# Patient Record
Sex: MALE | Race: WHITE | Employment: OTHER | URBAN - METROPOLITAN AREA
[De-identification: names, ages, dates, MRNs, and addresses within clinical notes are randomized per-mention and may not be internally consistent; named-entity substitution may affect disease eponyms.]

---

## 2022-04-13 ENCOUNTER — HOSPITAL ENCOUNTER (OUTPATIENT)
Dept: GENERAL RADIOLOGY | Age: 77
Discharge: HOME | End: 2022-04-13
Attending: NURSE PRACTITIONER
Payer: MEDICARE

## 2022-04-13 DIAGNOSIS — M54.16 LUMBAR RADICULOPATHY: ICD-10-CM

## 2022-04-13 PROCEDURE — 72100 X-RAY EXAM L-S SPINE 2/3 VWS: CPT

## 2022-04-13 PROCEDURE — 73502 X-RAY EXAM HIP UNI 2-3 VIEWS: CPT

## 2022-04-13 NOTE — PROGRESS NOTES
Please let patient know that his xray showed multilevel lumbar spondylosis, arthritis of the spine and I would like for him to follow with orthopedic for further evaluation if he is in agreement to this.

## 2022-04-14 NOTE — PROGRESS NOTES
Pt was notified on 04/14/2022 @ 11:08 about his xray. He would like a referral for orthopedic.     Thank you    Bradley Hernandez

## 2022-05-23 ENCOUNTER — OFFICE VISIT (OUTPATIENT)
Dept: ORTHOPEDIC SURGERY | Age: 77
End: 2022-05-23
Payer: MEDICARE

## 2022-05-23 DIAGNOSIS — M48.062 SPINAL STENOSIS OF LUMBAR REGION WITH NEUROGENIC CLAUDICATION: Primary | ICD-10-CM

## 2022-05-23 DIAGNOSIS — M51.36 DDD (DEGENERATIVE DISC DISEASE), LUMBAR: ICD-10-CM

## 2022-05-23 PROCEDURE — 64483 NJX AA&/STRD TFRM EPI L/S 1: CPT | Performed by: PHYSICAL MEDICINE & REHABILITATION

## 2022-05-23 RX ORDER — TRIAMCINOLONE ACETONIDE 40 MG/ML
80 INJECTION, SUSPENSION INTRA-ARTICULAR; INTRAMUSCULAR ONCE
Status: COMPLETED | OUTPATIENT
Start: 2022-05-23 | End: 2022-05-23

## 2022-05-23 RX ADMIN — TRIAMCINOLONE ACETONIDE 80 MG: 40 INJECTION, SUSPENSION INTRA-ARTICULAR; INTRAMUSCULAR at 14:39

## 2022-05-23 NOTE — PROGRESS NOTES
Name: Rena Bronson  YOB: 1945  Gender: male  MRN: [id number]        Transforaminal WARNER Procedure Note    Procedure: Right  L5-S1 transforaminal epidural steroid injections     Precautions: Rena Bronson denies prior sensitivity to steroid, local anesthetic, iodine, or shellfish. Consent:  Consent was obtained prior to the procedure. The procedure was discussed at length with Rena Bronson. He was given the opportunity to ask questions regarding the procedure and its associated risks. In addition to the potential risks associated with the procedure itself, the patient was informed both verbally and in writing of potential side effects of the use glucocorticoids. The patient appeared to comprehend the informed consent and desired to have the procedure performed, and informed consent was signed. He was placed in a prone position on the fluoroscopy table and the skin was prepped and draped in a routine sterile fashion. The areas to be injected were each anesthetized with 1 ml of 1% Lidocaine. A 22 gauge 3.5 inch spinal needle was carefully advanced under fluoroscopic guidance to the right L5-S1 transforaminal space  0.5 ml of 70% of Omnipaque was injected to confirm proper needle placement and absence of subdural or vascular flow Once proper placement was confirmed, 0.5ml of 0.25 marcaine and 80 mg of kenalog were injected through the spinal needle. Fluoroscopic guidance was used intermittently over a 10-minute period to insure proper needle placement and his safety. A hard copy of the fluoroscopic image has been placed in his chart and is saved on the C-arm hard drive. He was monitored for 30 minutes after the procedure and discharged home in a stable fashion with a routine follow up. Procedural Diagnosis:     ICD-10-CM    1.  Spinal stenosis of lumbar region with neurogenic claudication  M48.062 IR GUIDED INJ LUMB/SAC TRANSFORAMINAL EPI SINGLE LEVEL triamcinolone acetonide (KENALOG-40) injection 80 mg   2.  DDD (degenerative disc disease), lumbar  M51.36 IR GUIDED INJ LUMB/SAC TRANSFORAMINAL EPI SINGLE LEVEL     triamcinolone acetonide (KENALOG-40) injection 80 mg      Agapito Catalan MD  05/25/22

## 2022-06-09 ENCOUNTER — OFFICE VISIT (OUTPATIENT)
Dept: ORTHOPEDIC SURGERY | Age: 77
End: 2022-06-09
Payer: MEDICARE

## 2022-06-09 DIAGNOSIS — M43.16 SPONDYLOLISTHESIS OF LUMBAR REGION: ICD-10-CM

## 2022-06-09 DIAGNOSIS — M48.062 SPINAL STENOSIS OF LUMBAR REGION WITH NEUROGENIC CLAUDICATION: Primary | ICD-10-CM

## 2022-06-09 PROCEDURE — 99213 OFFICE O/P EST LOW 20 MIN: CPT | Performed by: NURSE PRACTITIONER

## 2022-06-09 PROCEDURE — G8428 CUR MEDS NOT DOCUMENT: HCPCS | Performed by: NURSE PRACTITIONER

## 2022-06-09 PROCEDURE — 4004F PT TOBACCO SCREEN RCVD TLK: CPT | Performed by: NURSE PRACTITIONER

## 2022-06-09 PROCEDURE — 1123F ACP DISCUSS/DSCN MKR DOCD: CPT | Performed by: NURSE PRACTITIONER

## 2022-06-09 PROCEDURE — G8421 BMI NOT CALCULATED: HCPCS | Performed by: NURSE PRACTITIONER

## 2022-06-09 NOTE — PROGRESS NOTES
Name: Farida Blanca  YOB: 1945  Gender: male  MRN: [id number]    CC: Follow-up low back and right leg pain    HPI: This is a 68y.o. year old male who returns today after lumbar injections. Patient has complaints of right L5 radiculopathy ongoing intermittently for 5 years. He has a known spondylolisthesis of L4 and L5 with severe spinal stenosis at L4-5 centrally and moderate to severe foraminal stenosis on the right at L4 and L5. Patient underwent a right L5 selective nerve root block. Conservative treatment had included physical therapy, chiropractic care, diclofenac. Percentage of pain relief: 95% relief    Activity limitation or improvement: he is able to return back to baseline activities.     Current pain level: 0       AMB PAIN ASSESSMENT 6/9/2022   Severity of Pain 0           Not on File    Current Outpatient Medications:     albuterol sulfate  (90 Base) MCG/ACT inhaler, 2 puffs as needed, Disp: , Rfl:     chlorhexidine (PERIDEX) 0.12 % solution, PLACE 15 ML IN THE MOUTH SWISH IN MOUTH FOR 30 SECONDS THEN SPIT OUT, Disp: , Rfl:     vitamin D (CHOLECALCIFEROL) 25 MCG (1000 UT) TABS tablet, Take 1,000 Units by mouth daily, Disp: , Rfl:     diclofenac (VOLTAREN) 75 MG EC tablet, Take 75 mg by mouth 2 times daily, Disp: , Rfl:     fluticasone (FLONASE) 50 MCG/ACT nasal spray, 1 spray in each nostril, Disp: , Rfl:     fluticasone-salmeterol (ADVAIR) 250-50 MCG/ACT AEPB diskus inhaler, Inhale 1 puff into the lungs, Disp: , Rfl:     hydroCHLOROthiazide (HYDRODIURIL) 12.5 MG tablet, Take 12.5 mg by mouth every other day, Disp: , Rfl:     lisinopril (PRINIVIL;ZESTRIL) 20 MG tablet, Take 20 mg by mouth daily, Disp: , Rfl:     methylPREDNISolone (MEDROL DOSEPACK) 4 MG tablet, FOLLOW PACKAGE INSTRUCTIONS, Disp: , Rfl:     metroNIDAZOLE (METROGEL) 1 % gel, Apply 1 Tube topically daily, Disp: , Rfl:     pramipexole (MIRAPEX) 0.5 MG tablet, Take 0.5 mg by mouth nightly, Disp: , Rfl:     pravastatin (PRAVACHOL) 10 MG tablet, Take 10 mg by mouth daily, Disp: , Rfl:   Past Medical History:   Diagnosis Date    Arthritis     Asthma     Hypercholesterolemia     Hypertension      Tobacco:  reports that he has never smoked. He has never used smokeless tobacco.  Alcohol:   Social History     Substance and Sexual Activity   Alcohol Use Never          Radiographic Studies:       Assessment/Plan:        ICD-10-CM    1. Spinal stenosis of lumbar region with neurogenic claudication  M48.062    2. Spondylolisthesis of lumbar region  M43.16         Almost complete resolution of right L5 radiculopathy due to severe spinal stenosis and spondylolisthesis status post selective nerve root block #1. I am very pleased with patient's results from the injection. He is very happy as well. We discussed that he may call back when his symptoms return for reinjection.    -The patient will be treated observantly with self directed symptomatic care. Instructions were given to follow up if there is any neurologic or functional decline. No orders of the defined types were placed in this encounter. No orders of the defined types were placed in this encounter. 3 This is stable chronic illness/condition      Return if symptoms worsen or fail to improve. GOMEZ Hansen - CNP  06/09/22      Elements of this note were created using speech recognition software. As such, errors of speech recognition may be present.

## 2022-06-13 ENCOUNTER — PATIENT MESSAGE (OUTPATIENT)
Dept: INTERNAL MEDICINE CLINIC | Facility: CLINIC | Age: 77
End: 2022-06-13

## 2022-06-13 NOTE — TELEPHONE ENCOUNTER
From: Millicent Birch  To: Dang Whitman  Sent: 2022 10:40 AM EDT  Subject: Pramipexole    I need a new scrip for this med sent to [address]. The current one from Dr Emmett Lombard on [address] has .  Thank you   Millicent Birch 1945

## 2022-06-14 DIAGNOSIS — I10 PRIMARY HYPERTENSION: Primary | ICD-10-CM

## 2022-06-14 DIAGNOSIS — E78.2 MODERATE MIXED HYPERLIPIDEMIA NOT REQUIRING STATIN THERAPY: ICD-10-CM

## 2022-06-15 ENCOUNTER — NURSE ONLY (OUTPATIENT)
Dept: INTERNAL MEDICINE CLINIC | Facility: CLINIC | Age: 77
End: 2022-06-15

## 2022-06-15 DIAGNOSIS — I10 PRIMARY HYPERTENSION: ICD-10-CM

## 2022-06-15 DIAGNOSIS — E78.2 MODERATE MIXED HYPERLIPIDEMIA NOT REQUIRING STATIN THERAPY: ICD-10-CM

## 2022-06-15 RX ORDER — PRAMIPEXOLE DIHYDROCHLORIDE 0.5 MG/1
0.5 TABLET ORAL NIGHTLY
Qty: 90 TABLET | Refills: 1 | Status: SHIPPED | OUTPATIENT
Start: 2022-06-15

## 2022-06-15 NOTE — TELEPHONE ENCOUNTER
Patient is requesting prmipexole refill. Please send to [facility] on [facility]

## 2022-06-15 NOTE — TELEPHONE ENCOUNTER
Pramipexole sent to [facility] by Dulce Soriano on 6/15/22. LVM on 6/15/22 @ 10:22 AM to inform pt.

## 2022-06-16 ENCOUNTER — TELEPHONE (OUTPATIENT)
Dept: INTERNAL MEDICINE CLINIC | Facility: CLINIC | Age: 77
End: 2022-06-16

## 2022-06-16 LAB
ALBUMIN SERPL-MCNC: 3.8 G/DL (ref 3.2–4.6)
ALBUMIN/GLOB SERPL: 1.2 {RATIO} (ref 1.2–3.5)
ALP SERPL-CCNC: 49 U/L (ref 50–136)
ALT SERPL-CCNC: 46 U/L (ref 12–65)
ANION GAP SERPL CALC-SCNC: 6 MMOL/L (ref 7–16)
APPEARANCE UR: CLEAR
AST SERPL-CCNC: 26 U/L (ref 15–37)
BACTERIA URNS QL MICRO: 0 /HPF
BASOPHILS # BLD: 0 K/UL (ref 0–0.2)
BASOPHILS NFR BLD: 0 % (ref 0–2)
BILIRUB SERPL-MCNC: 0.6 MG/DL (ref 0.2–1.1)
BILIRUB UR QL: NEGATIVE
BUN SERPL-MCNC: 17 MG/DL (ref 8–23)
CALCIUM SERPL-MCNC: 8.8 MG/DL (ref 8.3–10.4)
CASTS URNS QL MICRO: 0 /LPF
CHLORIDE SERPL-SCNC: 104 MMOL/L (ref 98–107)
CHOLEST SERPL-MCNC: 161 MG/DL
CO2 SERPL-SCNC: 28 MMOL/L (ref 21–32)
COLOR UR: YELLOW
CREAT SERPL-MCNC: 0.8 MG/DL (ref 0.8–1.5)
CRYSTALS URNS QL MICRO: 0 /LPF
DIFFERENTIAL METHOD BLD: ABNORMAL
EOSINOPHIL # BLD: 0.2 K/UL (ref 0–0.8)
EOSINOPHIL NFR BLD: 2 % (ref 0.5–7.8)
EPI CELLS #/AREA URNS HPF: 0 /HPF
ERYTHROCYTE [DISTWIDTH] IN BLOOD BY AUTOMATED COUNT: 13.8 % (ref 11.9–14.6)
GLOBULIN SER CALC-MCNC: 3.3 G/DL (ref 2.3–3.5)
GLUCOSE SERPL-MCNC: 79 MG/DL (ref 65–100)
GLUCOSE UR STRIP.AUTO-MCNC: NEGATIVE MG/DL
HCT VFR BLD AUTO: 42.1 % (ref 41.1–50.3)
HDLC SERPL-MCNC: 69 MG/DL (ref 40–60)
HDLC SERPL: 2.3 {RATIO}
HGB BLD-MCNC: 13.2 G/DL (ref 13.6–17.2)
HGB UR QL STRIP: NEGATIVE
IMM GRANULOCYTES # BLD AUTO: 0 K/UL (ref 0–0.5)
IMM GRANULOCYTES NFR BLD AUTO: 0 % (ref 0–5)
KETONES UR QL STRIP.AUTO: NEGATIVE MG/DL
LDLC SERPL CALC-MCNC: 72.2 MG/DL
LEUKOCYTE ESTERASE UR QL STRIP.AUTO: NEGATIVE
LYMPHOCYTES # BLD: 1.9 K/UL (ref 0.5–4.6)
LYMPHOCYTES NFR BLD: 27 % (ref 13–44)
MCH RBC QN AUTO: 30.4 PG (ref 26.1–32.9)
MCHC RBC AUTO-ENTMCNC: 31.4 G/DL (ref 31.4–35)
MCV RBC AUTO: 97 FL (ref 79.6–97.8)
MONOCYTES # BLD: 0.5 K/UL (ref 0.1–1.3)
MONOCYTES NFR BLD: 7 % (ref 4–12)
MUCOUS THREADS URNS QL MICRO: 0 /LPF
NEUTS SEG # BLD: 4.6 K/UL (ref 1.7–8.2)
NEUTS SEG NFR BLD: 64 % (ref 43–78)
NITRITE UR QL STRIP.AUTO: NEGATIVE
NRBC # BLD: 0 K/UL (ref 0–0.2)
PH UR STRIP: 7 [PH] (ref 5–9)
PLATELET # BLD AUTO: 247 K/UL (ref 150–450)
PMV BLD AUTO: 9.9 FL (ref 9.4–12.3)
POTASSIUM SERPL-SCNC: 4.3 MMOL/L (ref 3.5–5.1)
PROT SERPL-MCNC: 7.1 G/DL (ref 6.3–8.2)
PROT UR STRIP-MCNC: NEGATIVE MG/DL
RBC # BLD AUTO: 4.34 M/UL (ref 4.23–5.6)
RBC #/AREA URNS HPF: 0 /HPF
SODIUM SERPL-SCNC: 138 MMOL/L (ref 138–145)
SP GR UR REFRACTOMETRY: 1.01 (ref 1–1.02)
TRIGL SERPL-MCNC: 99 MG/DL (ref 35–150)
URINE CULTURE IF INDICATED: NORMAL
UROBILINOGEN UR QL STRIP.AUTO: 0.2 EU/DL (ref 0.2–1)
VLDLC SERPL CALC-MCNC: 19.8 MG/DL (ref 6–23)
WBC # BLD AUTO: 7.2 K/UL (ref 4.3–11.1)
WBC URNS QL MICRO: 0 /HPF

## 2022-06-16 NOTE — TELEPHONE ENCOUNTER
Called back no answer   Patient is requesting refill on advair. [facility] on [facility].

## 2022-06-17 RX ORDER — FLUTICASONE PROPIONATE AND SALMETEROL 250; 50 UG/1; UG/1
1 POWDER RESPIRATORY (INHALATION) 2 TIMES DAILY
Qty: 60 EACH | Refills: 2 | Status: SHIPPED | OUTPATIENT
Start: 2022-06-17 | End: 2022-09-30 | Stop reason: SDUPTHER

## 2022-07-18 ENCOUNTER — TELEMEDICINE (OUTPATIENT)
Dept: INTERNAL MEDICINE CLINIC | Facility: CLINIC | Age: 77
End: 2022-07-18
Payer: MEDICARE

## 2022-07-18 DIAGNOSIS — U07.1 COVID-19: Primary | ICD-10-CM

## 2022-07-18 PROBLEM — E78.5 HYPERLIPIDEMIA: Status: ACTIVE | Noted: 2020-12-10

## 2022-07-18 PROBLEM — I10 HYPERTENSION: Status: ACTIVE | Noted: 2020-12-10

## 2022-07-18 PROBLEM — J45.909 ASTHMA: Status: ACTIVE | Noted: 2020-12-10

## 2022-07-18 PROCEDURE — 1123F ACP DISCUSS/DSCN MKR DOCD: CPT | Performed by: NURSE PRACTITIONER

## 2022-07-18 PROCEDURE — 99213 OFFICE O/P EST LOW 20 MIN: CPT | Performed by: NURSE PRACTITIONER

## 2022-07-18 PROCEDURE — G8427 DOCREV CUR MEDS BY ELIG CLIN: HCPCS | Performed by: NURSE PRACTITIONER

## 2022-07-18 RX ORDER — METHYLPREDNISOLONE 4 MG/1
TABLET ORAL
Qty: 21 TABLET | Refills: 0 | Status: SHIPPED | OUTPATIENT
Start: 2022-07-18 | End: 2022-07-24

## 2022-07-18 ASSESSMENT — ENCOUNTER SYMPTOMS
VOMITING: 0
SHORTNESS OF BREATH: 0
RHINORRHEA: 1
SINUS PAIN: 0
WHEEZING: 1
ABDOMINAL PAIN: 0
SINUS PRESSURE: 0
DIARRHEA: 0
NAUSEA: 0
COUGH: 1
SORE THROAT: 0

## 2022-07-18 NOTE — PROGRESS NOTES
Zena Carranza (:  1945) is a Established patient, here for evaluation of the following:    Assessment & Plan   Below is the assessment and plan developed based on review of pertinent history, physical exam, labs, studies, and medications. 1. COVID-19  -     nirmatrelvir/ritonavir (PAXLOVID) 20 x 150 MG & 10 x 100MG; Take 3 tablets (two 150 mg nirmatrelvir and one 100 mg ritonavir tablets) by mouth every 12 hours for 5 days. , Disp-30 tablet, R-0Covid symptoms started ; positive result . GFR > 60 on 6/15/22. Normal  -     methylPREDNISolone (MEDROL DOSEPACK) 4 MG tablet; Take by mouth., Disp-21 tablet, R-0Normal  Take Paxlovid and Medrol dose pack as prescribed. Continue inhalers as prescribed. Recommended Mucinex twice daily and continued Flonase use. Call if symptoms worsen or fail to improve. Return if symptoms worsen or fail to improve. Subjective   HPI  Patient seen virtually today with complaint of cough and congestion. Symptoms started about 2 days ago. Tested positive on an at-home rapid Covid test yesterday. Associated symptoms include body aches, fatigue, rhinorrhea, headache, chest congestion. He reports underlying asthma and has noticed increased wheezing from baseline since symptoms started. He has been using Advair as prescribed. He says that he has not needed the albuterol. He denies any fever, chills or shortness of breath. Review of Systems   Constitutional:  Positive for fatigue. Negative for chills and fever. HENT:  Positive for congestion, postnasal drip and rhinorrhea. Negative for ear discharge, ear pain, sinus pressure, sinus pain and sore throat. Respiratory:  Positive for cough and wheezing. Negative for shortness of breath. Cardiovascular:  Negative for chest pain. Gastrointestinal:  Negative for abdominal pain, diarrhea, nausea and vomiting. Neurological:  Positive for headaches.         Objective     Physical Exam  Constitutional: General: He is not in acute distress. Appearance: Normal appearance. HENT:      Head: Normocephalic and atraumatic. Pulmonary:      Effort: No respiratory distress. Neurological:      Mental Status: He is alert and oriented to person, place, and time. Psychiatric:         Mood and Affect: Mood normal.            On this date 7/18/2022 I have spent 20 minutes reviewing previous notes, test results and face to face (virtual) with the patient discussing the diagnosis and importance of compliance with the treatment plan as well as documenting on the day of the visit. [employer], was evaluated through a synchronous (real-time) audio-video encounter. The patient (or guardian if applicable) is aware that this is a billable service, which includes applicable co-pays. This Virtual Visit was conducted with patient's (and/or legal guardian's) consent. The visit was conducted pursuant to the emergency declaration under the [address] [place] [address], [address] [place] [address] authority and the [employer] and [employer] General Act. Patient identification was verified, and a caregiver was present when appropriate. The patient was located at Home: [address] [address]. Provider was located at [facility] (Appt Dept): [address] [place] [address] [address] [facility]  [place],  [address] [facility] [place].         --GOMEZ Teague - CNP

## 2022-07-28 RX ORDER — DICLOFENAC SODIUM 75 MG/1
75 TABLET, DELAYED RELEASE ORAL 2 TIMES DAILY
Qty: 60 TABLET | Refills: 5 | Status: SHIPPED | OUTPATIENT
Start: 2022-07-28

## 2022-07-28 NOTE — TELEPHONE ENCOUNTER
Refills sent to [facility] by Uzma Mercado on 7/28/22. LVM on 7/28/22 @ 1:50 PM to inform pt.

## 2022-09-29 ENCOUNTER — TELEPHONE (OUTPATIENT)
Dept: ORTHOPEDIC SURGERY | Age: 77
End: 2022-09-29

## 2022-09-29 RX ORDER — FLUTICASONE PROPIONATE AND SALMETEROL 250; 50 UG/1; UG/1
1 POWDER RESPIRATORY (INHALATION) 2 TIMES DAILY
Qty: 60 EACH | Refills: 2 | Status: CANCELLED | OUTPATIENT
Start: 2022-09-29

## 2022-09-30 DIAGNOSIS — Z09 NEED FOR IMMUNIZATION FOLLOW-UP: Primary | ICD-10-CM

## 2022-09-30 RX ORDER — ZOSTER VACCINE RECOMBINANT, ADJUVANTED 50 MCG/0.5
0.5 KIT INTRAMUSCULAR SEE ADMIN INSTRUCTIONS
Qty: 0.5 ML | Refills: 0 | Status: SHIPPED | OUTPATIENT
Start: 2022-09-30 | End: 2023-03-29

## 2022-09-30 RX ORDER — METRONIDAZOLE 10 MG/G
1 GEL TOPICAL DAILY
Qty: 1 EACH | Refills: 1 | Status: SHIPPED | OUTPATIENT
Start: 2022-09-30

## 2022-09-30 RX ORDER — FLUTICASONE PROPIONATE AND SALMETEROL 250; 50 UG/1; UG/1
1 POWDER RESPIRATORY (INHALATION) 2 TIMES DAILY
Qty: 1 EACH | Refills: 2 | Status: SHIPPED | OUTPATIENT
Start: 2022-09-30

## 2022-09-30 RX ORDER — CHLORHEXIDINE GLUCONATE 0.12 MG/ML
15 RINSE ORAL 2 TIMES DAILY
Qty: 1 EACH | Refills: 0 | Status: SHIPPED | OUTPATIENT
Start: 2022-09-30

## 2022-09-30 NOTE — TELEPHONE ENCOUNTER
Refills sent to [facility] by [address], NP and shingles vaccine RX printed. Pt notified and verbalized understanding. RX will be mailed to pt today.

## 2022-10-11 ENCOUNTER — TELEPHONE (OUTPATIENT)
Dept: ORTHOPEDIC SURGERY | Age: 77
End: 2022-10-11

## 2022-10-11 DIAGNOSIS — M48.062 SPINAL STENOSIS OF LUMBAR REGION WITH NEUROGENIC CLAUDICATION: Primary | ICD-10-CM

## 2022-10-11 DIAGNOSIS — M43.16 SPONDYLOLISTHESIS OF LUMBAR REGION: ICD-10-CM

## 2022-10-11 NOTE — TELEPHONE ENCOUNTER
Patient calls in with increases complaints of lower back and right sided leg pain. Patient expresses no new injuries or falls. Patient is wanting to trial #2 SNRB. We will get him scheduled for a R l5 snrb with GIANNA.             Name: Rossy Hawkins  YOB: 1945  Gender: male  MRN: [id number]        The most recent injection provided 90% pain reduction for at least 4 months. The patient's current pain scale is 8/10. .    As a result of the current recurrence of pain, the patient is having the following difficulties:  Doing his daily acitivies without pain    The patient has severe pain limiting function despite on-going, conservative, physician-guided treatment. The patient is currently doing home exercise program under [facility] with no relief.        Eluterio Halsted, [place]     10/11/2022

## 2022-10-17 ENCOUNTER — OFFICE VISIT (OUTPATIENT)
Dept: ORTHOPEDIC SURGERY | Age: 77
End: 2022-10-17
Payer: MEDICARE

## 2022-10-17 DIAGNOSIS — M51.36 DDD (DEGENERATIVE DISC DISEASE), LUMBAR: ICD-10-CM

## 2022-10-17 DIAGNOSIS — M48.062 SPINAL STENOSIS OF LUMBAR REGION WITH NEUROGENIC CLAUDICATION: Primary | ICD-10-CM

## 2022-10-17 DIAGNOSIS — M43.16 SPONDYLOLISTHESIS OF LUMBAR REGION: ICD-10-CM

## 2022-10-17 PROCEDURE — 64483 NJX AA&/STRD TFRM EPI L/S 1: CPT | Performed by: PHYSICAL MEDICINE & REHABILITATION

## 2022-10-17 RX ORDER — TRIAMCINOLONE ACETONIDE 40 MG/ML
40 INJECTION, SUSPENSION INTRA-ARTICULAR; INTRAMUSCULAR ONCE
Status: COMPLETED | OUTPATIENT
Start: 2022-10-17 | End: 2022-10-17

## 2022-10-17 RX ADMIN — TRIAMCINOLONE ACETONIDE 40 MG: 40 INJECTION, SUSPENSION INTRA-ARTICULAR; INTRAMUSCULAR at 15:55

## 2022-11-07 RX ORDER — PRAMIPEXOLE DIHYDROCHLORIDE 0.5 MG/1
0.5 TABLET ORAL NIGHTLY
Qty: 90 TABLET | Refills: 1 | OUTPATIENT
Start: 2022-11-07

## 2022-11-28 RX ORDER — PRAMIPEXOLE DIHYDROCHLORIDE 0.5 MG/1
0.5 TABLET ORAL NIGHTLY
Qty: 90 TABLET | Refills: 1 | OUTPATIENT
Start: 2022-11-28

## 2022-11-29 RX ORDER — FLUTICASONE PROPIONATE AND SALMETEROL 50; 250 UG/1; UG/1
POWDER RESPIRATORY (INHALATION)
Qty: 60 EACH | Refills: 2 | OUTPATIENT
Start: 2022-11-29

## 2022-12-07 RX ORDER — PRAMIPEXOLE DIHYDROCHLORIDE 0.5 MG/1
0.5 TABLET ORAL NIGHTLY
Qty: 90 TABLET | Refills: 1 | Status: SHIPPED | OUTPATIENT
Start: 2022-12-07

## 2022-12-07 RX ORDER — LISINOPRIL 20 MG/1
20 TABLET ORAL DAILY
Qty: 30 TABLET | Refills: 5 | Status: SHIPPED | OUTPATIENT
Start: 2022-12-07

## 2022-12-07 RX ORDER — PRAVASTATIN SODIUM 10 MG
10 TABLET ORAL DAILY
Qty: 30 TABLET | Refills: 5 | Status: SHIPPED | OUTPATIENT
Start: 2022-12-07

## 2022-12-15 RX ORDER — FLUTICASONE PROPIONATE AND SALMETEROL 50; 250 UG/1; UG/1
POWDER RESPIRATORY (INHALATION)
Qty: 60 EACH | Refills: 2 | OUTPATIENT
Start: 2022-12-15

## 2023-01-09 DIAGNOSIS — J45.909 UNCOMPLICATED ASTHMA, UNSPECIFIED ASTHMA SEVERITY, UNSPECIFIED WHETHER PERSISTENT: Primary | ICD-10-CM

## 2023-01-09 RX ORDER — FLUTICASONE PROPIONATE AND SALMETEROL 250; 50 UG/1; UG/1
1 POWDER RESPIRATORY (INHALATION) 2 TIMES DAILY
Qty: 1 EACH | Refills: 2 | Status: SHIPPED | OUTPATIENT
Start: 2023-01-09

## 2023-01-09 NOTE — TELEPHONE ENCOUNTER
Pt requesting refill of Advair Diskus to [facility] [address], [address] Any issues pt can be reached by [phone]. Please advise.      Thank you    Pati Miller

## 2023-01-23 RX ORDER — DICLOFENAC SODIUM 75 MG/1
75 TABLET, DELAYED RELEASE ORAL 2 TIMES DAILY
Qty: 60 TABLET | Refills: 5 | OUTPATIENT
Start: 2023-01-23

## 2023-02-01 DIAGNOSIS — J45.909 UNCOMPLICATED ASTHMA, UNSPECIFIED ASTHMA SEVERITY, UNSPECIFIED WHETHER PERSISTENT: ICD-10-CM

## 2023-02-01 NOTE — TELEPHONE ENCOUNTER
Pt called, requests refill of diclofenac 75 mg tablet, requests 90 day supply, and Advair 250 discus, requests 90 days, pt states brand name only.     Sent to [facility] on [address]

## 2023-02-03 RX ORDER — FLUTICASONE PROPIONATE AND SALMETEROL 250; 50 UG/1; UG/1
1 POWDER RESPIRATORY (INHALATION) 2 TIMES DAILY
Qty: 1 EACH | Refills: 2 | Status: SHIPPED | OUTPATIENT
Start: 2023-02-03

## 2023-02-03 RX ORDER — DICLOFENAC SODIUM 75 MG/1
75 TABLET, DELAYED RELEASE ORAL 2 TIMES DAILY
Qty: 60 TABLET | Refills: 5 | Status: SHIPPED | OUTPATIENT
Start: 2023-02-03

## 2023-02-03 NOTE — TELEPHONE ENCOUNTER
Called and advised patient diclofenac and [facility]. Patient voiced understanding.

## 2023-02-08 ENCOUNTER — TELEMEDICINE (OUTPATIENT)
Dept: INTERNAL MEDICINE CLINIC | Facility: CLINIC | Age: 78
End: 2023-02-08
Payer: MEDICARE

## 2023-02-08 DIAGNOSIS — U07.1 POSITIVE SELF-ADMINISTERED ANTIGEN TEST FOR COVID-19: Primary | ICD-10-CM

## 2023-02-08 PROCEDURE — G8417 CALC BMI ABV UP PARAM F/U: HCPCS | Performed by: NURSE PRACTITIONER

## 2023-02-08 PROCEDURE — 1123F ACP DISCUSS/DSCN MKR DOCD: CPT | Performed by: NURSE PRACTITIONER

## 2023-02-08 PROCEDURE — 99213 OFFICE O/P EST LOW 20 MIN: CPT | Performed by: NURSE PRACTITIONER

## 2023-02-08 PROCEDURE — 1036F TOBACCO NON-USER: CPT | Performed by: NURSE PRACTITIONER

## 2023-02-08 PROCEDURE — G8428 CUR MEDS NOT DOCUMENT: HCPCS | Performed by: NURSE PRACTITIONER

## 2023-02-08 PROCEDURE — G8484 FLU IMMUNIZE NO ADMIN: HCPCS | Performed by: NURSE PRACTITIONER

## 2023-02-08 ASSESSMENT — ENCOUNTER SYMPTOMS
NAUSEA: 0
SHORTNESS OF BREATH: 0
EYE DISCHARGE: 0
ABDOMINAL DISTENTION: 0
CONSTIPATION: 0
EYE ITCHING: 0
APNEA: 0
EYE REDNESS: 0
SINUS PAIN: 0
BACK PAIN: 0
COUGH: 0
EYE PAIN: 0
DIARRHEA: 0
ABDOMINAL PAIN: 0
COLOR CHANGE: 0

## 2023-02-08 NOTE — PROGRESS NOTES
[unfilled]  [address] [place] [id number]      PROGRESS NOTE    SUBJECTIVE:   Pritesh Orellana is a 78 y.o. male seen for a follow up visit regarding the following chief complaint:     Chief Complaint   Patient presents with    Positive For Covid-19       Positive For Covid-19  Associated symptoms include congestion and fatigue. Pertinent negatives include no abdominal pain, arthralgias, chest pain, chills, coughing, fever, headaches, joint swelling, myalgias, nausea, neck pain, rash or weakness.   Patient presents with concerns of positive covid test today. Symptom onset 3 days ago. Symptoms include fatigue, congestion and sinus drainage. Afebrile. Appetite fair. Denies cough. Hx of COPD.  Current Outpatient Medications   Medication Sig Dispense Refill    molnupiravir 200 MG capsule Take 4 capsules by mouth in the morning and 4 capsules in the evening. Do all this for 5 days. 40 capsule 0    diclofenac (VOLTAREN) 75 MG EC tablet Take 1 tablet by mouth 2 times daily 60 tablet 5    fluticasone-salmeterol (ADVAIR DISKUS) 250-50 MCG/ACT AEPB diskus inhaler Inhale 1 puff into the lungs 2 times daily 1 each 2    pravastatin (PRAVACHOL) 10 MG tablet Take 1 tablet by mouth daily 30 tablet 5    lisinopril (PRINIVIL;ZESTRIL) 20 MG tablet Take 1 tablet by mouth daily 30 tablet 5    pramipexole (MIRAPEX) 0.5 MG tablet Take 1 tablet by mouth nightly 90 tablet 1    chlorhexidine (PERIDEX) 0.12 % solution Take 15 mLs by mouth 2 times daily PLACE 15 ML IN THE MOUTH SWISH IN MOUTH FOR 30 SECONDS THEN SPIT OUT 1 each 0    metroNIDAZOLE (METROGEL) 1 % gel Apply 1 Tube topically daily 1 each 1    zoster recombinant adjuvanted vaccine (SHINGRIX) 50 MCG/0.5ML SUSR injection Inject 0.5 mLs into the muscle See Admin Instructions 1 dose now and repeat in 2-6 months 0.5 mL 0    zoster recombinant adjuvanted vaccine (SHINGRIX) 50 MCG/0.5ML SUSR injection Inject 0.5 mLs into the muscle See Admin Instructions 1 dose  now and repeat in 2-6 months 0.5 mL 0    albuterol sulfate  (90 Base) MCG/ACT inhaler 2 puffs as needed      vitamin D (CHOLECALCIFEROL) 25 MCG (1000 UT) TABS tablet Take 1,000 Units by mouth daily      fluticasone (FLONASE) 50 MCG/ACT nasal spray 1 spray in each nostril      hydroCHLOROthiazide (HYDRODIURIL) 12.5 MG tablet Take 12.5 mg by mouth every other day       No current facility-administered medications for this visit. No Known Allergies    Past Medical History:   Diagnosis Date    Arthritis     Asthma     Hypercholesterolemia     Hypertension      Past Surgical History:   Procedure Laterality Date    CATARACT REMOVAL      Bilateral    CHOLECYSTECTOMY      HX JOINT REPLACEMENT SURGERY Bilateral 2012    [place]    TOTAL KNEE ARTHROPLASTY Bilateral 2012     Family History   Problem Relation Age of Onset    Kidney Disease Father     Pancreatic Cancer Mother     Colon Cancer Sister      Social History     Tobacco Use    Smoking status: Never    Smokeless tobacco: Never   Substance Use Topics    Alcohol use: Never       Review of Systems   Constitutional:  Positive for fatigue. Negative for activity change, appetite change, chills and fever. HENT:  Positive for congestion. Negative for ear pain and sinus pain. Eyes:  Negative for pain, discharge, redness and itching. Respiratory:  Negative for apnea, cough and shortness of breath. Cardiovascular:  Negative for chest pain, palpitations and leg swelling. Gastrointestinal:  Negative for abdominal distention, abdominal pain, constipation, diarrhea and nausea. Endocrine: Negative for cold intolerance and heat intolerance. Genitourinary:  Negative for difficulty urinating, dysuria, enuresis and urgency. Musculoskeletal:  Negative for arthralgias, back pain, joint swelling, myalgias and neck pain. Skin:  Negative for color change and rash. Neurological:  Negative for dizziness, weakness and headaches.    Psychiatric/Behavioral: Negative for behavioral problems and sleep disturbance. The patient is not nervous/anxious. OBJECTIVE:  There were no vitals taken for this visit. Physical Exam  Constitutional:       General: He is not in acute distress. Appearance: Normal appearance. He is not ill-appearing or toxic-appearing. Cardiovascular:      Rate and Rhythm: Normal rate. Pulmonary:      Effort: Pulmonary effort is normal. No respiratory distress. Skin:     General: Skin is warm and dry. Neurological:      Mental Status: He is alert. Mental status is at baseline. Psychiatric:         Mood and Affect: Mood normal.         Behavior: Behavior normal.         Thought Content: Thought content normal.         ASSESSMENT and PLAN  Hair Huff was seen today for positive for covid-19. Diagnoses and all orders for this visit:    Positive self-administered antigen test for COVID-19  -     molnupiravir 200 MG capsule; Take 4 capsules by mouth in the morning and 4 capsules in the evening. Do all this for 5 days. Start on molnupiravir as prescribed. Instructed to return if symptoms worsen such as dehydration, lethargy, chest pain or SOB. Pursuant to the emergency declaration under the [facility], [facility] waiver authority and the [employer] Act, this Virtual Visit was conducted, with patient's consent, to reduce the patient's risk of exposure to COVID-19 and provide continuity of care for an established patient. Services were provided through a video synchronous discussion virtually to substitute for in-person clinic visit. Patient consented to virtual visit. Greater than 50% of this 15 min visit was spent counseling the patient about test results, prognosis, importance of compliance, education about disease process, benefits of medications, instructions for management of acute symptoms, and follow up plans.        Follow-up and Dispositions Return if symptoms worsen or fail to improve.          Nusrat Balderas NP, APRN - CNP

## 2023-02-09 ENCOUNTER — TELEPHONE (OUTPATIENT)
Dept: INTERNAL MEDICINE CLINIC | Facility: CLINIC | Age: 78
End: 2023-02-09

## 2023-02-09 NOTE — TELEPHONE ENCOUNTER
Patient called and lvm stating that his advair was not sent in. Called and advised patient refills were already at the pharmacy. Patient voiced understanding and will call pharmacy.

## 2023-02-24 ENCOUNTER — OFFICE VISIT (OUTPATIENT)
Dept: INTERNAL MEDICINE CLINIC | Facility: CLINIC | Age: 78
End: 2023-02-24
Payer: MEDICARE

## 2023-02-24 VITALS
WEIGHT: 213 LBS | SYSTOLIC BLOOD PRESSURE: 130 MMHG | HEART RATE: 79 BPM | BODY MASS INDEX: 33.43 KG/M2 | OXYGEN SATURATION: 96 % | TEMPERATURE: 97.9 F | DIASTOLIC BLOOD PRESSURE: 76 MMHG | HEIGHT: 67 IN

## 2023-02-24 DIAGNOSIS — I10 PRIMARY HYPERTENSION: Primary | ICD-10-CM

## 2023-02-24 DIAGNOSIS — Z91.89 CARDIOVASCULAR RISK FACTOR: ICD-10-CM

## 2023-02-24 DIAGNOSIS — R07.89 INTERMITTENT LEFT-SIDED CHEST PAIN: ICD-10-CM

## 2023-02-24 DIAGNOSIS — G25.81 RLS (RESTLESS LEGS SYNDROME): ICD-10-CM

## 2023-02-24 DIAGNOSIS — N52.1 ERECTILE DYSFUNCTION DUE TO DISEASES CLASSIFIED ELSEWHERE: ICD-10-CM

## 2023-02-24 DIAGNOSIS — E78.2 MIXED HYPERLIPIDEMIA: ICD-10-CM

## 2023-02-24 PROCEDURE — 1036F TOBACCO NON-USER: CPT | Performed by: NURSE PRACTITIONER

## 2023-02-24 PROCEDURE — G8417 CALC BMI ABV UP PARAM F/U: HCPCS | Performed by: NURSE PRACTITIONER

## 2023-02-24 PROCEDURE — G8484 FLU IMMUNIZE NO ADMIN: HCPCS | Performed by: NURSE PRACTITIONER

## 2023-02-24 PROCEDURE — 99214 OFFICE O/P EST MOD 30 MIN: CPT | Performed by: NURSE PRACTITIONER

## 2023-02-24 PROCEDURE — 3078F DIAST BP <80 MM HG: CPT | Performed by: NURSE PRACTITIONER

## 2023-02-24 PROCEDURE — 3075F SYST BP GE 130 - 139MM HG: CPT | Performed by: NURSE PRACTITIONER

## 2023-02-24 PROCEDURE — G8427 DOCREV CUR MEDS BY ELIG CLIN: HCPCS | Performed by: NURSE PRACTITIONER

## 2023-02-24 PROCEDURE — 1123F ACP DISCUSS/DSCN MKR DOCD: CPT | Performed by: NURSE PRACTITIONER

## 2023-02-24 RX ORDER — PRAMIPEXOLE DIHYDROCHLORIDE 0.5 MG/1
0.5 TABLET ORAL NIGHTLY
Qty: 90 TABLET | Refills: 1 | Status: SHIPPED | OUTPATIENT
Start: 2023-02-24

## 2023-02-24 RX ORDER — SILDENAFIL CITRATE 20 MG/1
20 TABLET ORAL DAILY PRN
Qty: 30 TABLET | Refills: 0 | Status: SHIPPED | OUTPATIENT
Start: 2023-02-24

## 2023-02-24 RX ORDER — HYDROCHLOROTHIAZIDE 12.5 MG/1
12.5 TABLET ORAL EVERY OTHER DAY
Qty: 90 TABLET | Refills: 1 | Status: SHIPPED | OUTPATIENT
Start: 2023-02-24

## 2023-02-24 SDOH — ECONOMIC STABILITY: INCOME INSECURITY: HOW HARD IS IT FOR YOU TO PAY FOR THE VERY BASICS LIKE FOOD, HOUSING, MEDICAL CARE, AND HEATING?: NOT HARD AT ALL

## 2023-02-24 SDOH — ECONOMIC STABILITY: FOOD INSECURITY: WITHIN THE PAST 12 MONTHS, YOU WORRIED THAT YOUR FOOD WOULD RUN OUT BEFORE YOU GOT MONEY TO BUY MORE.: NEVER TRUE

## 2023-02-24 SDOH — ECONOMIC STABILITY: FOOD INSECURITY: WITHIN THE PAST 12 MONTHS, THE FOOD YOU BOUGHT JUST DIDN'T LAST AND YOU DIDN'T HAVE MONEY TO GET MORE.: NEVER TRUE

## 2023-02-24 SDOH — ECONOMIC STABILITY: HOUSING INSECURITY
IN THE LAST 12 MONTHS, WAS THERE A TIME WHEN YOU DID NOT HAVE A STEADY PLACE TO SLEEP OR SLEPT IN A SHELTER (INCLUDING NOW)?: NO

## 2023-02-24 ASSESSMENT — PATIENT HEALTH QUESTIONNAIRE - PHQ9
SUM OF ALL RESPONSES TO PHQ QUESTIONS 1-9: 0
2. FEELING DOWN, DEPRESSED OR HOPELESS: 0
1. LITTLE INTEREST OR PLEASURE IN DOING THINGS: 0
SUM OF ALL RESPONSES TO PHQ9 QUESTIONS 1 & 2: 0

## 2023-02-24 ASSESSMENT — ENCOUNTER SYMPTOMS
EYE DISCHARGE: 0
ABDOMINAL PAIN: 0
SINUS PAIN: 0
EYE REDNESS: 0
DIARRHEA: 0
SHORTNESS OF BREATH: 0
NAUSEA: 0
EYE ITCHING: 0
COUGH: 0
ABDOMINAL DISTENTION: 0
COLOR CHANGE: 0
APNEA: 0
BACK PAIN: 0
EYE PAIN: 0
CONSTIPATION: 0

## 2023-02-24 NOTE — PROGRESS NOTES
[facility]  [address] [place] [id number]      PROGRESS NOTE    SUBJECTIVE:   Shy Lerner is a 66 y.o. male seen for a follow up visit regarding the following chief complaint:     Chief Complaint   Patient presents with    Hypertension     4 month recheck    Cholesterol Problem       Hypertension  Pertinent negatives include no chest pain, headaches, neck pain, palpitations or shortness of breath. Patient presents for follow up chronic disease management. Due for labs. RLS: stable with mirapex; HL: stable on current regimen, denies any intolerance to lipitor and he is compliant with this. HTN: he is compliant with current treatment regimen, denies any intolerance. he denies any headache, dizziness or edema. Ongoing back pain. Following orthopedic Dr. Helen Arenas for spinal injections that seems to be helping temporarily. Last injection 10/22; Additional concerns of ED. He has tried cialis in the past but didn't find this effective. He would like to try alternative. He complains of single episode of sharp chest pain that occurred after dinner upon sitting in recliner. He states he had one sec of \"jolt\" like chest pain that went away and returned 1-2 more times with similar sensation and length. He denies any associated symptoms. He denies any chest pain today, dyspnea or heart palpitations.   Current Outpatient Medications   Medication Sig Dispense Refill    hydroCHLOROthiazide (HYDRODIURIL) 12.5 MG tablet Take 1 tablet by mouth every other day 90 tablet 1    pramipexole (MIRAPEX) 0.5 MG tablet Take 1 tablet by mouth nightly 90 tablet 1    sildenafil (REVATIO) 20 MG tablet Take 1 tablet by mouth daily as needed (ED) 30 tablet 0    diclofenac (VOLTAREN) 75 MG EC tablet Take 1 tablet by mouth 2 times daily 60 tablet 5    fluticasone-salmeterol (ADVAIR DISKUS) 250-50 MCG/ACT AEPB diskus inhaler Inhale 1 puff into the lungs 2 times daily 1 each 2    pravastatin (PRAVACHOL) 10 MG tablet Take 1 tablet by mouth daily 30 tablet 5    lisinopril (PRINIVIL;ZESTRIL) 20 MG tablet Take 1 tablet by mouth daily 30 tablet 5    chlorhexidine (PERIDEX) 0.12 % solution Take 15 mLs by mouth 2 times daily PLACE 15 ML IN THE MOUTH SWISH IN MOUTH FOR 30 SECONDS THEN SPIT OUT 1 each 0    metroNIDAZOLE (METROGEL) 1 % gel Apply 1 Tube topically daily 1 each 1    albuterol sulfate  (90 Base) MCG/ACT inhaler 2 puffs as needed      vitamin D (CHOLECALCIFEROL) 25 MCG (1000 UT) TABS tablet Take 1,000 Units by mouth daily      fluticasone (FLONASE) 50 MCG/ACT nasal spray 1 spray in each nostril      zoster recombinant adjuvanted vaccine [facility]) 50 MCG/0.5ML SUSR injection Inject 0.5 mLs into the muscle See Admin Instructions 1 dose now and repeat in 2-6 months (Patient not taking: Reported on 2/24/2023) 0.5 mL 0     No current facility-administered medications for this visit. No Known Allergies    Past Medical History:   Diagnosis Date    Arthritis     Asthma     Hypercholesterolemia     Hypertension      Past Surgical History:   Procedure Laterality Date    CATARACT REMOVAL      Bilateral    CHOLECYSTECTOMY      HX JOINT REPLACEMENT SURGERY Bilateral 2012    [place]    TOTAL KNEE ARTHROPLASTY Bilateral 2012     Family History   Problem Relation Age of Onset    Kidney Disease Father     Pancreatic Cancer Mother     Colon Cancer Sister      Social History     Tobacco Use    Smoking status: Never    Smokeless tobacco: Never   Substance Use Topics    Alcohol use: Never       Review of Systems   Constitutional:  Negative for activity change, appetite change, chills, fatigue and fever. HENT:  Negative for congestion, ear pain and sinus pain. Eyes:  Negative for pain, discharge, redness and itching. Respiratory:  Negative for apnea, cough and shortness of breath. Cardiovascular:  Negative for chest pain, palpitations and leg swelling.    Gastrointestinal:  Negative for abdominal distention, abdominal pain, constipation, diarrhea and nausea. Endocrine: Negative for cold intolerance and heat intolerance. Genitourinary:  Negative for difficulty urinating, dysuria, enuresis and urgency. Musculoskeletal:  Negative for arthralgias, back pain, joint swelling, myalgias and neck pain. Skin:  Negative for color change and rash. Neurological:  Negative for dizziness, weakness and headaches. Psychiatric/Behavioral:  Negative for behavioral problems and sleep disturbance. The patient is not nervous/anxious. OBJECTIVE:  /76 (Site: Left Upper Arm, Position: Sitting, Cuff Size: Small Adult)   Pulse 79   Temp 97.9 °F (36.6 °C) (Skin)   Ht 5' 6.5\" (1.689 m)   Wt 213 lb (96.6 kg)   SpO2 96%   BMI 33.86 kg/m²      Physical Exam  Constitutional:       General: He is not in acute distress. Appearance: Normal appearance. He is not ill-appearing or toxic-appearing. Cardiovascular:      Rate and Rhythm: Normal rate and regular rhythm. Pulmonary:      Effort: Pulmonary effort is normal. No respiratory distress. Skin:     General: Skin is warm and dry. Neurological:      Mental Status: He is alert. Mental status is at baseline. Psychiatric:         Mood and Affect: Mood normal.         Behavior: Behavior normal.         Thought Content: Thought content normal.         ASSESSMENT and PLAN  Ramin Jacobson was seen today for hypertension and cholesterol problem. Diagnoses and all orders for this visit:    Primary hypertension  -     hydroCHLOROthiazide (HYDRODIURIL) 12.5 MG tablet; Take 1 tablet by mouth every other day    Cardiovascular risk factor  -     Exercise stress test; Future    Intermittent left-sided chest pain  -     Exercise stress test; Future    Mixed hyperlipidemia    Erectile dysfunction due to diseases classified elsewhere  -     sildenafil (REVATIO) 20 MG tablet;  Take 1 tablet by mouth daily as needed (ED)    RLS (restless legs syndrome)  -     pramipexole (MIRAPEX) 0.5 MG tablet; Take 1 tablet by mouth nightly    We discussed obtaining stress test. Start on viagra once cleared from stress test. Continue all medications as prescribed. Greater than 50% of this 35 min visit was spent counseling the patient about test results, prognosis, importance of compliance, education about disease process, benefits of medications, instructions for management of acute symptoms, and follow up plans. Follow-up and Dispositions    Return in about 3 months (around 5/24/2023) for fasting labs/recheck.          Ely Rosario, GOMEZ - CNP

## 2023-02-27 ENCOUNTER — TELEPHONE (OUTPATIENT)
Dept: INTERNAL MEDICINE CLINIC | Facility: CLINIC | Age: 78
End: 2023-02-27

## 2023-02-27 ENCOUNTER — TELEPHONE (OUTPATIENT)
Dept: ORTHOPEDIC SURGERY | Age: 78
End: 2023-02-27

## 2023-02-28 NOTE — TELEPHONE ENCOUNTER
Called and advised patient PA was denied. Advised patient to contact insurance company to see what alternatives they would cover or to call pharmacy and see what the self-pay cost would be for Sildenafil. Patient voiced understanding and states he will call pharmacy.

## 2023-03-03 ENCOUNTER — TELEPHONE (OUTPATIENT)
Dept: ORTHOPEDIC SURGERY | Age: 78
End: 2023-03-03

## 2023-03-03 DIAGNOSIS — M48.061 SPINAL STENOSIS OF LUMBAR REGION, UNSPECIFIED WHETHER NEUROGENIC CLAUDICATION PRESENT: Primary | ICD-10-CM

## 2023-03-03 DIAGNOSIS — M43.16 SPONDYLOLISTHESIS OF LUMBAR REGION: ICD-10-CM

## 2023-03-03 NOTE — TELEPHONE ENCOUNTER
Name: Emily Mai  YOB: 1945  Gender: male  MRN: [id number]     CC: Follow-up low back and right leg pain     HPI: This is a 68y.o. year old male who returns today after lumbar injections. Patient has complaints of right L5 radiculopathy ongoing intermittently for 5 years. He has a known spondylolisthesis of L4 and L5 with severe spinal stenosis at L4-5 centrally and moderate to severe foraminal stenosis on the right at L4 and L5. Patient underwent a right L5 selective nerve root block. Conservative treatment had included physical therapy, chiropractic care, diclofenac. Patient calls in with increased pain of lower back and right leg. Pain level is 6/10 and is limited with daily activies. Unable to walk without having leg pain. He would like to schedule a repeat R l5 snrb. This will be first injection for 2023.

## 2023-03-09 ENCOUNTER — HOSPITAL ENCOUNTER (OUTPATIENT)
Dept: NON INVASIVE DIAGNOSTICS | Age: 78
Discharge: HOME | End: 2023-03-11

## 2023-03-09 DIAGNOSIS — R07.89 INTERMITTENT LEFT-SIDED CHEST PAIN: ICD-10-CM

## 2023-03-09 DIAGNOSIS — Z91.89 CARDIOVASCULAR RISK FACTOR: ICD-10-CM

## 2023-03-10 DIAGNOSIS — R07.89 INTERMITTENT LEFT-SIDED CHEST PAIN: Primary | ICD-10-CM

## 2023-03-10 DIAGNOSIS — I10 PRIMARY HYPERTENSION: ICD-10-CM

## 2023-03-10 DIAGNOSIS — Z91.89 CARDIOVASCULAR RISK FACTOR: ICD-10-CM

## 2023-03-13 ENCOUNTER — OFFICE VISIT (OUTPATIENT)
Dept: ORTHOPEDIC SURGERY | Age: 78
End: 2023-03-13

## 2023-03-13 DIAGNOSIS — M48.061 SPINAL STENOSIS OF LUMBAR REGION, UNSPECIFIED WHETHER NEUROGENIC CLAUDICATION PRESENT: Primary | ICD-10-CM

## 2023-03-13 DIAGNOSIS — M51.36 DDD (DEGENERATIVE DISC DISEASE), LUMBAR: ICD-10-CM

## 2023-03-13 DIAGNOSIS — M43.16 SPONDYLOLISTHESIS OF LUMBAR REGION: ICD-10-CM

## 2023-03-13 DIAGNOSIS — M48.062 SPINAL STENOSIS OF LUMBAR REGION WITH NEUROGENIC CLAUDICATION: ICD-10-CM

## 2023-03-13 RX ORDER — TRIAMCINOLONE ACETONIDE 40 MG/ML
40 INJECTION, SUSPENSION INTRA-ARTICULAR; INTRAMUSCULAR ONCE
Status: COMPLETED | OUTPATIENT
Start: 2023-03-13 | End: 2023-03-13

## 2023-03-13 RX ADMIN — TRIAMCINOLONE ACETONIDE 40 MG: 40 INJECTION, SUSPENSION INTRA-ARTICULAR; INTRAMUSCULAR at 16:37

## 2023-03-13 NOTE — PROGRESS NOTES
Name: Ana Bhagat  YOB: 1945  Gender: male  MRN: [id number]        Transforaminal WARNER Procedure Note    Procedure: Right  L5-S1 transforaminal epidural steroid injections     Precautions: Ana Bhagat denies prior sensitivity to steroid, local anesthetic, iodine, or shellfish. Consent:  Consent was obtained prior to the procedure. The procedure was discussed at length with Ana Bhagat. He was given the opportunity to ask questions regarding the procedure and its associated risks. In addition to the potential risks associated with the procedure itself, the patient was informed both verbally and in writing of potential side effects of the use glucocorticoids. The patient appeared to comprehend the informed consent and desired to have the procedure performed, and informed consent was signed. He was placed in a prone position on the fluoroscopy table and the skin was prepped and draped in a routine sterile fashion. The areas to be injected were each anesthetized with 1 ml of 1% Lidocaine. A 22 gauge 3.5 inch spinal needle was carefully advanced under fluoroscopic guidance to the right L5-S1 transforaminal space  0.5 ml of 70% of Omnipaque was injected to confirm proper needle placement and absence of subdural or vascular flow Once proper placement was confirmed, 0.5ml of 0.25 marcaine and 40 mg kenalog were injected through the spinal needle. Fluoroscopic guidance was used intermittently over a 10-minute period to insure proper needle placement and his safety. A hard copy of the fluoroscopic image has been placed in his chart and is saved on the C-arm hard drive. He was monitored for 30 minutes after the procedure and discharged home in a stable fashion with a routine follow up. Procedural Diagnosis:     ICD-10-CM    1.  Spinal stenosis of lumbar region, unspecified whether neurogenic claudication present  M48.061 FL NERVE BLOCK LUMBOSACRAL 1ST triamcinolone acetonide (KENALOG-40) injection 40 mg      2. Spondylolisthesis of lumbar region  M43.16 FL NERVE BLOCK LUMBOSACRAL 1ST     triamcinolone acetonide (KENALOG-40) injection 40 mg      3. Spinal stenosis of lumbar region with neurogenic claudication  M48.062 FL NERVE BLOCK LUMBOSACRAL 1ST     triamcinolone acetonide (KENALOG-40) injection 40 mg      4.  DDD (degenerative disc disease), lumbar  M51.36 FL NERVE BLOCK LUMBOSACRAL 1ST     triamcinolone acetonide (KENALOG-40) injection 40 mg         Amador Campuzano MD  03/13/23

## 2023-05-18 DIAGNOSIS — Z11.59 NEED FOR HEPATITIS C SCREENING TEST: ICD-10-CM

## 2023-05-18 DIAGNOSIS — E78.2 MIXED HYPERLIPIDEMIA: Primary | ICD-10-CM

## 2023-05-18 DIAGNOSIS — I10 PRIMARY HYPERTENSION: ICD-10-CM

## 2023-05-22 ENCOUNTER — HOSPITAL ENCOUNTER (OUTPATIENT)
Dept: LAB | Age: 78
Discharge: HOME | End: 2023-05-25

## 2023-05-22 DIAGNOSIS — I10 PRIMARY HYPERTENSION: ICD-10-CM

## 2023-05-22 DIAGNOSIS — E78.2 MIXED HYPERLIPIDEMIA: ICD-10-CM

## 2023-05-22 DIAGNOSIS — Z11.59 NEED FOR HEPATITIS C SCREENING TEST: ICD-10-CM

## 2023-05-22 LAB
ALBUMIN SERPL-MCNC: 3.5 G/DL (ref 3.2–4.6)
ALBUMIN/GLOB SERPL: 1.1 (ref 0.4–1.6)
ALP SERPL-CCNC: 53 U/L (ref 50–136)
ALT SERPL-CCNC: 38 U/L (ref 12–65)
ANION GAP SERPL CALC-SCNC: 5 MMOL/L (ref 2–11)
APPEARANCE UR: CLEAR
AST SERPL-CCNC: 24 U/L (ref 15–37)
BACTERIA URNS QL MICRO: NEGATIVE /HPF
BASOPHILS # BLD: 0 K/UL (ref 0–0.2)
BASOPHILS NFR BLD: 0 % (ref 0–2)
BILIRUB SERPL-MCNC: 0.5 MG/DL (ref 0.2–1.1)
BILIRUB UR QL: NEGATIVE
BUN SERPL-MCNC: 22 MG/DL (ref 8–23)
CALCIUM SERPL-MCNC: 8.6 MG/DL (ref 8.3–10.4)
CASTS URNS QL MICRO: NORMAL /LPF (ref 0–2)
CHLORIDE SERPL-SCNC: 106 MMOL/L (ref 101–110)
CHOLEST SERPL-MCNC: 151 MG/DL
CO2 SERPL-SCNC: 29 MMOL/L (ref 21–32)
COLOR UR: NORMAL
CREAT SERPL-MCNC: 0.9 MG/DL (ref 0.8–1.5)
DIFFERENTIAL METHOD BLD: ABNORMAL
EOSINOPHIL # BLD: 0.3 K/UL (ref 0–0.8)
EOSINOPHIL NFR BLD: 4 % (ref 0.5–7.8)
EPI CELLS #/AREA URNS HPF: NORMAL /HPF (ref 0–5)
ERYTHROCYTE [DISTWIDTH] IN BLOOD BY AUTOMATED COUNT: 14.4 % (ref 11.9–14.6)
GLOBULIN SER CALC-MCNC: 3.3 G/DL (ref 2.8–4.5)
GLUCOSE SERPL-MCNC: 103 MG/DL (ref 65–100)
GLUCOSE UR STRIP.AUTO-MCNC: NEGATIVE MG/DL
HCT VFR BLD AUTO: 41.1 % (ref 41.1–50.3)
HCV AB SER QL: NONREACTIVE
HDLC SERPL-MCNC: 63 MG/DL (ref 40–60)
HDLC SERPL: 2.4
HGB BLD-MCNC: 13.1 G/DL (ref 13.6–17.2)
HGB UR QL STRIP: NEGATIVE
IMM GRANULOCYTES # BLD AUTO: 0 K/UL (ref 0–0.5)
IMM GRANULOCYTES NFR BLD AUTO: 0 % (ref 0–5)
KETONES UR QL STRIP.AUTO: NEGATIVE MG/DL
LDLC SERPL CALC-MCNC: 65.6 MG/DL
LEUKOCYTE ESTERASE UR QL STRIP.AUTO: NEGATIVE
LYMPHOCYTES # BLD: 1.9 K/UL (ref 0.5–4.6)
LYMPHOCYTES NFR BLD: 25 % (ref 13–44)
MCH RBC QN AUTO: 30.5 PG (ref 26.1–32.9)
MCHC RBC AUTO-ENTMCNC: 31.9 G/DL (ref 31.4–35)
MCV RBC AUTO: 95.8 FL (ref 82–102)
MONOCYTES # BLD: 0.6 K/UL (ref 0.1–1.3)
MONOCYTES NFR BLD: 8 % (ref 4–12)
MUCOUS THREADS URNS QL MICRO: 0 /LPF
NEUTS SEG # BLD: 4.7 K/UL (ref 1.7–8.2)
NEUTS SEG NFR BLD: 63 % (ref 43–78)
NITRITE UR QL STRIP.AUTO: NEGATIVE
NRBC # BLD: 0 K/UL (ref 0–0.2)
PH UR STRIP: 7 (ref 5–9)
PLATELET # BLD AUTO: 295 K/UL (ref 150–450)
PMV BLD AUTO: 9.8 FL (ref 9.4–12.3)
POTASSIUM SERPL-SCNC: 4.3 MMOL/L (ref 3.5–5.1)
PROT SERPL-MCNC: 6.8 G/DL (ref 6.3–8.2)
PROT UR STRIP-MCNC: NEGATIVE MG/DL
RBC # BLD AUTO: 4.29 M/UL (ref 4.23–5.6)
RBC #/AREA URNS HPF: NORMAL /HPF (ref 0–5)
SODIUM SERPL-SCNC: 140 MMOL/L (ref 133–143)
SP GR UR REFRACTOMETRY: 1.01 (ref 1–1.02)
TRIGL SERPL-MCNC: 112 MG/DL (ref 35–150)
TSH, 3RD GENERATION: 0.43 UIU/ML (ref 0.36–3.74)
URINE CULTURE IF INDICATED: NORMAL
UROBILINOGEN UR QL STRIP.AUTO: 0.2 EU/DL (ref 0.2–1)
VLDLC SERPL CALC-MCNC: 22.4 MG/DL (ref 6–23)
WBC # BLD AUTO: 7.5 K/UL (ref 4.3–11.1)
WBC URNS QL MICRO: NORMAL /HPF (ref 0–4)

## 2023-06-23 ENCOUNTER — TELEPHONE (OUTPATIENT)
Dept: ORTHOPEDIC SURGERY | Age: 78
End: 2023-06-23

## 2023-06-23 DIAGNOSIS — M48.062 SPINAL STENOSIS OF LUMBAR REGION WITH NEUROGENIC CLAUDICATION: Primary | ICD-10-CM

## 2023-06-23 DIAGNOSIS — M43.16 SPONDYLOLISTHESIS OF LUMBAR REGION: ICD-10-CM

## 2023-06-29 ENCOUNTER — OFFICE VISIT (OUTPATIENT)
Dept: ORTHOPEDIC SURGERY | Age: 78
End: 2023-06-29

## 2023-06-29 DIAGNOSIS — M48.062 SPINAL STENOSIS OF LUMBAR REGION WITH NEUROGENIC CLAUDICATION: Primary | ICD-10-CM

## 2023-06-29 DIAGNOSIS — M43.16 SPONDYLOLISTHESIS OF LUMBAR REGION: ICD-10-CM

## 2023-06-29 RX ORDER — TRIAMCINOLONE ACETONIDE 40 MG/ML
40 INJECTION, SUSPENSION INTRA-ARTICULAR; INTRAMUSCULAR ONCE
Status: COMPLETED | OUTPATIENT
Start: 2023-06-29 | End: 2023-06-30

## 2023-06-30 RX ADMIN — TRIAMCINOLONE ACETONIDE 40 MG: 40 INJECTION, SUSPENSION INTRA-ARTICULAR; INTRAMUSCULAR at 13:35

## 2023-07-04 DIAGNOSIS — J45.909 UNCOMPLICATED ASTHMA, UNSPECIFIED ASTHMA SEVERITY, UNSPECIFIED WHETHER PERSISTENT: ICD-10-CM

## 2023-07-05 RX ORDER — FLUTICASONE PROPIONATE AND SALMETEROL 50; 250 UG/1; UG/1
POWDER RESPIRATORY (INHALATION)
Qty: 60 EACH | Refills: 1 | OUTPATIENT
Start: 2023-07-05

## 2023-07-05 RX ORDER — LISINOPRIL 20 MG/1
TABLET ORAL
Qty: 30 TABLET | Refills: 5 | OUTPATIENT
Start: 2023-07-05

## 2023-07-12 SDOH — HEALTH STABILITY: PHYSICAL HEALTH: ON AVERAGE, HOW MANY DAYS PER WEEK DO YOU ENGAGE IN MODERATE TO STRENUOUS EXERCISE (LIKE A BRISK WALK)?: 0 DAYS

## 2023-07-12 ASSESSMENT — LIFESTYLE VARIABLES
HOW OFTEN DO YOU HAVE A DRINK CONTAINING ALCOHOL: NEVER
HOW MANY STANDARD DRINKS CONTAINING ALCOHOL DO YOU HAVE ON A TYPICAL DAY: 0
HOW OFTEN DO YOU HAVE A DRINK CONTAINING ALCOHOL: 1
HOW MANY STANDARD DRINKS CONTAINING ALCOHOL DO YOU HAVE ON A TYPICAL DAY: PATIENT DOES NOT DRINK

## 2023-07-12 ASSESSMENT — PATIENT HEALTH QUESTIONNAIRE - PHQ9
1. LITTLE INTEREST OR PLEASURE IN DOING THINGS: 0
2. FEELING DOWN, DEPRESSED OR HOPELESS: 0
SUM OF ALL RESPONSES TO PHQ QUESTIONS 1-9: 0
SUM OF ALL RESPONSES TO PHQ9 QUESTIONS 1 & 2: 0
SUM OF ALL RESPONSES TO PHQ QUESTIONS 1-9: 0

## 2023-07-14 ENCOUNTER — OFFICE VISIT (OUTPATIENT)
Dept: INTERNAL MEDICINE CLINIC | Facility: CLINIC | Age: 78
End: 2023-07-14
Payer: MEDICARE

## 2023-07-14 ENCOUNTER — OFFICE VISIT (OUTPATIENT)
Dept: ORTHOPEDIC SURGERY | Age: 78
End: 2023-07-14
Payer: MEDICARE

## 2023-07-14 VITALS
BODY MASS INDEX: 33.78 KG/M2 | SYSTOLIC BLOOD PRESSURE: 156 MMHG | OXYGEN SATURATION: 94 % | HEART RATE: 80 BPM | DIASTOLIC BLOOD PRESSURE: 92 MMHG | TEMPERATURE: 98.1 F | WEIGHT: 210.2 LBS | HEIGHT: 66 IN

## 2023-07-14 VITALS — HEIGHT: 66 IN | BODY MASS INDEX: 33.75 KG/M2 | WEIGHT: 210 LBS

## 2023-07-14 DIAGNOSIS — M43.16 SPONDYLOLISTHESIS OF LUMBAR REGION: ICD-10-CM

## 2023-07-14 DIAGNOSIS — Z76.0 MEDICATION REFILL: ICD-10-CM

## 2023-07-14 DIAGNOSIS — G25.81 RLS (RESTLESS LEGS SYNDROME): ICD-10-CM

## 2023-07-14 DIAGNOSIS — Z00.00 ENCOUNTER FOR SUBSEQUENT ANNUAL WELLNESS VISIT (AWV) IN MEDICARE PATIENT: Primary | ICD-10-CM

## 2023-07-14 DIAGNOSIS — Z01.00 ENCOUNTER FOR VISION SCREENING: ICD-10-CM

## 2023-07-14 DIAGNOSIS — I10 PRIMARY HYPERTENSION: ICD-10-CM

## 2023-07-14 DIAGNOSIS — M48.062 SPINAL STENOSIS OF LUMBAR REGION WITH NEUROGENIC CLAUDICATION: Primary | ICD-10-CM

## 2023-07-14 DIAGNOSIS — J45.909 UNCOMPLICATED ASTHMA, UNSPECIFIED ASTHMA SEVERITY, UNSPECIFIED WHETHER PERSISTENT: ICD-10-CM

## 2023-07-14 PROCEDURE — 1123F ACP DISCUSS/DSCN MKR DOCD: CPT | Performed by: NURSE PRACTITIONER

## 2023-07-14 PROCEDURE — 3080F DIAST BP >= 90 MM HG: CPT | Performed by: NURSE PRACTITIONER

## 2023-07-14 PROCEDURE — G8428 CUR MEDS NOT DOCUMENT: HCPCS | Performed by: NURSE PRACTITIONER

## 2023-07-14 PROCEDURE — 1036F TOBACCO NON-USER: CPT | Performed by: NURSE PRACTITIONER

## 2023-07-14 PROCEDURE — 3077F SYST BP >= 140 MM HG: CPT | Performed by: NURSE PRACTITIONER

## 2023-07-14 PROCEDURE — G0439 PPPS, SUBSEQ VISIT: HCPCS | Performed by: NURSE PRACTITIONER

## 2023-07-14 PROCEDURE — G8417 CALC BMI ABV UP PARAM F/U: HCPCS | Performed by: NURSE PRACTITIONER

## 2023-07-14 PROCEDURE — 99214 OFFICE O/P EST MOD 30 MIN: CPT | Performed by: NURSE PRACTITIONER

## 2023-07-14 RX ORDER — PRAMIPEXOLE DIHYDROCHLORIDE 0.5 MG/1
0.5 TABLET ORAL NIGHTLY
Qty: 90 TABLET | Refills: 1 | Status: SHIPPED | OUTPATIENT
Start: 2023-07-14

## 2023-07-14 RX ORDER — PREDNISONE 10 MG/1
10 TABLET ORAL SEE ADMIN INSTRUCTIONS
Qty: 48 EACH | Refills: 0 | Status: SHIPPED | OUTPATIENT
Start: 2023-07-14 | End: 2023-07-26

## 2023-07-14 RX ORDER — LISINOPRIL 20 MG/1
20 TABLET ORAL DAILY
Qty: 30 TABLET | Refills: 5 | Status: SHIPPED | OUTPATIENT
Start: 2023-07-14

## 2023-07-14 RX ORDER — FLUTICASONE PROPIONATE AND SALMETEROL 250; 50 UG/1; UG/1
1 POWDER RESPIRATORY (INHALATION) 2 TIMES DAILY
Qty: 1 EACH | Refills: 2 | Status: SHIPPED | OUTPATIENT
Start: 2023-07-14

## 2023-07-14 RX ORDER — DICLOFENAC SODIUM 75 MG/1
75 TABLET, DELAYED RELEASE ORAL 2 TIMES DAILY
Qty: 60 TABLET | Refills: 5 | Status: SHIPPED | OUTPATIENT
Start: 2023-07-14

## 2023-07-14 ASSESSMENT — ENCOUNTER SYMPTOMS
ABDOMINAL PAIN: 0
EYE ITCHING: 0
APNEA: 0
SINUS PAIN: 0
CONSTIPATION: 0
COLOR CHANGE: 0
EYE REDNESS: 0
COUGH: 0
DIARRHEA: 0
NAUSEA: 0
EYE PAIN: 0
BACK PAIN: 0
SHORTNESS OF BREATH: 0
EYE DISCHARGE: 0
ABDOMINAL DISTENTION: 0

## 2023-07-14 NOTE — PROGRESS NOTES
@[facility]@  [facility] [phone]      PROGRESS NOTE    SUBJECTIVE:   Joretta Kehr is a 66 y.o. male seen for a follow up visit regarding the following chief complaint:     Chief Complaint   Patient presents with    Medicare AWV       HPI    Patient presents for AWV. Due for labs. HTN: he is compliant with current treatment regimen, denies any intolerance. he denies any headache, dizziness or edema. BP at home usually 140-148/82-90; White coat syndrome. HL: stable on current regimen, denies any intolerance to pravastatin and he is compliant with this. Vision screen: 6 months ago optometry; Wears hearing aids. Sleeps about 6-7 hours each night; takes melatonin at times to help. Lives with spouse and independent of daily living activities. He has family that stay in off and on.    Denies any skin concerns-he has followed dermatology in the past.     Current Outpatient Medications   Medication Sig Dispense Refill    lisinopril (PRINIVIL;ZESTRIL) 20 MG tablet Take 1 tablet by mouth daily 30 tablet 5    pramipexole (MIRAPEX) 0.5 MG tablet Take 1 tablet by mouth nightly 90 tablet 1    diclofenac (VOLTAREN) 75 MG EC tablet Take 1 tablet by mouth 2 times daily 60 tablet 5    fluticasone-salmeterol (ADVAIR DISKUS) 250-50 MCG/ACT AEPB diskus inhaler Inhale 1 puff into the lungs 2 times daily 1 each 2    sildenafil (VIAGRA) 50 MG tablet Take 1 tablet by mouth daily as needed for Erectile Dysfunction 10 tablet 2    hydroCHLOROthiazide (HYDRODIURIL) 12.5 MG tablet Take 1 tablet by mouth every other day 90 tablet 1    sildenafil (REVATIO) 20 MG tablet Take 1 tablet by mouth daily as needed (ED) 30 tablet 0    pravastatin (PRAVACHOL) 10 MG tablet Take 1 tablet by mouth daily 30 tablet 5    chlorhexidine (PERIDEX) 0.12 % solution Take 15 mLs by mouth 2 times daily PLACE 15 ML IN THE MOUTH SWISH IN MOUTH FOR 30 SECONDS THEN SPIT OUT 1 each 0    metroNIDAZOLE (METROGEL) 1 % gel Apply 1

## 2023-07-14 NOTE — PROGRESS NOTES
illness/condition with exacerbation and progression      No follow-ups on file. GOMEZ Camara CNP  07/14/23      Elements of this note were created using speech recognition software. As such, errors of speech recognition may be present.

## 2023-08-01 ENCOUNTER — TELEPHONE (OUTPATIENT)
Dept: ORTHOPEDIC SURGERY | Age: 78
End: 2023-08-01

## 2023-08-31 ENCOUNTER — OFFICE VISIT (OUTPATIENT)
Dept: ORTHOPEDIC SURGERY | Age: 78
End: 2023-08-31
Payer: MEDICARE

## 2023-08-31 VITALS — BODY MASS INDEX: 33.43 KG/M2 | HEIGHT: 67 IN | WEIGHT: 213 LBS

## 2023-08-31 DIAGNOSIS — M48.062 SPINAL STENOSIS OF LUMBAR REGION WITH NEUROGENIC CLAUDICATION: Primary | ICD-10-CM

## 2023-08-31 DIAGNOSIS — M47.816 LUMBAR FACET ARTHROPATHY: ICD-10-CM

## 2023-08-31 DIAGNOSIS — M54.16 RADICULOPATHY, LUMBAR REGION: ICD-10-CM

## 2023-08-31 DIAGNOSIS — M43.16 SPONDYLOLISTHESIS OF LUMBAR REGION: ICD-10-CM

## 2023-08-31 PROCEDURE — 1123F ACP DISCUSS/DSCN MKR DOCD: CPT | Performed by: ORTHOPAEDIC SURGERY

## 2023-08-31 PROCEDURE — 99214 OFFICE O/P EST MOD 30 MIN: CPT | Performed by: ORTHOPAEDIC SURGERY

## 2023-08-31 PROCEDURE — G8417 CALC BMI ABV UP PARAM F/U: HCPCS | Performed by: ORTHOPAEDIC SURGERY

## 2023-08-31 PROCEDURE — G8427 DOCREV CUR MEDS BY ELIG CLIN: HCPCS | Performed by: ORTHOPAEDIC SURGERY

## 2023-08-31 PROCEDURE — 1036F TOBACCO NON-USER: CPT | Performed by: ORTHOPAEDIC SURGERY

## 2023-08-31 NOTE — PROGRESS NOTES
failure possibly needing additional surgery;  blood loss needing transfusion; postoperative hematoma; and the risks of anesthesia. The patient voiced an understanding of these issues as outlined. The procedure that may prove to be beneficial here is a L4-L5 laminectomy and fusion with allograft, transforaminal lumbar interbody fusion, and instrumentation, and a right S1 hemilaminectomy.            Electronically Signed By Odilon Hightower MD     8:55 AM

## 2023-09-05 ENCOUNTER — PREP FOR PROCEDURE (OUTPATIENT)
Dept: ORTHOPEDIC SURGERY | Age: 78
End: 2023-09-05

## 2023-09-05 DIAGNOSIS — M48.062 SPINAL STENOSIS OF LUMBAR REGION WITH NEUROGENIC CLAUDICATION: Primary | ICD-10-CM

## 2023-09-05 DIAGNOSIS — M47.816 LUMBAR FACET ARTHROPATHY: ICD-10-CM

## 2023-09-05 DIAGNOSIS — M43.16 SPONDYLOLISTHESIS OF LUMBAR REGION: ICD-10-CM

## 2023-09-05 DIAGNOSIS — M54.16 RADICULOPATHY, LUMBAR REGION: ICD-10-CM

## 2023-09-14 ENCOUNTER — HOSPITAL ENCOUNTER (OUTPATIENT)
Dept: SURGERY | Age: 78
Discharge: HOME | End: 2023-09-14
Payer: MEDICARE

## 2023-09-14 VITALS
WEIGHT: 212.7 LBS | HEIGHT: 68 IN | SYSTOLIC BLOOD PRESSURE: 158 MMHG | DIASTOLIC BLOOD PRESSURE: 86 MMHG | OXYGEN SATURATION: 95 % | HEART RATE: 83 BPM | RESPIRATION RATE: 16 BRPM | TEMPERATURE: 97.5 F | BODY MASS INDEX: 32.24 KG/M2

## 2023-09-14 LAB
ANION GAP SERPL CALC-SCNC: 7 MMOL/L (ref 2–11)
APPEARANCE UR: ABNORMAL
BACTERIA URNS QL MICRO: NEGATIVE /HPF
BASOPHILS # BLD: 0 K/UL (ref 0–0.2)
BASOPHILS NFR BLD: 0 % (ref 0–2)
BILIRUB UR QL: NEGATIVE
BUN SERPL-MCNC: 23 MG/DL (ref 8–23)
CALCIUM SERPL-MCNC: 8.8 MG/DL (ref 8.3–10.4)
CASTS URNS QL MICRO: ABNORMAL /LPF
CHLORIDE SERPL-SCNC: 106 MMOL/L (ref 101–110)
CO2 SERPL-SCNC: 26 MMOL/L (ref 21–32)
COLOR UR: ABNORMAL
CREAT SERPL-MCNC: 0.96 MG/DL (ref 0.8–1.5)
DIFFERENTIAL METHOD BLD: ABNORMAL
EOSINOPHIL # BLD: 0.3 K/UL (ref 0–0.8)
EOSINOPHIL NFR BLD: 4 % (ref 0.5–7.8)
EPI CELLS #/AREA URNS HPF: ABNORMAL /HPF
ERYTHROCYTE [DISTWIDTH] IN BLOOD BY AUTOMATED COUNT: 13.8 % (ref 11.9–14.6)
GLUCOSE SERPL-MCNC: 93 MG/DL (ref 65–100)
GLUCOSE UR STRIP.AUTO-MCNC: NEGATIVE MG/DL
HCT VFR BLD AUTO: 40.4 % (ref 41.1–50.3)
HGB BLD-MCNC: 13.3 G/DL (ref 13.6–17.2)
HGB UR QL STRIP: NEGATIVE
IMM GRANULOCYTES # BLD AUTO: 0 K/UL (ref 0–0.5)
IMM GRANULOCYTES NFR BLD AUTO: 0 % (ref 0–5)
KETONES UR QL STRIP.AUTO: ABNORMAL MG/DL
LEUKOCYTE ESTERASE UR QL STRIP.AUTO: NEGATIVE
LYMPHOCYTES # BLD: 1.8 K/UL (ref 0.5–4.6)
LYMPHOCYTES NFR BLD: 25 % (ref 13–44)
MCH RBC QN AUTO: 30.6 PG (ref 26.1–32.9)
MCHC RBC AUTO-ENTMCNC: 32.9 G/DL (ref 31.4–35)
MCV RBC AUTO: 92.9 FL (ref 82–102)
MONOCYTES # BLD: 0.7 K/UL (ref 0.1–1.3)
MONOCYTES NFR BLD: 9 % (ref 4–12)
NEUTS SEG # BLD: 4.5 K/UL (ref 1.7–8.2)
NEUTS SEG NFR BLD: 62 % (ref 43–78)
NITRITE UR QL STRIP.AUTO: NEGATIVE
NRBC # BLD: 0 K/UL (ref 0–0.2)
PH UR STRIP: 5 (ref 5–9)
PLATELET # BLD AUTO: 298 K/UL (ref 150–450)
PMV BLD AUTO: 9.6 FL (ref 9.4–12.3)
POTASSIUM SERPL-SCNC: 3.9 MMOL/L (ref 3.5–5.1)
PROT UR STRIP-MCNC: ABNORMAL MG/DL
RBC # BLD AUTO: 4.35 M/UL (ref 4.23–5.6)
RBC #/AREA URNS HPF: ABNORMAL /HPF
SODIUM SERPL-SCNC: 139 MMOL/L (ref 133–143)
SP GR UR REFRACTOMETRY: 1.03 (ref 1–1.02)
UROBILINOGEN UR QL STRIP.AUTO: 1 EU/DL (ref 0.2–1)
WBC # BLD AUTO: 7.4 K/UL (ref 4.3–11.1)
WBC URNS QL MICRO: ABNORMAL /HPF

## 2023-09-14 PROCEDURE — 85025 COMPLETE CBC W/AUTO DIFF WBC: CPT

## 2023-09-14 PROCEDURE — 81001 URINALYSIS AUTO W/SCOPE: CPT

## 2023-09-14 PROCEDURE — 87641 MR-STAPH DNA AMP PROBE: CPT

## 2023-09-14 PROCEDURE — 80048 BASIC METABOLIC PNL TOTAL CA: CPT

## 2023-09-14 RX ORDER — ASCORBIC ACID 500 MG
500 TABLET ORAL DAILY
COMMUNITY

## 2023-09-14 RX ORDER — VIT C/ZINC CITRATE/ELDERBERRY 45 MG-50MG
TABLET,CHEWABLE ORAL
COMMUNITY

## 2023-09-14 ASSESSMENT — PAIN DESCRIPTION - LOCATION: LOCATION: BACK

## 2023-09-14 ASSESSMENT — PAIN SCALES - GENERAL: PAINLEVEL_OUTOF10: 3

## 2023-09-14 NOTE — PERIOP NOTE
PLEASE CONTINUE TAKING ALL PRESCRIPTION MEDICATIONS UP TO THE DAY OF SURGERY UNLESS OTHERWISE DIRECTED BELOW. DISCONTINUE all vitamins and supplements 7 days prior to surgery. DISCONTINUE Non-Steriodal Anti-Inflammatory (NSAIDS) such as Advil and Aleve 5 days prior to surgery. Home Medications to take  the day of surgery    Albuterol (Ventolin/ Pro-air) use and bring     Fluticasone propionate (Flonase)     Use fluticasone-salmeterol (ADVAIR) inhaler       Home Medications   to Hold   Diclofenac (Cataflam) hold 5 days     Am of surgery hold  Hydrochlorothiazide (HCTZ)  Lisinopril       Comments      On the day before surgery please take Acetaminophen 1000mg in the morning and then again before bed. You may substitute for Tylenol 650 mg. Please do not bring home medications with you on the day of surgery unless otherwise directed by your nurse. If you are instructed to bring home medications, please give them to your nurse as they will be administered by the nursing staff. If you have any questions, please call [address] [place] [address] [phone]. A copy of this note was provided to the patient for reference.

## 2023-09-14 NOTE — PERIOP NOTE
Patient verified name, , and surgery as listed in [facility]. Patient provided medical/health information and PTA medications to the best of their ability. TYPE  CASE: III  Orders per surgeon: were recieved  Labs per surgeon: CBC, BMP, UA, MRSA. Labs per anesthesia protocol: T&S  EKG:  not required      MRSA/MSSA swab collected; pharmacy to review and dose antibiotic as appropriate. Patient provided with and instructed on education handouts including Guide to Surgery, blood transfusions, pain management, and hand hygiene for the family and community, and [facility] brochure. Road to Recovery Spine surgery patient guide given. Instructed on incentive spirometry with return demonstration. Patient viewed spine prehab video. Hibiclens and instructions given per hospital policy. Original medication prescription bottles not visualized during patient appointment. Patient teach back successful and patient demonstrates knowledge of instruction.

## 2023-09-15 LAB
MRSA DNA SPEC QL NAA+PROBE: NOT DETECTED
S AUREUS CPE NOSE QL NAA+PROBE: DETECTED

## 2023-09-15 NOTE — PROGRESS NOTES
Pre-Assessment Surgery Review      Patient ID:  Karen Castelan  [id number]  89 y.o.  1945  Surgeon: Dr Nathalie Gee  Date of Surgery: 10/2/2023  Procedure:  laminectomy and fusion    Primary Care Physician: GOMEZ Romero [phone]  Specialty Physician(s):      Subjective:   Karen Castelan is a 66 y.o. White (non-) male who presents for preoperative evaluation. This is a preoperative chart review note based on data collected by the nurse at the surgical Pre-Assessment visit. Past Medical History:   Diagnosis Date    Arthritis     Asthma     Hypercholesterolemia     Hypertension       Past Surgical History:   Procedure Laterality Date    CATARACT REMOVAL      Bilateral    CHOLECYSTECTOMY      HX JOINT REPLACEMENT SURGERY Bilateral 2012    [place], [place]    SINUS SURGERY  2001    TOTAL KNEE ARTHROPLASTY Bilateral 2012     Family History   Problem Relation Age of Onset    Kidney Disease Father     Pancreatic Cancer Mother     Colon Cancer Sister       Social History     Tobacco Use    Smoking status: Never    Smokeless tobacco: Never   Substance Use Topics    Alcohol use: Never       Prior to Admission medications    Medication Sig Start Date End Date Taking?  Authorizing Provider   Melatonin-Theanine (SLEEP+CALM) 3-50 MG CHEW Take by mouth nightly   Yes Historical Provider, MD   vitamin C (ASCORBIC ACID) 500 MG tablet Take 1 tablet by mouth daily   Yes Historical Provider, MD   Multiple Vitamins-Minerals (ICAPS AREDS FORMULA PO) Take by mouth in the morning and at bedtime   Yes Historical Provider, MD   lisinopril (PRINIVIL;ZESTRIL) 20 MG tablet Take 1 tablet by mouth daily 7/14/23   GOMEZ Hoyos CNP   pramipexole (MIRAPEX) 0.5 MG tablet Take 1 tablet by mouth nightly 7/14/23   GOMEZ Hoyos CNP   diclofenac (VOLTAREN) 75 MG EC tablet Take 1 tablet by mouth 2 times daily 7/14/23   GOMEZ Hoyos CNP   fluticasone-salmeterol (ADVAIR DISKUS) 250-50 MCG/ACT AEPB

## 2023-09-22 ENCOUNTER — TELEPHONE (OUTPATIENT)
Dept: ORTHOPEDIC SURGERY | Age: 78
End: 2023-09-22

## 2023-09-22 DIAGNOSIS — J45.909 UNCOMPLICATED ASTHMA, UNSPECIFIED ASTHMA SEVERITY, UNSPECIFIED WHETHER PERSISTENT: ICD-10-CM

## 2023-09-22 DIAGNOSIS — M43.16 SPONDYLOLISTHESIS OF LUMBAR REGION: ICD-10-CM

## 2023-09-22 DIAGNOSIS — M54.16 RADICULOPATHY, LUMBAR REGION: Primary | ICD-10-CM

## 2023-09-22 DIAGNOSIS — M48.062 SPINAL STENOSIS OF LUMBAR REGION WITH NEUROGENIC CLAUDICATION: ICD-10-CM

## 2023-09-22 RX ORDER — FLUTICASONE PROPIONATE AND SALMETEROL 50; 250 UG/1; UG/1
POWDER RESPIRATORY (INHALATION)
Qty: 60 EACH | Refills: 2 | OUTPATIENT
Start: 2023-09-22

## 2023-09-22 NOTE — TELEPHONE ENCOUNTER
He is having surgery Oct 2. He is wanting to get his post op meds before his surgery. His surgery is on a Monday so I told him it wouldn't be before the Friday before. Please let him know.

## 2023-09-25 ENCOUNTER — TELEPHONE (OUTPATIENT)
Dept: ORTHOPEDIC SURGERY | Age: 78
End: 2023-09-25

## 2023-09-25 NOTE — TELEPHONE ENCOUNTER
He is having surgery on Oct 2. On 9-14 his labs came back positive for staph. What do you do about this. He did have a pilonidal cyst surgery in 1969 and he forgot to list this on prior surgeries.  Can you let him know about the staph test results

## 2023-09-25 NOTE — TELEPHONE ENCOUNTER
Having surgery on Monday and would like his medications called in prior.  Will send his request for tramadol and flexeril to Dr Ainsley Betts to authorize and send to his pharmacy

## 2023-09-25 NOTE — TELEPHONE ENCOUNTER
Discussed that the SA positive did not have any affect on his surgery. That was his main concern.  I also will send the rx for tramadol and flexeril to Dr Virgilio Guerrero to send prior to surgery

## 2023-09-26 RX ORDER — TRAMADOL HYDROCHLORIDE 50 MG/1
50 TABLET ORAL EVERY 6 HOURS PRN
Qty: 28 TABLET | Refills: 0 | Status: SHIPPED | OUTPATIENT
Start: 2023-09-26 | End: 2023-10-03

## 2023-09-26 RX ORDER — CYCLOBENZAPRINE HCL 10 MG
10 TABLET ORAL 3 TIMES DAILY PRN
Qty: 40 TABLET | Refills: 0 | Status: SHIPPED | OUTPATIENT
Start: 2023-09-26 | End: 2023-10-06

## 2023-10-01 ENCOUNTER — ANESTHESIA EVENT (OUTPATIENT)
Dept: SURGERY | Age: 78
End: 2023-10-01
Payer: MEDICARE

## 2023-10-02 ENCOUNTER — APPOINTMENT (OUTPATIENT)
Dept: GENERAL RADIOLOGY | Age: 78
End: 2023-10-02
Attending: ORTHOPAEDIC SURGERY
Payer: MEDICARE

## 2023-10-02 ENCOUNTER — HOSPITAL ENCOUNTER (OUTPATIENT)
Age: 78
Setting detail: OBSERVATION
Discharge: HOME | End: 2023-10-03
Attending: ORTHOPAEDIC SURGERY | Admitting: ORTHOPAEDIC SURGERY
Payer: MEDICARE

## 2023-10-02 ENCOUNTER — ANESTHESIA (OUTPATIENT)
Dept: SURGERY | Age: 78
End: 2023-10-02
Payer: MEDICARE

## 2023-10-02 DIAGNOSIS — M48.062 SPINAL STENOSIS, LUMBAR REGION, WITH NEUROGENIC CLAUDICATION: ICD-10-CM

## 2023-10-02 DIAGNOSIS — M47.816 LUMBAR FACET ARTHROPATHY: ICD-10-CM

## 2023-10-02 DIAGNOSIS — M54.16 LUMBAR RADICULOPATHY: ICD-10-CM

## 2023-10-02 DIAGNOSIS — M43.16 SPONDYLOLISTHESIS OF LUMBAR REGION: ICD-10-CM

## 2023-10-02 PROBLEM — Z98.1 S/P LUMBAR FUSION: Status: ACTIVE | Noted: 2023-10-02

## 2023-10-02 LAB
ABO + RH BLD: NORMAL
BLOOD GROUP ANTIBODIES SERPL: NORMAL
SPECIMEN EXP DATE BLD: NORMAL

## 2023-10-02 PROCEDURE — 86850 RBC ANTIBODY SCREEN: CPT

## 2023-10-02 PROCEDURE — 6360000002 HC RX W HCPCS: Performed by: ORTHOPAEDIC SURGERY

## 2023-10-02 PROCEDURE — 2580000003 HC RX 258: Performed by: ORTHOPAEDIC SURGERY

## 2023-10-02 PROCEDURE — G0378 HOSPITAL OBSERVATION PER HR: HCPCS

## 2023-10-02 PROCEDURE — 6360000002 HC RX W HCPCS: Performed by: ANESTHESIOLOGY

## 2023-10-02 PROCEDURE — 2580000003 HC RX 258: Performed by: NURSE ANESTHETIST, CERTIFIED REGISTERED

## 2023-10-02 PROCEDURE — 86900 BLOOD TYPING SEROLOGIC ABO: CPT

## 2023-10-02 PROCEDURE — 22633 ARTHRD CMBN 1NTRSPC LUMBAR: CPT | Performed by: ORTHOPAEDIC SURGERY

## 2023-10-02 PROCEDURE — 3600000014 HC SURGERY LEVEL 4 ADDTL 15MIN: Performed by: ORTHOPAEDIC SURGERY

## 2023-10-02 PROCEDURE — C1713 ANCHOR/SCREW BN/BN,TIS/BN: HCPCS | Performed by: ORTHOPAEDIC SURGERY

## 2023-10-02 PROCEDURE — 6370000000 HC RX 637 (ALT 250 FOR IP): Performed by: ORTHOPAEDIC SURGERY

## 2023-10-02 PROCEDURE — 6370000000 HC RX 637 (ALT 250 FOR IP): Performed by: ANESTHESIOLOGY

## 2023-10-02 PROCEDURE — 2709999900 HC NON-CHARGEABLE SUPPLY: Performed by: ORTHOPAEDIC SURGERY

## 2023-10-02 PROCEDURE — C1889 IMPLANT/INSERT DEVICE, NOC: HCPCS | Performed by: ORTHOPAEDIC SURGERY

## 2023-10-02 PROCEDURE — 7100000001 HC PACU RECOVERY - ADDTL 15 MIN: Performed by: ORTHOPAEDIC SURGERY

## 2023-10-02 PROCEDURE — 51798 US URINE CAPACITY MEASURE: CPT

## 2023-10-02 PROCEDURE — 86901 BLOOD TYPING SEROLOGIC RH(D): CPT

## 2023-10-02 PROCEDURE — 6360000002 HC RX W HCPCS: Performed by: NURSE ANESTHETIST, CERTIFIED REGISTERED

## 2023-10-02 PROCEDURE — 72100 X-RAY EXAM L-S SPINE 2/3 VWS: CPT

## 2023-10-02 PROCEDURE — 7100000000 HC PACU RECOVERY - FIRST 15 MIN: Performed by: ORTHOPAEDIC SURGERY

## 2023-10-02 PROCEDURE — 2500000003 HC RX 250 WO HCPCS: Performed by: NURSE ANESTHETIST, CERTIFIED REGISTERED

## 2023-10-02 PROCEDURE — A4216 STERILE WATER/SALINE, 10 ML: HCPCS | Performed by: ANESTHESIOLOGY

## 2023-10-02 PROCEDURE — 3700000000 HC ANESTHESIA ATTENDED CARE: Performed by: ORTHOPAEDIC SURGERY

## 2023-10-02 PROCEDURE — 63047 LAM FACETEC & FORAMOT LUMBAR: CPT | Performed by: ORTHOPAEDIC SURGERY

## 2023-10-02 PROCEDURE — 51701 INSERT BLADDER CATHETER: CPT

## 2023-10-02 PROCEDURE — 2500000003 HC RX 250 WO HCPCS: Performed by: ANESTHESIOLOGY

## 2023-10-02 PROCEDURE — 2580000003 HC RX 258: Performed by: ANESTHESIOLOGY

## 2023-10-02 PROCEDURE — 63052 LAM FACETC/FRMT ARTHRD LUM 1: CPT | Performed by: ORTHOPAEDIC SURGERY

## 2023-10-02 PROCEDURE — 3600000004 HC SURGERY LEVEL 4 BASE: Performed by: ORTHOPAEDIC SURGERY

## 2023-10-02 PROCEDURE — 3700000001 HC ADD 15 MINUTES (ANESTHESIA): Performed by: ORTHOPAEDIC SURGERY

## 2023-10-02 PROCEDURE — 22840 INSERT SPINE FIXATION DEVICE: CPT | Performed by: ORTHOPAEDIC SURGERY

## 2023-10-02 PROCEDURE — 63053 LAM FACTC/FRMT ARTHRD LUM EA: CPT | Performed by: ORTHOPAEDIC SURGERY

## 2023-10-02 PROCEDURE — 2720000010 HC SURG SUPPLY STERILE: Performed by: ORTHOPAEDIC SURGERY

## 2023-10-02 PROCEDURE — 22853 INSJ BIOMECHANICAL DEVICE: CPT | Performed by: ORTHOPAEDIC SURGERY

## 2023-10-02 DEVICE — GRAFT BNE MED: Type: IMPLANTABLE DEVICE | Site: SPINE LUMBAR | Status: FUNCTIONAL

## 2023-10-02 DEVICE — ALLOGRAFT BNE CHIP 1-4 MM 5 CC CRUSH CANC: Type: IMPLANTABLE DEVICE | Site: SPINE LUMBAR | Status: FUNCTIONAL

## 2023-10-02 DEVICE — SPACER SPNL 15 DEG SM 28X10 MM STRL PROLIFT: Type: IMPLANTABLE DEVICE | Site: SPINE LUMBAR | Status: FUNCTIONAL

## 2023-10-02 DEVICE — VITALLIUM PREBENT AND PRECUT ROD WITHOUT HEX
Type: IMPLANTABLE DEVICE | Site: SPINE LUMBAR | Status: FUNCTIONAL
Brand: XIA 4 5

## 2023-10-02 DEVICE — POLYAXIAL CORTICAL SCREW
Type: IMPLANTABLE DEVICE | Site: SPINE LUMBAR | Status: FUNCTIONAL
Brand: XIA 4.5 SYSTEM -  XIA CT

## 2023-10-02 DEVICE — BLOCKER
Type: IMPLANTABLE DEVICE | Site: SPINE LUMBAR | Status: FUNCTIONAL
Brand: XIA 4.5 SYSTEM -  XIA CT

## 2023-10-02 DEVICE — BIO DBM PLUS PUTTY (WITH CANCELLOUS)
Type: IMPLANTABLE DEVICE | Site: SPINE LUMBAR | Status: FUNCTIONAL
Brand: BIO DBM

## 2023-10-02 RX ORDER — PROMETHAZINE HYDROCHLORIDE 25 MG/1
12.5 TABLET ORAL EVERY 6 HOURS PRN
Status: DISCONTINUED | OUTPATIENT
Start: 2023-10-02 | End: 2023-10-03 | Stop reason: HOSPADM

## 2023-10-02 RX ORDER — CYCLOBENZAPRINE HCL 5 MG
10 TABLET ORAL 3 TIMES DAILY PRN
Status: DISCONTINUED | OUTPATIENT
Start: 2023-10-02 | End: 2023-10-03 | Stop reason: HOSPADM

## 2023-10-02 RX ORDER — OXYCODONE HYDROCHLORIDE 5 MG/1
10 TABLET ORAL EVERY 4 HOURS PRN
Status: DISCONTINUED | OUTPATIENT
Start: 2023-10-02 | End: 2023-10-03 | Stop reason: HOSPADM

## 2023-10-02 RX ORDER — SODIUM CHLORIDE 9 MG/ML
INJECTION, SOLUTION INTRAVENOUS CONTINUOUS
Status: ACTIVE | OUTPATIENT
Start: 2023-10-02 | End: 2023-10-03

## 2023-10-02 RX ORDER — SODIUM CHLORIDE, SODIUM LACTATE, POTASSIUM CHLORIDE, CALCIUM CHLORIDE 600; 310; 30; 20 MG/100ML; MG/100ML; MG/100ML; MG/100ML
INJECTION, SOLUTION INTRAVENOUS CONTINUOUS PRN
Status: DISCONTINUED | OUTPATIENT
Start: 2023-10-02 | End: 2023-10-02 | Stop reason: SDUPTHER

## 2023-10-02 RX ORDER — SODIUM CHLORIDE 0.9 % (FLUSH) 0.9 %
5-40 SYRINGE (ML) INJECTION PRN
Status: DISCONTINUED | OUTPATIENT
Start: 2023-10-02 | End: 2023-10-02 | Stop reason: HOSPADM

## 2023-10-02 RX ORDER — MIDAZOLAM HYDROCHLORIDE 1 MG/ML
INJECTION INTRAMUSCULAR; INTRAVENOUS PRN
Status: DISCONTINUED | OUTPATIENT
Start: 2023-10-02 | End: 2023-10-02 | Stop reason: SDUPTHER

## 2023-10-02 RX ORDER — FLUTICASONE PROPIONATE 50 MCG
1 SPRAY, SUSPENSION (ML) NASAL DAILY
Status: DISCONTINUED | OUTPATIENT
Start: 2023-10-03 | End: 2023-10-03 | Stop reason: HOSPADM

## 2023-10-02 RX ORDER — DEXTROSE MONOHYDRATE 100 MG/ML
INJECTION, SOLUTION INTRAVENOUS CONTINUOUS PRN
Status: DISCONTINUED | OUTPATIENT
Start: 2023-10-02 | End: 2023-10-02 | Stop reason: HOSPADM

## 2023-10-02 RX ORDER — DIPHENHYDRAMINE HCL 25 MG
25 CAPSULE ORAL EVERY 6 HOURS PRN
Status: DISCONTINUED | OUTPATIENT
Start: 2023-10-02 | End: 2023-10-03 | Stop reason: HOSPADM

## 2023-10-02 RX ORDER — DIPHENHYDRAMINE HYDROCHLORIDE 50 MG/ML
25 INJECTION INTRAMUSCULAR; INTRAVENOUS EVERY 6 HOURS PRN
Status: DISCONTINUED | OUTPATIENT
Start: 2023-10-02 | End: 2023-10-03 | Stop reason: HOSPADM

## 2023-10-02 RX ORDER — KETAMINE HYDROCHLORIDE 50 MG/ML
INJECTION, SOLUTION, CONCENTRATE INTRAMUSCULAR; INTRAVENOUS PRN
Status: DISCONTINUED | OUTPATIENT
Start: 2023-10-02 | End: 2023-10-02 | Stop reason: SDUPTHER

## 2023-10-02 RX ORDER — TRANEXAMIC ACID 100 MG/ML
INJECTION, SOLUTION INTRAVENOUS PRN
Status: DISCONTINUED | OUTPATIENT
Start: 2023-10-02 | End: 2023-10-02 | Stop reason: SDUPTHER

## 2023-10-02 RX ORDER — OXYCODONE HYDROCHLORIDE 5 MG/1
5 TABLET ORAL EVERY 4 HOURS PRN
Status: DISCONTINUED | OUTPATIENT
Start: 2023-10-02 | End: 2023-10-03 | Stop reason: HOSPADM

## 2023-10-02 RX ORDER — PROCHLORPERAZINE EDISYLATE 5 MG/ML
5 INJECTION INTRAMUSCULAR; INTRAVENOUS
Status: DISCONTINUED | OUTPATIENT
Start: 2023-10-02 | End: 2023-10-02 | Stop reason: HOSPADM

## 2023-10-02 RX ORDER — ONDANSETRON 2 MG/ML
4 INJECTION INTRAMUSCULAR; INTRAVENOUS EVERY 6 HOURS PRN
Status: DISCONTINUED | OUTPATIENT
Start: 2023-10-02 | End: 2023-10-03 | Stop reason: HOSPADM

## 2023-10-02 RX ORDER — HYDROMORPHONE HYDROCHLORIDE 2 MG/ML
0.5 INJECTION, SOLUTION INTRAMUSCULAR; INTRAVENOUS; SUBCUTANEOUS EVERY 5 MIN PRN
Status: DISCONTINUED | OUTPATIENT
Start: 2023-10-02 | End: 2023-10-02 | Stop reason: HOSPADM

## 2023-10-02 RX ORDER — SODIUM CHLORIDE 0.9 % (FLUSH) 0.9 %
5-40 SYRINGE (ML) INJECTION EVERY 12 HOURS SCHEDULED
Status: DISCONTINUED | OUTPATIENT
Start: 2023-10-02 | End: 2023-10-02 | Stop reason: HOSPADM

## 2023-10-02 RX ORDER — DIPHENHYDRAMINE HYDROCHLORIDE 50 MG/ML
12.5 INJECTION INTRAMUSCULAR; INTRAVENOUS
Status: DISCONTINUED | OUTPATIENT
Start: 2023-10-02 | End: 2023-10-02 | Stop reason: HOSPADM

## 2023-10-02 RX ORDER — ONDANSETRON 2 MG/ML
4 INJECTION INTRAMUSCULAR; INTRAVENOUS
Status: COMPLETED | OUTPATIENT
Start: 2023-10-02 | End: 2023-10-02

## 2023-10-02 RX ORDER — IBUPROFEN 600 MG/1
1 TABLET ORAL PRN
Status: DISCONTINUED | OUTPATIENT
Start: 2023-10-02 | End: 2023-10-02 | Stop reason: HOSPADM

## 2023-10-02 RX ORDER — OXYCODONE HYDROCHLORIDE 5 MG/1
5 TABLET ORAL
Status: COMPLETED | OUTPATIENT
Start: 2023-10-02 | End: 2023-10-02

## 2023-10-02 RX ORDER — HYDROMORPHONE HYDROCHLORIDE 2 MG/ML
INJECTION, SOLUTION INTRAMUSCULAR; INTRAVENOUS; SUBCUTANEOUS PRN
Status: DISCONTINUED | OUTPATIENT
Start: 2023-10-02 | End: 2023-10-02 | Stop reason: SDUPTHER

## 2023-10-02 RX ORDER — GLYCOPYRROLATE 0.2 MG/ML
INJECTION INTRAMUSCULAR; INTRAVENOUS PRN
Status: DISCONTINUED | OUTPATIENT
Start: 2023-10-02 | End: 2023-10-02 | Stop reason: SDUPTHER

## 2023-10-02 RX ORDER — SODIUM CHLORIDE 9 MG/ML
INJECTION, SOLUTION INTRAVENOUS PRN
Status: DISCONTINUED | OUTPATIENT
Start: 2023-10-02 | End: 2023-10-03 | Stop reason: HOSPADM

## 2023-10-02 RX ORDER — SODIUM CHLORIDE 0.9 % (FLUSH) 0.9 %
5-40 SYRINGE (ML) INJECTION PRN
Status: DISCONTINUED | OUTPATIENT
Start: 2023-10-02 | End: 2023-10-03 | Stop reason: HOSPADM

## 2023-10-02 RX ORDER — HYDROCHLOROTHIAZIDE 25 MG/1
12.5 TABLET ORAL EVERY OTHER DAY
Status: DISCONTINUED | OUTPATIENT
Start: 2023-10-03 | End: 2023-10-03 | Stop reason: HOSPADM

## 2023-10-02 RX ORDER — LISINOPRIL 20 MG/1
20 TABLET ORAL DAILY
Status: DISCONTINUED | OUTPATIENT
Start: 2023-10-03 | End: 2023-10-03 | Stop reason: HOSPADM

## 2023-10-02 RX ORDER — LIDOCAINE HYDROCHLORIDE 20 MG/ML
INJECTION, SOLUTION EPIDURAL; INFILTRATION; INTRACAUDAL; PERINEURAL PRN
Status: DISCONTINUED | OUTPATIENT
Start: 2023-10-02 | End: 2023-10-02 | Stop reason: SDUPTHER

## 2023-10-02 RX ORDER — ROCURONIUM BROMIDE 10 MG/ML
INJECTION, SOLUTION INTRAVENOUS PRN
Status: DISCONTINUED | OUTPATIENT
Start: 2023-10-02 | End: 2023-10-02 | Stop reason: SDUPTHER

## 2023-10-02 RX ORDER — LIDOCAINE HYDROCHLORIDE 10 MG/ML
1 INJECTION, SOLUTION INFILTRATION; PERINEURAL
Status: DISCONTINUED | OUTPATIENT
Start: 2023-10-02 | End: 2023-10-02 | Stop reason: HOSPADM

## 2023-10-02 RX ORDER — ALBUTEROL SULFATE 90 UG/1
2 AEROSOL, METERED RESPIRATORY (INHALATION) EVERY 6 HOURS PRN
Status: DISCONTINUED | OUTPATIENT
Start: 2023-10-02 | End: 2023-10-03 | Stop reason: HOSPADM

## 2023-10-02 RX ORDER — MIDAZOLAM HYDROCHLORIDE 2 MG/2ML
2 INJECTION, SOLUTION INTRAMUSCULAR; INTRAVENOUS
Status: DISCONTINUED | OUTPATIENT
Start: 2023-10-02 | End: 2023-10-02 | Stop reason: HOSPADM

## 2023-10-02 RX ORDER — SODIUM CHLORIDE 9 MG/ML
INJECTION, SOLUTION INTRAVENOUS PRN
Status: DISCONTINUED | OUTPATIENT
Start: 2023-10-02 | End: 2023-10-02 | Stop reason: HOSPADM

## 2023-10-02 RX ORDER — HYDROMORPHONE HYDROCHLORIDE 1 MG/ML
0.5 INJECTION, SOLUTION INTRAMUSCULAR; INTRAVENOUS; SUBCUTANEOUS
Status: DISCONTINUED | OUTPATIENT
Start: 2023-10-02 | End: 2023-10-03 | Stop reason: HOSPADM

## 2023-10-02 RX ORDER — NEOSTIGMINE METHYLSULFATE 1 MG/ML
INJECTION, SOLUTION INTRAVENOUS PRN
Status: DISCONTINUED | OUTPATIENT
Start: 2023-10-02 | End: 2023-10-02 | Stop reason: SDUPTHER

## 2023-10-02 RX ORDER — SODIUM CHLORIDE 0.9 % (FLUSH) 0.9 %
5-40 SYRINGE (ML) INJECTION EVERY 12 HOURS SCHEDULED
Status: DISCONTINUED | OUTPATIENT
Start: 2023-10-02 | End: 2023-10-03 | Stop reason: HOSPADM

## 2023-10-02 RX ORDER — PRAVASTATIN SODIUM 20 MG
10 TABLET ORAL
Status: DISCONTINUED | OUTPATIENT
Start: 2023-10-03 | End: 2023-10-03 | Stop reason: HOSPADM

## 2023-10-02 RX ORDER — DEXAMETHASONE SODIUM PHOSPHATE 4 MG/ML
INJECTION, SOLUTION INTRA-ARTICULAR; INTRALESIONAL; INTRAMUSCULAR; INTRAVENOUS; SOFT TISSUE PRN
Status: DISCONTINUED | OUTPATIENT
Start: 2023-10-02 | End: 2023-10-02 | Stop reason: SDUPTHER

## 2023-10-02 RX ORDER — PROPOFOL 10 MG/ML
INJECTION, EMULSION INTRAVENOUS PRN
Status: DISCONTINUED | OUTPATIENT
Start: 2023-10-02 | End: 2023-10-02 | Stop reason: SDUPTHER

## 2023-10-02 RX ORDER — SODIUM CHLORIDE, SODIUM LACTATE, POTASSIUM CHLORIDE, CALCIUM CHLORIDE 600; 310; 30; 20 MG/100ML; MG/100ML; MG/100ML; MG/100ML
INJECTION, SOLUTION INTRAVENOUS CONTINUOUS
Status: DISCONTINUED | OUTPATIENT
Start: 2023-10-02 | End: 2023-10-03 | Stop reason: HOSPADM

## 2023-10-02 RX ORDER — TRAMADOL HYDROCHLORIDE 50 MG/1
50 TABLET ORAL EVERY 6 HOURS PRN
Qty: 28 TABLET | Refills: 0 | Status: SHIPPED | OUTPATIENT
Start: 2023-10-02 | End: 2023-10-09

## 2023-10-02 RX ORDER — POLYETHYLENE GLYCOL 3350 17 G/17G
17 POWDER, FOR SOLUTION ORAL DAILY
Status: DISCONTINUED | OUTPATIENT
Start: 2023-10-02 | End: 2023-10-03 | Stop reason: HOSPADM

## 2023-10-02 RX ORDER — ACETAMINOPHEN 500 MG
1000 TABLET ORAL ONCE
Status: COMPLETED | OUTPATIENT
Start: 2023-10-02 | End: 2023-10-02

## 2023-10-02 RX ORDER — PRAMIPEXOLE DIHYDROCHLORIDE 1 MG/1
0.5 TABLET ORAL NIGHTLY
Status: DISCONTINUED | OUTPATIENT
Start: 2023-10-03 | End: 2023-10-03 | Stop reason: HOSPADM

## 2023-10-02 RX ORDER — BISACODYL 5 MG/1
5 TABLET, DELAYED RELEASE ORAL DAILY
Status: DISCONTINUED | OUTPATIENT
Start: 2023-10-02 | End: 2023-10-03 | Stop reason: HOSPADM

## 2023-10-02 RX ORDER — ACETAMINOPHEN 325 MG/1
650 TABLET ORAL EVERY 6 HOURS
Status: DISCONTINUED | OUTPATIENT
Start: 2023-10-02 | End: 2023-10-03 | Stop reason: HOSPADM

## 2023-10-02 RX ORDER — ONDANSETRON 2 MG/ML
INJECTION INTRAMUSCULAR; INTRAVENOUS PRN
Status: DISCONTINUED | OUTPATIENT
Start: 2023-10-02 | End: 2023-10-02 | Stop reason: SDUPTHER

## 2023-10-02 RX ORDER — VANCOMYCIN HYDROCHLORIDE 1 G/20ML
INJECTION, POWDER, LYOPHILIZED, FOR SOLUTION INTRAVENOUS PRN
Status: DISCONTINUED | OUTPATIENT
Start: 2023-10-02 | End: 2023-10-02 | Stop reason: ALTCHOICE

## 2023-10-02 RX ORDER — SODIUM CHLORIDE, SODIUM LACTATE, POTASSIUM CHLORIDE, CALCIUM CHLORIDE 600; 310; 30; 20 MG/100ML; MG/100ML; MG/100ML; MG/100ML
INJECTION, SOLUTION INTRAVENOUS CONTINUOUS
Status: DISCONTINUED | OUTPATIENT
Start: 2023-10-02 | End: 2023-10-02 | Stop reason: HOSPADM

## 2023-10-02 RX ADMIN — BISACODYL 5 MG: 5 TABLET, COATED ORAL at 18:37

## 2023-10-02 RX ADMIN — CEFAZOLIN SODIUM 2000 MG: 100 INJECTION, POWDER, LYOPHILIZED, FOR SOLUTION INTRAVENOUS at 20:38

## 2023-10-02 RX ADMIN — ROCURONIUM BROMIDE 10 MG: 10 INJECTION, SOLUTION INTRAVENOUS at 11:50

## 2023-10-02 RX ADMIN — HYDROMORPHONE HYDROCHLORIDE 0.5 MG: 2 INJECTION, SOLUTION INTRAMUSCULAR; INTRAVENOUS; SUBCUTANEOUS at 13:52

## 2023-10-02 RX ADMIN — SODIUM CHLORIDE, PRESERVATIVE FREE 10 ML: 5 INJECTION INTRAVENOUS at 21:00

## 2023-10-02 RX ADMIN — SODIUM CHLORIDE, POTASSIUM CHLORIDE, SODIUM LACTATE AND CALCIUM CHLORIDE: 600; 310; 30; 20 INJECTION, SOLUTION INTRAVENOUS at 08:54

## 2023-10-02 RX ADMIN — LIDOCAINE HYDROCHLORIDE 100 MG: 20 INJECTION, SOLUTION EPIDURAL; INFILTRATION; INTRACAUDAL; PERINEURAL at 10:40

## 2023-10-02 RX ADMIN — ROCURONIUM BROMIDE 50 MG: 10 INJECTION, SOLUTION INTRAVENOUS at 10:40

## 2023-10-02 RX ADMIN — SODIUM CHLORIDE: 9 INJECTION, SOLUTION INTRAVENOUS at 18:42

## 2023-10-02 RX ADMIN — Medication 2 G: at 11:06

## 2023-10-02 RX ADMIN — TRANEXAMIC ACID 2000 MG: 100 INJECTION, SOLUTION INTRAVENOUS at 11:11

## 2023-10-02 RX ADMIN — PROPOFOL 200 MG: 10 INJECTION, EMULSION INTRAVENOUS at 10:40

## 2023-10-02 RX ADMIN — HYDROMORPHONE HYDROCHLORIDE 0.4 MG: 2 INJECTION INTRAMUSCULAR; INTRAVENOUS; SUBCUTANEOUS at 12:32

## 2023-10-02 RX ADMIN — PHENYLEPHRINE HYDROCHLORIDE 100 MCG: 10 INJECTION INTRAVENOUS at 11:31

## 2023-10-02 RX ADMIN — HYDROMORPHONE HYDROCHLORIDE 0.3 MG: 2 INJECTION INTRAMUSCULAR; INTRAVENOUS; SUBCUTANEOUS at 12:39

## 2023-10-02 RX ADMIN — PHENYLEPHRINE HYDROCHLORIDE 50 MCG: 10 INJECTION INTRAVENOUS at 11:15

## 2023-10-02 RX ADMIN — OXYCODONE HYDROCHLORIDE 5 MG: 5 TABLET ORAL at 20:52

## 2023-10-02 RX ADMIN — ONDANSETRON 4 MG: 2 INJECTION INTRAMUSCULAR; INTRAVENOUS at 14:07

## 2023-10-02 RX ADMIN — ACETAMINOPHEN 650 MG: 325 TABLET ORAL at 20:38

## 2023-10-02 RX ADMIN — PHENYLEPHRINE HYDROCHLORIDE 150 MCG: 10 INJECTION INTRAVENOUS at 11:23

## 2023-10-02 RX ADMIN — FAMOTIDINE 20 MG: 10 INJECTION, SOLUTION INTRAVENOUS at 14:34

## 2023-10-02 RX ADMIN — HYDROMORPHONE HYDROCHLORIDE 0.5 MG: 2 INJECTION, SOLUTION INTRAMUSCULAR; INTRAVENOUS; SUBCUTANEOUS at 13:24

## 2023-10-02 RX ADMIN — Medication 3 AMPULE: at 08:56

## 2023-10-02 RX ADMIN — MIDAZOLAM 1 MG: 1 INJECTION INTRAMUSCULAR; INTRAVENOUS at 10:30

## 2023-10-02 RX ADMIN — DEXAMETHASONE SODIUM PHOSPHATE 4 MG: 4 INJECTION INTRA-ARTICULAR; INTRALESIONAL; INTRAMUSCULAR; INTRAVENOUS; SOFT TISSUE at 11:06

## 2023-10-02 RX ADMIN — PHENYLEPHRINE HYDROCHLORIDE 100 MCG: 10 INJECTION INTRAVENOUS at 11:14

## 2023-10-02 RX ADMIN — HYDROMORPHONE HYDROCHLORIDE 0.2 MG: 2 INJECTION INTRAMUSCULAR; INTRAVENOUS; SUBCUTANEOUS at 12:15

## 2023-10-02 RX ADMIN — ONDANSETRON 4 MG: 2 INJECTION INTRAMUSCULAR; INTRAVENOUS at 11:06

## 2023-10-02 RX ADMIN — GLYCOPYRROLATE 0.8 MG: 0.2 INJECTION INTRAMUSCULAR; INTRAVENOUS at 12:20

## 2023-10-02 RX ADMIN — KETAMINE HYDROCHLORIDE 20 MG: 50 INJECTION, SOLUTION INTRAMUSCULAR; INTRAVENOUS at 10:40

## 2023-10-02 RX ADMIN — OXYCODONE HYDROCHLORIDE 5 MG: 5 TABLET ORAL at 13:25

## 2023-10-02 RX ADMIN — Medication 5 MG: at 12:20

## 2023-10-02 RX ADMIN — GLYCOPYRROLATE 0.2 MG: 0.2 INJECTION INTRAMUSCULAR; INTRAVENOUS at 10:30

## 2023-10-02 RX ADMIN — HYDROMORPHONE HYDROCHLORIDE 0.2 MG: 2 INJECTION INTRAMUSCULAR; INTRAVENOUS; SUBCUTANEOUS at 11:12

## 2023-10-02 RX ADMIN — ACETAMINOPHEN 1000 MG: 500 TABLET, FILM COATED ORAL at 08:56

## 2023-10-02 RX ADMIN — PHENYLEPHRINE HYDROCHLORIDE 100 MCG: 10 INJECTION INTRAVENOUS at 10:57

## 2023-10-02 RX ADMIN — HYDROMORPHONE HYDROCHLORIDE 0.2 MG: 2 INJECTION INTRAMUSCULAR; INTRAVENOUS; SUBCUTANEOUS at 11:50

## 2023-10-02 RX ADMIN — SODIUM CHLORIDE, SODIUM LACTATE, POTASSIUM CHLORIDE, AND CALCIUM CHLORIDE: 600; 310; 30; 20 INJECTION, SOLUTION INTRAVENOUS at 10:30

## 2023-10-02 RX ADMIN — HYDROMORPHONE HYDROCHLORIDE 0.4 MG: 2 INJECTION INTRAMUSCULAR; INTRAVENOUS; SUBCUTANEOUS at 10:30

## 2023-10-02 ASSESSMENT — PAIN SCALES - GENERAL
PAINLEVEL_OUTOF10: 4
PAINLEVEL_OUTOF10: 9
PAINLEVEL_OUTOF10: 7

## 2023-10-02 ASSESSMENT — PAIN DESCRIPTION - LOCATION
LOCATION: BACK;INCISION
LOCATION: BACK

## 2023-10-02 ASSESSMENT — PAIN DESCRIPTION - PAIN TYPE: TYPE: SURGICAL PAIN

## 2023-10-02 ASSESSMENT — PAIN DESCRIPTION - ORIENTATION: ORIENTATION: RIGHT;LEFT;LOWER

## 2023-10-02 ASSESSMENT — PAIN DESCRIPTION - DESCRIPTORS
DESCRIPTORS: ACHING
DESCRIPTORS: ACHING

## 2023-10-02 ASSESSMENT — PAIN - FUNCTIONAL ASSESSMENT: PAIN_FUNCTIONAL_ASSESSMENT: 0-10

## 2023-10-02 ASSESSMENT — PAIN DESCRIPTION - FREQUENCY: FREQUENCY: INTERMITTENT

## 2023-10-02 NOTE — ANESTHESIA PRE PROCEDURE
Department of Anesthesiology  Preprocedure Note       Name:  Lucrecia Garcia   Age:  66 y.o.  :  1945                                          MRN:  [id number]         Date:  10/2/2023      Surgeon: Panda Leach):  Rhianna Seymour MD    Procedure: Procedure(s):  L4-L5 laminectomy and fusion with allograft, transforaminal lumbar interbody fusion, and instrumentation, right S1 hemilaminectomy. Medications prior to admission:   Prior to Admission medications    Medication Sig Start Date End Date Taking? Authorizing Provider   traMADol (ULTRAM) 50 MG tablet Take 1 tablet by mouth every 6 hours as needed for Pain for up to 7 days.  Max Daily Amount: 200 mg 9/26/23 10/3/23  Rhianna Seymour MD   cyclobenzaprine (FLEXERIL) 10 MG tablet Take 1 tablet by mouth 3 times daily as needed for Muscle spasms 9/26/23 10/6/23  Rihanna Seymour MD   Melatonin-Theanine (SLEEP+CALM) 3-50 MG CHEW Take by mouth nightly    Historical Provider, MD   vitamin C (ASCORBIC ACID) 500 MG tablet Take 1 tablet by mouth daily    Historical Provider, MD   Multiple Vitamins-Minerals (ICAPS AREDS FORMULA PO) Take by mouth in the morning and at bedtime    Historical Provider, MD   lisinopril (PRINIVIL;ZESTRIL) 20 MG tablet Take 1 tablet by mouth daily 23   GOMEZ Du CNP   pramipexole (MIRAPEX) 0.5 MG tablet Take 1 tablet by mouth nightly 23   Alexa Zona Canavan, APRN - CNP   diclofenac (VOLTAREN) 75 MG EC tablet Take 1 tablet by mouth 2 times daily 23   Alexa Zona Canavan, APRN - CNP   fluticasone-salmeterol (ADVAIR DISKUS) 250-50 MCG/ACT AEPB diskus inhaler Inhale 1 puff into the lungs 2 times daily 23   Alexa Zona Canavan, APRN - CNP   sildenafil (VIAGRA) 50 MG tablet Take 1 tablet by mouth daily as needed for Erectile Dysfunction 23   Alexa Zona Canavan, APRN - CNP   hydroCHLOROthiazide (HYDRODIURIL) 12.5 MG tablet Take 1 tablet by mouth every other day 23   Tramaine Call, APRN - CNP

## 2023-10-02 NOTE — ANESTHESIA PROCEDURE NOTES
Airway  Date/Time: 10/2/2023 10:43 AM  Urgency: elective    Airway not difficult    General Information and Staff    Patient location during procedure: OR  Resident/CRNA: GOMEZ Coffman CRNA  Performed: resident/CRNA   Performed by: GOMEZ Coffman CRNA  Authorized by: Radha Stephens MD      Indications and Patient Condition  Indications for airway management: anesthesia  Spontaneous Ventilation: absent  Sedation level: deep  Preoxygenated: yes  Patient position: sniffing  MILS not maintained throughout  Mask difficulty assessment: vent by bag mask + OA or adjuvant +/- NMBA    Final Airway Details  Final airway type: endotracheal airway      Successful airway: ETT  Cuffed: yes   Successful intubation technique: direct laryngoscopy  Facilitating devices/methods: intubating stylet  Endotracheal tube insertion site: oral  Blade: Thorne  Blade size: #2  ETT size (mm): 7.5  Cormack-Lehane Classification: grade I - full view of glottis  Placement verified by: chest auscultation and capnometry   Measured from: teeth  ETT to teeth (cm): 24  Number of attempts at approach: 1  Ventilation between attempts: bag mask    Additional Comments  Atraumatic.  Oral structures and dentition unchanged

## 2023-10-02 NOTE — ANESTHESIA POSTPROCEDURE EVALUATION
Department of Anesthesiology  Postprocedure Note    Patient: Urszula Basilio  MRN: [id number]  YOB: 1945  Date of evaluation: 10/2/2023      Procedure Summary     Date: 10/02/23 Room / Location: [facility] MAIN OR  / [facility] MAIN OR    Anesthesia Start: 1030 Anesthesia Stop: 1387    Procedure: L4-L5 laminectomy and fusion with allograft, transforaminal lumbar interbody fusion, and instrumentation, right S1 hemilaminectomy. (Spine Lumbar) Diagnosis:       Spinal stenosis, lumbar region, with neurogenic claudication      Lumbar facet arthropathy      Spondylolisthesis of lumbar region      Lumbar radiculopathy      (Spinal stenosis, lumbar region, with neurogenic claudication [M48.062])      (Lumbar facet arthropathy [M47.816])      (Spondylolisthesis of lumbar region [M43.16])      (Lumbar radiculopathy [M54.16])    Providers: Basil Clemons MD Responsible Provider: Poonam Garza MD    Anesthesia Type: General ASA Status: 2          Anesthesia Type: General    Jocelnie Phase I: Joceline Score: 9    Joceline Phase II:        Anesthesia Post Evaluation    Patient location during evaluation: PACU  Patient participation: complete - patient participated  Level of consciousness: awake and alert  Airway patency: patent  Nausea: well controlled. Complications: no  Cardiovascular status: acceptable.   Respiratory status: acceptable  Hydration status: stable  Pain management: adequate

## 2023-10-02 NOTE — H&P
needing additional surgery;  blood loss needing transfusion; postoperative hematoma; and the risks of anesthesia. The patient voiced an understanding of these issues as outlined. The procedure that may prove to be beneficial here is a L4-L5 laminectomy and fusion with allograft, transforaminal lumbar interbody fusion, and instrumentation, and a right S1 hemilaminectomy.

## 2023-10-02 NOTE — PERIOP NOTE
Hearing aids removed and left with the patient's wife. Patient has a wedding band on his left hand that he says \"won't come off\". Patient has been made aware of the risks of keeping the wedding band on during the procedure and the wedding band is taped over.

## 2023-10-03 VITALS
HEART RATE: 102 BPM | HEIGHT: 68 IN | TEMPERATURE: 99.9 F | OXYGEN SATURATION: 95 % | SYSTOLIC BLOOD PRESSURE: 134 MMHG | WEIGHT: 209.13 LBS | DIASTOLIC BLOOD PRESSURE: 75 MMHG | RESPIRATION RATE: 17 BRPM | BODY MASS INDEX: 31.7 KG/M2

## 2023-10-03 PROCEDURE — 2580000003 HC RX 258: Performed by: ORTHOPAEDIC SURGERY

## 2023-10-03 PROCEDURE — G0378 HOSPITAL OBSERVATION PER HR: HCPCS

## 2023-10-03 PROCEDURE — 6370000000 HC RX 637 (ALT 250 FOR IP): Performed by: ORTHOPAEDIC SURGERY

## 2023-10-03 PROCEDURE — 97165 OT EVAL LOW COMPLEX 30 MIN: CPT

## 2023-10-03 PROCEDURE — 94760 N-INVAS EAR/PLS OXIMETRY 1: CPT

## 2023-10-03 PROCEDURE — 2580000003 HC RX 258: Performed by: ANESTHESIOLOGY

## 2023-10-03 PROCEDURE — 94640 AIRWAY INHALATION TREATMENT: CPT

## 2023-10-03 PROCEDURE — 97535 SELF CARE MNGMENT TRAINING: CPT

## 2023-10-03 PROCEDURE — 97162 PT EVAL MOD COMPLEX 30 MIN: CPT

## 2023-10-03 PROCEDURE — 6360000002 HC RX W HCPCS: Performed by: ORTHOPAEDIC SURGERY

## 2023-10-03 PROCEDURE — 97112 NEUROMUSCULAR REEDUCATION: CPT

## 2023-10-03 PROCEDURE — 97530 THERAPEUTIC ACTIVITIES: CPT

## 2023-10-03 RX ORDER — KETOROLAC TROMETHAMINE 30 MG/ML
30 INJECTION, SOLUTION INTRAMUSCULAR; INTRAVENOUS ONCE
Status: COMPLETED | OUTPATIENT
Start: 2023-10-03 | End: 2023-10-03

## 2023-10-03 RX ADMIN — ALBUTEROL SULFATE 2 PUFF: 90 AEROSOL, METERED RESPIRATORY (INHALATION) at 01:34

## 2023-10-03 RX ADMIN — OXYCODONE HYDROCHLORIDE 5 MG: 5 TABLET ORAL at 01:45

## 2023-10-03 RX ADMIN — FLUTICASONE PROPIONATE 1 SPRAY: 50 SPRAY, METERED NASAL at 08:36

## 2023-10-03 RX ADMIN — HYDROCHLOROTHIAZIDE 12.5 MG: 25 TABLET ORAL at 08:35

## 2023-10-03 RX ADMIN — PRAVASTATIN SODIUM 10 MG: 20 TABLET ORAL at 00:04

## 2023-10-03 RX ADMIN — CEFAZOLIN SODIUM 2000 MG: 100 INJECTION, POWDER, LYOPHILIZED, FOR SOLUTION INTRAVENOUS at 04:34

## 2023-10-03 RX ADMIN — ACETAMINOPHEN 650 MG: 325 TABLET ORAL at 14:15

## 2023-10-03 RX ADMIN — ACETAMINOPHEN 650 MG: 325 TABLET ORAL at 01:45

## 2023-10-03 RX ADMIN — LISINOPRIL 20 MG: 20 TABLET ORAL at 08:35

## 2023-10-03 RX ADMIN — CYCLOBENZAPRINE HYDROCHLORIDE 10 MG: 5 TABLET, FILM COATED ORAL at 12:33

## 2023-10-03 RX ADMIN — SODIUM CHLORIDE, POTASSIUM CHLORIDE, SODIUM LACTATE AND CALCIUM CHLORIDE: 600; 310; 30; 20 INJECTION, SOLUTION INTRAVENOUS at 04:34

## 2023-10-03 RX ADMIN — SODIUM CHLORIDE, PRESERVATIVE FREE 10 ML: 5 INJECTION INTRAVENOUS at 08:44

## 2023-10-03 RX ADMIN — KETOROLAC TROMETHAMINE 30 MG: 30 INJECTION, SOLUTION INTRAMUSCULAR; INTRAVENOUS at 08:35

## 2023-10-03 RX ADMIN — ACETAMINOPHEN 650 MG: 325 TABLET ORAL at 08:35

## 2023-10-03 RX ADMIN — OXYCODONE HYDROCHLORIDE 5 MG: 5 TABLET ORAL at 05:59

## 2023-10-03 RX ADMIN — MOMETASONE FUROATE AND FORMOTEROL FUMARATE DIHYDRATE 2 PUFF: 200; 5 AEROSOL RESPIRATORY (INHALATION) at 09:15

## 2023-10-03 RX ADMIN — BISACODYL 5 MG: 5 TABLET, COATED ORAL at 08:35

## 2023-10-03 RX ADMIN — PRAMIPEXOLE DIHYDROCHLORIDE 0.5 MG: 1 TABLET ORAL at 00:04

## 2023-10-03 ASSESSMENT — PAIN DESCRIPTION - FREQUENCY: FREQUENCY: INTERMITTENT

## 2023-10-03 ASSESSMENT — PAIN DESCRIPTION - DESCRIPTORS
DESCRIPTORS: ACHING
DESCRIPTORS: ACHING

## 2023-10-03 ASSESSMENT — PAIN DESCRIPTION - LOCATION
LOCATION: BACK
LOCATION: BACK

## 2023-10-03 ASSESSMENT — PAIN DESCRIPTION - ORIENTATION
ORIENTATION: RIGHT;LEFT;LOWER
ORIENTATION: RIGHT;LEFT;LOWER

## 2023-10-03 ASSESSMENT — PAIN DESCRIPTION - PAIN TYPE: TYPE: SURGICAL PAIN

## 2023-10-03 ASSESSMENT — PAIN - FUNCTIONAL ASSESSMENT: PAIN_FUNCTIONAL_ASSESSMENT: PREVENTS OR INTERFERES WITH MANY ACTIVE NOT PASSIVE ACTIVITIES

## 2023-10-03 ASSESSMENT — PAIN SCALES - GENERAL: PAINLEVEL_OUTOF10: 4

## 2023-10-03 NOTE — PLAN OF CARE
Problem: Pain  Goal: Verbalizes/displays adequate comfort level or baseline comfort level  10/3/2023 0502 by Marnie Sow RN  Outcome: Progressing  10/2/2023 2342 by Marnie Sow RN  Outcome: Progressing     Problem: Safety - Adult  Goal: Free from fall injury  10/3/2023 0502 by Marnie Sow RN  Outcome: Progressing  10/2/2023 2342 by Marnie Sow RN  Outcome: Progressing     Problem: Discharge Planning  Goal: Discharge to home or other facility with appropriate resources  10/3/2023 0502 by Marnie Sow RN  Outcome: Progressing  10/2/2023 2342 by Marnie Sow RN  Outcome: Progressing     Problem: ABCDS Injury Assessment  Goal: Absence of physical injury  10/3/2023 0502 by Marnie Sow RN  Outcome: Progressing  10/2/2023 2342 by Marnie Sow RN  Outcome: Progressing

## 2023-10-03 NOTE — THERAPY EVALUATION
ACUTE PHYSICAL THERAPY GOALS:   (Developed with and agreed upon by patient and/or caregiver.)  (1.) Leopold Eans  will move from supine to sit and sit to supine , scoot up and down, and roll side to side with MODIFIED INDEPENDENCE within 7 treatment day(s). (2.) Leopold Eans will transfer from bed to chair and chair to bed with MODIFIED INDEPENDENCE using the least restrictive device within 7 treatment day(s). (3.) Leopold Eans will ambulate with MODIFIED INDEPENDENCE for 500+ feet with the least restrictive device within 7 treatment day(s). (4.) Leopold Eans will perform standing static and dynamic balance activities x 25 minutes with MODIFIED INDEPENDENCE to improve safety within 7 treatment day(s). (5.) Leopold Eans will perform therapeutic exercises x 20 min for HEP with INDEPENDENCE to improve strength, endurance, and functional mobility within 7 treatment day(s).      PHYSICAL THERAPY Initial Assessment, Daily Note, and AM  (Link to Caseload Tracking: PT Visit Days : 1  Acknowledge Orders  Time In/Out  PT Charge Capture  Rehab Caseload Tracker    Leopold Eans is a 66 y.o. male   PRIMARY DIAGNOSIS: Spinal stenosis, lumbar region, with neurogenic claudication  Spinal stenosis, lumbar region, with neurogenic claudication [M48.062]  Lumbar facet arthropathy [M47.816]  Spondylolisthesis of lumbar region [M43.16]  Lumbar radiculopathy [M54.16]  S/P lumbar fusion [Z98.1]  Procedure(s) (LRB):  L4-L5 laminectomy and fusion with allograft, transforaminal lumbar interbody fusion, and instrumentation, right S1 hemilaminectomy. (N/A)  1 Day Post-Op  Reason for Referral: Generalized Muscle Weakness (M62.81)  Other lack of cordination (R27.8)  Difficulty in walking, Not elsewhere classified (R26.2)  Other abnormalities of gait and mobility (R26.89)  Observation: Payor: MEDICARE / Plan: MEDICARE PART A AND B / Product Type: *No Product type* /

## 2023-10-03 NOTE — CARE COORDINATION
Active  Yes   Occupation Retired   Discharge Planning   Type of [facility] Prior To Admission None   [address] DME Needed Walker   DME Ordered? Osbaldo Garcia  ([address] from [facility])   Potential Assistance Purchasing Medications No   Type of Home Care Services PT;OT   Patient expects to be discharged to: [facility] Discharge   Transition of Care Consult (CM Consult) Home Health;DME/Supply Assistance   Internal Home Health No   Reason Outside [address] Physician referred to [address] Discharge Home Health;DME;OT;PT   [address] Provided? No   Mode of Transport at Discharge Other (see comment)  (family)   Confirm Follow Up Transport Family   Condition of Participation: Discharge Planning   The Plan for Transition of Care is related to the following treatment goals: Home health therapy services to support pt's care and recovery in the home s/p spine surgery   [place] of Choice list was provided with basic dialogue that supports the patient's individualized plan of care/goals, treatment preferences, and shares the quality data associated with the providers?   No  ([address] [facility] services arranged by surgeon's office prior to admission)

## 2023-10-04 ENCOUNTER — TELEPHONE (OUTPATIENT)
Dept: ORTHOPEDIC SURGERY | Age: 78
End: 2023-10-04

## 2023-10-04 DIAGNOSIS — M54.16 RADICULOPATHY, LUMBAR REGION: Primary | ICD-10-CM

## 2023-10-04 RX ORDER — OXYCODONE HYDROCHLORIDE 5 MG/1
5 TABLET ORAL EVERY 6 HOURS PRN
Qty: 28 TABLET | Refills: 0 | Status: SHIPPED | OUTPATIENT
Start: 2023-10-04 | End: 2023-10-11

## 2023-10-04 NOTE — TELEPHONE ENCOUNTER
He was discharged from the hosp yesterday. CDV was going to send in Oxy. [facility] on [address] has not received anything. Can you let her know the status please.

## 2023-10-11 ENCOUNTER — TELEPHONE (OUTPATIENT)
Dept: ORTHOPEDIC SURGERY | Age: 78
End: 2023-10-11

## 2023-10-11 NOTE — TELEPHONE ENCOUNTER
He just wanted to let [facility] know that he fell Saturday and landed on his left side on carpet. He got his feet tied up with the walker. He seems okay. Home health has been out and they don't think damage was done but suggested he let [facility]  know. He said if he thinks he needs to be seen sooner to let him know.

## 2023-10-12 NOTE — TELEPHONE ENCOUNTER
Return call to patient and informed him if he is not having any issue we will hold off on any appointments at this time. IF pain increases, or is different we will need to bring him in to be seen and possible new xrays.

## 2023-10-26 ENCOUNTER — OFFICE VISIT (OUTPATIENT)
Dept: ORTHOPEDIC SURGERY | Age: 78
End: 2023-10-26

## 2023-10-26 DIAGNOSIS — Z98.1 STATUS POST LUMBAR SPINAL ARTHRODESIS: Primary | ICD-10-CM

## 2023-10-26 RX ORDER — TRAMADOL HYDROCHLORIDE 50 MG/1
50 TABLET ORAL EVERY 6 HOURS PRN
Qty: 28 TABLET | Refills: 0 | Status: SHIPPED | OUTPATIENT
Start: 2023-10-26 | End: 2023-11-02

## 2023-10-26 RX ORDER — CYCLOBENZAPRINE HCL 10 MG
10 TABLET ORAL 3 TIMES DAILY PRN
Qty: 21 TABLET | Refills: 0 | Status: SHIPPED | OUTPATIENT
Start: 2023-10-26 | End: 2023-11-05

## 2023-10-26 RX ORDER — CEPHALEXIN 500 MG/1
500 CAPSULE ORAL 4 TIMES DAILY
Qty: 28 CAPSULE | Refills: 0 | Status: SHIPPED | OUTPATIENT
Start: 2023-10-26

## 2023-10-26 NOTE — PROGRESS NOTES
Name: Best Das  YOB: 1945  Gender: male  MRN: [id number]  Age: 66 y.o. Chief Complaint: Lumbar spine surgery follow up    History of Present Illness:      Best Das  is here for 3 week follow up of his  lumbar TLIF surgery. He reports some relief of preoperative lower extremity pain, weakness and parasthesias. There is the expected residual back stiffness.            Medications:       Current Outpatient Medications:     Melatonin-Theanine (SLEEP+CALM) 3-50 MG CHEW, Take by mouth nightly, Disp: , Rfl:     vitamin C (ASCORBIC ACID) 500 MG tablet, Take 1 tablet by mouth daily, Disp: , Rfl:     Multiple Vitamins-Minerals (ICAPS AREDS FORMULA PO), Take by mouth in the morning and at bedtime, Disp: , Rfl:     lisinopril (PRINIVIL;ZESTRIL) 20 MG tablet, Take 1 tablet by mouth daily, Disp: 30 tablet, Rfl: 5    pramipexole (MIRAPEX) 0.5 MG tablet, Take 1 tablet by mouth nightly, Disp: 90 tablet, Rfl: 1    diclofenac (VOLTAREN) 75 MG EC tablet, Take 1 tablet by mouth 2 times daily, Disp: 60 tablet, Rfl: 5    fluticasone-salmeterol (ADVAIR DISKUS) 250-50 MCG/ACT AEPB diskus inhaler, Inhale 1 puff into the lungs 2 times daily, Disp: 1 each, Rfl: 2    sildenafil (VIAGRA) 50 MG tablet, Take 1 tablet by mouth daily as needed for Erectile Dysfunction, Disp: 10 tablet, Rfl: 2    hydroCHLOROthiazide (HYDRODIURIL) 12.5 MG tablet, Take 1 tablet by mouth every other day, Disp: 90 tablet, Rfl: 1    sildenafil (REVATIO) 20 MG tablet, Take 1 tablet by mouth daily as needed (ED), Disp: 30 tablet, Rfl: 0    pravastatin (PRAVACHOL) 10 MG tablet, Take 1 tablet by mouth daily (Patient taking differently: Take 1 tablet by mouth nightly), Disp: 30 tablet, Rfl: 5    chlorhexidine (PERIDEX) 0.12 % solution, Take 15 mLs by mouth 2 times daily PLACE 15 ML IN THE MOUTH SWISH IN MOUTH FOR 30 SECONDS THEN SPIT OUT, Disp: 1 each, Rfl: 0    metroNIDAZOLE (METROGEL) 1 % gel, Apply 1 Tube topically daily,

## 2023-10-30 ENCOUNTER — TELEPHONE (OUTPATIENT)
Dept: ORTHOPEDIC SURGERY | Age: 78
End: 2023-10-30

## 2023-10-30 DIAGNOSIS — Z98.1 STATUS POST LUMBAR SPINAL ARTHRODESIS: Primary | ICD-10-CM

## 2023-11-01 DIAGNOSIS — J45.909 UNCOMPLICATED ASTHMA, UNSPECIFIED ASTHMA SEVERITY, UNSPECIFIED WHETHER PERSISTENT: ICD-10-CM

## 2023-11-01 DIAGNOSIS — I10 PRIMARY HYPERTENSION: ICD-10-CM

## 2023-11-01 RX ORDER — LISINOPRIL 20 MG/1
20 TABLET ORAL DAILY
Qty: 30 TABLET | Refills: 5 | OUTPATIENT
Start: 2023-11-01

## 2023-11-01 RX ORDER — FLUTICASONE PROPIONATE AND SALMETEROL 250; 50 UG/1; UG/1
1 POWDER RESPIRATORY (INHALATION) 2 TIMES DAILY
Qty: 1 EACH | Refills: 2 | Status: SHIPPED | OUTPATIENT
Start: 2023-11-01

## 2023-11-01 RX ORDER — FLUTICASONE PROPIONATE AND SALMETEROL 50; 250 UG/1; UG/1
POWDER RESPIRATORY (INHALATION)
Qty: 60 EACH | Refills: 2 | OUTPATIENT
Start: 2023-11-01

## 2023-11-01 NOTE — TELEPHONE ENCOUNTER
Pt is requesting a refill of advair disk inhaler. Please send to [facility] on [facility]. joann

## 2023-11-05 DIAGNOSIS — J45.909 UNCOMPLICATED ASTHMA, UNSPECIFIED ASTHMA SEVERITY, UNSPECIFIED WHETHER PERSISTENT: ICD-10-CM

## 2023-11-06 NOTE — TELEPHONE ENCOUNTER
Patient is requesting refill for fluticasone-salmeterol (ADVAIR DISKUS) 250-50 MCG/ACT AEPB diskus inhaler

## 2023-11-07 DIAGNOSIS — J45.909 UNCOMPLICATED ASTHMA, UNSPECIFIED ASTHMA SEVERITY, UNSPECIFIED WHETHER PERSISTENT: ICD-10-CM

## 2023-11-07 NOTE — TELEPHONE ENCOUNTER
Pt is requesting a refill on Advair sent to [facility] on [address] said they do not have the order.     Pt has been without meds for a week

## 2023-11-08 RX ORDER — FLUTICASONE PROPIONATE AND SALMETEROL 250; 50 UG/1; UG/1
1 POWDER RESPIRATORY (INHALATION) 2 TIMES DAILY
Qty: 1 EACH | Refills: 2 | Status: SHIPPED | OUTPATIENT
Start: 2023-11-08

## 2023-11-08 RX ORDER — FLUTICASONE PROPIONATE AND SALMETEROL 250; 50 UG/1; UG/1
1 POWDER RESPIRATORY (INHALATION) 2 TIMES DAILY
Qty: 1 EACH | Refills: 2 | Status: SHIPPED | OUTPATIENT
Start: 2023-11-08 | End: 2023-11-08 | Stop reason: SDUPTHER

## 2023-12-10 DIAGNOSIS — I10 PRIMARY HYPERTENSION: ICD-10-CM

## 2023-12-11 RX ORDER — LISINOPRIL 20 MG/1
20 TABLET ORAL DAILY
Qty: 30 TABLET | Refills: 5 | OUTPATIENT
Start: 2023-12-11

## 2023-12-12 ENCOUNTER — TELEPHONE (OUTPATIENT)
Dept: INTERNAL MEDICINE CLINIC | Facility: CLINIC | Age: 78
End: 2023-12-12

## 2023-12-12 NOTE — TELEPHONE ENCOUNTER
Pt said pharmacy is out of Advair      Wants to know if  can call in a prescription for [address] to the [facility]    He is leaving for [facility] on Saturday and needs this meds ASAP    He will pay for it.

## 2023-12-13 DIAGNOSIS — J45.909 UNCOMPLICATED ASTHMA, UNSPECIFIED ASTHMA SEVERITY, UNSPECIFIED WHETHER PERSISTENT: ICD-10-CM

## 2023-12-13 RX ORDER — FLUTICASONE PROPIONATE AND SALMETEROL 250; 50 UG/1; UG/1
1 POWDER RESPIRATORY (INHALATION) 2 TIMES DAILY
Qty: 1 EACH | Refills: 2 | Status: SHIPPED | OUTPATIENT
Start: 2023-12-13

## 2024-01-05 ENCOUNTER — OFFICE VISIT (OUTPATIENT)
Dept: ORTHOPEDIC SURGERY | Age: 79
End: 2024-01-05
Payer: MEDICARE

## 2024-01-05 DIAGNOSIS — Z98.1 STATUS POST LUMBAR SPINAL ARTHRODESIS: Primary | ICD-10-CM

## 2024-01-05 PROCEDURE — 1036F TOBACCO NON-USER: CPT | Performed by: ORTHOPAEDIC SURGERY

## 2024-01-05 PROCEDURE — G8417 CALC BMI ABV UP PARAM F/U: HCPCS | Performed by: ORTHOPAEDIC SURGERY

## 2024-01-05 PROCEDURE — 99213 OFFICE O/P EST LOW 20 MIN: CPT | Performed by: ORTHOPAEDIC SURGERY

## 2024-01-05 PROCEDURE — G8484 FLU IMMUNIZE NO ADMIN: HCPCS | Performed by: ORTHOPAEDIC SURGERY

## 2024-01-05 PROCEDURE — 1123F ACP DISCUSS/DSCN MKR DOCD: CPT | Performed by: ORTHOPAEDIC SURGERY

## 2024-01-05 PROCEDURE — G8427 DOCREV CUR MEDS BY ELIG CLIN: HCPCS | Performed by: ORTHOPAEDIC SURGERY

## 2024-01-05 NOTE — PROGRESS NOTES
will have him return for follow up in 4 months.         Electronically Signed By Eugene Saldaña MD   01/05/24  12:01 PM

## 2024-01-16 ENCOUNTER — TELEMEDICINE (OUTPATIENT)
Dept: INTERNAL MEDICINE CLINIC | Facility: CLINIC | Age: 79
End: 2024-01-16
Payer: MEDICARE

## 2024-01-16 DIAGNOSIS — Z76.0 MEDICATION REFILL: ICD-10-CM

## 2024-01-16 DIAGNOSIS — U07.1 POSITIVE SELF-ADMINISTERED ANTIGEN TEST FOR COVID-19: Primary | ICD-10-CM

## 2024-01-16 DIAGNOSIS — I10 PRIMARY HYPERTENSION: ICD-10-CM

## 2024-01-16 PROCEDURE — 99213 OFFICE O/P EST LOW 20 MIN: CPT | Performed by: NURSE PRACTITIONER

## 2024-01-16 PROCEDURE — 1123F ACP DISCUSS/DSCN MKR DOCD: CPT | Performed by: NURSE PRACTITIONER

## 2024-01-16 PROCEDURE — G8427 DOCREV CUR MEDS BY ELIG CLIN: HCPCS | Performed by: NURSE PRACTITIONER

## 2024-01-16 RX ORDER — METHYLPREDNISOLONE 4 MG/1
TABLET ORAL
Qty: 21 TABLET | Refills: 0 | Status: SHIPPED | OUTPATIENT
Start: 2024-01-16 | End: 2024-01-22

## 2024-01-16 RX ORDER — ALBUTEROL SULFATE 90 UG/1
2 AEROSOL, METERED RESPIRATORY (INHALATION) EVERY 4 HOURS PRN
Qty: 18 G | Refills: 2 | Status: SHIPPED | OUTPATIENT
Start: 2024-01-16

## 2024-01-16 RX ORDER — BENZONATATE 200 MG/1
200 CAPSULE ORAL 3 TIMES DAILY PRN
Qty: 30 CAPSULE | Refills: 0 | Status: SHIPPED | OUTPATIENT
Start: 2024-01-16 | End: 2024-01-26

## 2024-01-16 RX ORDER — DICLOFENAC SODIUM 75 MG/1
75 TABLET, DELAYED RELEASE ORAL 2 TIMES DAILY
Qty: 180 TABLET | Refills: 0 | Status: SHIPPED | OUTPATIENT
Start: 2024-01-16

## 2024-01-16 RX ORDER — LISINOPRIL 20 MG/1
20 TABLET ORAL DAILY
Qty: 90 TABLET | Refills: 0 | Status: SHIPPED | OUTPATIENT
Start: 2024-01-16

## 2024-01-16 ASSESSMENT — ENCOUNTER SYMPTOMS
SINUS PRESSURE: 1
ABDOMINAL PAIN: 0
SHORTNESS OF BREATH: 0
VOMITING: 0
NAUSEA: 0
DIARRHEA: 0
COUGH: 1
WHEEZING: 1
RHINORRHEA: 1
SORE THROAT: 1

## 2024-01-16 NOTE — PROGRESS NOTES
Pritesh Orellana, was evaluated through a synchronous (real-time) audio-video encounter. The patient (or guardian if applicable) is aware that this is a billable service, which includes applicable co-pays. This Virtual Visit was conducted with patient's (and/or legal guardian's) consent. Patient identification was verified, and a caregiver was present when appropriate.   The patient was located at Home: [address] [place] [id number]  Provider was located at Facility (Appt Dept): [address] [address]      Pritesh Orellana (:  1945) is a Established patient, presenting virtually for evaluation of the following:    Assessment & Plan   Below is the assessment and plan developed based on review of pertinent history, physical exam, labs, studies, and medications.    1. Positive self-administered antigen test for COVID-19  -     albuterol sulfate HFA (PROVENTIL;VENTOLIN;PROAIR) 108 (90 Base) MCG/ACT inhaler; Inhale 2 puffs into the lungs every 4 hours as needed for Wheezing 2 puffs as needed, Disp-18 g, R-2Normal  -     benzonatate (TESSALON) 200 MG capsule; Take 1 capsule by mouth 3 times daily as needed for Cough, Disp-30 capsule, R-0Normal  -     nirmatrelvir/ritonavir 300/100 (PAXLOVID) 20 x 150 MG & 10 x 100MG TBPK; Take 3 tablets (two 150 mg nirmatrelvir and one 100 mg ritonavir tablets) by mouth every 12 hours for 5 days., Disp-30 tablet, R-0Symptoms started 24. GFR on 23 >60.Normal  -     methylPREDNISolone (MEDROL DOSEPACK) 4 MG tablet; Take by mouth., Disp-21 tablet, R-0Normal  Take Paxlovid and Medrol pack as prescribed. Mucinex twice daily. Tessalon and albuterol as needed. Call if symptoms worsen or fail to improve.       Return if symptoms worsen or fail to improve.       Subjective   HPI  Patient seen virtually today for complaint of positive at-home Covid test. Symptoms started 2-3 days ago with subjective fever, body

## 2024-01-26 ENCOUNTER — OFFICE VISIT (OUTPATIENT)
Dept: ORTHOPEDIC SURGERY | Age: 79
End: 2024-01-26

## 2024-01-26 DIAGNOSIS — M20.42 HAMMER TOE OF LEFT FOOT: ICD-10-CM

## 2024-01-26 DIAGNOSIS — M20.12 VALGUS DEFORMITY OF BOTH GREAT TOES: ICD-10-CM

## 2024-01-26 DIAGNOSIS — M79.672 BILATERAL FOOT PAIN: Primary | ICD-10-CM

## 2024-01-26 DIAGNOSIS — M79.671 BILATERAL FOOT PAIN: Primary | ICD-10-CM

## 2024-01-26 DIAGNOSIS — M20.11 VALGUS DEFORMITY OF BOTH GREAT TOES: ICD-10-CM

## 2024-01-26 NOTE — PROGRESS NOTES
Name: Pritesh Orellana  YOB: 1945  Gender: male  MRN: [id number]    Summary:   Bilateral severe hallux valgus and fixed second and third claw toe deformities       CC: New Patient (NP, bilateral bunions, getting xrays today, )       HPI: Pritesh Orellana is a 79 y.o. male who presents with New Patient (NP, bilateral bunions, getting xrays today, )  .  This patient presents the office with longstanding history of worsening forefoot pain and deformity.  He also complains of instability secondary to his deformity in his forefeet.  He is tried pads and spacers as well as wide shoes for years without much benefit.    History was obtained by Patient and his wife    ROS/Meds/PSH/PMH/FH/SH: I personally reviewed the patients standard intake form.  Below are the pertinents    Tobacco:  reports that he has never smoked. He has never used smokeless tobacco.  Diabetes: None      Physical Examination:  Exam of the bilateral feet shows severe hallux valgus deformities worse on the left on the right as well as fixed second and third claw toe deformities with MTP subluxation.  He has strongly palpable pulses and diminished sensation at his baseline for the peripheral neuropathy.      Imaging:   Interpretation of imaging  Bilateral feet XR: AP, Lateral, Oblique views     ICD-10-CM    1. Bilateral foot pain  M79.671 XR Foot Standard Bilateral    M79.672       2. Valgus deformity of both great toes  M20.11     M20.12       3. Hammer toe of left foot  M20.42          Report: AP, lateral, oblique x-ray of the bilateral feet demonstrates hallux valgus with claw toes    Impression: Hallux valgus with claw toes   RACHID MENA III, MD           Assessment:   Bilateral severe hallux valgus and second and third fixed claw toes left greater than right    Treatment Plan:   4 This is a chronic illness/condition with exacerbation and progression  Treatment at this time: Elective major surgery with procedural

## 2024-01-28 DIAGNOSIS — M20.11 VALGUS DEFORMITY OF BOTH GREAT TOES: Primary | ICD-10-CM

## 2024-01-28 DIAGNOSIS — M20.42 HAMMER TOE OF LEFT FOOT: ICD-10-CM

## 2024-01-28 DIAGNOSIS — M20.12 VALGUS DEFORMITY OF BOTH GREAT TOES: Primary | ICD-10-CM

## 2024-01-29 ENCOUNTER — TELEPHONE (OUTPATIENT)
Dept: ORTHOPEDIC SURGERY | Age: 79
End: 2024-01-29

## 2024-01-29 PROBLEM — M20.42 HAMMER TOE OF LEFT FOOT: Status: ACTIVE | Noted: 2024-01-28

## 2024-01-29 PROBLEM — M20.11 VALGUS DEFORMITY OF BOTH GREAT TOES: Status: ACTIVE | Noted: 2024-01-28

## 2024-01-29 PROBLEM — M20.12 VALGUS DEFORMITY OF BOTH GREAT TOES: Status: ACTIVE | Noted: 2024-01-28

## 2024-01-29 NOTE — TELEPHONE ENCOUNTER
Pt wife Radha called wants to know if 2/15 is available for sx its a conflict with schedule please call asap

## 2024-02-07 ASSESSMENT — PATIENT HEALTH QUESTIONNAIRE - PHQ9
1. LITTLE INTEREST OR PLEASURE IN DOING THINGS: 0
SUM OF ALL RESPONSES TO PHQ QUESTIONS 1-9: 0
SUM OF ALL RESPONSES TO PHQ QUESTIONS 1-9: 0
2. FEELING DOWN, DEPRESSED OR HOPELESS: NOT AT ALL
SUM OF ALL RESPONSES TO PHQ9 QUESTIONS 1 & 2: 0
SUM OF ALL RESPONSES TO PHQ QUESTIONS 1-9: 0
2. FEELING DOWN, DEPRESSED OR HOPELESS: 0
SUM OF ALL RESPONSES TO PHQ QUESTIONS 1-9: 0
1. LITTLE INTEREST OR PLEASURE IN DOING THINGS: NOT AT ALL
SUM OF ALL RESPONSES TO PHQ9 QUESTIONS 1 & 2: 0

## 2024-02-09 ENCOUNTER — HOSPITAL ENCOUNTER (OUTPATIENT)
Dept: GENERAL RADIOLOGY | Age: 79
End: 2024-02-09
Payer: MEDICARE

## 2024-02-09 ENCOUNTER — OFFICE VISIT (OUTPATIENT)
Dept: INTERNAL MEDICINE CLINIC | Facility: CLINIC | Age: 79
End: 2024-02-09
Payer: MEDICARE

## 2024-02-09 VITALS
OXYGEN SATURATION: 98 % | BODY MASS INDEX: 31.37 KG/M2 | HEIGHT: 68 IN | WEIGHT: 207 LBS | HEART RATE: 81 BPM | TEMPERATURE: 97.3 F | SYSTOLIC BLOOD PRESSURE: 150 MMHG | DIASTOLIC BLOOD PRESSURE: 86 MMHG

## 2024-02-09 DIAGNOSIS — J45.41 MODERATE PERSISTENT ASTHMA WITH EXACERBATION: ICD-10-CM

## 2024-02-09 DIAGNOSIS — J45.41 MODERATE PERSISTENT ASTHMA WITH EXACERBATION: Primary | ICD-10-CM

## 2024-02-09 DIAGNOSIS — R07.89 CHEST TIGHTNESS: ICD-10-CM

## 2024-02-09 DIAGNOSIS — R06.02 SOB (SHORTNESS OF BREATH): ICD-10-CM

## 2024-02-09 PROCEDURE — G8484 FLU IMMUNIZE NO ADMIN: HCPCS | Performed by: NURSE PRACTITIONER

## 2024-02-09 PROCEDURE — 99215 OFFICE O/P EST HI 40 MIN: CPT | Performed by: NURSE PRACTITIONER

## 2024-02-09 PROCEDURE — 3079F DIAST BP 80-89 MM HG: CPT | Performed by: NURSE PRACTITIONER

## 2024-02-09 PROCEDURE — 1123F ACP DISCUSS/DSCN MKR DOCD: CPT | Performed by: NURSE PRACTITIONER

## 2024-02-09 PROCEDURE — G8427 DOCREV CUR MEDS BY ELIG CLIN: HCPCS | Performed by: NURSE PRACTITIONER

## 2024-02-09 PROCEDURE — 71046 X-RAY EXAM CHEST 2 VIEWS: CPT

## 2024-02-09 PROCEDURE — G8417 CALC BMI ABV UP PARAM F/U: HCPCS | Performed by: NURSE PRACTITIONER

## 2024-02-09 PROCEDURE — 93000 ELECTROCARDIOGRAM COMPLETE: CPT | Performed by: NURSE PRACTITIONER

## 2024-02-09 PROCEDURE — 3077F SYST BP >= 140 MM HG: CPT | Performed by: NURSE PRACTITIONER

## 2024-02-09 PROCEDURE — 1036F TOBACCO NON-USER: CPT | Performed by: NURSE PRACTITIONER

## 2024-02-09 RX ORDER — ALBUTEROL SULFATE 90 UG/1
2 AEROSOL, METERED RESPIRATORY (INHALATION) EVERY 4 HOURS PRN
Qty: 18 G | Refills: 2 | Status: SHIPPED | OUTPATIENT
Start: 2024-02-09

## 2024-02-09 RX ORDER — PREDNISONE 20 MG/1
40 TABLET ORAL DAILY
Qty: 10 TABLET | Refills: 0 | Status: SHIPPED | OUTPATIENT
Start: 2024-02-09 | End: 2024-02-14

## 2024-02-09 RX ORDER — FLUTICASONE PROPIONATE AND SALMETEROL 500; 50 UG/1; UG/1
1 POWDER RESPIRATORY (INHALATION) EVERY 12 HOURS
Qty: 60 EACH | Refills: 0 | Status: SHIPPED | OUTPATIENT
Start: 2024-02-09

## 2024-02-09 ASSESSMENT — ENCOUNTER SYMPTOMS
SHORTNESS OF BREATH: 1
VOMITING: 0
COUGH: 1
NAUSEA: 0
CONSTIPATION: 0
DIARRHEA: 0
CHEST TIGHTNESS: 1
WHEEZING: 0
ABDOMINAL PAIN: 0

## 2024-02-09 NOTE — PROGRESS NOTES
[facility]      2024    Patient Name: Pritesh Orellana  :  1945      Chief Complaint:  Chief Complaint   Patient presents with    Asthma     Patient c/o worsening asthma for the last couple of weeks. States he experiences SOB and chest tightening. States he is having to use 6 puffs on his albuterol inhaler to alleviate the symptoms.          HPI  Patient presents today with complaint of what he believes to be worsening asthma symptoms. Has been using Advair as prescribed for many years. Was rarely using albuterol (he estimates about once a year) until about 2 weeks ago. He was seen virtually and treated for Covid on 24. He was prescribed Paxlovid, Medrol dose pack which he completed. Symptoms resolved until about two weeks ago when he began having chest tightness and SOB several times daily. He has continued compliance with Advair as prescribed. Over the past two weeks, he reports having to use his albuterol inhaler as many as 6 times daily to relieve his symptoms. He denies any associated dizziness, lightheadedness. He reports mild cough which he attributes to his worsening asthma symptoms. Denies any fever, chills, congestion or wheezing.     Patient seen in 2023 for complaint of CP. Nuclear stress test ordered and completed 2023. Results below. \"Probably normal scan\".     He reports that he had the same symptoms several years ago while still living in [place]. His Advair dose was increased to 500-50 \"for a couple weeks\", and symptoms resolved. He is requesting an increased dose today.         Past Medical History:   Diagnosis Date    Arthritis     Asthma     Hypercholesterolemia     Hypertension        Past Surgical History:   Procedure Laterality Date    CATARACT REMOVAL      Bilateral    CHOLECYSTECTOMY      HX JOINT REPLACEMENT SURGERY Bilateral     LORIN Turner    LUMBAR FUSION N/A 10/2/2023    L4-L5 laminectomy and fusion with allograft,

## 2024-02-14 NOTE — PROGRESS NOTES
Patient verified name and     Order for consent not found in EHR and matches case posting; patient verified.     Type 1B surgery, phone assessment complete.    Labs per surgeon: none;   Labs per anesthesia protocol: K+; results pending  EKG: not needed at time of PAT  Pt tested + for Covid 24 with cough. Notified Dr Egan , no orders received.     .     Hospital approved surgical skin cleanser and instructions given per hospital policy.    Patient provided with and instructed on educational handouts including Guide to Surgery, Pain Management, Hand Hygiene, Blood Transfusion Education, and [facility] Brochure.    Patient answered medical/surgical history questions at their best of ability. All prior to admission medications documented in [facility]. Original medication prescription bottle not  visualized during patient appointment.     Patient instructed to hold all vitamins 7 days prior to surgery and NSAIDS 5 days prior to surgery, patient verbalized understanding.     Patient teach back successful and patient demonstrates knowledge of instructions.

## 2024-02-14 NOTE — PERIOP NOTE
Preop department called to notify patient of arrival time for scheduled procedure. Instructions given to   - Arrive at OPC Entrance [address].  - Remain NPO after midnight, unless otherwise indicated, including gum, mints, and ice chips.   - Have a responsible adult to drive patient to the hospital, stay during surgery, and patient will need supervision 24 hours after anesthesia.   - Use antibacterial soap in shower the night before surgery and on the morning of surgery.       Was patient contacted: no  Voicemail left: yes-pts phone  Numbers contacted: [phone]   Arrival time: 0800

## 2024-02-14 NOTE — PERIOP NOTE
Preop department called to notify patient of arrival time for scheduled procedure. Instructions given to   - Arrive at OPC Entrance [address].  - Remain NPO after midnight, unless otherwise indicated, including gum, mints, and ice chips.   - Have a responsible adult to drive patient to the hospital, stay during surgery, and patient will need supervision 24 hours after anesthesia.   - Use antibacterial soap in shower the night before surgery and on the morning of surgery.       Was patient contacted: yes pt called back confirming arrival   Voicemail left:   Numbers contacted: [phone]   Arrival time: 0800

## 2024-02-15 ENCOUNTER — ANESTHESIA EVENT (OUTPATIENT)
Dept: SURGERY | Age: 79
End: 2024-02-15
Payer: MEDICARE

## 2024-02-15 ENCOUNTER — ANESTHESIA (OUTPATIENT)
Dept: SURGERY | Age: 79
End: 2024-02-15
Payer: MEDICARE

## 2024-02-15 ENCOUNTER — APPOINTMENT (OUTPATIENT)
Dept: GENERAL RADIOLOGY | Age: 79
End: 2024-02-15
Attending: ORTHOPAEDIC SURGERY
Payer: MEDICARE

## 2024-02-15 ENCOUNTER — HOSPITAL ENCOUNTER (OUTPATIENT)
Age: 79
Setting detail: OUTPATIENT SURGERY
Discharge: HOME | End: 2024-02-15
Attending: ORTHOPAEDIC SURGERY | Admitting: ORTHOPAEDIC SURGERY
Payer: MEDICARE

## 2024-02-15 VITALS
HEIGHT: 67 IN | TEMPERATURE: 97.3 F | OXYGEN SATURATION: 97 % | WEIGHT: 207 LBS | BODY MASS INDEX: 32.49 KG/M2 | SYSTOLIC BLOOD PRESSURE: 157 MMHG | DIASTOLIC BLOOD PRESSURE: 76 MMHG | RESPIRATION RATE: 16 BRPM | HEART RATE: 89 BPM

## 2024-02-15 DIAGNOSIS — G89.18 ACUTE POST-OPERATIVE PAIN: Primary | ICD-10-CM

## 2024-02-15 LAB — POTASSIUM BLD-SCNC: 3.9 MMOL/L (ref 3.5–5.1)

## 2024-02-15 PROCEDURE — 2780000005 HC MISC SCREW >$500: Performed by: ORTHOPAEDIC SURGERY

## 2024-02-15 PROCEDURE — 28750 FUSION OF BIG TOE JOINT: CPT | Performed by: ORTHOPAEDIC SURGERY

## 2024-02-15 PROCEDURE — 6360000002 HC RX W HCPCS: Performed by: ANESTHESIOLOGY

## 2024-02-15 PROCEDURE — 64450 NJX AA&/STRD OTHER PN/BRANCH: CPT | Performed by: ANESTHESIOLOGY

## 2024-02-15 PROCEDURE — 2500000003 HC RX 250 WO HCPCS: Performed by: NURSE ANESTHETIST, CERTIFIED REGISTERED

## 2024-02-15 PROCEDURE — 2720000001 HC MISC SURG SUPPLY STERILE $51-500: Performed by: ORTHOPAEDIC SURGERY

## 2024-02-15 PROCEDURE — 84132 ASSAY OF SERUM POTASSIUM: CPT

## 2024-02-15 PROCEDURE — 6360000002 HC RX W HCPCS: Performed by: NURSE ANESTHETIST, CERTIFIED REGISTERED

## 2024-02-15 PROCEDURE — C1713 ANCHOR/SCREW BN/BN,TIS/BN: HCPCS | Performed by: ORTHOPAEDIC SURGERY

## 2024-02-15 PROCEDURE — 3600000004 HC SURGERY LEVEL 4 BASE: Performed by: ORTHOPAEDIC SURGERY

## 2024-02-15 PROCEDURE — 6360000002 HC RX W HCPCS: Performed by: NURSE PRACTITIONER

## 2024-02-15 PROCEDURE — 2780000004 HC MISC SCREW $251-500: Performed by: ORTHOPAEDIC SURGERY

## 2024-02-15 PROCEDURE — 7100000000 HC PACU RECOVERY - FIRST 15 MIN: Performed by: ORTHOPAEDIC SURGERY

## 2024-02-15 PROCEDURE — 2780000003 HC MISC SCREW $51-250: Performed by: ORTHOPAEDIC SURGERY

## 2024-02-15 PROCEDURE — 3700000000 HC ANESTHESIA ATTENDED CARE: Performed by: ORTHOPAEDIC SURGERY

## 2024-02-15 PROCEDURE — 2580000003 HC RX 258: Performed by: NURSE PRACTITIONER

## 2024-02-15 PROCEDURE — 2709999900 HC NON-CHARGEABLE SUPPLY: Performed by: ORTHOPAEDIC SURGERY

## 2024-02-15 PROCEDURE — 28288 PARTIAL REMOVAL OF FOOT BONE: CPT | Performed by: ORTHOPAEDIC SURGERY

## 2024-02-15 PROCEDURE — 3700000001 HC ADD 15 MINUTES (ANESTHESIA): Performed by: ORTHOPAEDIC SURGERY

## 2024-02-15 PROCEDURE — 3600000014 HC SURGERY LEVEL 4 ADDTL 15MIN: Performed by: ORTHOPAEDIC SURGERY

## 2024-02-15 PROCEDURE — 7100000001 HC PACU RECOVERY - ADDTL 15 MIN: Performed by: ORTHOPAEDIC SURGERY

## 2024-02-15 PROCEDURE — 28285 REPAIR OF HAMMERTOE: CPT | Performed by: ORTHOPAEDIC SURGERY

## 2024-02-15 PROCEDURE — 7100000010 HC PHASE II RECOVERY - FIRST 15 MIN: Performed by: ORTHOPAEDIC SURGERY

## 2024-02-15 RX ORDER — SODIUM CHLORIDE, SODIUM LACTATE, POTASSIUM CHLORIDE, CALCIUM CHLORIDE 600; 310; 30; 20 MG/100ML; MG/100ML; MG/100ML; MG/100ML
INJECTION, SOLUTION INTRAVENOUS CONTINUOUS
Status: DISCONTINUED | OUTPATIENT
Start: 2024-02-15 | End: 2024-02-15 | Stop reason: HOSPADM

## 2024-02-15 RX ORDER — OXYCODONE HYDROCHLORIDE 5 MG/1
5 TABLET ORAL
Status: DISCONTINUED | OUTPATIENT
Start: 2024-02-15 | End: 2024-02-15 | Stop reason: HOSPADM

## 2024-02-15 RX ORDER — ONDANSETRON 2 MG/ML
4 INJECTION INTRAMUSCULAR; INTRAVENOUS
Status: DISCONTINUED | OUTPATIENT
Start: 2024-02-15 | End: 2024-02-15 | Stop reason: HOSPADM

## 2024-02-15 RX ORDER — ROPIVACAINE HYDROCHLORIDE 5 MG/ML
INJECTION, SOLUTION EPIDURAL; INFILTRATION; PERINEURAL
Status: COMPLETED | OUTPATIENT
Start: 2024-02-15 | End: 2024-02-15

## 2024-02-15 RX ORDER — OXYCODONE HYDROCHLORIDE 5 MG/1
5 TABLET ORAL EVERY 6 HOURS PRN
Qty: 20 TABLET | Refills: 0 | Status: SHIPPED | OUTPATIENT
Start: 2024-02-15 | End: 2024-02-20

## 2024-02-15 RX ORDER — SODIUM CHLORIDE 0.9 % (FLUSH) 0.9 %
5-40 SYRINGE (ML) INJECTION EVERY 12 HOURS SCHEDULED
Status: DISCONTINUED | OUTPATIENT
Start: 2024-02-15 | End: 2024-02-15 | Stop reason: HOSPADM

## 2024-02-15 RX ORDER — KETOROLAC TROMETHAMINE 30 MG/ML
INJECTION, SOLUTION INTRAMUSCULAR; INTRAVENOUS PRN
Status: DISCONTINUED | OUTPATIENT
Start: 2024-02-15 | End: 2024-02-15 | Stop reason: SDUPTHER

## 2024-02-15 RX ORDER — LIDOCAINE HYDROCHLORIDE 10 MG/ML
1 INJECTION, SOLUTION INFILTRATION; PERINEURAL
Status: DISCONTINUED | OUTPATIENT
Start: 2024-02-15 | End: 2024-02-15 | Stop reason: HOSPADM

## 2024-02-15 RX ORDER — DEXAMETHASONE SODIUM PHOSPHATE 4 MG/ML
INJECTION, SOLUTION INTRA-ARTICULAR; INTRALESIONAL; INTRAMUSCULAR; INTRAVENOUS; SOFT TISSUE PRN
Status: DISCONTINUED | OUTPATIENT
Start: 2024-02-15 | End: 2024-02-15 | Stop reason: SDUPTHER

## 2024-02-15 RX ORDER — LIDOCAINE HYDROCHLORIDE 20 MG/ML
INJECTION, SOLUTION EPIDURAL; INFILTRATION; INTRACAUDAL; PERINEURAL PRN
Status: DISCONTINUED | OUTPATIENT
Start: 2024-02-15 | End: 2024-02-15 | Stop reason: SDUPTHER

## 2024-02-15 RX ORDER — FENTANYL CITRATE 50 UG/ML
INJECTION, SOLUTION INTRAMUSCULAR; INTRAVENOUS PRN
Status: DISCONTINUED | OUTPATIENT
Start: 2024-02-15 | End: 2024-02-15 | Stop reason: SDUPTHER

## 2024-02-15 RX ORDER — FENTANYL CITRATE 50 UG/ML
100 INJECTION, SOLUTION INTRAMUSCULAR; INTRAVENOUS
Status: COMPLETED | OUTPATIENT
Start: 2024-02-15 | End: 2024-02-15

## 2024-02-15 RX ORDER — HYDROMORPHONE HYDROCHLORIDE 2 MG/ML
0.5 INJECTION, SOLUTION INTRAMUSCULAR; INTRAVENOUS; SUBCUTANEOUS EVERY 5 MIN PRN
Status: DISCONTINUED | OUTPATIENT
Start: 2024-02-15 | End: 2024-02-15 | Stop reason: HOSPADM

## 2024-02-15 RX ORDER — ONDANSETRON 2 MG/ML
INJECTION INTRAMUSCULAR; INTRAVENOUS PRN
Status: DISCONTINUED | OUTPATIENT
Start: 2024-02-15 | End: 2024-02-15 | Stop reason: SDUPTHER

## 2024-02-15 RX ORDER — SODIUM CHLORIDE, SODIUM LACTATE, POTASSIUM CHLORIDE, CALCIUM CHLORIDE 600; 310; 30; 20 MG/100ML; MG/100ML; MG/100ML; MG/100ML
INJECTION, SOLUTION INTRAVENOUS CONTINUOUS
Status: DISCONTINUED | OUTPATIENT
Start: 2024-02-15 | End: 2024-02-15 | Stop reason: SDUPTHER

## 2024-02-15 RX ORDER — MIDAZOLAM HYDROCHLORIDE 1 MG/ML
4 INJECTION, SOLUTION INTRAMUSCULAR; INTRAVENOUS
Status: COMPLETED | OUTPATIENT
Start: 2024-02-15 | End: 2024-02-15

## 2024-02-15 RX ORDER — SODIUM CHLORIDE 0.9 % (FLUSH) 0.9 %
5-40 SYRINGE (ML) INJECTION PRN
Status: DISCONTINUED | OUTPATIENT
Start: 2024-02-15 | End: 2024-02-15 | Stop reason: HOSPADM

## 2024-02-15 RX ORDER — DEXMEDETOMIDINE HYDROCHLORIDE 100 UG/ML
INJECTION, SOLUTION INTRAVENOUS PRN
Status: DISCONTINUED | OUTPATIENT
Start: 2024-02-15 | End: 2024-02-15 | Stop reason: SDUPTHER

## 2024-02-15 RX ORDER — CEPHALEXIN 500 MG/1
500 CAPSULE ORAL 4 TIMES DAILY
Qty: 12 CAPSULE | Refills: 0 | Status: SHIPPED | OUTPATIENT
Start: 2024-02-15

## 2024-02-15 RX ORDER — EPHEDRINE SULFATE/0.9% NACL/PF 50 MG/5 ML
SYRINGE (ML) INTRAVENOUS PRN
Status: DISCONTINUED | OUTPATIENT
Start: 2024-02-15 | End: 2024-02-15 | Stop reason: SDUPTHER

## 2024-02-15 RX ORDER — PROPOFOL 10 MG/ML
INJECTION, EMULSION INTRAVENOUS CONTINUOUS PRN
Status: DISCONTINUED | OUTPATIENT
Start: 2024-02-15 | End: 2024-02-15 | Stop reason: SDUPTHER

## 2024-02-15 RX ORDER — SODIUM CHLORIDE 9 MG/ML
INJECTION, SOLUTION INTRAVENOUS PRN
Status: DISCONTINUED | OUTPATIENT
Start: 2024-02-15 | End: 2024-02-15 | Stop reason: HOSPADM

## 2024-02-15 RX ADMIN — FENTANYL CITRATE 12.5 MCG: 50 INJECTION, SOLUTION INTRAMUSCULAR; INTRAVENOUS at 10:39

## 2024-02-15 RX ADMIN — DEXMEDETOMIDINE 4 MCG: 100 INJECTION, SOLUTION, CONCENTRATE INTRAVENOUS at 09:50

## 2024-02-15 RX ADMIN — DEXMEDETOMIDINE 4 MCG: 100 INJECTION, SOLUTION, CONCENTRATE INTRAVENOUS at 09:54

## 2024-02-15 RX ADMIN — KETOROLAC TROMETHAMINE 15 MG: 30 INJECTION, SOLUTION INTRAMUSCULAR; INTRAVENOUS at 10:34

## 2024-02-15 RX ADMIN — FENTANYL CITRATE 12.5 MCG: 50 INJECTION, SOLUTION INTRAMUSCULAR; INTRAVENOUS at 10:26

## 2024-02-15 RX ADMIN — ROPIVACAINE HYDROCHLORIDE 50 ML: 5 INJECTION, SOLUTION EPIDURAL; INFILTRATION; PERINEURAL at 08:50

## 2024-02-15 RX ADMIN — Medication 10 MG: at 10:05

## 2024-02-15 RX ADMIN — FENTANYL CITRATE 25 MCG: 50 INJECTION, SOLUTION INTRAMUSCULAR; INTRAVENOUS at 10:02

## 2024-02-15 RX ADMIN — FENTANYL CITRATE 12.5 MCG: 50 INJECTION, SOLUTION INTRAMUSCULAR; INTRAVENOUS at 10:33

## 2024-02-15 RX ADMIN — FENTANYL CITRATE 25 MCG: 50 INJECTION, SOLUTION INTRAMUSCULAR; INTRAVENOUS at 10:05

## 2024-02-15 RX ADMIN — ONDANSETRON 4 MG: 2 INJECTION INTRAMUSCULAR; INTRAVENOUS at 10:18

## 2024-02-15 RX ADMIN — PROPOFOL 100 MG: 10 INJECTION, EMULSION INTRAVENOUS at 09:59

## 2024-02-15 RX ADMIN — MIDAZOLAM 2 MG: 1 INJECTION INTRAMUSCULAR; INTRAVENOUS at 08:50

## 2024-02-15 RX ADMIN — FENTANYL CITRATE 50 MCG: 50 INJECTION, SOLUTION INTRAMUSCULAR; INTRAVENOUS at 08:50

## 2024-02-15 RX ADMIN — SODIUM CHLORIDE, POTASSIUM CHLORIDE, SODIUM LACTATE AND CALCIUM CHLORIDE: 600; 310; 30; 20 INJECTION, SOLUTION INTRAVENOUS at 08:58

## 2024-02-15 RX ADMIN — Medication 2000 MG: at 09:48

## 2024-02-15 RX ADMIN — FENTANYL CITRATE 12.5 MCG: 50 INJECTION, SOLUTION INTRAMUSCULAR; INTRAVENOUS at 10:41

## 2024-02-15 RX ADMIN — DEXAMETHASONE SODIUM PHOSPHATE 4 MG: 4 INJECTION, SOLUTION INTRAMUSCULAR; INTRAVENOUS at 10:13

## 2024-02-15 RX ADMIN — Medication 10 MG: at 10:18

## 2024-02-15 RX ADMIN — LIDOCAINE HYDROCHLORIDE 20 MG: 20 INJECTION, SOLUTION EPIDURAL; INFILTRATION; INTRACAUDAL; PERINEURAL at 09:46

## 2024-02-15 RX ADMIN — PROPOFOL 120 MCG/KG/MIN: 10 INJECTION, EMULSION INTRAVENOUS at 09:46

## 2024-02-15 RX ADMIN — Medication 10 MG: at 10:12

## 2024-02-15 ASSESSMENT — PAIN SCALES - GENERAL: PAINLEVEL_OUTOF10: 0

## 2024-02-15 NOTE — ANESTHESIA POSTPROCEDURE EVALUATION
Department of Anesthesiology  Postprocedure Note    Patient: Pritesh Orellana  MRN: [id number]  YOB: 1945  Date of evaluation: 2/15/2024    Procedure Summary       Date: 02/15/24 Room / Location: [facility] OP OR 02 / [facility]    Anesthesia Start: 0941 Anesthesia Stop: 1056    Procedures:       Left first MTP arthrodesis (Left: Foot)      left second and third proximal interphalangeal joint resection arthroplasties (Left: Foot)      left partial metatarsal head resections (Left: Toes) Diagnosis:       Hammer toe of left foot      Valgus deformity of both great toes      (Hammer toe of left foot [M20.42])      (Valgus deformity of both great toes [M20.11, M20.12])    Surgeons: Duncan De Luna III, MD Responsible Provider: Dmitriy Youngblood Jr., MD    Anesthesia Type: General ASA Status: 2            Anesthesia Type: General    Joceline Phase I: Joceline Score: 8    Joceline Phase II:      Anesthesia Post Evaluation    Patient location during evaluation: PACU  Patient participation: complete - patient participated  Level of consciousness: awake  Pain score: 0  Airway patency: patent  Nausea & Vomiting: no nausea and no vomiting  Cardiovascular status: blood pressure returned to baseline and hemodynamically stable  Respiratory status: acceptable, spontaneous ventilation and nonlabored ventilation  Hydration status: euvolemic  Multimodal analgesia pain management approach  Pain management: adequate    No notable events documented.

## 2024-02-15 NOTE — DISCHARGE INSTRUCTIONS
POSTOPERATIVE SURGICAL INSTRUCTIONS/ INFORMATION:    Your narcotic (pain medication) and an antibiotic will be sent to the pharmacy listed in your chart.  Both of these medications should be taken as directed on the bottle.      If the surgery you had requires you to be nonweightbearing, in addition to the narcotic and antibiotic you will also have an aspirin prescription that should also be taken as directed on the bottle.  This will be taken until you are told to discontinue it.  If you run out of the prescription of aspirin and over-the-counter 81 mg aspirin will work as well.    Nonweightbearing to the affected extremity means you are not allowed to put pressure or any weight on the surgical extremity.  Information regarding scooters, crutches and other assistive devices will have been discussed at your presurgical office visit these devices are to be used until told otherwise by the doctor or nurse practitioner.    Pain can be hard to manage postoperatively especially if you had an anesthetic nerve block and do not know when it will wear off.  It is recommended that you start taking pain medication even with an anesthetic block the night of surgery.  The anesthetic nerve block can last up to 72 hours postoperatively, your leg will likely be numb as long as the anesthetic block is working.      If you are taking the pain medication as directed on the bottle and your pain is not managed or controlled you may double the medication, taking 2 tablets every 4-6 hours as needed for 24 hours.  Once pain level is controlled you will resume the recommended dosage on the bottle.    Pain medication may cause constipation, to help minimize this ensure you are drinking plenty of water after surgery.  You may start a stool softener and/or MiraLAX to help alleviate or mitigate this problem.  Please take these over-the-counter products as directed on the bottle.    Please make every effort to leave your postoperative dressing  failure or if you experience any of the following symptoms:  Weight gain of 3 pounds or more overnight or 5 pounds in a week, increased swelling in our hands or feet or shortness of breath while lying flat in bed.  Please call your doctor as soon as you notice any of these symptoms; do not wait until your next office visit.

## 2024-02-15 NOTE — H&P
Outpatient Surgery History and Physical:  Pritesh Orellana was seen and examined.    CHIEF COMPLAINT:   left foot.     PE:   BP (!) 191/85   Pulse 79   Temp 98 °F (36.7 °C) (Oral)   Resp 18   Ht 1.702 m (5' 7\")   Wt 93.9 kg (207 lb)   SpO2 95%   BMI 32.42 kg/m²     Heart:   Regular rhythm      Lungs:  Are clear      Past Medical History:    Past Medical History:   Diagnosis Date    Arthritis     Asthma     Hypercholesterolemia     Hypertension        Surgical History:   Past Surgical History:   Procedure Laterality Date    CATARACT REMOVAL      Bilateral    CHOLECYSTECTOMY      HX JOINT REPLACEMENT SURGERY Bilateral 2012    LORIN Turner    LUMBAR FUSION N/A 10/2/2023    L4-L5 laminectomy and fusion with allograft, transforaminal lumbar interbody fusion, and instrumentation, right S1 hemilaminectomy. performed by Eugene Sanon MD at [facility] MAIN OR    SINUS SURGERY  2001    TOTAL KNEE ARTHROPLASTY Bilateral 2012       Social History: Patient  reports that he has never smoked. He has never used smokeless tobacco. He reports that he does not drink alcohol and does not use drugs.    Family History:   Family History   Problem Relation Age of Onset    Kidney Disease Father     Pancreatic Cancer Mother     Colon Cancer Sister        Allergies: Reviewed per EMR  Mixed ragweed, Peanut butter flavor, and Chocolate    Medications:    Prior to Admission medications    Medication Sig Start Date End Date Taking? Authorizing Provider   fluticasone-salmeterol (ADVAIR) 500-50 MCG/ACT AEPB diskus inhaler Inhale 1 puff into the lungs in the morning and 1 puff in the evening. 2/9/24   Ni Phillip APRN - CNP   albuterol sulfate HFA (VENTOLIN HFA) 108 (90 Base) MCG/ACT inhaler Inhale 2 puffs into the lungs every 4 hours as needed for Wheezing 2/9/24   Ni Phillip APRN - CNP   albuterol sulfate HFA (PROVENTIL;VENTOLIN;PROAIR) 108 (90 Base) MCG/ACT inhaler Inhale 2 puffs into the lungs every 4 hours as needed for

## 2024-02-15 NOTE — OP NOTE
the patient was prepped and draped in the normal sterile fashion using a Betadine solution and/or a ChloraPrep solution.  A timeout was then taken to identify the patient his name, date of birth, laterality, and procedure being performed.  We also identified allergies and any concerns about the operation.  Attention was then placed to the operative extremity.  An ankle block was placed by the department of anesthesia.  In a preop surgical timeout the left lower extremity was identified as a correct surgical site and prepped and draped in a standard sterile fashions and ChloraPrep solution.  A medial approach the first MTP joint was performed followed by capsulotomy.  The medial eminence was resected using an oscillating saw.  A cup and cone reamer system disease prepare both sides arthrodesis site which was brought into desired clinical alignment and then pinned using a K wire.  After confirming satisfactory alignment a lag screw was placed over the K wire with good purchase noted.  A dorsal locking plate was affixed as appropriately locking and nonlocking screws.  PIP resection arthroplasty was performed to the second and third toes.  Separate dorsal approach the second and third MTP joints were performed capsulotomies.  Lesser metatarsal head resections performed the second and third MTP joints.  Both lesser toes were pinned using K wires in retrograde fashion.  The wounds were then irrigated and closed using Monocryl nylon sutures.  Sterile dressings and applied.  Anesthesia was discontinued.  The patient was transferred back to recovery bed.  He was taken recovery in satisfactory condition.  He appeared to tolerate the procedure well.  There were no apparent surgical or anesthetic complications.  All needle and sponge counts were correct.      A sterile dressing was then applied to the leg and Hardole/Post op sandal.  They were awoken from anesthesia and returned to the PACU without difficulty.    Post Operative  Plan:   1- WB status: As tolerated   2- Immobilization/assistive devices: walker  3- DVT px: No VTE Prophylaxis Needed

## 2024-02-15 NOTE — ANESTHESIA PRE PROCEDURE
Department of Anesthesiology  Preprocedure Note       Name:  Pritesh Orellana   Age:  79 y.o.  :  1945                                          MRN:  [id number]         Date:  2/15/2024      Surgeon: Surgeon(s):  Duncan De Luna III, MD    Procedure: Procedure(s):  Left first MTP arthrodesis  left second and third proximal interphalangeal joint resection arthroplasties  left partial metatarsal head resections    Medications prior to admission:   Prior to Admission medications    Medication Sig Start Date End Date Taking? Authorizing Provider   cephALEXin (KEFLEX) 500 MG capsule Take 1 capsule by mouth 4 times daily 2/15/24  Yes Cristina Felipe APRN - CNP   oxyCODONE (ROXICODONE) 5 MG immediate release tablet Take 1 tablet by mouth every 6 hours as needed for Pain for up to 5 days. Intended supply: 5 days. Take lowest dose possible to manage pain Max Daily Amount: 20 mg 2/15/24 2/20/24 Yes Cristina Felipe APRN - CNP   fluticasone-salmeterol (ADVAIR) 500-50 MCG/ACT AEPB diskus inhaler Inhale 1 puff into the lungs in the morning and 1 puff in the evening. 24   Ni Phillip APRN - CNP   albuterol sulfate HFA (VENTOLIN HFA) 108 (90 Base) MCG/ACT inhaler Inhale 2 puffs into the lungs every 4 hours as needed for Wheezing 24   Ni Phillip APRN - CNP   albuterol sulfate HFA (PROVENTIL;VENTOLIN;PROAIR) 108 (90 Base) MCG/ACT inhaler Inhale 2 puffs into the lungs every 4 hours as needed for Wheezing 2 puffs as needed 24   Ni Phillip APRN - CNP   lisinopril (PRINIVIL;ZESTRIL) 20 MG tablet Take 1 tablet by mouth daily 24   Ni Phillip APRN - CNP   diclofenac (VOLTAREN) 75 MG EC tablet Take 1 tablet by mouth 2 times daily 24   Ni Phillip APRN - CNP   Melatonin-Theanine (SLEEP+CALM) 3-50 MG CHEW Take by mouth nightly    Provider, MD Fartun   vitamin C (ASCORBIC ACID) 500 MG tablet Take 1 tablet by mouth daily    Provider MD Fartun   Multiple

## 2024-02-15 NOTE — ANESTHESIA PROCEDURE NOTES
Peripheral Block    Patient location during procedure: pre-op  Reason for block: post-op pain management and at surgeon's request  Start time: 2/15/2024 8:50 AM  End time: 2/15/2024 8:57 AM  Staffing  Performed: anesthesiologist   Anesthesiologist: Dmitriy Youngblood Jr., MD  Performed by: Dmitriy Youngblood Jr., MD  Authorized by: Dmitriy Youngblood Jr., MD    Preanesthetic Checklist  Completed: patient identified, IV checked, site marked, risks and benefits discussed, surgical/procedural consents, equipment checked, pre-op evaluation, timeout performed, anesthesia consent given, oxygen available, monitors applied/VS acknowledged, fire risk safety assessment completed and verbalized and blood product R/B/A discussed and consented  Peripheral Block   Patient position: supine  Prep: ChloraPrep  Provider prep: mask and sterile gloves  Patient monitoring: cardiac monitor, continuous pulse ox, oxygen, IV access, frequent blood pressure checks and responsive to questions  Block type: Ankle  Laterality: left  Injection technique: single-shot  Guidance: other    Needle   Needle type: Quincke   Needle gauge: 25 G.  Needle localization: anatomical landmarks  Needle length: 5 cm  Assessment   Injection assessment: negative aspiration for heme, no paresthesia on injection and no intravascular symptoms  Slow fractionated injection: yes  Hemodynamics: stable  Real-time US image taken/store: yes  Outcomes: patient tolerated procedure well and uncomplicated    Additional Notes  Risks/benefits/alternatives discussed including damage to nerve or nerve, bleeding, infection, and a reaction to our medications.      Medications Administered  ropivacaine (NAROPIN) injection 0.5% - Perineural   50 mL - 2/15/2024 8:50:00 AM

## 2024-02-26 ENCOUNTER — OFFICE VISIT (OUTPATIENT)
Dept: ORTHOPEDIC SURGERY | Age: 79
End: 2024-02-26

## 2024-02-26 DIAGNOSIS — G89.18 ACUTE POST-OPERATIVE PAIN: ICD-10-CM

## 2024-02-26 DIAGNOSIS — M20.12 VALGUS DEFORMITY OF BOTH GREAT TOES: Primary | ICD-10-CM

## 2024-02-26 DIAGNOSIS — M20.42 HAMMER TOE OF LEFT FOOT: ICD-10-CM

## 2024-02-26 DIAGNOSIS — M20.11 VALGUS DEFORMITY OF BOTH GREAT TOES: Primary | ICD-10-CM

## 2024-02-26 PROCEDURE — 99024 POSTOP FOLLOW-UP VISIT: CPT | Performed by: NURSE PRACTITIONER

## 2024-02-26 RX ORDER — TRAMADOL HYDROCHLORIDE 50 MG/1
50 TABLET ORAL EVERY 6 HOURS PRN
Qty: 20 TABLET | Refills: 0 | Status: SHIPPED | OUTPATIENT
Start: 2024-02-26 | End: 2024-03-02

## 2024-02-26 NOTE — PROGRESS NOTES
Name: Pritesh Orellana  YOB: 1945  Gender: male  MRN: [id number]    Procedure Performed:  Left first MTP arthrodesis  Left second and third proximal interphalangeal joint resection arthroplasties  Left second and third partial metatarsal head resections        Date of Procedure: 02/15/2024      Subjective: Patient reports that he is done well, he is follow postoperative instructions as directed.  His pain seems to be well-managed under control at this visit however he reports having some nerve related sensations and would like a refill of his tramadol today.      Physical Examination: Incisional areas are currently healing and appear to have no signs of infection at this time.  K wires are in pinball are intact in the second and third phalanx today.  He has palpable pulses and intact sensation to the foot.  There is noted bruising and swelling to the affected extremity.  Range of motion to the first IP and TMT joints performed without difficulty or pain.  He is able to wiggle the lesser toes minimally and effectively without pain.  He has no signs of DVT at this time.        Imaging:   No imaging reviewed          Assessment:   Status post left MTP fusion with second and third PIP RA's and metatarsal head resections.  We discussed progression of care today as well as expectations until next follow-up visit.  Questions concerns were addressed, he verbalized understanding of today's conversation.      Plan:   3 This is stable chronic illness/condition  Treatment at this time: Sutures removed and Steri-Strips placed.  Patient will continue to monitor and protect the K wires and the affected foot.  The patient may now get the affected extremity wet including showering only.  It was encouraged that the patient continue elevating the affected extremity as needed for swelling, icing is also an option for this.  The patient will continue to limit the activity levels at this time excluding

## 2024-03-05 DIAGNOSIS — G25.81 RLS (RESTLESS LEGS SYNDROME): ICD-10-CM

## 2024-03-05 RX ORDER — PRAMIPEXOLE DIHYDROCHLORIDE 0.5 MG/1
0.5 TABLET ORAL NIGHTLY
Qty: 90 TABLET | Refills: 1 | Status: SHIPPED | OUTPATIENT
Start: 2024-03-05

## 2024-03-06 DIAGNOSIS — J45.41 MODERATE PERSISTENT ASTHMA WITH EXACERBATION: ICD-10-CM

## 2024-03-06 RX ORDER — FLUTICASONE PROPIONATE AND SALMETEROL 500; 50 UG/1; UG/1
POWDER RESPIRATORY (INHALATION)
Qty: 60 EACH | Refills: 0 | OUTPATIENT
Start: 2024-03-06

## 2024-03-11 ENCOUNTER — OFFICE VISIT (OUTPATIENT)
Dept: ORTHOPEDIC SURGERY | Age: 79
End: 2024-03-11

## 2024-03-11 DIAGNOSIS — M20.11 VALGUS DEFORMITY OF BOTH GREAT TOES: ICD-10-CM

## 2024-03-11 DIAGNOSIS — M20.12 VALGUS DEFORMITY OF BOTH GREAT TOES: ICD-10-CM

## 2024-03-11 DIAGNOSIS — M20.42 HAMMER TOE OF LEFT FOOT: Primary | ICD-10-CM

## 2024-03-11 PROCEDURE — 99024 POSTOP FOLLOW-UP VISIT: CPT | Performed by: ORTHOPAEDIC SURGERY

## 2024-03-11 NOTE — PROGRESS NOTES
Name: Pritesh Orellana  YOB: 1945  Gender: male  MRN: [id number]    Procedure Performed:Left first MTP arthrodesis - Left, left second and third proximal interphalangeal joint resection arthroplasties - Left, and left partial metatarsal head resections - Left        Date of Procedure: 2/15/2024      Subjective: Doing well.      Physical Examination: The pins were removed today.  The toes have good alignment.  Incisions are healing well but sign of infection.        Imaging:   Interpretation of imaging  Left foot XR: AP, Lateral, Oblique views     ICD-10-CM    1. Hammer toe of left foot  M20.42 XR FOOT LEFT (MIN 3 VIEWS)      2. Valgus deformity of both great toes  M20.11 XR FOOT LEFT (MIN 3 VIEWS)    M20.12          Report: AP, lateral, oblique x-ray of the left foot demonstrates no hardware failure    Impression: No hardware failure   RACHID MENA III, MD           Assessment:   Left first MTP arthrodesis and second and third claw toe corrections      Plan:   3 This is stable chronic illness/condition  Treatment at this time: Time with no intervention  Studies ordered: NO XR needed @ Next Visit    Weight-bearing status: WBAT        Return to work/work restrictions: none  No medications given

## 2024-03-12 ENCOUNTER — PATIENT MESSAGE (OUTPATIENT)
Dept: INTERNAL MEDICINE CLINIC | Facility: CLINIC | Age: 79
End: 2024-03-12

## 2024-03-12 DIAGNOSIS — J45.41 MODERATE PERSISTENT ASTHMA WITH EXACERBATION: ICD-10-CM

## 2024-03-12 RX ORDER — FLUTICASONE PROPIONATE AND SALMETEROL 500; 50 UG/1; UG/1
1 POWDER RESPIRATORY (INHALATION) EVERY 12 HOURS
Qty: 60 EACH | Refills: 0 | Status: SHIPPED | OUTPATIENT
Start: 2024-03-12

## 2024-03-12 NOTE — TELEPHONE ENCOUNTER
From: Pritesh Orellana  To: Ni Phillip  Sent: 3/12/2024 7:55 AM EDT  Subject: Wixela 500     Please renew scrip for above for 60 days. Then I can go back to 250. The 500 is working well. Thank you.

## 2024-03-25 ENCOUNTER — OFFICE VISIT (OUTPATIENT)
Dept: PULMONOLOGY | Age: 79
End: 2024-03-25
Payer: MEDICARE

## 2024-03-25 VITALS
HEIGHT: 67 IN | RESPIRATION RATE: 16 BRPM | WEIGHT: 201 LBS | BODY MASS INDEX: 31.55 KG/M2 | TEMPERATURE: 97.6 F | OXYGEN SATURATION: 100 % | DIASTOLIC BLOOD PRESSURE: 82 MMHG | HEART RATE: 90 BPM | SYSTOLIC BLOOD PRESSURE: 144 MMHG

## 2024-03-25 DIAGNOSIS — J45.41 MODERATE PERSISTENT ASTHMA WITH EXACERBATION: Primary | ICD-10-CM

## 2024-03-25 DIAGNOSIS — J30.89 ENVIRONMENTAL AND SEASONAL ALLERGIES: ICD-10-CM

## 2024-03-25 LAB
EXPIRATORY TIME: NORMAL
FEF 25-75% %PRED-PRE: NORMAL
FEF 25-75% PRED: NORMAL
FEF 25-75-PRE: NORMAL
FEV1 %PRED-PRE: NORMAL %
FEV1 PRED: 2.64 L
FEV1/FVC %PRED-PRE: NORMAL
FEV1/FVC PRED: NORMAL
FEV1/FVC: 0.86 %
FEV1: 2.59 L
FVC %PRED-PRE: NORMAL %
FVC PRED: 3.53 L
FVC: 3 L
PEF %PRED-PRE: NORMAL
PEF PRED: NORMAL
PEF-PRE: NORMAL

## 2024-03-25 PROCEDURE — G8417 CALC BMI ABV UP PARAM F/U: HCPCS | Performed by: NURSE PRACTITIONER

## 2024-03-25 PROCEDURE — 94010 BREATHING CAPACITY TEST: CPT | Performed by: INTERNAL MEDICINE

## 2024-03-25 PROCEDURE — 3079F DIAST BP 80-89 MM HG: CPT | Performed by: NURSE PRACTITIONER

## 2024-03-25 PROCEDURE — 1123F ACP DISCUSS/DSCN MKR DOCD: CPT | Performed by: NURSE PRACTITIONER

## 2024-03-25 PROCEDURE — 99204 OFFICE O/P NEW MOD 45 MIN: CPT | Performed by: NURSE PRACTITIONER

## 2024-03-25 PROCEDURE — G8484 FLU IMMUNIZE NO ADMIN: HCPCS | Performed by: NURSE PRACTITIONER

## 2024-03-25 PROCEDURE — 3077F SYST BP >= 140 MM HG: CPT | Performed by: NURSE PRACTITIONER

## 2024-03-25 PROCEDURE — 1036F TOBACCO NON-USER: CPT | Performed by: NURSE PRACTITIONER

## 2024-03-25 PROCEDURE — G8427 DOCREV CUR MEDS BY ELIG CLIN: HCPCS | Performed by: NURSE PRACTITIONER

## 2024-03-25 RX ORDER — ALBUTEROL SULFATE 90 UG/1
2 AEROSOL, METERED RESPIRATORY (INHALATION) EVERY 4 HOURS PRN
Qty: 1 EACH | Refills: 11 | Status: SHIPPED | OUTPATIENT
Start: 2024-03-25

## 2024-03-25 RX ORDER — FLUTICASONE PROPIONATE AND SALMETEROL 500; 50 UG/1; UG/1
1 POWDER RESPIRATORY (INHALATION) EVERY 12 HOURS
Qty: 1 EACH | Refills: 5 | Status: SHIPPED | OUTPATIENT
Start: 2024-03-25

## 2024-03-25 RX ORDER — PREDNISONE 10 MG/1
TABLET ORAL
Qty: 30 TABLET | Refills: 0 | Status: SHIPPED | OUTPATIENT
Start: 2024-03-25

## 2024-03-25 ASSESSMENT — PULMONARY FUNCTION TESTS
FEV1_PREDICTED: 2.64
FEV1/FVC: 0.86
FVC_PREDICTED: 3.53
FEV1: 2.59
FVC: 3.00

## 2024-03-25 NOTE — PROGRESS NOTES
Name:  Pritesh Orellana  YOB: 1945   MRN: [id number]      Office Visit: 3/25/2024        ASSESSMENT AND PLAN:  (Medical Decision Making)    Impression: 79 y.o. male referred to [facility] for persistent asthma symptoms and evaluation    1. Moderate persistent asthma with exacerbation  --Multiple issues that could contribute to exacerbation.  Recent surgery, seasonal allergies not currently being addressed, recent COVID infection.  Has not been as active since surgery, but may need a CT scan if symptoms are persistent to rule out VTE concerns.  He is not hypoxic or tachycardic today on evaluation  --PFTs without obstruction  --Would continue high-dose therapy for now with Wixela 500  --Albuterol HFA, but will also order nebulizer for the patient  --Will do another round of prednisone milligram taper  --May need to consider reflux control, patient to start with as needed Pepcid/Tums  --Would like to see back in several months to ensure high-dose Wixela is the appropriate therapy for him or if needs to be changed up  - fluticasone-salmeterol (ADVAIR) 500-50 MCG/ACT AEPB diskus inhaler; Inhale 1 puff into the lungs in the morning and 1 puff in the evening.  Dispense: 1 each; Refill: 5  - albuterol sulfate HFA (VENTOLIN HFA) 108 (90 Base) MCG/ACT inhaler; Inhale 2 puffs into the lungs every 4 hours as needed for Wheezing  Dispense: 1 each; Refill: 11    2. Environmental and seasonal allergies  --Daily allergy tablet is imperative.  Patient reports significant history of environmental allergies including molds, grasses, dander etc.    Orders Placed This Encounter   Medications    fluticasone-salmeterol (ADVAIR) 500-50 MCG/ACT AEPB diskus inhaler     Sig: Inhale 1 puff into the lungs in the morning and 1 puff in the evening.     Dispense:  1 each     Refill:  5    albuterol sulfate HFA (VENTOLIN HFA) 108 (90 Base) MCG/ACT inhaler     Sig: Inhale 2 puffs into the lungs every 4 hours as

## 2024-04-01 SDOH — HEALTH STABILITY: PHYSICAL HEALTH: ON AVERAGE, HOW MANY DAYS PER WEEK DO YOU ENGAGE IN MODERATE TO STRENUOUS EXERCISE (LIKE A BRISK WALK)?: 0 DAYS

## 2024-04-04 ENCOUNTER — OFFICE VISIT (OUTPATIENT)
Dept: INTERNAL MEDICINE CLINIC | Facility: CLINIC | Age: 79
End: 2024-04-04
Payer: MEDICARE

## 2024-04-04 VITALS
RESPIRATION RATE: 16 BRPM | BODY MASS INDEX: 32.36 KG/M2 | SYSTOLIC BLOOD PRESSURE: 130 MMHG | HEART RATE: 88 BPM | DIASTOLIC BLOOD PRESSURE: 86 MMHG | OXYGEN SATURATION: 97 % | WEIGHT: 206.2 LBS | HEIGHT: 67 IN | TEMPERATURE: 98.1 F

## 2024-04-04 DIAGNOSIS — N40.0 BENIGN PROSTATIC HYPERPLASIA WITHOUT LOWER URINARY TRACT SYMPTOMS: ICD-10-CM

## 2024-04-04 DIAGNOSIS — M43.16 SPONDYLOLISTHESIS OF LUMBAR REGION: ICD-10-CM

## 2024-04-04 DIAGNOSIS — L98.9 SCALP LESION: ICD-10-CM

## 2024-04-04 DIAGNOSIS — E55.9 VITAMIN D DEFICIENCY: ICD-10-CM

## 2024-04-04 DIAGNOSIS — I10 PRIMARY HYPERTENSION: Primary | ICD-10-CM

## 2024-04-04 DIAGNOSIS — R79.9 ABNORMAL BLOOD CHEMISTRY: ICD-10-CM

## 2024-04-04 DIAGNOSIS — E78.2 MIXED HYPERLIPIDEMIA: ICD-10-CM

## 2024-04-04 DIAGNOSIS — M20.42 HAMMER TOE OF LEFT FOOT: ICD-10-CM

## 2024-04-04 DIAGNOSIS — Z85.828 HISTORY OF BASAL CELL CANCER: ICD-10-CM

## 2024-04-04 DIAGNOSIS — J45.40 MODERATE PERSISTENT ASTHMA WITHOUT COMPLICATION: ICD-10-CM

## 2024-04-04 DIAGNOSIS — G25.81 RLS (RESTLESS LEGS SYNDROME): ICD-10-CM

## 2024-04-04 PROBLEM — L71.9 ROSACEA: Status: ACTIVE | Noted: 2020-12-10

## 2024-04-04 PROCEDURE — 1036F TOBACCO NON-USER: CPT | Performed by: INTERNAL MEDICINE

## 2024-04-04 PROCEDURE — 99214 OFFICE O/P EST MOD 30 MIN: CPT | Performed by: INTERNAL MEDICINE

## 2024-04-04 PROCEDURE — G8417 CALC BMI ABV UP PARAM F/U: HCPCS | Performed by: INTERNAL MEDICINE

## 2024-04-04 PROCEDURE — G8427 DOCREV CUR MEDS BY ELIG CLIN: HCPCS | Performed by: INTERNAL MEDICINE

## 2024-04-04 PROCEDURE — 3079F DIAST BP 80-89 MM HG: CPT | Performed by: INTERNAL MEDICINE

## 2024-04-04 PROCEDURE — 3075F SYST BP GE 130 - 139MM HG: CPT | Performed by: INTERNAL MEDICINE

## 2024-04-04 PROCEDURE — 1123F ACP DISCUSS/DSCN MKR DOCD: CPT | Performed by: INTERNAL MEDICINE

## 2024-04-04 SDOH — ECONOMIC STABILITY: FOOD INSECURITY: WITHIN THE PAST 12 MONTHS, THE FOOD YOU BOUGHT JUST DIDN'T LAST AND YOU DIDN'T HAVE MONEY TO GET MORE.: NEVER TRUE

## 2024-04-04 SDOH — ECONOMIC STABILITY: FOOD INSECURITY: WITHIN THE PAST 12 MONTHS, YOU WORRIED THAT YOUR FOOD WOULD RUN OUT BEFORE YOU GOT MONEY TO BUY MORE.: NEVER TRUE

## 2024-04-04 SDOH — ECONOMIC STABILITY: INCOME INSECURITY: HOW HARD IS IT FOR YOU TO PAY FOR THE VERY BASICS LIKE FOOD, HOUSING, MEDICAL CARE, AND HEATING?: NOT HARD AT ALL

## 2024-04-04 ASSESSMENT — ENCOUNTER SYMPTOMS
SHORTNESS OF BREATH: 0
COUGH: 0
ABDOMINAL PAIN: 0
RHINORRHEA: 0
COLOR CHANGE: 1
BLOOD IN STOOL: 0

## 2024-04-04 ASSESSMENT — PATIENT HEALTH QUESTIONNAIRE - PHQ9
SUM OF ALL RESPONSES TO PHQ QUESTIONS 1-9: 0
SUM OF ALL RESPONSES TO PHQ9 QUESTIONS 1 & 2: 0
SUM OF ALL RESPONSES TO PHQ QUESTIONS 1-9: 0
1. LITTLE INTEREST OR PLEASURE IN DOING THINGS: NOT AT ALL
2. FEELING DOWN, DEPRESSED OR HOPELESS: NOT AT ALL

## 2024-04-04 NOTE — PROGRESS NOTES
[facility]      2024    Patient Name: Pritesh Orellana  :  1945    Subjective:    Chief Complaint:  Chief Complaint   Patient presents with    New To Provider     NP here to establish care w/ PCP.     Skin Problem     Pt c/o skin problem on his head.          HPI Here for f/u, was previously seeing Rosemarie Saunders NP in our office;   He has hx of asthma, saw Jessica Zamorano NP last month; he is to continue Wixella and Albuterol HFA, Albuterol nebulizer ordered along with Prednisone taper; he is to f/u in 4 months; records reviewed;   He has hammer toe of L foot, saw Dr. De Luna last month; he had corrective surgery in February, pins removed at recent visit, toes w/ good alignment, healing well; he is to f/u in 6 weeks for recheck; records reviewed;   He has hx of basal cell cancer; he previously lived in [place]; he has scaling rash on his scalp that he's concerned about;   HTN:  Patient compliant with taking blood pressure medications: Yes  Discussed importance of following low sodium DASH diet, increasing physical activity, taking medications as ordered, decreasing alcohol intake, keeping f/u visits to recheck blood pressure, monitoring blood pressure at home and keeping a log, with goal blood pressure <140/90.  Home bp readings are: good      Past Medical History:   Diagnosis Date    Arthritis     Asthma     Hypercholesterolemia     Hypertension     Lumbar stenosis        Past Surgical History:   Procedure Laterality Date    ARTHRODESIS Left 02/15/2024    Left first MTP arthrodesis performed by Duncan De Luna III, MD at [facility]    CATARACT REMOVAL      Bilateral    CHOLECYSTECTOMY      COLONOSCOPY      FOOT SURGERY Left 02/15/2024    left partial metatarsal head resections performed by Duncan De Luna III, MD at [facility]    HAMMER TOE SURGERY Left 02/15/2024    left second and third proximal interphalangeal joint resection arthroplasties performed by Duncan De Luna III, MD at

## 2024-04-07 DIAGNOSIS — I10 PRIMARY HYPERTENSION: ICD-10-CM

## 2024-04-08 RX ORDER — LISINOPRIL 20 MG/1
20 TABLET ORAL DAILY
Qty: 90 TABLET | Refills: 0 | OUTPATIENT
Start: 2024-04-08

## 2024-04-18 ENCOUNTER — APPOINTMENT (RX ONLY)
Dept: URBAN - METROPOLITAN AREA CLINIC 329 | Facility: CLINIC | Age: 79
Setting detail: DERMATOLOGY
End: 2024-04-18

## 2024-04-18 DIAGNOSIS — L82.1 OTHER SEBORRHEIC KERATOSIS: ICD-10-CM

## 2024-04-18 DIAGNOSIS — L81.4 OTHER MELANIN HYPERPIGMENTATION: ICD-10-CM

## 2024-04-18 DIAGNOSIS — D18.0 HEMANGIOMA: ICD-10-CM

## 2024-04-18 DIAGNOSIS — L57.0 ACTINIC KERATOSIS: ICD-10-CM | Status: INADEQUATELY CONTROLLED

## 2024-04-18 DIAGNOSIS — Z85.828 PERSONAL HISTORY OF OTHER MALIGNANT NEOPLASM OF SKIN: ICD-10-CM

## 2024-04-18 DIAGNOSIS — D22 MELANOCYTIC NEVI: ICD-10-CM

## 2024-04-18 DIAGNOSIS — B07.8 OTHER VIRAL WARTS: ICD-10-CM | Status: INADEQUATELY CONTROLLED

## 2024-04-18 PROBLEM — D22.5 MELANOCYTIC NEVI OF TRUNK: Status: ACTIVE | Noted: 2024-04-18

## 2024-04-18 PROBLEM — D22.62 MELANOCYTIC NEVI OF LEFT UPPER LIMB, INCLUDING SHOULDER: Status: ACTIVE | Noted: 2024-04-18

## 2024-04-18 PROBLEM — D18.01 HEMANGIOMA OF SKIN AND SUBCUTANEOUS TISSUE: Status: ACTIVE | Noted: 2024-04-18

## 2024-04-18 PROCEDURE — 17003 DESTRUCT PREMALG LES 2-14: CPT | Mod: 59

## 2024-04-18 PROCEDURE — ? FULL BODY SKIN EXAM - DECLINED

## 2024-04-18 PROCEDURE — 99203 OFFICE O/P NEW LOW 30 MIN: CPT | Mod: 25

## 2024-04-18 PROCEDURE — ? REFERRAL CORRESPONDENCE

## 2024-04-18 PROCEDURE — 17110 DESTRUCTION B9 LES UP TO 14: CPT

## 2024-04-18 PROCEDURE — ? COUNSELING

## 2024-04-18 PROCEDURE — ? TREATMENT REGIMEN

## 2024-04-18 PROCEDURE — ? LIQUID NITROGEN

## 2024-04-18 PROCEDURE — 17000 DESTRUCT PREMALG LESION: CPT | Mod: 59

## 2024-04-18 ASSESSMENT — LOCATION DETAILED DESCRIPTION DERM
LOCATION DETAILED: LEFT CENTRAL ZYGOMA
LOCATION DETAILED: LEFT POSTERIOR SHOULDER
LOCATION DETAILED: LEFT MEDIAL FRONTAL SCALP
LOCATION DETAILED: RIGHT SUPERIOR HELIX
LOCATION DETAILED: LEFT SUPERIOR CENTRAL MALAR CHEEK
LOCATION DETAILED: RIGHT SUPERIOR OCCIPITAL SCALP
LOCATION DETAILED: LEFT INFERIOR UPPER BACK
LOCATION DETAILED: LEFT OCCIPITAL SCALP
LOCATION DETAILED: SUPERIOR MID FOREHEAD
LOCATION DETAILED: RIGHT INFERIOR FOREHEAD
LOCATION DETAILED: LEFT MEDIAL TEMPLE
LOCATION DETAILED: RIGHT ANTITRAGUS
LOCATION DETAILED: LEFT LATERAL UPPER BACK
LOCATION DETAILED: LEFT PROXIMAL DORSAL FOREARM
LOCATION DETAILED: LEFT FOREHEAD
LOCATION DETAILED: RIGHT LATERAL SUPERIOR CHEST
LOCATION DETAILED: LEFT RIB CAGE
LOCATION DETAILED: LEFT INFERIOR LATERAL FOREHEAD
LOCATION DETAILED: PERIUMBILICAL SKIN
LOCATION DETAILED: RIGHT PROXIMAL DORSAL FOREARM

## 2024-04-18 ASSESSMENT — LOCATION SIMPLE DESCRIPTION DERM
LOCATION SIMPLE: LEFT FOREHEAD
LOCATION SIMPLE: LEFT CHEEK
LOCATION SIMPLE: LEFT ZYGOMA
LOCATION SIMPLE: LEFT TEMPLE
LOCATION SIMPLE: LEFT SCALP
LOCATION SIMPLE: RIGHT FOREHEAD
LOCATION SIMPLE: ABDOMEN
LOCATION SIMPLE: LEFT FOREARM
LOCATION SIMPLE: LEFT UPPER BACK
LOCATION SIMPLE: POSTERIOR SCALP
LOCATION SIMPLE: SUPERIOR FOREHEAD
LOCATION SIMPLE: RIGHT EAR
LOCATION SIMPLE: LEFT SHOULDER
LOCATION SIMPLE: RIGHT FOREARM
LOCATION SIMPLE: CHEST

## 2024-04-18 ASSESSMENT — LOCATION ZONE DERM
LOCATION ZONE: ARM
LOCATION ZONE: TRUNK
LOCATION ZONE: FACE
LOCATION ZONE: SCALP
LOCATION ZONE: EAR

## 2024-04-18 NOTE — PROCEDURE: LIQUID NITROGEN
Medical Necessity Information: It is in your best interest to select a reason for this procedure from the list below. All of these items fulfill various CMS LCD requirements except the new and changing color options.
Detail Level: Detailed
Show Topical Anesthesia Variable?: Yes
Post-Care Instructions: I reviewed with the patient in detail post-care instructions. Patient is to wear sunprotection, and avoid picking at any of the treated lesions. Pt may apply Vaseline to crusted or scabbing areas.
Render Note In Bullet Format When Appropriate: No
Spray Paint Text: The liquid nitrogen was applied to the skin utilizing a spray paint frosting technique.
Medical Necessity Clause: This procedure was medically necessary because the lesions that were treated were:
Consent: The patient's consent was obtained including but not limited to risks of crusting, scabbing, blistering, scarring, darker or lighter pigmentary change, recurrence, incomplete removal and infection.
Duration Of Freeze Thaw-Cycle (Seconds): 0
Number Of Freeze-Thaw Cycles: 2 freeze-thaw cycles

## 2024-04-18 NOTE — HPI: EVALUATION OF SKIN LESION(S)
What Type Of Note Output Would You Prefer (Optional)?: Bullet Format
Hpi Title: Evaluation of Skin Lesions
Additional History: Patient states he has a lesion on his scalp that has been present for a couple of years. He states it will get scaly but denies any pain or bleeding. He states he has used a topical chemo cream for his scalp and forehead in the past.

## 2024-04-20 DIAGNOSIS — I10 PRIMARY HYPERTENSION: ICD-10-CM

## 2024-04-20 DIAGNOSIS — Z76.0 MEDICATION REFILL: ICD-10-CM

## 2024-04-22 ENCOUNTER — OFFICE VISIT (OUTPATIENT)
Dept: ORTHOPEDIC SURGERY | Age: 79
End: 2024-04-22

## 2024-04-22 DIAGNOSIS — M20.11 VALGUS DEFORMITY OF BOTH GREAT TOES: Primary | ICD-10-CM

## 2024-04-22 DIAGNOSIS — M20.12 VALGUS DEFORMITY OF BOTH GREAT TOES: Primary | ICD-10-CM

## 2024-04-22 DIAGNOSIS — Z76.0 MEDICATION REFILL: ICD-10-CM

## 2024-04-22 DIAGNOSIS — M20.42 HAMMER TOE OF LEFT FOOT: ICD-10-CM

## 2024-04-22 PROCEDURE — 99024 POSTOP FOLLOW-UP VISIT: CPT | Performed by: NURSE PRACTITIONER

## 2024-04-22 RX ORDER — DICLOFENAC SODIUM 75 MG/1
75 TABLET, DELAYED RELEASE ORAL 2 TIMES DAILY
Qty: 180 TABLET | Refills: 0 | OUTPATIENT
Start: 2024-04-22

## 2024-04-22 NOTE — PROGRESS NOTES
Name: Pritesh Orellana  YOB: 1945  Gender: male  MRN: [id number]    Procedure Performed:  Left first MTP arthrodesis  Left second and third proximal interphalangeal joint resection arthroplasties  Left second and third partial metatarsal head resections           Date of Procedure: 02/15/2024    Subjective: Patient reports that he has done well, he no longer has any pain and seems overall pleased with the appearance of his foot.  He reports he has not been doing any type of exercise program at home to help with strength and mobility.      Physical Examination: Incisional area is not yet fully healed however there are no signs of infection to the foot or ankle today.  He has palpable pulses and intact sensation to foot.  Range of motion to the first IP and TMT joints performed without pain.  He has no pain with palpation to any of the MTP joints plantarly.  He is able to wiggle the lesser toes without pain.  There is minimal to no swelling present today.  The great toe is well surgically aligned and corrected.        Imaging:   Interpretation of imaging  Left foot XR: AP, Lateral, Oblique views     ICD-10-CM    1. Hammer toe of left foot  M20.42 XR FOOT LEFT (MIN 3 VIEWS)      2. Valgus deformity of both great toes  M20.11 XR FOOT LEFT (MIN 3 VIEWS)    M20.12          Report: AP, lateral, oblique x-ray of the left foot demonstrates no hardware failures    Impression: Hallux valgus correction without hardware failure   Cristina Felipe, APRN - CNP           Assessment:   Status post left first MTP fusion with second and third PIP RA's and partial metatarsal head resections.  The patient does have interest in getting the right foot bunion corrected as well.  He would like to do this in late summer early fall.  We will discuss this on his next visit.  We will review his x-rays and discussed proper procedure.      Plan:   3 This is stable chronic illness/condition  Treatment at this time:

## 2024-04-23 ENCOUNTER — NURSE ONLY (OUTPATIENT)
Dept: INTERNAL MEDICINE CLINIC | Facility: CLINIC | Age: 79
End: 2024-04-23

## 2024-04-23 DIAGNOSIS — E55.9 VITAMIN D DEFICIENCY: ICD-10-CM

## 2024-04-23 DIAGNOSIS — N40.0 BENIGN PROSTATIC HYPERPLASIA WITHOUT LOWER URINARY TRACT SYMPTOMS: ICD-10-CM

## 2024-04-23 DIAGNOSIS — R79.9 ABNORMAL BLOOD CHEMISTRY: ICD-10-CM

## 2024-04-23 DIAGNOSIS — E78.2 MIXED HYPERLIPIDEMIA: ICD-10-CM

## 2024-04-23 LAB
25(OH)D3 SERPL-MCNC: 39.2 NG/ML (ref 30–100)
ALBUMIN SERPL-MCNC: 3.9 G/DL (ref 3.2–4.6)
ALBUMIN/GLOB SERPL: 1.3 (ref 1–1.9)
ALP SERPL-CCNC: 57 U/L (ref 40–129)
ALT SERPL-CCNC: 37 U/L (ref 12–65)
ANION GAP SERPL CALC-SCNC: 12 MMOL/L (ref 9–18)
APPEARANCE UR: CLEAR
AST SERPL-CCNC: 35 U/L (ref 15–37)
BACTERIA URNS QL MICRO: NEGATIVE /HPF
BASOPHILS # BLD: 0 K/UL (ref 0–0.2)
BASOPHILS NFR BLD: 1 % (ref 0–2)
BILIRUB SERPL-MCNC: 0.4 MG/DL (ref 0–1.2)
BILIRUB UR QL: NEGATIVE
BUN SERPL-MCNC: 20 MG/DL (ref 8–23)
CALCIUM SERPL-MCNC: 9.1 MG/DL (ref 8.8–10.2)
CASTS URNS QL MICRO: ABNORMAL /LPF (ref 0–2)
CHLORIDE SERPL-SCNC: 103 MMOL/L (ref 98–107)
CHOLEST SERPL-MCNC: 158 MG/DL (ref 0–200)
CO2 SERPL-SCNC: 26 MMOL/L (ref 20–28)
COLOR UR: ABNORMAL
CREAT SERPL-MCNC: 0.88 MG/DL (ref 0.8–1.3)
DIFFERENTIAL METHOD BLD: ABNORMAL
EOSINOPHIL # BLD: 0.3 K/UL (ref 0–0.8)
EOSINOPHIL NFR BLD: 5 % (ref 0.5–7.8)
EPI CELLS #/AREA URNS HPF: ABNORMAL /HPF (ref 0–5)
ERYTHROCYTE [DISTWIDTH] IN BLOOD BY AUTOMATED COUNT: 14.4 % (ref 11.9–14.6)
GLOBULIN SER CALC-MCNC: 2.9 G/DL (ref 2.3–3.5)
GLUCOSE SERPL-MCNC: 94 MG/DL (ref 70–99)
GLUCOSE UR STRIP.AUTO-MCNC: NEGATIVE MG/DL
HCT VFR BLD AUTO: 40.4 % (ref 41.1–50.3)
HDLC SERPL-MCNC: 63 MG/DL (ref 40–60)
HDLC SERPL: 2.5 (ref 0–5)
HGB BLD-MCNC: 12.9 G/DL (ref 13.6–17.2)
HGB UR QL STRIP: NEGATIVE
IMM GRANULOCYTES # BLD AUTO: 0 K/UL (ref 0–0.5)
IMM GRANULOCYTES NFR BLD AUTO: 0 % (ref 0–5)
KETONES UR QL STRIP.AUTO: NEGATIVE MG/DL
LDLC SERPL CALC-MCNC: 74 MG/DL (ref 0–100)
LEUKOCYTE ESTERASE UR QL STRIP.AUTO: NEGATIVE
LYMPHOCYTES # BLD: 1.4 K/UL (ref 0.5–4.6)
LYMPHOCYTES NFR BLD: 21 % (ref 13–44)
MCH RBC QN AUTO: 30.4 PG (ref 26.1–32.9)
MCHC RBC AUTO-ENTMCNC: 31.9 G/DL (ref 31.4–35)
MCV RBC AUTO: 95.3 FL (ref 82–102)
MONOCYTES # BLD: 0.5 K/UL (ref 0.1–1.3)
MONOCYTES NFR BLD: 7 % (ref 4–12)
NEUTS SEG # BLD: 4.2 K/UL (ref 1.7–8.2)
NEUTS SEG NFR BLD: 66 % (ref 43–78)
NITRITE UR QL STRIP.AUTO: NEGATIVE
NRBC # BLD: 0 K/UL (ref 0–0.2)
PH UR STRIP: 6 (ref 5–9)
PLATELET # BLD AUTO: 319 K/UL (ref 150–450)
PMV BLD AUTO: 9.6 FL (ref 9.4–12.3)
POTASSIUM SERPL-SCNC: 4.3 MMOL/L (ref 3.5–5.1)
PROT SERPL-MCNC: 6.8 G/DL (ref 6.3–8.2)
PROT UR STRIP-MCNC: ABNORMAL MG/DL
PSA FREE MFR SERPL: 8.6 %
PSA FREE SERPL-MCNC: 0.6 NG/ML
PSA SERPL-MCNC: 7 NG/ML (ref 0–4)
RBC # BLD AUTO: 4.24 M/UL (ref 4.23–5.6)
RBC #/AREA URNS HPF: ABNORMAL /HPF (ref 0–5)
SODIUM SERPL-SCNC: 142 MMOL/L (ref 136–145)
SP GR UR REFRACTOMETRY: 1.02 (ref 1–1.02)
T4 FREE SERPL-MCNC: 1.4 NG/DL (ref 0.9–1.7)
TRIGL SERPL-MCNC: 108 MG/DL (ref 0–150)
TSH, 3RD GENERATION: 0.85 UIU/ML (ref 0.27–4.2)
UROBILINOGEN UR QL STRIP.AUTO: 0.2 EU/DL (ref 0.2–1)
VLDLC SERPL CALC-MCNC: 22 MG/DL (ref 6–23)
WBC # BLD AUTO: 6.4 K/UL (ref 4.3–11.1)
WBC URNS QL MICRO: ABNORMAL /HPF (ref 0–4)

## 2024-04-23 RX ORDER — LISINOPRIL 20 MG/1
20 TABLET ORAL DAILY
Qty: 90 TABLET | Refills: 0 | Status: SHIPPED | OUTPATIENT
Start: 2024-04-23

## 2024-04-23 RX ORDER — DICLOFENAC SODIUM 75 MG/1
75 TABLET, DELAYED RELEASE ORAL 2 TIMES DAILY
Qty: 180 TABLET | Refills: 0 | Status: SHIPPED | OUTPATIENT
Start: 2024-04-23

## 2024-04-24 ENCOUNTER — PATIENT MESSAGE (OUTPATIENT)
Dept: PULMONOLOGY | Age: 79
End: 2024-04-24

## 2024-04-24 ENCOUNTER — TELEPHONE (OUTPATIENT)
Dept: PULMONOLOGY | Age: 79
End: 2024-04-24

## 2024-04-24 DIAGNOSIS — R97.20 ELEVATED PSA: Primary | ICD-10-CM

## 2024-04-24 LAB
EST. AVERAGE GLUCOSE BLD GHB EST-MCNC: 115 MG/DL
HBA1C MFR BLD: 5.6 % (ref 0–5.6)

## 2024-04-24 RX ORDER — LEVALBUTEROL INHALATION SOLUTION 0.63 MG/3ML
1 SOLUTION RESPIRATORY (INHALATION) EVERY 8 HOURS PRN
Qty: 120 EACH | Refills: 5 | Status: SHIPPED | OUTPATIENT
Start: 2024-04-24

## 2024-04-24 NOTE — TELEPHONE ENCOUNTER
Spoke with the patient to let him know Ms. Jessica Zamorano sent nebulizer medication to his pharmacy ( [address]). Instruction given how to use it. Patient voices understanding. Edda ROBERTSON

## 2024-04-25 NOTE — TELEPHONE ENCOUNTER
From: Pritesh Orellana  To: Jessica Zamorano  Sent: 4/24/2024 6:24 PM EDT  Subject: Nebulizer     The Nebulizer machine requires a liquid med to be useful. What I received from [facility] was a manual albuterol inhaler. What can we do to resolve this?

## 2024-05-14 DIAGNOSIS — I10 PRIMARY HYPERTENSION: ICD-10-CM

## 2024-05-14 RX ORDER — HYDROCHLOROTHIAZIDE 12.5 MG/1
12.5 TABLET ORAL EVERY OTHER DAY
Qty: 90 TABLET | Refills: 1 | Status: SHIPPED | OUTPATIENT
Start: 2024-05-14

## 2024-05-14 NOTE — PROGRESS NOTES
Urologic Oncology  [facility]  [address] [place] [id number]  [phone]          Pritesh Orellana  : 1945      INITIAL EVALUATION    Chief Complaint   Patient presents with    New Patient       HPI:     Pritesh Orellana is a 79 y.o. male who is referred for evaluation of elevated PSA.  He had a routine screening PSA check on 2024 that was 7.0 free PSA of 0.6 and percent free PSA of 8.6.  Patient moved to [place] from [facility] in .  He states that he has been told several times while he was there that he has an enlarged prostate and did require straight cath times to help him void.  He does not believe he has had prior PSAs in the past.    Patient endorses some incomplete voiding with weak urinary stream.  He otherwise denies significant frequency or urgency.  He gets up approximately 1 time per night to void.  He denies dysuria or hematuria.    Patient denies family history of prostate cancer.    Patient's other medical comorbidities include hypertension, hyperlipidemia and asthma.  He does not take blood thinners or anticoagulation.      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    Hypercholesterolemia     Hypertension     Lumbar stenosis     Skin cancer 2019    BCC  top of right ear       PSH:    Past Surgical History:   Procedure Laterality Date    ARTHRODESIS Left 02/15/2024    Left first MTP arthrodesis performed by Duncan De Luna III, MD at [facility]    CATARACT REMOVAL      Bilateral    CHOLECYSTECTOMY      COLONOSCOPY      EYE SURGERY      FOOT SURGERY Left 02/15/2024    left partial metatarsal head resections performed by Duncan De Luna III, MD at [facility]    HAMMER TOE SURGERY Left 02/15/2024    left second and third proximal interphalangeal joint resection arthroplasties performed by Duncan De Luna III, MD at [facility]    HX JOINT REPLACEMENT

## 2024-05-17 ENCOUNTER — OFFICE VISIT (OUTPATIENT)
Dept: ONCOLOGY | Age: 79
End: 2024-05-17

## 2024-05-17 VITALS
HEART RATE: 80 BPM | BODY MASS INDEX: 32.21 KG/M2 | HEIGHT: 67 IN | OXYGEN SATURATION: 96 % | RESPIRATION RATE: 13 BRPM | WEIGHT: 205.2 LBS | SYSTOLIC BLOOD PRESSURE: 175 MMHG | DIASTOLIC BLOOD PRESSURE: 93 MMHG | TEMPERATURE: 97.2 F

## 2024-05-17 DIAGNOSIS — R97.20 ELEVATED PSA: Primary | ICD-10-CM

## 2024-05-17 PROCEDURE — 3080F DIAST BP >= 90 MM HG: CPT | Performed by: UROLOGY

## 2024-05-17 PROCEDURE — 1036F TOBACCO NON-USER: CPT | Performed by: UROLOGY

## 2024-05-17 NOTE — PATIENT INSTRUCTIONS
We reviewed the significance of an elevated PSA.  We discussed benign etiologies such as; benign enlargement of the prostate, inflammation of the prostate, infections of the prostate, and prostatic manipulation as a possible cause. We also discussed the possibility of prostate cancer.  We explained that the main ways to diagnosis prostate cancer are with an ultrasound-guided biopsy or a prostate MRI followed by a biopsy. We explained that the MRI gives a view of the entire prostate and would possibly enable a targeted biopsy if an abnormality was seen on the MRI.  Otherwise, we could just manage the patient with observation and repeat PSA checks. After a full discussion regarding the potential risks, benefits, and treatment alternatives, we will plan to proceed with a prostate MRI and then consider prostate biopsy.      Radiology should be calling you to set up your MRI in the next week or 2. If you do not hear from them by Wednesday, 5/22 please call the number below to scheduled: [phone].      
The patient is a 47y Female complaining of

## 2024-05-17 NOTE — PROGRESS NOTES
NEW PATIENT INTAKE    Referral Diagnosis: Elevated PSA    Referring Provider:  Natasha Rodriguez MD    Primary Care Provider: Natasha Rodriguez MD    Family History of Cancer/ Hematology Disorders: Mother with pancreatic cancer and sister with colon cancer     Presenting Symptoms: No relevant physical symptoms documented     Chronological History of Pertinent Events: Mr. Orellnaa is a 79-year-old white male referred for elevated PSA. His PMH is significant for benign prostatic hyperplasia without lower urinary tract symptoms.    12/11/17: PSA 0.6 (CE/Labs)    4/23/21: PSA 2.2 (CE/Labs)    4/4/24: Established care at [facility]   -Routine labs ordered    4/23/24: PSA 7.0; free PSA 0.6; free % PSA 8.6 (Epic/Labs)  -UA with trace protein (Epic/Labs)    4/24/24: MD noted, “PSA elevated compared to prior study in 2021; will refer to Urology”  -Referred to [facility]    Notes from Referring Provider: None    Presented at Tumor Board: N/A    Other Pertinent Information: None

## 2024-06-05 ENCOUNTER — HOSPITAL ENCOUNTER (OUTPATIENT)
Dept: MRI IMAGING | Age: 79
Discharge: HOME | End: 2024-06-08
Payer: MEDICARE

## 2024-06-05 DIAGNOSIS — R97.20 ELEVATED PSA: ICD-10-CM

## 2024-06-05 PROCEDURE — A9579 GAD-BASE MR CONTRAST NOS,1ML: HCPCS | Performed by: PHYSICIAN ASSISTANT

## 2024-06-05 PROCEDURE — 72197 MRI PELVIS W/O & W/DYE: CPT

## 2024-06-05 PROCEDURE — 6360000004 HC RX CONTRAST MEDICATION: Performed by: PHYSICIAN ASSISTANT

## 2024-06-05 RX ADMIN — GADOTERIDOL 19 ML: 279.3 INJECTION, SOLUTION INTRAVENOUS at 08:19

## 2024-06-05 NOTE — PROGRESS NOTES
Urologic Oncology  [facility]  [address] [place] [id number]  [phone]        Mr. Pritesh Orellana is a 79 y.o. male with a diagnosis of elevated PSA.    INTERVAL HISTORY: Patient returns today for follow-up of his elevated PSA.  He brought some records today that showed his PSA in December 2019 was 1.4.  It was checked more recently and found to be 7.0.  His percent free is 8.6.    At our last appointment his digital rectal exam was unremarkable.  He does have a history of BPH and has had 2 straight cath previously.    He underwent an MRI of his prostate on 6/5/2024.  It shows a 27 cc gland.  He has a PI-RADS 5 lesion measuring 1.6 cm involving the right anterior mid transition zone.  I looked at his images and I agree this is a concerning appearing lesion.    He is not on any anticoagulants.  He has no known drug allergies.    He has done a considerable amount of research and is well-educated on his findings and what his options are in terms of biopsy of the prostate.      From previous note on 5/17/24: Pritesh Orellana is a 79 y.o. male who is referred for evaluation of elevated PSA.  He had a routine screening PSA check on 4/23/2024 that was 7.0 free PSA of 0.6 and percent free PSA of 8.6.  Patient moved to [place] from [facility] in 2021.  He states that he has been told several times while he was there that he has an enlarged prostate and did require straight cath times to help him void.  He does not believe he has had prior PSAs in the past.     Patient endorses some incomplete voiding with weak urinary stream.  He otherwise denies significant frequency or urgency.  He gets up approximately 1 time per night to void.  He denies dysuria or hematuria.     Patient denies family history of prostate cancer.     Patient's other medical comorbidities include hypertension, hyperlipidemia and asthma.  He does not take blood thinners or

## 2024-06-10 ENCOUNTER — OFFICE VISIT (OUTPATIENT)
Dept: ONCOLOGY | Age: 79
End: 2024-06-10
Payer: MEDICARE

## 2024-06-10 VITALS
DIASTOLIC BLOOD PRESSURE: 90 MMHG | WEIGHT: 207.9 LBS | HEART RATE: 96 BPM | TEMPERATURE: 97.9 F | BODY MASS INDEX: 32.63 KG/M2 | OXYGEN SATURATION: 93 % | HEIGHT: 67 IN | SYSTOLIC BLOOD PRESSURE: 166 MMHG | RESPIRATION RATE: 16 BRPM

## 2024-06-10 DIAGNOSIS — R97.20 ELEVATED PSA: Primary | ICD-10-CM

## 2024-06-10 PROCEDURE — 3077F SYST BP >= 140 MM HG: CPT | Performed by: UROLOGY

## 2024-06-10 PROCEDURE — 1123F ACP DISCUSS/DSCN MKR DOCD: CPT | Performed by: UROLOGY

## 2024-06-10 PROCEDURE — 1036F TOBACCO NON-USER: CPT | Performed by: UROLOGY

## 2024-06-10 PROCEDURE — G8417 CALC BMI ABV UP PARAM F/U: HCPCS | Performed by: UROLOGY

## 2024-06-10 PROCEDURE — 3080F DIAST BP >= 90 MM HG: CPT | Performed by: UROLOGY

## 2024-06-10 PROCEDURE — 99213 OFFICE O/P EST LOW 20 MIN: CPT | Performed by: UROLOGY

## 2024-06-10 PROCEDURE — G8427 DOCREV CUR MEDS BY ELIG CLIN: HCPCS | Performed by: UROLOGY

## 2024-06-10 ASSESSMENT — PATIENT HEALTH QUESTIONNAIRE - PHQ9
1. LITTLE INTEREST OR PLEASURE IN DOING THINGS: NOT AT ALL
SUM OF ALL RESPONSES TO PHQ QUESTIONS 1-9: 0
SUM OF ALL RESPONSES TO PHQ QUESTIONS 1-9: 0
2. FEELING DOWN, DEPRESSED OR HOPELESS: NOT AT ALL
SUM OF ALL RESPONSES TO PHQ QUESTIONS 1-9: 0
SUM OF ALL RESPONSES TO PHQ QUESTIONS 1-9: 0
SUM OF ALL RESPONSES TO PHQ9 QUESTIONS 1 & 2: 0

## 2024-06-10 ASSESSMENT — ENCOUNTER SYMPTOMS
GASTROINTESTINAL NEGATIVE: 1
RESPIRATORY NEGATIVE: 1

## 2024-06-10 NOTE — PATIENT INSTRUCTIONS
mass, extra-renal metabolism of creatinine, excessive creatine ingestion, or following therapy that affects renal tubular secretion.      Calcium 04/23/2024 9.1  8.8 - 10.2 MG/DL Final    Total Bilirubin 04/23/2024 0.4  0.0 - 1.2 MG/DL Final    ALT 04/23/2024 37  12 - 65 U/L Final    AST 04/23/2024 35  15 - 37 U/L Final    Alk Phosphatase 04/23/2024 57  40 - 129 U/L Final    Total Protein 04/23/2024 6.8  6.3 - 8.2 g/dL Final    Albumin 04/23/2024 3.9  3.2 - 4.6 g/dL Final    Globulin 04/23/2024 2.9  2.3 - 3.5 g/dL Final    Albumin/Globulin Ratio 04/23/2024 1.3  1.0 - 1.9   Final    Cholesterol, Total 04/23/2024 158  0 - 200 MG/DL Final    Comment: Borderline High: 200-239 mg/dL  High: Greater than or equal to 240 mg/dL      Triglycerides 04/23/2024 108  0 - 150 MG/DL Final    Comment: Borderline High: 150-199 mg/dL, High: 200-499 mg/dL  Very High: Greater than or equal to 500 mg/dL      HDL 04/23/2024 63 (H)  40 - 60 MG/DL Final    LDL Calculated 04/23/2024 74  0 - 100 MG/DL Final    Comment: Near Optimal: 100-129 mg/dL  Borderline High: 130-159, High: 160-189 mg/dL  Very High: Greater than or equal to 190 mg/dL      VLDL Cholesterol Calculated 04/23/2024 22  6 - 23 MG/DL Final    Chol/HDL Ratio 04/23/2024 2.5  0.0 - 5.0   Final    TSH, 3rd Generation 04/23/2024 0.848  0.270 - 4.200 uIU/mL Final    Vit D, 25-Hydroxy 04/23/2024 39.2  30.0 - 100.0 ng/mL Final    Comment: Deficiency         <20.0 ng/mL  Insufficiency       20.0-29.9 ng/mL      Hemoglobin A1C 04/23/2024 5.6  0 - 5.6 % Final    Comment: Reference Range  Normal       <5.7%  Prediabetes  5.7-6.4%  Diabetes     >6.4%      Estimated Avg Glucose 04/23/2024 115  mg/dL Final    PSA 04/23/2024 7.0 (H)  0.0 - 4.0 ng/mL Final    Comment: Roche ECLIA methodology  Patient's results of tumor marker testing may not be comparable to labs using different manufacturers/methods.      PSA, Free 04/23/2024 0.6  ng/mL Final    Comment: Roche ECLIA methodology  Patient's

## 2024-06-12 ENCOUNTER — PREP FOR PROCEDURE (OUTPATIENT)
Dept: ONCOLOGY | Age: 79
End: 2024-06-12

## 2024-06-12 DIAGNOSIS — R97.20 ELEVATED PROSTATE SPECIFIC ANTIGEN (PSA): ICD-10-CM

## 2024-06-14 ENCOUNTER — OFFICE VISIT (OUTPATIENT)
Age: 79
End: 2024-06-14
Payer: MEDICARE

## 2024-06-14 DIAGNOSIS — Z98.1 STATUS POST LUMBAR SPINAL ARTHRODESIS: Primary | ICD-10-CM

## 2024-06-14 PROCEDURE — G8427 DOCREV CUR MEDS BY ELIG CLIN: HCPCS | Performed by: ORTHOPAEDIC SURGERY

## 2024-06-14 PROCEDURE — 1036F TOBACCO NON-USER: CPT | Performed by: ORTHOPAEDIC SURGERY

## 2024-06-14 PROCEDURE — 1123F ACP DISCUSS/DSCN MKR DOCD: CPT | Performed by: ORTHOPAEDIC SURGERY

## 2024-06-14 PROCEDURE — G8417 CALC BMI ABV UP PARAM F/U: HCPCS | Performed by: ORTHOPAEDIC SURGERY

## 2024-06-14 PROCEDURE — 99213 OFFICE O/P EST LOW 20 MIN: CPT | Performed by: ORTHOPAEDIC SURGERY

## 2024-06-14 NOTE — PROGRESS NOTES
the spine surgery.     He can continue activities as tolerated and return for follow up as needed.          Electronically Signed By Eugene Saldaña MD   06/14/24  11:36 AM

## 2024-06-28 NOTE — PERIOP NOTE
Patient verified name and .  Order for consent not found in EHR and matches case posting; patient verifies procedure.   Type 1A surgery, phone assessment complete.  Orders not received.  Labs per surgeon: none  Labs per anesthesia protocol: POCT Potassium DOS    Patient answered medical/surgical history questions at their best of ability. All prior to admission medications documented in EPIC.    Patient instructed to continue taking all prescription medications up to the day of surgery but to take only the following medications the day of surgery according to anesthesia guidelines with a small sip of water: Albuterol (Ventolin/ Pro-air) use and bring, Fluticasone-salmeterol  (Advair), Flonase if needed,  Patient informed that all vitamins and supplements should be held 7 days prior to surgery and NSAIDS 5 days prior to surgery. Prescription meds to hold:lisinopril, Hydrochlorothiazide (HCTZ), Diclofenac (Voltaren) hold 5 days PTS    Patient instructed on the following:    > Arrive at Main Entrance, time of arrival to be called the day before by 1700  > NPO after midnight, unless otherwise indicated, including gum, mints, and ice chips  > Responsible adult must drive patient to the hospital, stay during surgery, and patient will need supervision 24 hours after anesthesia  > Use non moisturizing soap in shower the night before surgery and on the morning of surgery  > All piercings must be removed prior to arrival.    > Leave all valuables (money and jewelry) at home but bring insurance card and ID on DOS.   > You may be required to pay a deductible or co-pay on the day of your procedure. You can pre-pay by calling [phone] if your surgery is at the [facility] or [phone] if your surgery is at the [facility].  > Do not wear make-up, nail polish, lotions, cologne, perfumes, powders, or oil on skin. Artificial nails are not permitted.    
registration/identification.    Our Guide to Surgery with additional information can be found:  [email or web address]

## 2024-07-08 ENCOUNTER — ANESTHESIA EVENT (OUTPATIENT)
Dept: SURGERY | Age: 79
End: 2024-07-08
Payer: MEDICARE

## 2024-07-08 NOTE — ANESTHESIA PRE PROCEDURE
in the morning and at bedtime    Provider, MD Fartun   pravastatin (PRAVACHOL) 10 MG tablet Take 1 tablet by mouth daily  Patient taking differently: Take 1 tablet by mouth nightly 12/7/22   Rosemarie Saunders APRN - CNP   chlorhexidine (PERIDEX) 0.12 % solution Take 15 mLs by mouth 2 times daily PLACE 15 ML IN THE MOUTH SWISH IN MOUTH FOR 30 SECONDS THEN SPIT OUT  Patient taking differently: Take 15 mLs by mouth 2 times daily as needed PLACE 15 ML IN THE MOUTH SWISH IN MOUTH FOR 30 SECONDS THEN SPIT OUT 9/30/22   Rosemarie Saunders APRN - CNP   metroNIDAZOLE (METROGEL) 1 % gel Apply 1 Tube topically daily 9/30/22   Rosemarie Saunders APRN - CNP   vitamin D (CHOLECALCIFEROL) 25 MCG (1000 UT) TABS tablet Take 1 tablet by mouth daily    Automatic Reconciliation, Ar   fluticasone (FLONASE) 50 MCG/ACT nasal spray 1 spray in each nostril    Automatic Reconciliation, Ar       Current medications:    No current facility-administered medications for this encounter.     Current Outpatient Medications   Medication Sig Dispense Refill   • Multiple Vitamin (MULTIVITAMIN ADULT PO) Take 1 tablet by mouth Daily     • hydroCHLOROthiazide 12.5 MG tablet Take 1 tablet by mouth every other day 90 tablet 1   • levalbuterol (XOPENEX) 0.63 MG/3ML nebulization Take 3 mLs by nebulization every 8 hours as needed for Wheezing 120 each 5   • lisinopril (PRINIVIL;ZESTRIL) 20 MG tablet Take 1 tablet by mouth daily 90 tablet 0   • diclofenac (VOLTAREN) 75 MG EC tablet Take 1 tablet by mouth 2 times daily 180 tablet 0   • fluticasone-salmeterol (ADVAIR) 500-50 MCG/ACT AEPB diskus inhaler Inhale 1 puff into the lungs in the morning and 1 puff in the evening. 1 each 5   • albuterol sulfate HFA (VENTOLIN HFA) 108 (90 Base) MCG/ACT inhaler Inhale 2 puffs into the lungs every 4 hours as needed for Wheezing 1 each 11   • predniSONE (DELTASONE) 10 MG tablet Take 4 tabs x 3 days then 3 tabs x 3 days then 2 tabs x 3 days then 1 tab x 3 days then stop.

## 2024-07-09 ENCOUNTER — ANESTHESIA (OUTPATIENT)
Dept: SURGERY | Age: 79
End: 2024-07-09
Payer: MEDICARE

## 2024-07-09 ENCOUNTER — HOSPITAL ENCOUNTER (OUTPATIENT)
Age: 79
Setting detail: OUTPATIENT SURGERY
Discharge: HOME | End: 2024-07-09
Attending: UROLOGY | Admitting: UROLOGY
Payer: MEDICARE

## 2024-07-09 VITALS
BODY MASS INDEX: 31.39 KG/M2 | TEMPERATURE: 97.3 F | HEART RATE: 82 BPM | WEIGHT: 200 LBS | DIASTOLIC BLOOD PRESSURE: 75 MMHG | RESPIRATION RATE: 12 BRPM | SYSTOLIC BLOOD PRESSURE: 139 MMHG | HEIGHT: 67 IN | OXYGEN SATURATION: 94 %

## 2024-07-09 LAB
APPEARANCE UR: CLEAR
BILIRUB UR QL: NEGATIVE
COLOR UR: NORMAL
GLUCOSE UR STRIP.AUTO-MCNC: NEGATIVE MG/DL
HGB UR QL STRIP: NEGATIVE
KETONES UR QL STRIP.AUTO: NEGATIVE MG/DL
LEUKOCYTE ESTERASE UR QL STRIP.AUTO: NEGATIVE
NITRITE UR QL STRIP.AUTO: NEGATIVE
PH UR STRIP: 7 (ref 5–9)
POTASSIUM BLD-SCNC: 3.6 MMOL/L (ref 3.5–5.1)
PROT UR STRIP-MCNC: NEGATIVE MG/DL
SP GR UR REFRACTOMETRY: 1.01 (ref 1–1.02)
UROBILINOGEN UR QL STRIP.AUTO: 0.2 EU/DL (ref 0.2–1)

## 2024-07-09 PROCEDURE — 84132 ASSAY OF SERUM POTASSIUM: CPT

## 2024-07-09 PROCEDURE — 2709999900 HC NON-CHARGEABLE SUPPLY: Performed by: UROLOGY

## 2024-07-09 PROCEDURE — 7100000010 HC PHASE II RECOVERY - FIRST 15 MIN: Performed by: UROLOGY

## 2024-07-09 PROCEDURE — 3700000000 HC ANESTHESIA ATTENDED CARE: Performed by: UROLOGY

## 2024-07-09 PROCEDURE — 3600000003 HC SURGERY LEVEL 3 BASE: Performed by: UROLOGY

## 2024-07-09 PROCEDURE — 55700 PR PROSTATE NEEDLE BIOPSY ANY APPROACH: CPT | Performed by: UROLOGY

## 2024-07-09 PROCEDURE — 6360000002 HC RX W HCPCS

## 2024-07-09 PROCEDURE — 6360000002 HC RX W HCPCS: Performed by: UROLOGY

## 2024-07-09 PROCEDURE — 7100000011 HC PHASE II RECOVERY - ADDTL 15 MIN: Performed by: UROLOGY

## 2024-07-09 PROCEDURE — 3700000001 HC ADD 15 MINUTES (ANESTHESIA): Performed by: UROLOGY

## 2024-07-09 PROCEDURE — 2580000003 HC RX 258: Performed by: ANESTHESIOLOGY

## 2024-07-09 PROCEDURE — 81003 URINALYSIS AUTO W/O SCOPE: CPT

## 2024-07-09 PROCEDURE — 2500000003 HC RX 250 WO HCPCS: Performed by: UROLOGY

## 2024-07-09 PROCEDURE — 88305 TISSUE EXAM BY PATHOLOGIST: CPT

## 2024-07-09 PROCEDURE — 3600000013 HC SURGERY LEVEL 3 ADDTL 15MIN: Performed by: UROLOGY

## 2024-07-09 PROCEDURE — 76872 US TRANSRECTAL: CPT | Performed by: UROLOGY

## 2024-07-09 PROCEDURE — 2500000003 HC RX 250 WO HCPCS

## 2024-07-09 PROCEDURE — 7100000000 HC PACU RECOVERY - FIRST 15 MIN: Performed by: UROLOGY

## 2024-07-09 PROCEDURE — 6370000000 HC RX 637 (ALT 250 FOR IP): Performed by: ANESTHESIOLOGY

## 2024-07-09 PROCEDURE — 7100000001 HC PACU RECOVERY - ADDTL 15 MIN: Performed by: UROLOGY

## 2024-07-09 RX ORDER — LIDOCAINE HYDROCHLORIDE 10 MG/ML
1 INJECTION, SOLUTION INFILTRATION; PERINEURAL
Status: DISCONTINUED | OUTPATIENT
Start: 2024-07-09 | End: 2024-07-09 | Stop reason: HOSPADM

## 2024-07-09 RX ORDER — SODIUM CHLORIDE 0.9 % (FLUSH) 0.9 %
5-40 SYRINGE (ML) INJECTION PRN
Status: DISCONTINUED | OUTPATIENT
Start: 2024-07-09 | End: 2024-07-09 | Stop reason: HOSPADM

## 2024-07-09 RX ORDER — BUPIVACAINE HYDROCHLORIDE 2.5 MG/ML
INJECTION, SOLUTION EPIDURAL; INFILTRATION; INTRACAUDAL PRN
Status: DISCONTINUED | OUTPATIENT
Start: 2024-07-09 | End: 2024-07-09 | Stop reason: ALTCHOICE

## 2024-07-09 RX ORDER — DEXTROSE MONOHYDRATE 100 MG/ML
INJECTION, SOLUTION INTRAVENOUS CONTINUOUS PRN
Status: DISCONTINUED | OUTPATIENT
Start: 2024-07-09 | End: 2024-07-09 | Stop reason: HOSPADM

## 2024-07-09 RX ORDER — OXYCODONE HYDROCHLORIDE 5 MG/1
5 TABLET ORAL
Status: DISCONTINUED | OUTPATIENT
Start: 2024-07-09 | End: 2024-07-09 | Stop reason: HOSPADM

## 2024-07-09 RX ORDER — EPHEDRINE SULFATE 50 MG/ML
INJECTION INTRAVENOUS
Status: DISCONTINUED
Start: 2024-07-09 | End: 2024-07-09 | Stop reason: HOSPADM

## 2024-07-09 RX ORDER — PROPOFOL 10 MG/ML
INJECTION, EMULSION INTRAVENOUS PRN
Status: DISCONTINUED | OUTPATIENT
Start: 2024-07-09 | End: 2024-07-09 | Stop reason: SDUPTHER

## 2024-07-09 RX ORDER — LIDOCAINE HYDROCHLORIDE 10 MG/ML
INJECTION, SOLUTION INFILTRATION; PERINEURAL PRN
Status: DISCONTINUED | OUTPATIENT
Start: 2024-07-09 | End: 2024-07-09 | Stop reason: ALTCHOICE

## 2024-07-09 RX ORDER — SODIUM CHLORIDE 0.9 % (FLUSH) 0.9 %
5-40 SYRINGE (ML) INJECTION EVERY 12 HOURS SCHEDULED
Status: DISCONTINUED | OUTPATIENT
Start: 2024-07-09 | End: 2024-07-09 | Stop reason: HOSPADM

## 2024-07-09 RX ORDER — LIDOCAINE HYDROCHLORIDE 20 MG/ML
INJECTION, SOLUTION EPIDURAL; INFILTRATION; INTRACAUDAL; PERINEURAL PRN
Status: DISCONTINUED | OUTPATIENT
Start: 2024-07-09 | End: 2024-07-09 | Stop reason: SDUPTHER

## 2024-07-09 RX ORDER — ONDANSETRON 2 MG/ML
4 INJECTION INTRAMUSCULAR; INTRAVENOUS
Status: DISCONTINUED | OUTPATIENT
Start: 2024-07-09 | End: 2024-07-09 | Stop reason: HOSPADM

## 2024-07-09 RX ORDER — ACETAMINOPHEN 500 MG
1000 TABLET ORAL ONCE
Status: COMPLETED | OUTPATIENT
Start: 2024-07-09 | End: 2024-07-09

## 2024-07-09 RX ORDER — NALOXONE HYDROCHLORIDE 0.4 MG/ML
INJECTION, SOLUTION INTRAMUSCULAR; INTRAVENOUS; SUBCUTANEOUS PRN
Status: DISCONTINUED | OUTPATIENT
Start: 2024-07-09 | End: 2024-07-09 | Stop reason: HOSPADM

## 2024-07-09 RX ORDER — SODIUM CHLORIDE, SODIUM LACTATE, POTASSIUM CHLORIDE, CALCIUM CHLORIDE 600; 310; 30; 20 MG/100ML; MG/100ML; MG/100ML; MG/100ML
INJECTION, SOLUTION INTRAVENOUS CONTINUOUS
Status: DISCONTINUED | OUTPATIENT
Start: 2024-07-09 | End: 2024-07-09 | Stop reason: HOSPADM

## 2024-07-09 RX ORDER — FENTANYL CITRATE 50 UG/ML
INJECTION, SOLUTION INTRAMUSCULAR; INTRAVENOUS PRN
Status: DISCONTINUED | OUTPATIENT
Start: 2024-07-09 | End: 2024-07-09 | Stop reason: SDUPTHER

## 2024-07-09 RX ORDER — DIPHENHYDRAMINE HYDROCHLORIDE 50 MG/ML
12.5 INJECTION INTRAMUSCULAR; INTRAVENOUS
Status: DISCONTINUED | OUTPATIENT
Start: 2024-07-09 | End: 2024-07-09 | Stop reason: HOSPADM

## 2024-07-09 RX ORDER — SODIUM CHLORIDE 9 MG/ML
INJECTION, SOLUTION INTRAVENOUS PRN
Status: DISCONTINUED | OUTPATIENT
Start: 2024-07-09 | End: 2024-07-09 | Stop reason: HOSPADM

## 2024-07-09 RX ORDER — FENTANYL CITRATE 50 UG/ML
25 INJECTION, SOLUTION INTRAMUSCULAR; INTRAVENOUS EVERY 5 MIN PRN
Status: DISCONTINUED | OUTPATIENT
Start: 2024-07-09 | End: 2024-07-09 | Stop reason: HOSPADM

## 2024-07-09 RX ORDER — IBUPROFEN 600 MG/1
1 TABLET ORAL PRN
Status: DISCONTINUED | OUTPATIENT
Start: 2024-07-09 | End: 2024-07-09 | Stop reason: HOSPADM

## 2024-07-09 RX ORDER — EPHEDRINE SULFATE 5 MG/ML
INJECTION INTRAVENOUS PRN
Status: DISCONTINUED | OUTPATIENT
Start: 2024-07-09 | End: 2024-07-09 | Stop reason: SDUPTHER

## 2024-07-09 RX ORDER — MIDAZOLAM HYDROCHLORIDE 2 MG/2ML
2 INJECTION, SOLUTION INTRAMUSCULAR; INTRAVENOUS
Status: DISCONTINUED | OUTPATIENT
Start: 2024-07-09 | End: 2024-07-09 | Stop reason: HOSPADM

## 2024-07-09 RX ORDER — PROCHLORPERAZINE EDISYLATE 5 MG/ML
5 INJECTION INTRAMUSCULAR; INTRAVENOUS
Status: DISCONTINUED | OUTPATIENT
Start: 2024-07-09 | End: 2024-07-09 | Stop reason: HOSPADM

## 2024-07-09 RX ORDER — HYDROMORPHONE HYDROCHLORIDE 2 MG/ML
0.5 INJECTION, SOLUTION INTRAMUSCULAR; INTRAVENOUS; SUBCUTANEOUS EVERY 5 MIN PRN
Status: DISCONTINUED | OUTPATIENT
Start: 2024-07-09 | End: 2024-07-09 | Stop reason: HOSPADM

## 2024-07-09 RX ORDER — MEPERIDINE HYDROCHLORIDE 25 MG/ML
12.5 INJECTION INTRAMUSCULAR; INTRAVENOUS; SUBCUTANEOUS EVERY 5 MIN PRN
Status: DISCONTINUED | OUTPATIENT
Start: 2024-07-09 | End: 2024-07-09 | Stop reason: HOSPADM

## 2024-07-09 RX ADMIN — ACETAMINOPHEN 1000 MG: 500 TABLET, FILM COATED ORAL at 09:30

## 2024-07-09 RX ADMIN — FENTANYL CITRATE 50 MCG: 50 INJECTION, SOLUTION INTRAMUSCULAR; INTRAVENOUS at 10:43

## 2024-07-09 RX ADMIN — PROPOFOL 160 MCG/KG/MIN: 10 INJECTION, EMULSION INTRAVENOUS at 10:39

## 2024-07-09 RX ADMIN — LIDOCAINE HYDROCHLORIDE 50 MG: 20 INJECTION, SOLUTION EPIDURAL; INFILTRATION; INTRACAUDAL; PERINEURAL at 10:38

## 2024-07-09 RX ADMIN — Medication 2000 MG: at 10:41

## 2024-07-09 RX ADMIN — SODIUM CHLORIDE, POTASSIUM CHLORIDE, SODIUM LACTATE AND CALCIUM CHLORIDE: 600; 310; 30; 20 INJECTION, SOLUTION INTRAVENOUS at 09:33

## 2024-07-09 RX ADMIN — FENTANYL CITRATE 50 MCG: 50 INJECTION, SOLUTION INTRAMUSCULAR; INTRAVENOUS at 10:45

## 2024-07-09 RX ADMIN — PROPOFOL 60 MG: 10 INJECTION, EMULSION INTRAVENOUS at 10:38

## 2024-07-09 RX ADMIN — EPHEDRINE SULFATE 10 MG: 5 INJECTION INTRAVENOUS at 11:05

## 2024-07-09 ASSESSMENT — ENCOUNTER SYMPTOMS
RESPIRATORY NEGATIVE: 1
GASTROINTESTINAL NEGATIVE: 1

## 2024-07-09 ASSESSMENT — PAIN SCALES - GENERAL: PAINLEVEL_OUTOF10: 0

## 2024-07-09 ASSESSMENT — PAIN - FUNCTIONAL ASSESSMENT: PAIN_FUNCTIONAL_ASSESSMENT: 0-10

## 2024-07-09 NOTE — H&P
negative.       PHYSICAL EXAMINATION    BP (!) 166/90 Comment: manual sitting  Pulse 96   Temp 97.9 °F (36.6 °C) (Oral)   Resp 16   Ht 1.702 m (5' 7\")   Wt 94.3 kg (207 lb 14.4 oz)   SpO2 93%   BMI 32.56 kg/m²   General: well dressed, well nourished, no acute distress  Skin: no rashes  HEENT: Sclera are clear,normocephalic, atraumatic. no external lesions   Cardiovascular: Reg. Normal perfusion  Respiratory: normal respiratory effort, no JVD, no audible wheezing.  Musculoskeletal: unremarkable with normal function. No embolic signs or cyanosis.   Neurologic exam: intact, no focal deficits, moves all 4 extremities  Psych: normal mood and affect, alert, oriented x 3  LE:  no edema  GI: soft, nontender, no masses, no CVA tenderness  : DEFERRED       LABORATORY RESULTS:    Lab Results   Component Value Date/Time    PSA 7.0 04/23/2024 08:59 AM          IMAGING:      MRI Results:  MRI PELVIS W WO CONTRAST - 6/5/2024 8:39 AM - [id number]     INDICATION: Elevated prostate specific antigen (PSA).       COMPARISON: None     TECHNIQUE:  Multiplanar, multisequence MR imaging was performed of the prostate  prior to and following gadolinium contrast. 19 mL Prohance contrast material was  administrated intravenously for the examination. This study was acquired  following the IV administration of contrast material, given the patient's  indications for the examination. If IV contrast material had not been  administered, the likely of detecting abnormalities relevant to the patient's  condition would have been substantially decreased.  All lesions assessed are  based on PI-RAD compliance.     FINDINGS:  Prostate Size: 4.2 x 4.3 x 3.0 cm with a calculated volume of 27.1 mL.     Peripheral Zone:No suspicious peripheral zone lesion. No evidence of  prostatitis.      Transition/Central Zone: Focal lesion(s) described below.     BPH: No significant BPH.     Lesion #1  -Size and location: 1.6 cm indistinct margin, right mid

## 2024-07-09 NOTE — DISCHARGE INSTRUCTIONS
My office will schedule your follow-up appointment.    Please call our office ([phone]) or return to ED if you experience fever > 101.5, severe pain, persistent nausea/vomiting, excessive bleeding, inability to urinate, or other concerns.      If you have had surgery in the past 7-10 days by one of our providers and are having fever, bleeding, or drainage from an incision, have an opening in an incision, or having issues urinating properly, please call [phone].    Prostate Biopsy and Ultrasound:   A prostate biopsy is a type of test. Your doctor takes small tissue samples from your prostate gland. Then another doctor looks at the tissue under a microscope to see if there are cancer cells.  This test is done by a doctor who specializes in men's genital and urinary problems (urologist). It can be done in your doctor's office, a day surgery clinic, or a hospital operating room. To get the tissue samples from the prostate, the doctor inserts a thin needle through the rectum, the urethra, or the area between the anus and scrotum (perineum). The most common method is through the rectum. Your doctor may use ultrasound to help guide the needle.  What else should you know about this test?  A prostate biopsy has a slight risk of causing problems such as infection or bleeding.  If the biopsy went through your rectum, you may have a small amount of bleeding from your rectum for 2 to 3 days after the biopsy.  You may have a little pain in your pelvic area. You may also have a little blood in your urine for 1 to 5 days.  You may have some blood in your semen for a week or longer.  Do not do heavy work or exercise for 4 hours after the test.  Your doctor will tell you how long it may take to get your results back.  Follow-up care is a key part of your treatment and safety. Be sure to make and go to all appointments, and call your doctor if you are having problems. It's also a good idea to keep a list of the medicines you

## 2024-07-09 NOTE — ANESTHESIA POSTPROCEDURE EVALUATION
Department of Anesthesiology  Postprocedure Note    Patient: Pritesh Orellana  MRN: [id number]  YOB: 1945  Date of evaluation: 7/9/2024    Procedure Summary       Date: 07/09/24 Room / Location: [facility] MAIN OR 02 / [facility] MAIN OR    Anesthesia Start: 1035 Anesthesia Stop: 1106    Procedure: PROSTATE BIOPSY TRANSPERINEAL (Perineum) Diagnosis:       Elevated prostate specific antigen (PSA)      (Elevated prostate specific antigen (PSA) [R97.20])    Providers: Dmitriy Mccarthy MD Responsible Provider: Olga Reid MD    Anesthesia Type: TIVA ASA Status: 3            Anesthesia Type: No value filed.    Joceline Phase I: Joceline Score: 8    Joceline Phase II: Joceline Score: 10    Anesthesia Post Evaluation    Patient location during evaluation: PACU  Patient participation: complete - patient participated  Level of consciousness: awake and alert  Pain score: 0  Airway patency: patent  Nausea & Vomiting: no nausea and no vomiting  Cardiovascular status: hemodynamically stable  Respiratory status: acceptable  Hydration status: euvolemic  Multimodal analgesia pain management approach  Pain management: adequate    No notable events documented.

## 2024-07-09 NOTE — OP NOTE
Patient: Pritesh Orellana, [id number]    Date of Surgery: 07/09/24    Preoperative Diagnosis: Elevated PSA    Postoperative Diagnosis:  same    Surgeon(s) and Role:     * Manuel Mccarthy MD - Primary     Anesthesia:  MAC     Procedure: Procedure(s):  URONAV MRI Fusion Transperineal Ultrasound-Guided Prostate Biopsy     Indications:     Discussed the risk of surgery including infection, hematoma, bleeding, urinary retention, pain, and the risks of anesthethesia.  The patient understands the risks, any and all questions were answered to the patient's satisfaction and signed the consent for operation.     URONAV MRI FUSED TRANSPERINEAL ULTRASOUND GUIDED BIOPSY OF THE PROSTATE    All risks, benefits and alternatives were again reviewed and he is willing to proceed at this time.  The patient was placed in low lithotomy.   Ancef was given as a prophylactic antibiotic.  Chloroprep was used to prep the perineal area.  Approximately 4-5 cc of 0.25% marcaine mixed equally with 1% lidocaine plain was injected under the perineal skin at the needle insertion sites. I then inserted the transrectal ultrasound probe into the rectum.  The prostate was visualized.  The prostate appeared homogenous in appearance.   Ultrasonographic sweep of the prostate was performed to obtain images to link to MRI via URONAV.  There was one region of interest (right anterior PZ) based upon MRI imaging.  3 biopsies of regions of interest were then obtained using the ultrasound images for guidance that had been linked to the previous MRI using Uronav.  I then performed 10 needle core biopsies using a standard transperineal biopsy format with traditional ultrasound images for guidance.  The ultrasound probe was removed.  The patient tolerated the procedure well.      PROSTATE VOLUME:  24 cc    TOI: No induration or nodule     Estimated Blood Loss:  minimal    Specimens: prostate biopsies    ID Type Source Tests Collected by Time Destination   A :

## 2024-07-10 ENCOUNTER — CLINICAL DOCUMENTATION (OUTPATIENT)
Dept: ONCOLOGY | Age: 79
End: 2024-07-10

## 2024-07-16 DIAGNOSIS — I10 PRIMARY HYPERTENSION: ICD-10-CM

## 2024-07-16 NOTE — PROGRESS NOTES
of radiation therapy as treatment.  My only concern is his current lower urinary tract symptoms and how those will be impacted.  I explained that oftentimes we place people on concomitant ADT which can help shrink the prostate and improve urinary symptoms.  I explained that Dr. Richards may also talk to him about enrolling him in a study that would order a decipher test to better inform whether or not to give him ADT.  Starting him on Flomax would certainly be an option but I want to first have him talk with Dr. Richards.  I will plan to see him back after the PET scan and radiation oncology appointment.      PLAN:   -Pathology reviewed  -Treatment options discussed  -IPSS and VINCENT completed  -Referral to radiation oncology(Dr Richards)  -PSMA PET scan(Next available); RTC after pet    ________________________________________      I have seen and examined this patient.  I have reviewed and edited the note started by the MA and agree with the outlined plan.  Part of this note was written by using a voice dictation software. The note has been proof read but may still contain some grammatical/other typographical errors.      Manuel Mccarthy MD  Urologic Oncology  [facility]

## 2024-07-16 NOTE — TELEPHONE ENCOUNTER
Patient is in need of refills and only needs oen bottle for this medication until next appt- he had to rs due to you being off on 8/8/ he is rescheduled to 12/3/24 for AWV.    Thank you,   Pat     chlorhexidine (PERIDEX) 0.12 % solution     Rosemarie Saunders, APRN - CNP      Patient taking differently: 15 mL Mouth/Throat 2 TIMES DAILY PRN, PLACE 15 ML IN THE MOUTH SWISH IN MOUTH FOR 30 SECONDS THEN SPIT OUT, Reported on 6/28/2024   Metro gel 1% apply daily to affected areas and patient only needs one tube    Thank you  Pat     Pt is ok for her to send this in when she returns from vacation, he does not need this right away

## 2024-07-16 NOTE — TELEPHONE ENCOUNTER
Since we have not prescribed this since 2022 and it was last sent by Rosemarie, it will need to come from another provider before I can send in refills.

## 2024-07-19 ENCOUNTER — OFFICE VISIT (OUTPATIENT)
Dept: ONCOLOGY | Age: 79
End: 2024-07-19
Payer: MEDICARE

## 2024-07-19 VITALS
TEMPERATURE: 97.4 F | OXYGEN SATURATION: 94 % | RESPIRATION RATE: 16 BRPM | WEIGHT: 206.6 LBS | SYSTOLIC BLOOD PRESSURE: 161 MMHG | BODY MASS INDEX: 32.43 KG/M2 | HEIGHT: 67 IN | HEART RATE: 80 BPM | DIASTOLIC BLOOD PRESSURE: 84 MMHG

## 2024-07-19 DIAGNOSIS — C61 PROSTATE CANCER (HCC): Primary | ICD-10-CM

## 2024-07-19 DIAGNOSIS — I10 PRIMARY HYPERTENSION: ICD-10-CM

## 2024-07-19 PROCEDURE — 1036F TOBACCO NON-USER: CPT | Performed by: UROLOGY

## 2024-07-19 PROCEDURE — 1123F ACP DISCUSS/DSCN MKR DOCD: CPT | Performed by: UROLOGY

## 2024-07-19 PROCEDURE — 3079F DIAST BP 80-89 MM HG: CPT | Performed by: UROLOGY

## 2024-07-19 PROCEDURE — 99215 OFFICE O/P EST HI 40 MIN: CPT | Performed by: UROLOGY

## 2024-07-19 PROCEDURE — 3077F SYST BP >= 140 MM HG: CPT | Performed by: UROLOGY

## 2024-07-19 PROCEDURE — G8417 CALC BMI ABV UP PARAM F/U: HCPCS | Performed by: UROLOGY

## 2024-07-19 PROCEDURE — G8427 DOCREV CUR MEDS BY ELIG CLIN: HCPCS | Performed by: UROLOGY

## 2024-07-19 RX ORDER — LISINOPRIL 20 MG/1
20 TABLET ORAL DAILY
Qty: 90 TABLET | Refills: 0 | OUTPATIENT
Start: 2024-07-19

## 2024-07-19 ASSESSMENT — PATIENT HEALTH QUESTIONNAIRE - PHQ9
SUM OF ALL RESPONSES TO PHQ9 QUESTIONS 1 & 2: 0
SUM OF ALL RESPONSES TO PHQ QUESTIONS 1-9: 0
SUM OF ALL RESPONSES TO PHQ QUESTIONS 1-9: 0
2. FEELING DOWN, DEPRESSED OR HOPELESS: NOT AT ALL
1. LITTLE INTEREST OR PLEASURE IN DOING THINGS: NOT AT ALL
SUM OF ALL RESPONSES TO PHQ QUESTIONS 1-9: 0
SUM OF ALL RESPONSES TO PHQ QUESTIONS 1-9: 0

## 2024-07-19 ASSESSMENT — ENCOUNTER SYMPTOMS
GASTROINTESTINAL NEGATIVE: 1
RESPIRATORY NEGATIVE: 1

## 2024-07-19 NOTE — PROGRESS NOTES
[facility] RADIATION ONCOLOGY CONSULTATION    Patient: Pritesh Orellana MRN: [id number]  SSN: [id number]    YOB: 1945  Age: 79 y.o.  Sex: male      Other Providers:  Dr. Mccarthy     CHIEF COMPLAINT: elevated PSA     DIAGNOSIS: Stage IIC [id number] unfavorable intermediate risk prostate cancer, darshana score 4+3=7, initial PSA 7.0    PREVIOUS RADIATION TREATMENT:  None    HISTORY OF PRESENT ILLNESS:  Pritesh Orellana is a 79 y.o. male who I am seeing at the request of Dr Mccarthy.  He had a PSA of 2.2 in April 2021 which subsequently increased to 7.0 on 4/23/2024.  Prostate MRI on 6/5/2024 demonstrated a PI-RADS 5 lesion at the right mid gland TZ with indirect evidence of extracapsular extension based on broad-based tumor contact to the anterior capsule with mild capsular bulging without clear periprostatic fat invasion seen.  Prostate biopsy on 7/9/2024 demonstrated 1 core of 3+4 = 7 as well as 1 core of 4+3 in the HILDA at the right anterior TZ.  PSMA PET CT on 7/24/2024 demonstrated foci of uptake in the prostate gland consistent with known disease with no evidence of distant metastatic disease.  He presents to radiation oncology for discussion of definitive radiation therapy.    PSA History  4/23/24 = 7.0  4/23/21 = 2.2    His AUA is 23. His last colonoscopy was 4 years ago and was told he does not need another. His pain is 0/10.    PAST MEDICAL HISTORY:    Past Medical History:   Diagnosis Date    Anemia 2024    Arthritis     Asthma     Cancer (HCC) 2017    Top of right ear    History of blood transfusion 2012    Hypercholesterolemia     Hypertension     Lumbar stenosis     Prolonged emergence from general anesthesia 2012    with knee replacement    Skin cancer 2019    BCC  top of right ear       The patient denies history of collagen vascular diseases, pacemaker insertion, or prior radiation.     PAST SURGICAL HISTORY:   Past Surgical History:

## 2024-07-19 NOTE — PATIENT INSTRUCTIONS
questions regarding your upcoming schedule please reach out to your care team through [employer] or call [phone].     Please notify your assigned Nurse Navigator of any unplanned hospital admissions or Emergency Room visits within 24 hours of discharge.    -------------------------------------------------------------------------------------------------------------------  Please call our office at [phone] if you have any  of the following symptoms:   Fever of 100.5 or greater  Chills  Shortness of breath  Swelling or pain in one leg    After office hours an answering service is available and will contact a provider for emergencies or if you are experiencing any of the above symptoms.        Blank Edmond MA

## 2024-07-20 DIAGNOSIS — Z76.0 MEDICATION REFILL: ICD-10-CM

## 2024-07-22 ENCOUNTER — OFFICE VISIT (OUTPATIENT)
Dept: ORTHOPEDIC SURGERY | Age: 79
End: 2024-07-22
Payer: MEDICARE

## 2024-07-22 VITALS — BODY MASS INDEX: 32.33 KG/M2 | HEIGHT: 67 IN | WEIGHT: 206 LBS

## 2024-07-22 DIAGNOSIS — M20.12 VALGUS DEFORMITY OF BOTH GREAT TOES: Primary | ICD-10-CM

## 2024-07-22 DIAGNOSIS — M20.42 HAMMER TOE OF LEFT FOOT: ICD-10-CM

## 2024-07-22 DIAGNOSIS — M20.11 VALGUS DEFORMITY OF BOTH GREAT TOES: Primary | ICD-10-CM

## 2024-07-22 PROCEDURE — G8427 DOCREV CUR MEDS BY ELIG CLIN: HCPCS | Performed by: NURSE PRACTITIONER

## 2024-07-22 PROCEDURE — 1123F ACP DISCUSS/DSCN MKR DOCD: CPT | Performed by: NURSE PRACTITIONER

## 2024-07-22 PROCEDURE — G8417 CALC BMI ABV UP PARAM F/U: HCPCS | Performed by: NURSE PRACTITIONER

## 2024-07-22 PROCEDURE — 1036F TOBACCO NON-USER: CPT | Performed by: NURSE PRACTITIONER

## 2024-07-22 PROCEDURE — 99213 OFFICE O/P EST LOW 20 MIN: CPT | Performed by: NURSE PRACTITIONER

## 2024-07-22 RX ORDER — METRONIDAZOLE 10 MG/G
1 GEL TOPICAL DAILY
Qty: 1 EACH | Refills: 0 | Status: SHIPPED | OUTPATIENT
Start: 2024-07-22

## 2024-07-22 RX ORDER — LISINOPRIL 20 MG/1
20 TABLET ORAL DAILY
Qty: 90 TABLET | Refills: 0 | Status: SHIPPED | OUTPATIENT
Start: 2024-07-22

## 2024-07-22 RX ORDER — CHLORHEXIDINE GLUCONATE ORAL RINSE 1.2 MG/ML
15 SOLUTION DENTAL 2 TIMES DAILY
Qty: 1 EACH | Refills: 0 | Status: SHIPPED | OUTPATIENT
Start: 2024-07-22

## 2024-07-22 RX ORDER — DICLOFENAC SODIUM 75 MG/1
75 TABLET, DELAYED RELEASE ORAL 2 TIMES DAILY
Qty: 180 TABLET | Refills: 0 | OUTPATIENT
Start: 2024-07-22

## 2024-07-22 NOTE — TELEPHONE ENCOUNTER
Pt notified and verbalized understanding. Pt also requested a refill of lisinopril. 90 day supply sent to pharmacy.

## 2024-07-22 NOTE — PROGRESS NOTES
Name: Pritesh Orellana  YOB: 1945  Gender: male  MRN: [id number]    Procedure Performed:  Left first MTP arthrodesis  Left second and third proximal interphalangeal joint resection arthroplasties  Left second and third partial metatarsal head resections           Date of Procedure: 02/15/2024    Subjective: Patient reports overall that he is really well since his last visit with the operative foot.  He does reports going to therapy up until about 2 weeks ago which he does note was very beneficial for him.  He also tells me today that he was diagnosed with prostate cancer and will begin treatment soon.  He is currently seeing Dr. Mccarthy within the [facility] system.  He had interest in having the right foot surgically corrected in the fall however with the new diagnosis of prostate cancer he knows that he will have to put it off for now.      Physical Examination: The incisional area is well-healed.  There are no signs of infection to the foot or ankle today.  He does have palpable pulses and intact sensation to the foot.  Range of motion to the great toe from the IP and TMT joints were performed without pain.  He has no pain with palpation throughout the arch of the foot or under the first MTP joint.  He is able to wiggle all lesser toes effectively and without pain.        Imaging:   Interpretation of imaging  Left foot XR: AP, Lateral, Oblique views     ICD-10-CM    1. Hammer toe of left foot  M20.42 XR FOOT LEFT (MIN 3 VIEWS)      2. Valgus deformity of both great toes  M20.11 XR FOOT LEFT (MIN 3 VIEWS)    M20.12          Report: AP, lateral, oblique x-ray of the left foot demonstrates fused first MTP joint without hardware failure    Impression: Fused first MTP joint without hardware failure   Cristina Felipe, APRN - CNP           Assessment:   Status post left first MTP joint fusion with second third PIP RA's and metatarsal head resections.      Plan:   3 This is stable

## 2024-07-24 ENCOUNTER — HOSPITAL ENCOUNTER (OUTPATIENT)
Dept: PET IMAGING | Age: 79
Discharge: HOME | End: 2024-07-27
Payer: MEDICARE

## 2024-07-24 DIAGNOSIS — C61 PROSTATE CANCER (HCC): ICD-10-CM

## 2024-07-24 PROCEDURE — 3430000000 HC RX DIAGNOSTIC RADIOPHARMACEUTICAL: Performed by: UROLOGY

## 2024-07-24 PROCEDURE — 2580000003 HC RX 258: Performed by: UROLOGY

## 2024-07-24 PROCEDURE — 78815 PET IMAGE W/CT SKULL-THIGH: CPT

## 2024-07-24 PROCEDURE — A9596 HC RX DIAGNOSTIC RADIOPHARMACEUTICAL: HCPCS | Performed by: UROLOGY

## 2024-07-24 RX ORDER — SODIUM CHLORIDE 0.9 % (FLUSH) 0.9 %
20 SYRINGE (ML) INJECTION AS NEEDED
Status: DISCONTINUED | OUTPATIENT
Start: 2024-07-24 | End: 2024-07-28 | Stop reason: HOSPADM

## 2024-07-24 RX ADMIN — KIT FOR THE PREPARATION OF GALLIUM GA 68 GOZETOTIDE INJECTION 6.14 MILLICURIE: KIT INTRAVENOUS at 12:34

## 2024-07-24 RX ADMIN — SODIUM CHLORIDE, PRESERVATIVE FREE 20 ML: 5 INJECTION INTRAVENOUS at 12:34

## 2024-07-25 ENCOUNTER — HOSPITAL ENCOUNTER (OUTPATIENT)
Dept: RADIATION ONCOLOGY | Age: 79
Setting detail: RECURRING SERIES
Discharge: HOME | End: 2024-07-28
Payer: MEDICARE

## 2024-07-25 VITALS
SYSTOLIC BLOOD PRESSURE: 172 MMHG | WEIGHT: 207.6 LBS | DIASTOLIC BLOOD PRESSURE: 100 MMHG | TEMPERATURE: 97.5 F | RESPIRATION RATE: 18 BRPM | BODY MASS INDEX: 32.51 KG/M2 | HEART RATE: 82 BPM | OXYGEN SATURATION: 96 %

## 2024-07-25 DIAGNOSIS — C61 MALIGNANT NEOPLASM OF PROSTATE (HCC): Primary | ICD-10-CM

## 2024-07-25 PROCEDURE — 99211 OFF/OP EST MAY X REQ PHY/QHP: CPT

## 2024-07-25 RX ORDER — EPINEPHRINE 1 MG/ML
0.3 INJECTION, SOLUTION, CONCENTRATE INTRAVENOUS PRN
OUTPATIENT
Start: 2024-07-29

## 2024-07-25 RX ORDER — ONDANSETRON 2 MG/ML
8 INJECTION INTRAMUSCULAR; INTRAVENOUS
OUTPATIENT
Start: 2024-07-29

## 2024-07-25 RX ORDER — SODIUM CHLORIDE 9 MG/ML
INJECTION, SOLUTION INTRAVENOUS CONTINUOUS
OUTPATIENT
Start: 2024-07-29

## 2024-07-25 RX ORDER — ALBUTEROL SULFATE 90 UG/1
4 AEROSOL, METERED RESPIRATORY (INHALATION) PRN
OUTPATIENT
Start: 2024-07-29

## 2024-07-25 RX ORDER — DIPHENHYDRAMINE HYDROCHLORIDE 50 MG/ML
50 INJECTION INTRAMUSCULAR; INTRAVENOUS
OUTPATIENT
Start: 2024-07-29

## 2024-07-25 RX ORDER — ACETAMINOPHEN 325 MG/1
650 TABLET ORAL
OUTPATIENT
Start: 2024-07-29

## 2024-07-25 ASSESSMENT — PATIENT HEALTH QUESTIONNAIRE - PHQ9
SUM OF ALL RESPONSES TO PHQ QUESTIONS 1-9: 0
1. LITTLE INTEREST OR PLEASURE IN DOING THINGS: NOT AT ALL
SUM OF ALL RESPONSES TO PHQ9 QUESTIONS 1 & 2: 0
2. FEELING DOWN, DEPRESSED OR HOPELESS: NOT AT ALL
SUM OF ALL RESPONSES TO PHQ QUESTIONS 1-9: 0

## 2024-07-25 NOTE — NOTE TO PATIENT VIA PORTAL
Consult for Prostate Cancer:    PSA = 7.0 on 04/23/24  Path = 07-09-24      4+3 = 7 in 2 cores out of 11  Patient arrives today accompanied by wife  Patient reports no history of Collagen Vascular Disease  Patient reports no pacemaker or other chest implant  Patient reports no previous RT  Patient c/o pain 0/10  Patient and spouse have Vacation 1st week of September  MD aware  AUA= 23  Colonoscopy: +4 years, no polyps, reports MD didn't recommend another  RADIATION TEACHING PROVIDED  Bowel Prep Sheet Provided and covered with Patient and spouse  Lupron Sheet provided and discussed  Frequently Asked Question Sheet Provided  Vitamin Sheet Provided and discussed  Opportunity for questions and clarifications provided  Consents Signed  Lupron Shot ordered, request for scheduling made  CT Sim to be scheduled for 6 weeks after shot given

## 2024-07-26 ENCOUNTER — TELEPHONE (OUTPATIENT)
Dept: ONCOLOGY | Age: 79
End: 2024-07-26

## 2024-08-01 ENCOUNTER — HOSPITAL ENCOUNTER (OUTPATIENT)
Dept: INFUSION THERAPY | Age: 79
Setting detail: INFUSION SERIES
Discharge: HOME | End: 2024-08-01
Payer: MEDICARE

## 2024-08-01 VITALS
HEART RATE: 94 BPM | DIASTOLIC BLOOD PRESSURE: 80 MMHG | OXYGEN SATURATION: 97 % | SYSTOLIC BLOOD PRESSURE: 142 MMHG | RESPIRATION RATE: 18 BRPM | TEMPERATURE: 97.1 F

## 2024-08-01 DIAGNOSIS — C61 MALIGNANT NEOPLASM OF PROSTATE (HCC): Primary | ICD-10-CM

## 2024-08-01 PROCEDURE — 6360000002 HC RX W HCPCS: Performed by: STUDENT IN AN ORGANIZED HEALTH CARE EDUCATION/TRAINING PROGRAM

## 2024-08-01 PROCEDURE — 96402 CHEMO HORMON ANTINEOPL SQ/IM: CPT

## 2024-08-01 RX ORDER — ONDANSETRON 2 MG/ML
8 INJECTION INTRAMUSCULAR; INTRAVENOUS
OUTPATIENT
Start: 2024-08-01

## 2024-08-01 RX ORDER — ACETAMINOPHEN 325 MG/1
650 TABLET ORAL
OUTPATIENT
Start: 2024-08-01

## 2024-08-01 RX ORDER — EPINEPHRINE 1 MG/ML
0.3 INJECTION, SOLUTION, CONCENTRATE INTRAVENOUS PRN
OUTPATIENT
Start: 2024-08-01

## 2024-08-01 RX ORDER — ALBUTEROL SULFATE 90 UG/1
4 AEROSOL, METERED RESPIRATORY (INHALATION) PRN
OUTPATIENT
Start: 2024-08-01

## 2024-08-01 RX ORDER — DIPHENHYDRAMINE HYDROCHLORIDE 50 MG/ML
50 INJECTION INTRAMUSCULAR; INTRAVENOUS
OUTPATIENT
Start: 2024-08-01

## 2024-08-01 RX ORDER — SODIUM CHLORIDE 9 MG/ML
INJECTION, SOLUTION INTRAVENOUS CONTINUOUS
OUTPATIENT
Start: 2024-08-01

## 2024-08-01 RX ADMIN — LEUPROLIDE ACETATE 45 MG: KIT at 14:59

## 2024-08-01 NOTE — PROGRESS NOTES
Arrived to the Infusion Center.  Lupron injection completed. Patient compliant with wait time due to this being his first Lupron injection.  Patient instructed to report any side affects to ordering provider.  Patient tolerated well .   Any issues or concerns during appointment: none.  No further infusion appointments scheduled at this time  Discharged ambulatory with the assistance of a cane

## 2024-08-07 DIAGNOSIS — Z76.0 MEDICATION REFILL: ICD-10-CM

## 2024-08-08 RX ORDER — DICLOFENAC SODIUM 75 MG/1
75 TABLET, DELAYED RELEASE ORAL 2 TIMES DAILY
Qty: 180 TABLET | Refills: 0 | Status: SHIPPED | OUTPATIENT
Start: 2024-08-08

## 2024-08-17 DIAGNOSIS — G25.81 RLS (RESTLESS LEGS SYNDROME): ICD-10-CM

## 2024-08-18 RX ORDER — PRAMIPEXOLE DIHYDROCHLORIDE 0.5 MG/1
0.5 TABLET ORAL NIGHTLY
Qty: 90 TABLET | Refills: 1 | OUTPATIENT
Start: 2024-08-18

## 2024-08-29 ENCOUNTER — OFFICE VISIT (OUTPATIENT)
Dept: PULMONOLOGY | Age: 79
End: 2024-08-29
Payer: MEDICARE

## 2024-08-29 VITALS
SYSTOLIC BLOOD PRESSURE: 168 MMHG | RESPIRATION RATE: 20 BRPM | BODY MASS INDEX: 32.33 KG/M2 | HEIGHT: 67 IN | OXYGEN SATURATION: 96 % | HEART RATE: 100 BPM | TEMPERATURE: 98 F | DIASTOLIC BLOOD PRESSURE: 87 MMHG | WEIGHT: 206 LBS

## 2024-08-29 DIAGNOSIS — J30.89 ENVIRONMENTAL AND SEASONAL ALLERGIES: Primary | ICD-10-CM

## 2024-08-29 DIAGNOSIS — J45.41 MODERATE PERSISTENT ASTHMA WITH EXACERBATION: ICD-10-CM

## 2024-08-29 PROCEDURE — G8427 DOCREV CUR MEDS BY ELIG CLIN: HCPCS | Performed by: NURSE PRACTITIONER

## 2024-08-29 PROCEDURE — 99214 OFFICE O/P EST MOD 30 MIN: CPT | Performed by: NURSE PRACTITIONER

## 2024-08-29 PROCEDURE — G8417 CALC BMI ABV UP PARAM F/U: HCPCS | Performed by: NURSE PRACTITIONER

## 2024-08-29 PROCEDURE — 3077F SYST BP >= 140 MM HG: CPT | Performed by: NURSE PRACTITIONER

## 2024-08-29 PROCEDURE — 1123F ACP DISCUSS/DSCN MKR DOCD: CPT | Performed by: NURSE PRACTITIONER

## 2024-08-29 PROCEDURE — 1036F TOBACCO NON-USER: CPT | Performed by: NURSE PRACTITIONER

## 2024-08-29 PROCEDURE — 3079F DIAST BP 80-89 MM HG: CPT | Performed by: NURSE PRACTITIONER

## 2024-08-29 RX ORDER — IPRATROPIUM BROMIDE AND ALBUTEROL SULFATE 2.5; .5 MG/3ML; MG/3ML
1 SOLUTION RESPIRATORY (INHALATION) EVERY 4 HOURS
Qty: 240 EACH | Refills: 11 | Status: SHIPPED | OUTPATIENT
Start: 2024-08-29

## 2024-08-29 RX ORDER — FLUTICASONE PROPIONATE AND SALMETEROL 500; 50 UG/1; UG/1
1 POWDER RESPIRATORY (INHALATION) EVERY 12 HOURS
Qty: 1 EACH | Refills: 11 | Status: SHIPPED | OUTPATIENT
Start: 2024-08-29

## 2024-08-29 RX ORDER — LEVALBUTEROL INHALATION SOLUTION 0.63 MG/3ML
1 SOLUTION RESPIRATORY (INHALATION) EVERY 8 HOURS PRN
Qty: 120 EACH | Refills: 5 | Status: CANCELLED | OUTPATIENT
Start: 2024-08-29

## 2024-08-29 NOTE — PROGRESS NOTES
Name:  Pritesh Orellana  YOB: 1945   MRN: [id number]      Office Visit: 8/29/2024        ASSESSMENT AND PLAN:  (Medical Decision Making)    Impression: 79 y.o. male referred to [facility] for persistent asthma symptoms and evaluation    1. Moderate persistent asthma with exacerbation  --doing much better with Wixela 500  --will do duonebs since hard time getting xopenex  - fluticasone-salmeterol (ADVAIR) 500-50 MCG/ACT AEPB diskus inhaler; Inhale 1 puff into the lungs in the morning and 1 puff in the evening.  Dispense: 1 each; Refill: 5    2. Environmental and seasonal allergies  --Daily allergy tablet is imperative, though still not consistent with this.  Patient reports significant history of environmental allergies including molds, grasses, dander etc.    No orders of the defined types were placed in this encounter.    No orders of the defined types were placed in this encounter.        Jessica Zamorano, APRN - CNP    No specialty comments available.    Collaborating physician is Talha Cisneros MD    _________________________________________________________________________    HISTORY OF PRESENT ILLNESS:    Mr. Pritesh Orellana is a 79 y.o. male who is seen at [facility] today for  Asthma  Mr. Orellana is here for follow up of asthma.  He states he has been an asthmatic since he was a child.  Other medical history includes arthritis, hypertension, elevated cholesterol.  Since switching to Wixela 500 he has noticed significant improvement in overall breathing. Denies fevers, chills.  Had COVID in January 2024.  Has also had a bunion surgery and back surgery within the last 6 months.  Significant family history for asthma.  Works as a  and no significant exposures that he is aware of.  Had allergy testing in the past which was significant for many allergens.  He is not currently taking any allergy medications.  He also reports intermittent reflux symptoms and does not

## 2024-08-30 ENCOUNTER — OFFICE VISIT (OUTPATIENT)
Dept: INTERNAL MEDICINE CLINIC | Facility: CLINIC | Age: 79
End: 2024-08-30
Payer: MEDICARE

## 2024-08-30 VITALS
HEART RATE: 91 BPM | HEIGHT: 67 IN | TEMPERATURE: 98.2 F | SYSTOLIC BLOOD PRESSURE: 130 MMHG | WEIGHT: 206 LBS | OXYGEN SATURATION: 95 % | DIASTOLIC BLOOD PRESSURE: 78 MMHG | BODY MASS INDEX: 32.33 KG/M2

## 2024-08-30 DIAGNOSIS — I10 PRIMARY HYPERTENSION: ICD-10-CM

## 2024-08-30 DIAGNOSIS — R20.2 PARESTHESIA OF BOTH FEET: Primary | ICD-10-CM

## 2024-08-30 PROCEDURE — G8417 CALC BMI ABV UP PARAM F/U: HCPCS | Performed by: INTERNAL MEDICINE

## 2024-08-30 PROCEDURE — 3078F DIAST BP <80 MM HG: CPT | Performed by: INTERNAL MEDICINE

## 2024-08-30 PROCEDURE — 99213 OFFICE O/P EST LOW 20 MIN: CPT | Performed by: INTERNAL MEDICINE

## 2024-08-30 PROCEDURE — 3075F SYST BP GE 130 - 139MM HG: CPT | Performed by: INTERNAL MEDICINE

## 2024-08-30 PROCEDURE — G8427 DOCREV CUR MEDS BY ELIG CLIN: HCPCS | Performed by: INTERNAL MEDICINE

## 2024-08-30 PROCEDURE — 1123F ACP DISCUSS/DSCN MKR DOCD: CPT | Performed by: INTERNAL MEDICINE

## 2024-08-30 PROCEDURE — 1036F TOBACCO NON-USER: CPT | Performed by: INTERNAL MEDICINE

## 2024-08-30 RX ORDER — LISINOPRIL 20 MG/1
20 TABLET ORAL DAILY
Qty: 90 TABLET | Refills: 3 | Status: SHIPPED | OUTPATIENT
Start: 2024-08-30

## 2024-08-30 RX ORDER — GABAPENTIN 100 MG/1
100 CAPSULE ORAL NIGHTLY
Qty: 30 CAPSULE | Refills: 5 | Status: SHIPPED | OUTPATIENT
Start: 2024-08-30 | End: 2025-02-26

## 2024-08-30 ASSESSMENT — ENCOUNTER SYMPTOMS
SHORTNESS OF BREATH: 0
COUGH: 0
BLOOD IN STOOL: 0
RHINORRHEA: 0
ABDOMINAL PAIN: 0

## 2024-08-30 NOTE — PROGRESS NOTES
[facility]      [facility]    Patient Name: Pritesh Orellana  :  1945    Subjective:    Chief Complaint:  Chief Complaint   Patient presents with    Numbness     Bilateral lower leg,problems for a while but worse past 6 weeks         HPI he was diagnosed with with prostate cancer in May, saw Dr. Mccarthy in July; he was referred to Radiation oncology, Dr. iRchards; PET scan ordered; records reviewed;   C/o weakness in his legs, has to rest after walking for short distance; he could not complete PT for his back  due to numbness in his feet, calf muscles, weakness; he is s/p lumbar laminectomy and fusion wo evidence of hardware failure or decompensation on lumbar xray e has seen vascular doctor in the past      Past Medical History:   Diagnosis Date    Anemia     Arthritis     Asthma     Cancer (HCC) 2017    Top of right ear    History of blood transfusion     Hypercholesterolemia     Hypertension     Lumbar stenosis     Prolonged emergence from general anesthesia 2012    with knee replacement    Skin cancer 2019    BCC  top of right ear       Past Surgical History:   Procedure Laterality Date    ARTHRODESIS Left 02/15/2024    Left first MTP arthrodesis performed by Duncan De Luna III, MD at [facility]    CATARACT REMOVAL      Bilateral    CHOLECYSTECTOMY      COLONOSCOPY      EYE SURGERY      FOOT SURGERY Left 02/15/2024    left partial metatarsal head resections performed by Duncan De Luna III, MD at [facility]    HAMMER TOE SURGERY Left 02/15/2024    left second and third proximal interphalangeal joint resection arthroplasties performed by Duncan De Luna III, MD at [facility]    HX JOINT REPLACEMENT SURGERY Bilateral     LORIN Turner, knee replacement    JOINT REPLACEMENT  2012    Bilateral knee replacements    LUMBAR FUSION N/A 10/02/2023    L4-L5 laminectomy and fusion with allograft, transforaminal lumbar interbody fusion, and instrumentation, right S1 hemilaminectomy.  mouth Daily, Disp: , Rfl:     hydroCHLOROthiazide 12.5 MG tablet, Take 1 tablet by mouth every other day, Disp: 90 tablet, Rfl: 1    levalbuterol (XOPENEX) 0.63 MG/3ML nebulization, Take 3 mLs by nebulization every 8 hours as needed for Wheezing, Disp: 120 each, Rfl: 5    albuterol sulfate HFA (VENTOLIN HFA) 108 (90 Base) MCG/ACT inhaler, Inhale 2 puffs into the lungs every 4 hours as needed for Wheezing, Disp: 1 each, Rfl: 11    pramipexole (MIRAPEX) 0.5 MG tablet, Take 1 tablet by mouth nightly, Disp: 90 tablet, Rfl: 1    Melatonin-Theanine (SLEEP+CALM) 3-50 MG CHEW, Take by mouth nightly, Disp: , Rfl:     vitamin C (ASCORBIC ACID) 500 MG tablet, Take 1 tablet by mouth daily, Disp: , Rfl:     Multiple Vitamins-Minerals (ICAPS AREDS FORMULA PO), Take by mouth in the morning and at bedtime, Disp: , Rfl:     pravastatin (PRAVACHOL) 10 MG tablet, Take 1 tablet by mouth daily (Patient taking differently: Take 1 tablet by mouth nightly), Disp: 30 tablet, Rfl: 5    vitamin D (CHOLECALCIFEROL) 25 MCG (1000 UT) TABS tablet, Take 1 tablet by mouth daily, Disp: , Rfl:     fluticasone (FLONASE) 50 MCG/ACT nasal spray, 1 spray in each nostril, Disp: , Rfl:     Allergies   Allergen Reactions    Mixed Ragweed      Other reaction(s): itchy eyes    Peanut Butter Flavor      Other reaction(s): diarrhea    Chocolate Rash     Other reaction(s): diarrhea         Review of Systems   Constitutional:  Negative for chills, fatigue and fever.   HENT:  Negative for congestion, postnasal drip and rhinorrhea.    Eyes:  Negative for visual disturbance.   Respiratory:  Negative for cough and shortness of breath.    Cardiovascular:  Negative for chest pain and palpitations.   Gastrointestinal:  Negative for abdominal pain and blood in stool.   Genitourinary:  Negative for dysuria and frequency.   Musculoskeletal:  Negative for arthralgias and myalgias.   Skin: Negative.    Neurological:  Positive for weakness and numbness. Negative for

## 2024-08-31 DIAGNOSIS — G25.81 RLS (RESTLESS LEGS SYNDROME): ICD-10-CM

## 2024-09-02 RX ORDER — PRAMIPEXOLE DIHYDROCHLORIDE 0.5 MG/1
0.5 TABLET ORAL NIGHTLY
Qty: 90 TABLET | Refills: 1 | Status: SHIPPED | OUTPATIENT
Start: 2024-09-02

## 2024-09-05 RX ORDER — IPRATROPIUM BROMIDE AND ALBUTEROL SULFATE 2.5; .5 MG/3ML; MG/3ML
1 SOLUTION RESPIRATORY (INHALATION) EVERY 6 HOURS PRN
Qty: 360 ML | Refills: 11 | Status: SHIPPED | OUTPATIENT
Start: 2024-09-05

## 2024-09-24 ENCOUNTER — HOSPITAL ENCOUNTER (OUTPATIENT)
Dept: RADIATION ONCOLOGY | Age: 79
Setting detail: RECURRING SERIES
Discharge: HOME | End: 2024-09-27

## 2024-10-09 ENCOUNTER — HOSPITAL ENCOUNTER (OUTPATIENT)
Dept: RADIATION ONCOLOGY | Age: 79
Setting detail: RECURRING SERIES
Discharge: HOME | End: 2024-10-12
Payer: MEDICARE

## 2024-10-09 LAB
RAD ONC ARIA COURSE FIRST TREATMENT DATE: NORMAL
RAD ONC ARIA COURSE ID: NORMAL
RAD ONC ARIA COURSE INTENT: NORMAL
RAD ONC ARIA COURSE LAST TREATMENT DATE: NORMAL
RAD ONC ARIA COURSE SESSION NUMBER: 1
RAD ONC ARIA COURSE START DATE: NORMAL
RAD ONC ARIA COURSE TREATMENT ELAPSED DAYS: 0
RAD ONC ARIA PLAN FRACTIONS TREATED TO DATE: 1
RAD ONC ARIA PLAN ID: NORMAL
RAD ONC ARIA PLAN PRESCRIBED DOSE PER FRACTION: 2.5 GY
RAD ONC ARIA PLAN PRIMARY REFERENCE POINT: NORMAL
RAD ONC ARIA PLAN TOTAL FRACTIONS PRESCRIBED: 28
RAD ONC ARIA PLAN TOTAL PRESCRIBED DOSE: 7000 CGY
RAD ONC ARIA REFERENCE POINT DOSAGE GIVEN TO DATE: 2.5 GY
RAD ONC ARIA REFERENCE POINT ID: NORMAL
RAD ONC ARIA REFERENCE POINT SESSION DOSAGE GIVEN: 2.5 GY

## 2024-10-09 PROCEDURE — 77385 HC NTSTY MODUL RAD TX DLVR SMPL: CPT

## 2024-10-10 ENCOUNTER — APPOINTMENT (OUTPATIENT)
Dept: RADIATION ONCOLOGY | Age: 79
End: 2024-10-10
Payer: MEDICARE

## 2024-10-11 ENCOUNTER — HOSPITAL ENCOUNTER (OUTPATIENT)
Dept: RADIATION ONCOLOGY | Age: 79
Setting detail: RECURRING SERIES
Discharge: HOME | End: 2024-10-14
Payer: MEDICARE

## 2024-10-11 LAB
RAD ONC ARIA COURSE FIRST TREATMENT DATE: NORMAL
RAD ONC ARIA COURSE ID: NORMAL
RAD ONC ARIA COURSE INTENT: NORMAL
RAD ONC ARIA COURSE LAST TREATMENT DATE: NORMAL
RAD ONC ARIA COURSE SESSION NUMBER: 2
RAD ONC ARIA COURSE START DATE: NORMAL
RAD ONC ARIA COURSE TREATMENT ELAPSED DAYS: 2
RAD ONC ARIA PLAN FRACTIONS TREATED TO DATE: 2
RAD ONC ARIA PLAN ID: NORMAL
RAD ONC ARIA PLAN PRESCRIBED DOSE PER FRACTION: 2.5 GY
RAD ONC ARIA PLAN PRIMARY REFERENCE POINT: NORMAL
RAD ONC ARIA PLAN TOTAL FRACTIONS PRESCRIBED: 28
RAD ONC ARIA PLAN TOTAL PRESCRIBED DOSE: 7000 CGY
RAD ONC ARIA REFERENCE POINT DOSAGE GIVEN TO DATE: 5 GY
RAD ONC ARIA REFERENCE POINT ID: NORMAL
RAD ONC ARIA REFERENCE POINT SESSION DOSAGE GIVEN: 2.5 GY

## 2024-10-11 PROCEDURE — 77385 HC NTSTY MODUL RAD TX DLVR SMPL: CPT

## 2024-10-11 NOTE — ON TREATMENT VISIT
[facility] RADIATION ONCOLOGY ON TREATMENT VISIT    Patient: Pritesh Orellana MRN: [id number]  SSN: [id number]    YOB: 1945  Age: 79 y.o.  Sex: male      10/11/24    Diagnosis:  Cancer Staging   Malignant neoplasm of prostate (HCC)  Staging form: Prostate, AJCC 8th Edition  - Clinical: Stage IIC (cT1c, cN0, cM0, PSA: 7, Grade Group: 3) - Signed by Kvng Richards MD on 7/19/2024      This is a 79 y.o. male who is currently receiving moderate hypofractionation for prostate cancer.    Current RT dose: 5/70 Gy in 2/28 fractions.     Concurrent ADT:8/1/24    Subjective:  Week 1: No complaints.    Objective:  There were no vitals filed for this visit.  Pain 0/10    General: Alert and conversant, in NAD  Skin: no XRT issues.    Assessment:  Patient is tolerating radiation therapy well with no XRT effects.    Plan:  -Continue RT as planned.  -Treatment images reviewed.  -The patient has a documented plan of care to address pain.  Pain is not present.      Kvng Richards MD  10/11/24

## 2024-10-14 ENCOUNTER — HOSPITAL ENCOUNTER (OUTPATIENT)
Dept: RADIATION ONCOLOGY | Age: 79
Setting detail: RECURRING SERIES
Discharge: HOME | End: 2024-10-17
Payer: MEDICARE

## 2024-10-14 LAB
RAD ONC ARIA COURSE FIRST TREATMENT DATE: NORMAL
RAD ONC ARIA COURSE ID: NORMAL
RAD ONC ARIA COURSE INTENT: NORMAL
RAD ONC ARIA COURSE LAST TREATMENT DATE: NORMAL
RAD ONC ARIA COURSE SESSION NUMBER: 3
RAD ONC ARIA COURSE START DATE: NORMAL
RAD ONC ARIA COURSE TREATMENT ELAPSED DAYS: 5
RAD ONC ARIA PLAN FRACTIONS TREATED TO DATE: 3
RAD ONC ARIA PLAN ID: NORMAL
RAD ONC ARIA PLAN PRESCRIBED DOSE PER FRACTION: 2.5 GY
RAD ONC ARIA PLAN PRIMARY REFERENCE POINT: NORMAL
RAD ONC ARIA PLAN TOTAL FRACTIONS PRESCRIBED: 28
RAD ONC ARIA PLAN TOTAL PRESCRIBED DOSE: 7000 CGY
RAD ONC ARIA REFERENCE POINT DOSAGE GIVEN TO DATE: 7.5 GY
RAD ONC ARIA REFERENCE POINT ID: NORMAL
RAD ONC ARIA REFERENCE POINT SESSION DOSAGE GIVEN: 2.5 GY

## 2024-10-14 PROCEDURE — 77385 HC NTSTY MODUL RAD TX DLVR SMPL: CPT

## 2024-10-15 ENCOUNTER — HOSPITAL ENCOUNTER (OUTPATIENT)
Dept: RADIATION ONCOLOGY | Age: 79
Setting detail: RECURRING SERIES
Discharge: HOME | End: 2024-10-18
Payer: MEDICARE

## 2024-10-15 LAB
RAD ONC ARIA COURSE FIRST TREATMENT DATE: NORMAL
RAD ONC ARIA COURSE ID: NORMAL
RAD ONC ARIA COURSE INTENT: NORMAL
RAD ONC ARIA COURSE LAST TREATMENT DATE: NORMAL
RAD ONC ARIA COURSE SESSION NUMBER: 4
RAD ONC ARIA COURSE START DATE: NORMAL
RAD ONC ARIA COURSE TREATMENT ELAPSED DAYS: 6
RAD ONC ARIA PLAN FRACTIONS TREATED TO DATE: 4
RAD ONC ARIA PLAN ID: NORMAL
RAD ONC ARIA PLAN PRESCRIBED DOSE PER FRACTION: 2.5 GY
RAD ONC ARIA PLAN PRIMARY REFERENCE POINT: NORMAL
RAD ONC ARIA PLAN TOTAL FRACTIONS PRESCRIBED: 28
RAD ONC ARIA PLAN TOTAL PRESCRIBED DOSE: 7000 CGY
RAD ONC ARIA REFERENCE POINT DOSAGE GIVEN TO DATE: 10 GY
RAD ONC ARIA REFERENCE POINT ID: NORMAL
RAD ONC ARIA REFERENCE POINT SESSION DOSAGE GIVEN: 2.5 GY

## 2024-10-15 PROCEDURE — 77385 HC NTSTY MODUL RAD TX DLVR SMPL: CPT

## 2024-10-16 ENCOUNTER — HOSPITAL ENCOUNTER (OUTPATIENT)
Dept: RADIATION ONCOLOGY | Age: 79
Setting detail: RECURRING SERIES
Discharge: HOME | End: 2024-10-19
Payer: MEDICARE

## 2024-10-16 LAB
RAD ONC ARIA COURSE FIRST TREATMENT DATE: NORMAL
RAD ONC ARIA COURSE ID: NORMAL
RAD ONC ARIA COURSE INTENT: NORMAL
RAD ONC ARIA COURSE LAST TREATMENT DATE: NORMAL
RAD ONC ARIA COURSE SESSION NUMBER: 5
RAD ONC ARIA COURSE START DATE: NORMAL
RAD ONC ARIA COURSE TREATMENT ELAPSED DAYS: 7
RAD ONC ARIA PLAN FRACTIONS TREATED TO DATE: 5
RAD ONC ARIA PLAN ID: NORMAL
RAD ONC ARIA PLAN PRESCRIBED DOSE PER FRACTION: 2.5 GY
RAD ONC ARIA PLAN PRIMARY REFERENCE POINT: NORMAL
RAD ONC ARIA PLAN TOTAL FRACTIONS PRESCRIBED: 28
RAD ONC ARIA PLAN TOTAL PRESCRIBED DOSE: 7000 CGY
RAD ONC ARIA REFERENCE POINT DOSAGE GIVEN TO DATE: 12.5 GY
RAD ONC ARIA REFERENCE POINT ID: NORMAL
RAD ONC ARIA REFERENCE POINT SESSION DOSAGE GIVEN: 2.5 GY

## 2024-10-16 PROCEDURE — 77336 RADIATION PHYSICS CONSULT: CPT

## 2024-10-16 PROCEDURE — 77385 HC NTSTY MODUL RAD TX DLVR SMPL: CPT

## 2024-10-17 ENCOUNTER — HOSPITAL ENCOUNTER (OUTPATIENT)
Dept: RADIATION ONCOLOGY | Age: 79
Setting detail: RECURRING SERIES
Discharge: HOME | End: 2024-10-20
Payer: MEDICARE

## 2024-10-17 LAB
RAD ONC ARIA COURSE FIRST TREATMENT DATE: NORMAL
RAD ONC ARIA COURSE ID: NORMAL
RAD ONC ARIA COURSE INTENT: NORMAL
RAD ONC ARIA COURSE LAST TREATMENT DATE: NORMAL
RAD ONC ARIA COURSE SESSION NUMBER: 6
RAD ONC ARIA COURSE START DATE: NORMAL
RAD ONC ARIA COURSE TREATMENT ELAPSED DAYS: 8
RAD ONC ARIA PLAN FRACTIONS TREATED TO DATE: 6
RAD ONC ARIA PLAN ID: NORMAL
RAD ONC ARIA PLAN PRESCRIBED DOSE PER FRACTION: 2.5 GY
RAD ONC ARIA PLAN PRIMARY REFERENCE POINT: NORMAL
RAD ONC ARIA PLAN TOTAL FRACTIONS PRESCRIBED: 28
RAD ONC ARIA PLAN TOTAL PRESCRIBED DOSE: 7000 CGY
RAD ONC ARIA REFERENCE POINT DOSAGE GIVEN TO DATE: 15 GY
RAD ONC ARIA REFERENCE POINT ID: NORMAL
RAD ONC ARIA REFERENCE POINT SESSION DOSAGE GIVEN: 2.5 GY

## 2024-10-17 PROCEDURE — 77385 HC NTSTY MODUL RAD TX DLVR SMPL: CPT

## 2024-10-18 ENCOUNTER — HOSPITAL ENCOUNTER (OUTPATIENT)
Dept: RADIATION ONCOLOGY | Age: 79
Setting detail: RECURRING SERIES
Discharge: HOME | End: 2024-10-21
Payer: MEDICARE

## 2024-10-18 LAB
RAD ONC ARIA COURSE FIRST TREATMENT DATE: NORMAL
RAD ONC ARIA COURSE ID: NORMAL
RAD ONC ARIA COURSE INTENT: NORMAL
RAD ONC ARIA COURSE LAST TREATMENT DATE: NORMAL
RAD ONC ARIA COURSE SESSION NUMBER: 7
RAD ONC ARIA COURSE START DATE: NORMAL
RAD ONC ARIA COURSE TREATMENT ELAPSED DAYS: 9
RAD ONC ARIA PLAN FRACTIONS TREATED TO DATE: 7
RAD ONC ARIA PLAN ID: NORMAL
RAD ONC ARIA PLAN PRESCRIBED DOSE PER FRACTION: 2.5 GY
RAD ONC ARIA PLAN PRIMARY REFERENCE POINT: NORMAL
RAD ONC ARIA PLAN TOTAL FRACTIONS PRESCRIBED: 28
RAD ONC ARIA PLAN TOTAL PRESCRIBED DOSE: 7000 CGY
RAD ONC ARIA REFERENCE POINT DOSAGE GIVEN TO DATE: 17.5 GY
RAD ONC ARIA REFERENCE POINT ID: NORMAL
RAD ONC ARIA REFERENCE POINT SESSION DOSAGE GIVEN: 2.5 GY

## 2024-10-18 PROCEDURE — 77385 HC NTSTY MODUL RAD TX DLVR SMPL: CPT

## 2024-10-18 NOTE — ON TREATMENT VISIT
[facility] RADIATION ONCOLOGY ON TREATMENT VISIT    Patient: Pritesh Orellana MRN: [id number]  SSN: [id number]    YOB: 1945  Age: 79 y.o.  Sex: male      10/18/24    Diagnosis:   Cancer Staging   Malignant neoplasm of prostate (HCC)  Staging form: Prostate, AJCC 8th Edition  - Clinical: Stage IIC (cT1c, cN0, cM0, PSA: 7, Grade Group: 3) - Signed by Kvng Richards MD on 7/19/2024      This is a 79 y.o. male who is currently receiving moderate hypofractionation for prostate cancer.    Current RT dose: 17.5/70 Gy in 7/28 fractions.     Concurrent ADT:8/1/24    Subjective:  Week 1: No complaints.  Week 2: Has noticed some increase in loose stool this week. Also had some trouble getting bladder full.     Objective:  There were no vitals filed for this visit.  Pain 0/10    General: Alert and conversant, in NAD  Skin: no XRT issues.    Assessment:  Patient is tolerating radiation therapy well with expected XRT effects.    Plan:  -Continue RT as planned.  -Discussed holding 1 of his stool softeners if diarrhea persists.   -Treatment images reviewed.  -The patient has a documented plan of care to address pain.  Pain is not present.      Kvng Richards MD  10/18/24

## 2024-10-21 ENCOUNTER — HOSPITAL ENCOUNTER (INPATIENT)
Age: 79
LOS: 1 days | Discharge: HOME | DRG: 871 | End: 2024-10-23
Attending: EMERGENCY MEDICINE | Admitting: FAMILY MEDICINE
Payer: MEDICARE

## 2024-10-21 ENCOUNTER — APPOINTMENT (OUTPATIENT)
Dept: GENERAL RADIOLOGY | Age: 79
DRG: 871 | End: 2024-10-21
Payer: MEDICARE

## 2024-10-21 ENCOUNTER — HOSPITAL ENCOUNTER (OUTPATIENT)
Dept: RADIATION ONCOLOGY | Age: 79
Setting detail: RECURRING SERIES
Discharge: HOME | End: 2024-10-24
Payer: MEDICARE

## 2024-10-21 ENCOUNTER — APPOINTMENT (OUTPATIENT)
Dept: CT IMAGING | Age: 79
DRG: 871 | End: 2024-10-21
Payer: MEDICARE

## 2024-10-21 DIAGNOSIS — R53.1 GENERAL WEAKNESS: Primary | ICD-10-CM

## 2024-10-21 DIAGNOSIS — J18.9 PNEUMONIA OF BOTH LUNGS DUE TO INFECTIOUS ORGANISM, UNSPECIFIED PART OF LUNG: ICD-10-CM

## 2024-10-21 DIAGNOSIS — A41.9 SEPTICEMIA (HCC): ICD-10-CM

## 2024-10-21 LAB
ALBUMIN SERPL-MCNC: 3.1 G/DL (ref 3.2–4.6)
ALBUMIN/GLOB SERPL: 1 (ref 1–1.9)
ALP SERPL-CCNC: 115 U/L (ref 40–129)
ALT SERPL-CCNC: 124 U/L (ref 8–55)
ANION GAP SERPL CALC-SCNC: 12 MMOL/L (ref 9–18)
APPEARANCE UR: CLEAR
AST SERPL-CCNC: 206 U/L (ref 15–37)
BACTERIA URNS QL MICRO: NEGATIVE /HPF
BASOPHILS # BLD: 0 K/UL (ref 0–0.2)
BASOPHILS NFR BLD: 0 % (ref 0–2)
BILIRUB SERPL-MCNC: 0.4 MG/DL (ref 0–1.2)
BILIRUB UR QL: NEGATIVE
BUN SERPL-MCNC: 18 MG/DL (ref 8–23)
CALCIUM SERPL-MCNC: 9.2 MG/DL (ref 8.8–10.2)
CHLORIDE SERPL-SCNC: 100 MMOL/L (ref 98–107)
CO2 SERPL-SCNC: 26 MMOL/L (ref 20–28)
COLOR UR: ABNORMAL
CREAT SERPL-MCNC: 0.78 MG/DL (ref 0.8–1.3)
DIFFERENTIAL METHOD BLD: ABNORMAL
EOSINOPHIL # BLD: 2 K/UL (ref 0–0.8)
EOSINOPHIL NFR BLD: 15 % (ref 0.5–7.8)
EPI CELLS #/AREA URNS HPF: ABNORMAL /HPF
ERYTHROCYTE [DISTWIDTH] IN BLOOD BY AUTOMATED COUNT: 13 % (ref 11.9–14.6)
GLOBULIN SER CALC-MCNC: 3.1 G/DL (ref 2.3–3.5)
GLUCOSE SERPL-MCNC: 116 MG/DL (ref 70–99)
GLUCOSE UR STRIP.AUTO-MCNC: NEGATIVE MG/DL
HCT VFR BLD AUTO: 38.6 % (ref 41.1–50.3)
HGB BLD-MCNC: 13.1 G/DL (ref 13.6–17.2)
HGB UR QL STRIP: ABNORMAL
HYALINE CASTS URNS QL MICRO: ABNORMAL /LPF
IMM GRANULOCYTES # BLD AUTO: 0.1 K/UL (ref 0–0.5)
IMM GRANULOCYTES NFR BLD AUTO: 1 % (ref 0–5)
KETONES UR QL STRIP.AUTO: NEGATIVE MG/DL
LACTATE SERPL-SCNC: 1.4 MMOL/L (ref 0.5–2)
LEUKOCYTE ESTERASE UR QL STRIP.AUTO: NEGATIVE
LYMPHOCYTES # BLD: 0.5 K/UL (ref 0.5–4.6)
LYMPHOCYTES NFR BLD: 4 % (ref 13–44)
MCH RBC QN AUTO: 30.8 PG (ref 26.1–32.9)
MCHC RBC AUTO-ENTMCNC: 33.9 G/DL (ref 31.4–35)
MCV RBC AUTO: 90.6 FL (ref 82–102)
MONOCYTES # BLD: 0.5 K/UL (ref 0.1–1.3)
MONOCYTES NFR BLD: 4 % (ref 4–12)
NEUTS SEG # BLD: 10.2 K/UL (ref 1.7–8.2)
NEUTS SEG NFR BLD: 77 % (ref 43–78)
NITRITE UR QL STRIP.AUTO: NEGATIVE
NRBC # BLD: 0 K/UL (ref 0–0.2)
PH UR STRIP: 6.5 (ref 5–9)
PLATELET # BLD AUTO: 246 K/UL (ref 150–450)
PMV BLD AUTO: 9.4 FL (ref 9.4–12.3)
POTASSIUM SERPL-SCNC: 3.7 MMOL/L (ref 3.5–5.1)
PROCALCITONIN SERPL-MCNC: 0.35 NG/ML (ref 0–0.1)
PROT SERPL-MCNC: 6.2 G/DL (ref 6.3–8.2)
PROT UR STRIP-MCNC: NEGATIVE MG/DL
RAD ONC ARIA COURSE FIRST TREATMENT DATE: NORMAL
RAD ONC ARIA COURSE ID: NORMAL
RAD ONC ARIA COURSE INTENT: NORMAL
RAD ONC ARIA COURSE LAST TREATMENT DATE: NORMAL
RAD ONC ARIA COURSE SESSION NUMBER: 8
RAD ONC ARIA COURSE START DATE: NORMAL
RAD ONC ARIA COURSE TREATMENT ELAPSED DAYS: 12
RAD ONC ARIA PLAN FRACTIONS TREATED TO DATE: 8
RAD ONC ARIA PLAN ID: NORMAL
RAD ONC ARIA PLAN PRESCRIBED DOSE PER FRACTION: 2.5 GY
RAD ONC ARIA PLAN PRIMARY REFERENCE POINT: NORMAL
RAD ONC ARIA PLAN TOTAL FRACTIONS PRESCRIBED: 28
RAD ONC ARIA PLAN TOTAL PRESCRIBED DOSE: 7000 CGY
RAD ONC ARIA REFERENCE POINT DOSAGE GIVEN TO DATE: 20 GY
RAD ONC ARIA REFERENCE POINT ID: NORMAL
RAD ONC ARIA REFERENCE POINT SESSION DOSAGE GIVEN: 2.5 GY
RBC # BLD AUTO: 4.26 M/UL (ref 4.23–5.6)
RBC #/AREA URNS HPF: ABNORMAL /HPF
SODIUM SERPL-SCNC: 138 MMOL/L (ref 136–145)
SP GR UR REFRACTOMETRY: 1.02 (ref 1–1.02)
UROBILINOGEN UR QL STRIP.AUTO: 0.2 EU/DL (ref 0.2–1)
WBC # BLD AUTO: 13.3 K/UL (ref 4.3–11.1)
WBC URNS QL MICRO: ABNORMAL /HPF

## 2024-10-21 PROCEDURE — 99285 EMERGENCY DEPT VISIT HI MDM: CPT

## 2024-10-21 PROCEDURE — 74177 CT ABD & PELVIS W/CONTRAST: CPT

## 2024-10-21 PROCEDURE — 85025 COMPLETE CBC W/AUTO DIFF WBC: CPT

## 2024-10-21 PROCEDURE — 87040 BLOOD CULTURE FOR BACTERIA: CPT

## 2024-10-21 PROCEDURE — 2580000003 HC RX 258: Performed by: EMERGENCY MEDICINE

## 2024-10-21 PROCEDURE — 93005 ELECTROCARDIOGRAM TRACING: CPT | Performed by: EMERGENCY MEDICINE

## 2024-10-21 PROCEDURE — 80053 COMPREHEN METABOLIC PANEL: CPT

## 2024-10-21 PROCEDURE — 6360000002 HC RX W HCPCS: Performed by: EMERGENCY MEDICINE

## 2024-10-21 PROCEDURE — 83605 ASSAY OF LACTIC ACID: CPT

## 2024-10-21 PROCEDURE — 71045 X-RAY EXAM CHEST 1 VIEW: CPT

## 2024-10-21 PROCEDURE — 84145 PROCALCITONIN (PCT): CPT

## 2024-10-21 PROCEDURE — 96365 THER/PROPH/DIAG IV INF INIT: CPT

## 2024-10-21 PROCEDURE — 77385 HC NTSTY MODUL RAD TX DLVR SMPL: CPT

## 2024-10-21 PROCEDURE — 81001 URINALYSIS AUTO W/SCOPE: CPT

## 2024-10-21 PROCEDURE — 6360000004 HC RX CONTRAST MEDICATION: Performed by: EMERGENCY MEDICINE

## 2024-10-21 RX ORDER — IOPAMIDOL 755 MG/ML
100 INJECTION, SOLUTION INTRAVASCULAR
Status: COMPLETED | OUTPATIENT
Start: 2024-10-21 | End: 2024-10-21

## 2024-10-21 RX ORDER — SODIUM CHLORIDE, SODIUM LACTATE, POTASSIUM CHLORIDE, AND CALCIUM CHLORIDE .6; .31; .03; .02 G/100ML; G/100ML; G/100ML; G/100ML
1000 INJECTION, SOLUTION INTRAVENOUS ONCE
Status: COMPLETED | OUTPATIENT
Start: 2024-10-21 | End: 2024-10-21

## 2024-10-21 RX ADMIN — SODIUM CHLORIDE, POTASSIUM CHLORIDE, SODIUM LACTATE AND CALCIUM CHLORIDE 1000 ML: 600; 310; 30; 20 INJECTION, SOLUTION INTRAVENOUS at 21:31

## 2024-10-21 RX ADMIN — PIPERACILLIN AND TAZOBACTAM 4500 MG: 4; .5 INJECTION, POWDER, FOR SOLUTION INTRAVENOUS at 21:33

## 2024-10-21 RX ADMIN — IOPAMIDOL 100 ML: 755 INJECTION, SOLUTION INTRAVENOUS at 22:47

## 2024-10-21 ASSESSMENT — PAIN SCALES - GENERAL: PAINLEVEL_OUTOF10: 7

## 2024-10-22 ENCOUNTER — APPOINTMENT (OUTPATIENT)
Dept: RADIATION ONCOLOGY | Age: 79
End: 2024-10-22
Payer: MEDICARE

## 2024-10-22 PROBLEM — R53.1 GENERAL WEAKNESS: Status: ACTIVE | Noted: 2024-10-22

## 2024-10-22 PROBLEM — A41.9 SEPSIS DUE TO PNEUMONIA (HCC): Status: ACTIVE | Noted: 2024-10-22

## 2024-10-22 PROBLEM — K63.9 COLON ABNORMALITY: Status: ACTIVE | Noted: 2024-10-22

## 2024-10-22 PROBLEM — J18.9 SEPSIS DUE TO PNEUMONIA (HCC): Status: ACTIVE | Noted: 2024-10-22

## 2024-10-22 LAB
ANION GAP SERPL CALC-SCNC: 12 MMOL/L (ref 9–18)
BASOPHILS # BLD: 0 K/UL (ref 0–0.2)
BASOPHILS NFR BLD: 0 % (ref 0–2)
BUN SERPL-MCNC: 14 MG/DL (ref 8–23)
CALCIUM SERPL-MCNC: 8.9 MG/DL (ref 8.8–10.2)
CEA SERPL-MCNC: 0.8 NG/ML (ref 0–3.8)
CHLORIDE SERPL-SCNC: 100 MMOL/L (ref 98–107)
CO2 SERPL-SCNC: 24 MMOL/L (ref 20–28)
CREAT SERPL-MCNC: 0.86 MG/DL (ref 0.8–1.3)
DIFFERENTIAL METHOD BLD: ABNORMAL
EKG ATRIAL RATE: 122 BPM
EKG DIAGNOSIS: NORMAL
EKG P AXIS: -75 DEGREES
EKG P-R INTERVAL: 106 MS
EKG Q-T INTERVAL: 329 MS
EKG QRS DURATION: 137 MS
EKG QTC CALCULATION (BAZETT): 469 MS
EKG R AXIS: 39 DEGREES
EKG T AXIS: 219 DEGREES
EKG VENTRICULAR RATE: 122 BPM
EOSINOPHIL # BLD: 0.9 K/UL (ref 0–0.8)
EOSINOPHIL NFR BLD: 6 % (ref 0.5–7.8)
ERYTHROCYTE [DISTWIDTH] IN BLOOD BY AUTOMATED COUNT: 13.2 % (ref 11.9–14.6)
GLUCOSE SERPL-MCNC: 101 MG/DL (ref 70–99)
HCT VFR BLD AUTO: 37.1 % (ref 41.1–50.3)
HGB BLD-MCNC: 12.4 G/DL (ref 13.6–17.2)
IMM GRANULOCYTES # BLD AUTO: 0.1 K/UL (ref 0–0.5)
IMM GRANULOCYTES NFR BLD AUTO: 1 % (ref 0–5)
LYMPHOCYTES # BLD: 1.2 K/UL (ref 0.5–4.6)
LYMPHOCYTES NFR BLD: 9 % (ref 13–44)
MCH RBC QN AUTO: 30.3 PG (ref 26.1–32.9)
MCHC RBC AUTO-ENTMCNC: 33.4 G/DL (ref 31.4–35)
MCV RBC AUTO: 90.7 FL (ref 82–102)
MONOCYTES # BLD: 0.5 K/UL (ref 0.1–1.3)
MONOCYTES NFR BLD: 3 % (ref 4–12)
NEUTS SEG # BLD: 11.5 K/UL (ref 1.7–8.2)
NEUTS SEG NFR BLD: 81 % (ref 43–78)
NRBC # BLD: 0 K/UL (ref 0–0.2)
PLATELET # BLD AUTO: 224 K/UL (ref 150–450)
PMV BLD AUTO: 9.4 FL (ref 9.4–12.3)
POTASSIUM SERPL-SCNC: 3.7 MMOL/L (ref 3.5–5.1)
RBC # BLD AUTO: 4.09 M/UL (ref 4.23–5.6)
SODIUM SERPL-SCNC: 136 MMOL/L (ref 136–145)
WBC # BLD AUTO: 14.3 K/UL (ref 4.3–11.1)

## 2024-10-22 PROCEDURE — 80048 BASIC METABOLIC PNL TOTAL CA: CPT

## 2024-10-22 PROCEDURE — 6360000002 HC RX W HCPCS: Performed by: FAMILY MEDICINE

## 2024-10-22 PROCEDURE — 97161 PT EVAL LOW COMPLEX 20 MIN: CPT

## 2024-10-22 PROCEDURE — 2580000003 HC RX 258: Performed by: FAMILY MEDICINE

## 2024-10-22 PROCEDURE — 36415 COLL VENOUS BLD VENIPUNCTURE: CPT

## 2024-10-22 PROCEDURE — 94760 N-INVAS EAR/PLS OXIMETRY 1: CPT

## 2024-10-22 PROCEDURE — 99221 1ST HOSP IP/OBS SF/LOW 40: CPT

## 2024-10-22 PROCEDURE — 6370000000 HC RX 637 (ALT 250 FOR IP): Performed by: INTERNAL MEDICINE

## 2024-10-22 PROCEDURE — 97530 THERAPEUTIC ACTIVITIES: CPT

## 2024-10-22 PROCEDURE — 93010 ELECTROCARDIOGRAM REPORT: CPT | Performed by: INTERNAL MEDICINE

## 2024-10-22 PROCEDURE — 1100000000 HC RM PRIVATE

## 2024-10-22 PROCEDURE — 94640 AIRWAY INHALATION TREATMENT: CPT

## 2024-10-22 PROCEDURE — 82378 CARCINOEMBRYONIC ANTIGEN: CPT

## 2024-10-22 PROCEDURE — 85025 COMPLETE CBC W/AUTO DIFF WBC: CPT

## 2024-10-22 PROCEDURE — 6370000000 HC RX 637 (ALT 250 FOR IP): Performed by: FAMILY MEDICINE

## 2024-10-22 RX ORDER — LANOLIN ALCOHOL/MO/W.PET/CERES
3 CREAM (GRAM) TOPICAL NIGHTLY PRN
Status: DISCONTINUED | OUTPATIENT
Start: 2024-10-22 | End: 2024-10-23 | Stop reason: HOSPADM

## 2024-10-22 RX ORDER — GABAPENTIN 100 MG/1
100 CAPSULE ORAL NIGHTLY
Status: DISCONTINUED | OUTPATIENT
Start: 2024-10-22 | End: 2024-10-23 | Stop reason: HOSPADM

## 2024-10-22 RX ORDER — ONDANSETRON 2 MG/ML
4 INJECTION INTRAMUSCULAR; INTRAVENOUS EVERY 6 HOURS PRN
Status: DISCONTINUED | OUTPATIENT
Start: 2024-10-22 | End: 2024-10-23 | Stop reason: HOSPADM

## 2024-10-22 RX ORDER — SODIUM CHLORIDE 0.9 % (FLUSH) 0.9 %
5-40 SYRINGE (ML) INJECTION PRN
Status: DISCONTINUED | OUTPATIENT
Start: 2024-10-22 | End: 2024-10-23 | Stop reason: HOSPADM

## 2024-10-22 RX ORDER — POTASSIUM CHLORIDE 7.45 MG/ML
10 INJECTION INTRAVENOUS PRN
Status: DISCONTINUED | OUTPATIENT
Start: 2024-10-22 | End: 2024-10-23 | Stop reason: HOSPADM

## 2024-10-22 RX ORDER — POTASSIUM CHLORIDE 1500 MG/1
40 TABLET, EXTENDED RELEASE ORAL PRN
Status: DISCONTINUED | OUTPATIENT
Start: 2024-10-22 | End: 2024-10-23 | Stop reason: HOSPADM

## 2024-10-22 RX ORDER — POLYETHYLENE GLYCOL 3350 17 G/17G
17 POWDER, FOR SOLUTION ORAL DAILY PRN
Status: DISCONTINUED | OUTPATIENT
Start: 2024-10-22 | End: 2024-10-23 | Stop reason: HOSPADM

## 2024-10-22 RX ORDER — PRAVASTATIN SODIUM 10 MG
10 TABLET ORAL
Status: DISCONTINUED | OUTPATIENT
Start: 2024-10-22 | End: 2024-10-23 | Stop reason: HOSPADM

## 2024-10-22 RX ORDER — SODIUM CHLORIDE 9 MG/ML
INJECTION, SOLUTION INTRAVENOUS PRN
Status: DISCONTINUED | OUTPATIENT
Start: 2024-10-22 | End: 2024-10-23 | Stop reason: HOSPADM

## 2024-10-22 RX ORDER — ACETAMINOPHEN 650 MG/1
650 SUPPOSITORY RECTAL EVERY 6 HOURS PRN
Status: DISCONTINUED | OUTPATIENT
Start: 2024-10-22 | End: 2024-10-23 | Stop reason: HOSPADM

## 2024-10-22 RX ORDER — LISINOPRIL 20 MG/1
20 TABLET ORAL DAILY
Status: DISCONTINUED | OUTPATIENT
Start: 2024-10-22 | End: 2024-10-23 | Stop reason: HOSPADM

## 2024-10-22 RX ORDER — ACETAMINOPHEN 325 MG/1
650 TABLET ORAL EVERY 6 HOURS PRN
Status: DISCONTINUED | OUTPATIENT
Start: 2024-10-22 | End: 2024-10-23 | Stop reason: HOSPADM

## 2024-10-22 RX ORDER — BUDESONIDE 0.5 MG/2ML
0.5 INHALANT ORAL
Status: DISCONTINUED | OUTPATIENT
Start: 2024-10-22 | End: 2024-10-23 | Stop reason: HOSPADM

## 2024-10-22 RX ORDER — IBUPROFEN 400 MG/1
400 TABLET, FILM COATED ORAL
Status: DISPENSED | OUTPATIENT
Start: 2024-10-22 | End: 2024-10-23

## 2024-10-22 RX ORDER — IPRATROPIUM BROMIDE AND ALBUTEROL SULFATE 2.5; .5 MG/3ML; MG/3ML
1 SOLUTION RESPIRATORY (INHALATION) EVERY 6 HOURS PRN
Status: DISCONTINUED | OUTPATIENT
Start: 2024-10-22 | End: 2024-10-23 | Stop reason: HOSPADM

## 2024-10-22 RX ORDER — MAGNESIUM SULFATE IN WATER 40 MG/ML
2000 INJECTION, SOLUTION INTRAVENOUS PRN
Status: DISCONTINUED | OUTPATIENT
Start: 2024-10-22 | End: 2024-10-23 | Stop reason: HOSPADM

## 2024-10-22 RX ORDER — HYDROCODONE BITARTRATE AND ACETAMINOPHEN 5; 325 MG/1; MG/1
1 TABLET ORAL EVERY 6 HOURS PRN
Status: DISCONTINUED | OUTPATIENT
Start: 2024-10-22 | End: 2024-10-23 | Stop reason: HOSPADM

## 2024-10-22 RX ORDER — PRAMIPEXOLE DIHYDROCHLORIDE 0.25 MG/1
0.5 TABLET ORAL NIGHTLY
Status: DISCONTINUED | OUTPATIENT
Start: 2024-10-22 | End: 2024-10-23 | Stop reason: HOSPADM

## 2024-10-22 RX ORDER — ARFORMOTEROL TARTRATE 15 UG/2ML
15 SOLUTION RESPIRATORY (INHALATION)
Status: DISCONTINUED | OUTPATIENT
Start: 2024-10-22 | End: 2024-10-23 | Stop reason: HOSPADM

## 2024-10-22 RX ORDER — ONDANSETRON 4 MG/1
4 TABLET, ORALLY DISINTEGRATING ORAL EVERY 8 HOURS PRN
Status: DISCONTINUED | OUTPATIENT
Start: 2024-10-22 | End: 2024-10-23 | Stop reason: HOSPADM

## 2024-10-22 RX ORDER — BUDESONIDE AND FORMOTEROL FUMARATE DIHYDRATE 160; 4.5 UG/1; UG/1
2 AEROSOL RESPIRATORY (INHALATION)
Status: DISCONTINUED | OUTPATIENT
Start: 2024-10-22 | End: 2024-10-22 | Stop reason: CLARIF

## 2024-10-22 RX ORDER — HYDROCHLOROTHIAZIDE 25 MG/1
12.5 TABLET ORAL EVERY OTHER DAY
Status: DISCONTINUED | OUTPATIENT
Start: 2024-10-22 | End: 2024-10-23 | Stop reason: HOSPADM

## 2024-10-22 RX ORDER — SODIUM CHLORIDE 0.9 % (FLUSH) 0.9 %
5-40 SYRINGE (ML) INJECTION EVERY 12 HOURS SCHEDULED
Status: DISCONTINUED | OUTPATIENT
Start: 2024-10-22 | End: 2024-10-23 | Stop reason: HOSPADM

## 2024-10-22 RX ADMIN — SODIUM CHLORIDE, PRESERVATIVE FREE 10 ML: 5 INJECTION INTRAVENOUS at 20:20

## 2024-10-22 RX ADMIN — BUDESONIDE 500 MCG: 0.5 SUSPENSION RESPIRATORY (INHALATION) at 08:21

## 2024-10-22 RX ADMIN — IBUPROFEN 400 MG: 400 TABLET, FILM COATED ORAL at 13:41

## 2024-10-22 RX ADMIN — PRAMIPEXOLE DIHYDROCHLORIDE 0.5 MG: 0.25 TABLET ORAL at 20:19

## 2024-10-22 RX ADMIN — BUDESONIDE 500 MCG: 0.5 SUSPENSION RESPIRATORY (INHALATION) at 20:06

## 2024-10-22 RX ADMIN — ARFORMOTEROL TARTRATE 15 MCG: 15 SOLUTION RESPIRATORY (INHALATION) at 20:06

## 2024-10-22 RX ADMIN — SODIUM CHLORIDE, PRESERVATIVE FREE 10 ML: 5 INJECTION INTRAVENOUS at 09:07

## 2024-10-22 RX ADMIN — PIPERACILLIN AND TAZOBACTAM 3375 MG: 3; .375 INJECTION, POWDER, LYOPHILIZED, FOR SOLUTION INTRAVENOUS at 04:56

## 2024-10-22 RX ADMIN — PRAVASTATIN SODIUM 10 MG: 10 TABLET ORAL at 20:19

## 2024-10-22 RX ADMIN — IBUPROFEN 400 MG: 400 TABLET, FILM COATED ORAL at 20:19

## 2024-10-22 RX ADMIN — PIPERACILLIN AND TAZOBACTAM 3375 MG: 3; .375 INJECTION, POWDER, LYOPHILIZED, FOR SOLUTION INTRAVENOUS at 20:18

## 2024-10-22 RX ADMIN — GABAPENTIN 100 MG: 100 CAPSULE ORAL at 20:18

## 2024-10-22 RX ADMIN — ARFORMOTEROL TARTRATE 15 MCG: 15 SOLUTION RESPIRATORY (INHALATION) at 08:21

## 2024-10-22 RX ADMIN — PIPERACILLIN AND TAZOBACTAM 3375 MG: 3; .375 INJECTION, POWDER, LYOPHILIZED, FOR SOLUTION INTRAVENOUS at 13:26

## 2024-10-22 ASSESSMENT — PAIN SCALES - GENERAL
PAINLEVEL_OUTOF10: 0
PAINLEVEL_OUTOF10: 3

## 2024-10-22 ASSESSMENT — ENCOUNTER SYMPTOMS
ABDOMINAL PAIN: 1
ANAL BLEEDING: 0
CONSTIPATION: 0
BLOOD IN STOOL: 0
VOMITING: 0
NAUSEA: 1
BACK PAIN: 1
DIARRHEA: 1

## 2024-10-22 NOTE — PLAN OF CARE
Problem: Discharge Planning  Goal: Discharge to home or other facility with appropriate resources  Outcome: Progressing  Flowsheets  Taken 10/22/2024 0347 by Joslyn Hines, RN  Discharge to home or other facility with appropriate resources: Identify barriers to discharge with patient and caregiver  Taken 10/22/2024 0346 by Joslyn Hines, RN  Discharge to home or other facility with appropriate resources: Identify barriers to discharge with patient and caregiver     Problem: Pain  Goal: Verbalizes/displays adequate comfort level or baseline comfort level  Outcome: Progressing     Problem: ABCDS Injury Assessment  Goal: Absence of physical injury  Outcome: Progressing     Problem: Safety - Adult  Goal: Free from fall injury  Outcome: Progressing

## 2024-10-22 NOTE — PLAN OF CARE
Problem: Discharge Planning  Goal: Discharge to home or other facility with appropriate resources  10/22/2024 0811 by Stacy Stein RN  Outcome: Progressing  Flowsheets (Taken 10/22/2024 0745)  Discharge to home or other facility with appropriate resources:   Identify barriers to discharge with patient and caregiver   Arrange for needed discharge resources and transportation as appropriate   Refer to discharge planning if patient needs post-hospital services based on physician order or complex needs related to functional status, cognitive ability or social support system  10/22/2024 0407 by Obinna Li RN  Outcome: Progressing  Flowsheets  Taken 10/22/2024 0347 by Joslyn Hines RN  Discharge to home or other facility with appropriate resources: Identify barriers to discharge with patient and caregiver  Taken 10/22/2024 0346 by Joslyn Hines RN  Discharge to home or other facility with appropriate resources: Identify barriers to discharge with patient and caregiver     Problem: Pain  Goal: Verbalizes/displays adequate comfort level or baseline comfort level  10/22/2024 0811 by Stacy Stein RN  Outcome: Progressing  Flowsheets (Taken 10/22/2024 0745)  Verbalizes/displays adequate comfort level or baseline comfort level:   Encourage patient to monitor pain and request assistance   Assess pain using appropriate pain scale   Administer analgesics based on type and severity of pain and evaluate response  10/22/2024 0407 by Obinna Li RN  Outcome: Progressing     Problem: ABCDS Injury Assessment  Goal: Absence of physical injury  10/22/2024 0811 by Stacy Stein RN  Outcome: Progressing  10/22/2024 0407 by Obinna Li RN  Outcome: Progressing     Problem: Safety - Adult  Goal: Free from fall injury  10/22/2024 0811 by Stacy Stein RN  Outcome: Progressing  10/22/2024 0407 by Obinna Li RN  Outcome: Progressing

## 2024-10-22 NOTE — PROGRESS NOTES
Hospitalist Progress Note   Admit Date:  10/21/2024  8:29 PM   Name:  Pritesh Orellana   Age:  79 y.o.  Sex:  male  :  1945   MRN:  [id number]   Room:  [facility]/    Presenting/Chief Complaint: Fatigue and Back Pain     Reason(s) for Admission: Septicemia (HCC) [A41.9]  General weakness [R53.1]  Sepsis due to pneumonia (HCC) [J18.9, A41.9]  Pneumonia of both lungs due to infectious organism, unspecified part of lung [J18.9]     Hospital Course:   Pritesh Orellana is a 79 y.o. male with medical history of   Hypertension  Asthma  Prostate cancer    who presented with increased weakness and back pain and abdominal pain.     Patient has had prostate cancer.  He had radiation treatment about 2 weeks prior to this admission.     In the emergency room he was ill-appearing.  He had tachycardia, leukocytosis.     Chest x-ray shows linear opacity in the lung bases greater on the left.  This could be atelectasis however infiltrate is not excluded.     CT of the abdomen and pelvis shows hypodensity in the proximal colon.  Hemorrhage cannot be ruled out.   Bilateral renal parapelvic cysts.     Hemoccult is negative.  Hemoglobin is 13.1.     Subjective & 24hr Events:     10/22/24   Patient reports feeling better today.   Patient reports feeling very sick when he came to hospital.   Abdominal discomfort still present.   Patient denies passing blood per rectum or hematemesis.   Afebrile.   No nausea, no vomiting today.   No chest pain.       Assessment & Plan:     Principal Problem:    Sepsis due to pneumonia (HCC)  Plan:   X-ray of the chest is still unclear whether the patient has pneumonia or atelectasis.   Patient has leukocytosis, and he appears septic on admission.   Patient is on empiric antibiotic, which is Zosyn.   I will continue this.     Abnormal finding on the CT abdomen and pelvis especially in the colon area.   I will consult GI service to see patient.   Continue empiric antibiotic.  9.4 - 12.3 FL    nRBC 0.00 0.0 - 0.2 K/uL    Differential Type AUTOMATED      Neutrophils % 81 (H) 43 - 78 %    Lymphocytes % 9 (L) 13 - 44 %    Monocytes % 3 (L) 4.0 - 12.0 %    Eosinophils % 6 0.5 - 7.8 %    Basophils % 0 0.0 - 2.0 %    Immature Granulocytes % 1 0.0 - 5.0 %    Neutrophils Absolute 11.5 (H) 1.7 - 8.2 K/UL    Lymphocytes Absolute 1.2 0.5 - 4.6 K/UL    Monocytes Absolute 0.5 0.1 - 1.3 K/UL    Eosinophils Absolute 0.9 (H) 0.0 - 0.8 K/UL    Basophils Absolute 0.0 0.0 - 0.2 K/UL    Immature Granulocytes Absolute 0.1 0.0 - 0.5 K/UL       No results for input(s): \"COVID19\" in the last 72 hours.    Current Meds:  Current Facility-Administered Medications   Medication Dose Route Frequency    gabapentin (NEURONTIN) capsule 100 mg  100 mg Oral Nightly    hydroCHLOROthiazide (HYDRODIURIL) tablet 12.5 mg  12.5 mg Oral Every Other Day    ipratropium 0.5 mg-albuterol 2.5 mg (DUONEB) nebulizer solution 1 Dose  1 Dose Inhalation Q6H PRN    lisinopril (PRINIVIL;ZESTRIL) tablet 20 mg  20 mg Oral Daily    melatonin tablet 3 mg  3 mg Oral Nightly PRN    pramipexole (MIRAPEX) tablet 0.5 mg  0.5 mg Oral Nightly    pravastatin (PRAVACHOL) tablet 10 mg  10 mg Oral QHS    sodium chloride flush 0.9 % injection 5-40 mL  5-40 mL IntraVENous 2 times per day    sodium chloride flush 0.9 % injection 5-40 mL  5-40 mL IntraVENous PRN    0.9 % sodium chloride infusion   IntraVENous PRN    potassium chloride (KLOR-CON M) extended release tablet 40 mEq  40 mEq Oral PRN    Or    potassium bicarb-citric acid (EFFER-K) effervescent tablet 40 mEq  40 mEq Oral PRN    Or    potassium chloride 10 mEq/100 mL IVPB (Peripheral Line)  10 mEq IntraVENous PRN    magnesium sulfate 2000 mg in 50 mL IVPB premix  2,000 mg IntraVENous PRN    ondansetron (ZOFRAN-ODT) disintegrating tablet 4 mg  4 mg Oral Q8H PRN    Or    ondansetron (ZOFRAN) injection 4 mg  4 mg IntraVENous Q6H PRN    polyethylene glycol (GLYCOLAX) packet 17 g  17 g Oral Daily PRN

## 2024-10-22 NOTE — CONSULTS
Consult Note            Date:10/22/2024        Patient Name:Pritesh Orellana     YOB: 1945     Age:79 y.o.    Inpatient consult to GI  Consult performed by: Marleny Quijano PA  Consult ordered by: Aletha Arcos MD          Physician Attestation:   I have personally discussed the patient's management with the GI PA.  I have fully participated in the care of this patient and have provided the substantive portion including the entire medical decision making (MDM) for this split/shared patient encounter. I have personally reviewed all pertinent clinical information, including history, physical exam, and plan. I have personally reviewed and approved all orders. I agree with the note as written unless otherwise specified below.     Review of Systems: A complete 10-point ROS was conducted - all pertinent positives are per the HPI/Subjective portion of this note, all others are negative.     Thank you for involving the GI service in the care of your patient. Please dont hesitate to call me directly on my cell below with any questions, updates, etc.      -Manan Hicks MD  [phone]   Gastroenterology/Hepatology     Chief Complaint     Chief Complaint   Patient presents with    Fatigue    Back Pain       History Obtained From   Patient/Wife    History of Present Illness   Patient is a 79 year old male, with a hx of prostate CA receiving radiation treatment, HTN, who was admitted for weakness, periumbilical abdominal pain, back pain. CXR concerning for PNA; GI consulted as CT showing hyperdensity in proximal colon (possible site of bleeding vs contrast vs medication) along with diverticulosis and hiatal hernia. Wife provides some history as patient hard of hearing. Reports that, yesterday, patient developed sudden weakness, chills, periumbilical to LLQ pain, and back pain yesterday and unable to get patient up from toilet. He recently started daily radiation treatment for his prostate CA a  few weeks ago; has lost around 7-8x lbs and been on low residue diet for his treatments. Has had diarrhea since then. He is still feeling weak and complaining of abdominal discomfort; no overt blood in stool, dysphagia, vomiting.    His last CSP was around 4 years ago (cannot find records) and wife states that this was normal. Both of his sisters had colon CA so patient on 3 year recall for colonoscopy. No NSAIDS or blood thinners. No significant GI history.     Labs: Hgb 12.4 (13), WBC 14.3, Plt 224, Pending CEA    Imaging: CT ABDOMEN PELVIS W IV CONTRAST Additional Contrast? None    Result Date: 10/21/2024  Hyperdensity is seen in the proximal colon as described above. Assuming this patient did not ingest any oral contrast or hyperdense medication, hemorrhage in this area cannot be ruled out. Otherwise no convincing evidence of an acute process in the abdomen or pelvis. Mild bibasilar atelectasis. Bilateral renal parapelvic cysts. Circumscribed fat density structure in the left mesentery may represent a focus of mesenteric infarction or a prominent epiploic appendage. It does not appear inflamed. Electronically signed by REBEKAH LOPEZ    Medications: Zosyn.     Past Medical History     Past Medical History:   Diagnosis Date    Anemia 2024    Arthritis     Asthma     Cancer (HCC) 2017    Top of right ear    History of blood transfusion 2012    Hypercholesterolemia     Hypertension     Lumbar stenosis     Prolonged emergence from general anesthesia 2012    with knee replacement    Skin cancer 2019    BCC  top of right ear        Past Surgical History     Past Surgical History:   Procedure Laterality Date    ARTHRODESIS Left 02/15/2024    Left first MTP arthrodesis performed by Duncan De Luna III, MD at [facility]    CATARACT REMOVAL      Bilateral    CHOLECYSTECTOMY      COLONOSCOPY  2021    EYE SURGERY      FOOT SURGERY Left 02/15/2024    left partial metatarsal head resections performed by Duncan De Luna III, MD at

## 2024-10-22 NOTE — PROGRESS NOTES
ACUTE PHYSICAL THERAPY GOALS:   (Developed with and agreed upon by patient and/or caregiver.)      LTG:  (1.)Mr. Orellana will move from supine to sit and sit to supine  in bed with INDEPENDENT within 7 treatment day(s).    (2.)Mr. Orellana will transfer from bed to chair and chair to bed with SUPERVISION using the least restrictive device within 7 treatment day(s).    (3.)Mr. Orellana will ambulate with SUPERVISION for 200 feet with the least restrictive device within 7 treatment day(s).  4.  Mr. Orellana will ambulate up/down 3 steps with a rail and supervision within 7 treatment days.   ________________________________________________________________________________________________      PHYSICAL THERAPY Initial Assessment  (Link to Caseload Tracking: PT Visit Days : 1  Acknowledge Orders  Time In/Out  PT Charge Capture  Rehab Caseload Tracker    Pritesh Orellana is a 79 y.o. male   PRIMARY DIAGNOSIS: Sepsis due to pneumonia (HCC)  Septicemia (HCC) [A41.9]  General weakness [R53.1]  Sepsis due to pneumonia (HCC) [J18.9, A41.9]  Pneumonia of both lungs due to infectious organism, unspecified part of lung [J18.9]       Reason for Referral: Generalized Muscle Weakness (M62.81)  Difficulty in walking, Not elsewhere classified (R26.2)  Inpatient: Payor: MEDICARE / Plan: MEDICARE PART A AND B / Product Type: *No Product type* /     ASSESSMENT:     REHAB RECOMMENDATIONS:   Recommendation to date pending progress:  Setting:  Outpatient oncology rehab    Equipment:    None     ASSESSMENT:  Mr. Orellana admitted with above diagnoses.  Patient brought to the ER due to weakness and difficulty with ambulation.  Patient is typically I/mod I with mobility-uses a cane outside the home but no device in the home.  He has prostate cancer for which he has recently started radiation treatment-has had 8/28 treatments.  Patient currently demonstrates generalized weakness affecting his mobility and tolerance for

## 2024-10-22 NOTE — PLAN OF CARE
Problem: Discharge Planning  Goal: Discharge to home or other facility with appropriate resources  10/22/2024 1915 by Obinna Li RN  Outcome: Progressing  10/22/2024 0811 by Stacy Stein RN  Outcome: Progressing  Flowsheets (Taken 10/22/2024 0745)  Discharge to home or other facility with appropriate resources:   Identify barriers to discharge with patient and caregiver   Arrange for needed discharge resources and transportation as appropriate   Refer to discharge planning if patient needs post-hospital services based on physician order or complex needs related to functional status, cognitive ability or social support system     Problem: Pain  Goal: Verbalizes/displays adequate comfort level or baseline comfort level  10/22/2024 1915 by Obinna Li RN  Outcome: Progressing  10/22/2024 0811 by Stacy Stein RN  Outcome: Progressing  Flowsheets (Taken 10/22/2024 0745)  Verbalizes/displays adequate comfort level or baseline comfort level:   Encourage patient to monitor pain and request assistance   Assess pain using appropriate pain scale   Administer analgesics based on type and severity of pain and evaluate response     Problem: ABCDS Injury Assessment  Goal: Absence of physical injury  10/22/2024 1915 by Obinna Li RN  Outcome: Progressing  10/22/2024 0811 by Stacy Stein RN  Outcome: Progressing     Problem: Safety - Adult  Goal: Free from fall injury  10/22/2024 1915 by Obinna Li RN  Outcome: Progressing  10/22/2024 0811 by Stacy Stein RN  Outcome: Progressing     Problem: Respiratory - Adult  Goal: Achieves optimal ventilation and oxygenation  10/22/2024 1915 by Obinna Li RN  Outcome: Progressing  10/22/2024 0825 by Samara Winston RCP  Outcome: Progressing

## 2024-10-22 NOTE — PROGRESS NOTES
4 Eyes Skin Assessment     NAME:  Pritesh Orellana  YOB: 1945  MEDICAL RECORD NUMBER:  [id number]    The patient is being assessed for  Admission    I agree that at least one RN has performed a thorough Head to Toe Skin Assessment on the patient. ALL assessment sites listed below have been assessed.      Areas assessed by both nurses:    Head, Face, Ears, Shoulders, Back, Chest, Arms, Elbows, Hands, Sacrum. Buttock, Coccyx, Ischium, and Legs. Feet and Heels        Does the Patient have a Wound? No noted wound(s)       Dax Prevention initiated by RN: Yes  Wound Care Orders initiated by RN: No    Pressure Injury (Stage 3,4, Unstageable, DTI, NWPT, and Complex wounds) if present, place Wound referral order by RN under : No    New Ostomies, if present place, Ostomy referral order under : No     Nurse 1 eSignature: Electronically signed by JESSIKA BRIGGS RN on 10/22/24 at 5:22 AM EDT    **SHARE this note so that the co-signing nurse can place an eSignature**    Nurse 2 eSignature: Electronically signed by Joslyn Hines RN on 10/22/24 at 6:05 AM EDT

## 2024-10-22 NOTE — ASSESSMENT & PLAN NOTE
- Meets sepsis criteria with tachycardia, leukocytosis, and source of infection  - Possible pneumonia on CXR  - Continue IV zosyn as patient was very ill appearing on arrival  - Follow blood cultures  - Monitor daily CBC

## 2024-10-22 NOTE — ED NOTES
TRANSFER - OUT REPORT:    Verbal report given to HI Yeboah on Pritesh Orellana  being transferred to [facility] for routine progression of patient care       Report consisted of patient's Situation, Background, Assessment and   Recommendations(SBAR).     Information from the following report(s) Nurse Handoff Report was reviewed with the receiving nurse.    Lines:   Peripheral IV 10/21/24 Left Antecubital (Active)   Site Assessment Clean, dry & intact 10/21/24 2937        Opportunity for questions and clarification was provided.      Patient transported with:  Registered Nurse

## 2024-10-22 NOTE — PROGRESS NOTES
Critical Care Interdisciplinary Rounds with staff.     Jeannette Payan M.Div, [facility] / / Bereavement Coordinator  \[facility]\"" Care Department   c: [phone]/ [phone] / [email or web address]  [email or web address]

## 2024-10-22 NOTE — ED TRIAGE NOTES
Pt presents to Ed with c/o increased weakness and back pain. Pt just started radiation approximately two weeks ago for his prostate cancer.

## 2024-10-22 NOTE — CARE COORDINATION
10/22/24 1109   Service Assessment   Patient Orientation Alert and Oriented   Cognition Alert   History Provided By Patient   Primary Caregiver Self   Support Systems Spouse/Significant Other   PCP Verified by CM Yes   Prior Functional Level Independent in ADLs/IADLs   Current Functional Level Independent in ADLs/IADLs   Can patient return to prior living arrangement Yes   Ability to make needs known: Good   Family able to assist with home care needs: Yes   Would you like for me to discuss the discharge plan with any other family members/significant others, and if so, who? Yes  (spouse)   Financial Resources Medicare   Community Resources None   Social/Functional History   Lives With Spouse   Type of Home House   Bathroom Equipment None   Home Equipment Cane;Rollator   ADL Assistance Independent   Mode of Transportation Car   Discharge Planning   Type of Residence House   Living Arrangements Spouse/Significant Other   Current Services Prior To Admission Durable Medical Equipment   Potential Assistance Purchasing Medications No   Type of Home Care Services None   Services At/After Discharge   Transition of Care Consult (CM Consult) Discharge Planning   Confirm Follow Up Transport Family     RN CM met with the patient at the bedside and discussed discharge planning. He lives with his wife in a private residence and plans on returning home from the hospital. Per his report he is independent of ADLs at baseline. RN CM encouraged him to ask questions. Discharge planning is pending his clinical course in the hospital.

## 2024-10-22 NOTE — H&P
Hospitalist History and Physical   Admit Date:  10/21/2024  8:29 PM   Name:  Pritesh Orellana   Age:  79 y.o.  Sex:  male  :  1945   MRN:  [id number]     Presenting Complaint: weakness, back pain  Reason(s) for Admission: Septicemia (HCC) [A41.9]  General weakness [R53.1]  Sepsis due to pneumonia (HCC) [J18.9, A41.9]  Pneumonia of both lungs due to infectious organism, unspecified part of lung [J18.9]     History of Present Illness:   Pritesh Orellana is a 79 y.o. male with medical history of HTN, asthma, prostate cancer who presented to ED with increased weakness and back pain.  He has known prostate cancer and started radiation treatment ~2 weeks ago.  Associated abdominal pain.  Upon ER evaluation, patient was tachycardic to 117.  Very ill appearing.  WBC elevated at 13.3.  Procal is 0.35.  Lactic acid is 1.4.  CXR shows:  IMPRESSION:  Linear opacities are noted in the lung bases bilaterally, greater on the left. These changes may represent atelectasis; however, infiltrate is not excluded, particularly on the left.  CT A/P shows:  IMPRESSION:  Hyperdensity is seen in the proximal colon as described above. Assuming this patient did not ingest any oral contrast or hyperdense medication, hemorrhage in this area cannot be ruled out.  Otherwise no convincing evidence of an acute process in the abdomen or pelvis.  Mild bibasilar atelectasis.  Bilateral renal parapelvic cysts.  Circumscribed fat density structure in the left mesentery may represent a focus of mesenteric infarction or a prominent epiploic appendage. It does not appear inflamed.  Hemoccult is negative, and hgb is 13.1, so intra-colonic hemorrhage is less likely.  Hospitalist asked to admit.    Review of Systems:  10 systems reviewed and negative except as noted in HPI.  Assessment & Plan:   * Sepsis due to pneumonia (HCC)  Assessment & Plan  - Meets sepsis criteria with tachycardia, leukocytosis, and source of infection  -

## 2024-10-22 NOTE — ED PROVIDER NOTES
Emergency Department Provider Note       PCP: Natasha Rodriguez MD   Age: 79 y.o.   Sex: male     DISPOSITION Decision To Admit 10/22/2024 12:17:19 AM    ICD-10-CM    1. General weakness  R53.1       2. Pneumonia of both lungs due to infectious organism, unspecified part of lung  J18.9       3. Septicemia (HCC)  A41.9           Medical Decision Making     Patient has tachycardia and tachypnea on arrival as well as elevated white blood cell count thus I gave him sepsis treatment including fluids and antibiotics.     1 or more acute illnesses that pose a threat to life or bodily function.   Discussion with external consultants.  Chronic medical problems impacting care include prostate cancer on treatment..  Shared medical decision making was utilized in creating the patients health plan today.  I independently ordered and reviewed each unique test.    I reviewed external records: ED visit note from an outside group.  I reviewed external records: previous lab results from outside ED.  I reviewed external records: previous imaging study including radiologist interpretation.   The patients assessment required an independent historian: Wife.  The reason they were needed is important historical information not provided by the patient.  ED cardiac monitoring rhythm strip was ordered and interpreted:  sinus rhythm, no evidence of arrhythmia  ST Segments:Nonspecific ST segments - NO STEMI   Rate: Initially 130s down to about 110 left bundle branch block    ED provider's independent EKG interpretation left bundle branch block tachycardia no arrhythmia      SEP-1 CORE MEASURE    Fluid Resuscitation Rational: less than 30ml/kg because lactate less than 2. Instead, 1 liter was ordered.    Repeat Lactate Level: not indicated due to initial lactate < 2    Reassessment Exam: I completed the sepsis fluid reassessment on 10/22/24 12:17 AM EDT.     Critical Care Procedure Note: 35 minutes of critical care time was performed in the  PELVIS W IV CONTRAST Additional Contrast? None   Final Result   Hyperdensity is seen in the proximal colon as described above. Assuming this   patient did not ingest any oral contrast or hyperdense medication, hemorrhage in   this area cannot be ruled out.      Otherwise no convincing evidence of an acute process in the abdomen or pelvis.      Mild bibasilar atelectasis.      Bilateral renal parapelvic cysts.      Circumscribed fat density structure in the left mesentery may represent a focus   of mesenteric infarction or a prominent epiploic appendage. It does not appear   inflamed.      Electronically signed by REBEKAH LOPEZ      XR CHEST PORTABLE   Final Result   Linear opacities are noted in the lung bases bilaterally, greater on the left.   These changes may represent atelectasis; however, infiltrate is not excluded,   particularly on the left.         Electronically signed by REBEKAH LOPEZ                   No results for input(s): \"COVID19\" in the last 72 hours.     Voice dictation software was used during the making of this note.  This software is not perfect and grammatical and other typographical errors may be present.  This note has not been completely proofread for errors.      Paramjit Reynaga MD  10/22/24 0018

## 2024-10-22 NOTE — PROGRESS NOTES
Occupational Therapy Note:    Attempted to see patient this PM for occupational therapy evaluation session. Patient has just gotten to the chair with PT and noted to be quite weak. Will follow and re-attempt as schedule permits/patient available. Thank you,    Dank Quan, OT    Rehab Caseload Tracker

## 2024-10-23 ENCOUNTER — APPOINTMENT (OUTPATIENT)
Dept: RADIATION ONCOLOGY | Age: 79
End: 2024-10-23
Payer: MEDICARE

## 2024-10-23 VITALS
BODY MASS INDEX: 31.23 KG/M2 | HEART RATE: 78 BPM | RESPIRATION RATE: 17 BRPM | OXYGEN SATURATION: 92 % | HEIGHT: 67 IN | SYSTOLIC BLOOD PRESSURE: 131 MMHG | TEMPERATURE: 97.8 F | WEIGHT: 199 LBS | DIASTOLIC BLOOD PRESSURE: 69 MMHG

## 2024-10-23 LAB
ANION GAP SERPL CALC-SCNC: 10 MMOL/L (ref 9–18)
BASOPHILS # BLD: 0 K/UL (ref 0–0.2)
BASOPHILS NFR BLD: 0 % (ref 0–2)
BUN SERPL-MCNC: 16 MG/DL (ref 8–23)
CALCIUM SERPL-MCNC: 8.4 MG/DL (ref 8.8–10.2)
CHLORIDE SERPL-SCNC: 100 MMOL/L (ref 98–107)
CO2 SERPL-SCNC: 24 MMOL/L (ref 20–28)
CREAT SERPL-MCNC: 0.96 MG/DL (ref 0.8–1.3)
DIFFERENTIAL METHOD BLD: ABNORMAL
EOSINOPHIL # BLD: 2.6 K/UL (ref 0–0.8)
EOSINOPHIL NFR BLD: 30 % (ref 0.5–7.8)
ERYTHROCYTE [DISTWIDTH] IN BLOOD BY AUTOMATED COUNT: 13.3 % (ref 11.9–14.6)
GLUCOSE SERPL-MCNC: 99 MG/DL (ref 70–99)
HCT VFR BLD AUTO: 33.8 % (ref 41.1–50.3)
HGB BLD-MCNC: 11.2 G/DL (ref 13.6–17.2)
IMM GRANULOCYTES # BLD AUTO: 0 K/UL (ref 0–0.5)
IMM GRANULOCYTES NFR BLD AUTO: 0 % (ref 0–5)
LYMPHOCYTES # BLD: 1.3 K/UL (ref 0.5–4.6)
LYMPHOCYTES NFR BLD: 15 % (ref 13–44)
MAGNESIUM SERPL-MCNC: 1.8 MG/DL (ref 1.8–2.4)
MCH RBC QN AUTO: 30.4 PG (ref 26.1–32.9)
MCHC RBC AUTO-ENTMCNC: 33.1 G/DL (ref 31.4–35)
MCV RBC AUTO: 91.6 FL (ref 82–102)
MONOCYTES # BLD: 0.6 K/UL (ref 0.1–1.3)
MONOCYTES NFR BLD: 7 % (ref 4–12)
NEUTS SEG # BLD: 4.1 K/UL (ref 1.7–8.2)
NEUTS SEG NFR BLD: 47 % (ref 43–78)
NRBC # BLD: 0 K/UL (ref 0–0.2)
PLATELET # BLD AUTO: 196 K/UL (ref 150–450)
PMV BLD AUTO: 9.4 FL (ref 9.4–12.3)
POTASSIUM SERPL-SCNC: 3.3 MMOL/L (ref 3.5–5.1)
POTASSIUM SERPL-SCNC: 3.7 MMOL/L (ref 3.5–5.1)
RBC # BLD AUTO: 3.69 M/UL (ref 4.23–5.6)
SODIUM SERPL-SCNC: 134 MMOL/L (ref 136–145)
WBC # BLD AUTO: 8.7 K/UL (ref 4.3–11.1)

## 2024-10-23 PROCEDURE — 80048 BASIC METABOLIC PNL TOTAL CA: CPT

## 2024-10-23 PROCEDURE — 2580000003 HC RX 258: Performed by: FAMILY MEDICINE

## 2024-10-23 PROCEDURE — 6370000000 HC RX 637 (ALT 250 FOR IP): Performed by: FAMILY MEDICINE

## 2024-10-23 PROCEDURE — 6360000002 HC RX W HCPCS: Performed by: FAMILY MEDICINE

## 2024-10-23 PROCEDURE — 85025 COMPLETE CBC W/AUTO DIFF WBC: CPT

## 2024-10-23 PROCEDURE — 36415 COLL VENOUS BLD VENIPUNCTURE: CPT

## 2024-10-23 PROCEDURE — 83735 ASSAY OF MAGNESIUM: CPT

## 2024-10-23 PROCEDURE — 97530 THERAPEUTIC ACTIVITIES: CPT

## 2024-10-23 PROCEDURE — 84132 ASSAY OF SERUM POTASSIUM: CPT

## 2024-10-23 PROCEDURE — 97165 OT EVAL LOW COMPLEX 30 MIN: CPT

## 2024-10-23 PROCEDURE — 97535 SELF CARE MNGMENT TRAINING: CPT

## 2024-10-23 RX ORDER — POTASSIUM CHLORIDE 1500 MG/1
40 TABLET, EXTENDED RELEASE ORAL ONCE
Status: DISCONTINUED | OUTPATIENT
Start: 2024-10-23 | End: 2024-10-23 | Stop reason: HOSPADM

## 2024-10-23 RX ADMIN — POTASSIUM CHLORIDE 40 MEQ: 1500 TABLET, EXTENDED RELEASE ORAL at 09:53

## 2024-10-23 RX ADMIN — PIPERACILLIN AND TAZOBACTAM 3375 MG: 3; .375 INJECTION, POWDER, LYOPHILIZED, FOR SOLUTION INTRAVENOUS at 13:07

## 2024-10-23 RX ADMIN — PIPERACILLIN AND TAZOBACTAM 3375 MG: 3; .375 INJECTION, POWDER, LYOPHILIZED, FOR SOLUTION INTRAVENOUS at 04:08

## 2024-10-23 RX ADMIN — SODIUM CHLORIDE, PRESERVATIVE FREE 10 ML: 5 INJECTION INTRAVENOUS at 08:10

## 2024-10-23 ASSESSMENT — PAIN SCALES - GENERAL: PAINLEVEL_OUTOF10: 0

## 2024-10-23 NOTE — PROGRESS NOTES
Hospitalist Progress Note   Admit Date:  10/21/2024  8:29 PM   Name:  Pritesh Orellana   Age:  79 y.o.  Sex:  male  :  1945   MRN:  [id number]   Room:  [facility]/    Presenting/Chief Complaint: Fatigue and Back Pain     Reason(s) for Admission: Septicemia (HCC) [A41.9]  General weakness [R53.1]  Sepsis due to pneumonia (HCC) [J18.9, A41.9]  Pneumonia of both lungs due to infectious organism, unspecified part of lung [J18.9]     Hospital Course:   Pritesh Orellana is a 79 y.o. male with medical history of   Hypertension  Asthma  Prostate cancer    who presented with increased weakness and back pain and abdominal pain.     Patient has had prostate cancer.  He had radiation treatment about 2 weeks prior to this admission.     In the emergency room he was ill-appearing.  He had tachycardia, leukocytosis.     Chest x-ray shows linear opacity in the lung bases greater on the left.  This could be atelectasis however infiltrate is not excluded.     CT of the abdomen and pelvis shows hypodensity in the proximal colon.  Hemorrhage cannot be ruled out.   Bilateral renal parapelvic cysts.     Hemoccult is negative.  Hemoglobin is 13.1.     Subjective & 24hr Events:     10/22/24   Patient reports feeling better today.   Patient reports feeling very sick when he came to hospital.   Abdominal discomfort still present.   Patient denies passing blood per rectum or hematemesis.   Afebrile.   No nausea, no vomiting today.   No chest pain.     10/23/24   Patient is seen and examined at bedside.    Wife is at bedside.   Patient was seen by GI service.   They offered colonoscopy while inpatient because of abnormality from CT scan.   Patient does not feel that he is strong enough for bowel preparation.  So they agree on having him seen again in outpatient follow-up and likely to have colonoscopy done as an outpatient.       Assessment & Plan:     Principal Problem:    Sepsis due to pneumonia (HCC)  Plan:   X-ray  - 0.2 K/uL    Differential Type AUTOMATED      Neutrophils % 47 43 - 78 %    Lymphocytes % 15 13 - 44 %    Monocytes % 7 4.0 - 12.0 %    Eosinophils % 30 (H) 0.5 - 7.8 %    Basophils % 0 0.0 - 2.0 %    Immature Granulocytes % 0 0.0 - 5.0 %    Neutrophils Absolute 4.1 1.7 - 8.2 K/UL    Lymphocytes Absolute 1.3 0.5 - 4.6 K/UL    Monocytes Absolute 0.6 0.1 - 1.3 K/UL    Eosinophils Absolute 2.6 (H) 0.0 - 0.8 K/UL    Basophils Absolute 0.0 0.0 - 0.2 K/UL    Immature Granulocytes Absolute 0.0 0.0 - 0.5 K/UL   Magnesium    Collection Time: 10/23/24  3:28 AM   Result Value Ref Range    Magnesium 1.8 1.8 - 2.4 mg/dL       No results for input(s): \"COVID19\" in the last 72 hours.    Current Meds:  Current Facility-Administered Medications   Medication Dose Route Frequency    potassium chloride (KLOR-CON M) extended release tablet 40 mEq  40 mEq Oral Once    gabapentin (NEURONTIN) capsule 100 mg  100 mg Oral Nightly    [Held by provider] hydroCHLOROthiazide (HYDRODIURIL) tablet 12.5 mg  12.5 mg Oral Every Other Day    ipratropium 0.5 mg-albuterol 2.5 mg (DUONEB) nebulizer solution 1 Dose  1 Dose Inhalation Q6H PRN    [Held by provider] lisinopril (PRINIVIL;ZESTRIL) tablet 20 mg  20 mg Oral Daily    melatonin tablet 3 mg  3 mg Oral Nightly PRN    pramipexole (MIRAPEX) tablet 0.5 mg  0.5 mg Oral Nightly    pravastatin (PRAVACHOL) tablet 10 mg  10 mg Oral QHS    sodium chloride flush 0.9 % injection 5-40 mL  5-40 mL IntraVENous 2 times per day    sodium chloride flush 0.9 % injection 5-40 mL  5-40 mL IntraVENous PRN    0.9 % sodium chloride infusion   IntraVENous PRN    potassium chloride (KLOR-CON M) extended release tablet 40 mEq  40 mEq Oral PRN    Or    potassium bicarb-citric acid (EFFER-K) effervescent tablet 40 mEq  40 mEq Oral PRN    Or    potassium chloride 10 mEq/100 mL IVPB (Peripheral Line)  10 mEq IntraVENous PRN    magnesium sulfate 2000 mg in 50 mL IVPB premix  2,000 mg IntraVENous PRN    ondansetron (ZOFRAN-ODT)

## 2024-10-23 NOTE — PLAN OF CARE
Problem: Discharge Planning  Goal: Discharge to home or other facility with appropriate resources  Outcome: Completed  Flowsheets (Taken 10/23/2024 0800)  Discharge to home or other facility with appropriate resources: Identify barriers to discharge with patient and caregiver     Problem: Pain  Goal: Verbalizes/displays adequate comfort level or baseline comfort level  Outcome: Completed  Flowsheets (Taken 10/23/2024 0720)  Verbalizes/displays adequate comfort level or baseline comfort level:   Encourage patient to monitor pain and request assistance   Assess pain using appropriate pain scale   Administer analgesics based on type and severity of pain and evaluate response   Implement non-pharmacological measures as appropriate and evaluate response   Consider cultural and social influences on pain and pain management   Notify Licensed Independent Practitioner if interventions unsuccessful or patient reports new pain     Problem: ABCDS Injury Assessment  Goal: Absence of physical injury  Outcome: Completed  Flowsheets (Taken 10/23/2024 0800)  Absence of Physical Injury: Implement safety measures based on patient assessment     Problem: Safety - Adult  Goal: Free from fall injury  Outcome: Completed  Flowsheets (Taken 10/23/2024 0800)  Free From Fall Injury: Instruct family/caregiver on patient safety     Problem: Respiratory - Adult  Goal: Achieves optimal ventilation and oxygenation  Outcome: Completed  Flowsheets (Taken 10/23/2024 0800)  Achieves optimal ventilation and oxygenation: Assess for changes in respiratory status

## 2024-10-23 NOTE — CARE COORDINATION
Pt chart reviewed and discussed in Critical Care Interdisciplinary Rounds. LOS 1 days. Pt admitted with sepsis due to pneumonia. Per MD, PT to eval with potential discharge home today.     DC/POC home with family when medically stable.    CM team will continue to follow for potential discharge needs that may arise.

## 2024-10-23 NOTE — DISCHARGE SUMMARY
Associated Diagnoses: Primary hypertension      fluticasone-salmeterol (ADVAIR) 500-50 MCG/ACT AEPB diskus inhaler Inhale 1 puff into the lungs in the morning and 1 puff in the evening.  Qty: 1 each, Refills: 11    Associated Diagnoses: Moderate persistent asthma with exacerbation      diclofenac (VOLTAREN) 75 MG EC tablet Take 1 tablet by mouth 2 times daily  Qty: 180 tablet, Refills: 0    Associated Diagnoses: Medication refill      metroNIDAZOLE (METROGEL) 1 % gel Apply 1 Tube topically daily  Qty: 1 each, Refills: 0      chlorhexidine (PERIDEX) 0.12 % solution Swish and spit 15 mLs 2 times daily PLACE 15 ML IN THE MOUTH SWISH IN MOUTH FOR 30 SECONDS THEN SPIT OUT  Qty: 1 each, Refills: 0      Melatonin-Theanine (SLEEP+CALM) 3-50 MG CHEW Take by mouth nightly      pravastatin (PRAVACHOL) 10 MG tablet Take 1 tablet by mouth daily  Qty: 30 tablet, Refills: 5      vitamin D (CHOLECALCIFEROL) 25 MCG (1000 UT) TABS tablet Take 1 tablet by mouth daily      fluticasone (FLONASE) 50 MCG/ACT nasal spray 1 spray in each nostril      albuterol sulfate HFA (VENTOLIN HFA) 108 (90 Base) MCG/ACT inhaler Inhale 2 puffs into the lungs every 4 hours as needed for Wheezing  Qty: 1 each, Refills: 11    Associated Diagnoses: Moderate persistent asthma with exacerbation           STOP taking these medications       Multiple Vitamin (MULTIVITAMIN ADULT PO) Comments:   Reason for Stopping:         hydroCHLOROthiazide 12.5 MG tablet Comments:   Reason for Stopping:         levalbuterol (XOPENEX) 0.63 MG/3ML nebulization Comments:   Reason for Stopping:         vitamin C (ASCORBIC ACID) 500 MG tablet Comments:   Reason for Stopping:         Multiple Vitamins-Minerals (ICAPS AREDS FORMULA PO) Comments:   Reason for Stopping:               Some of the medications may be marked as \"stop taking\" by the system; but in reality patient or family reported already being off these meds; defer to outpatient/prescribing providers.      Procedures  Device Mode: Intermittent  Pulse Oximeter Device Location: Finger  O2 Device: None (Room air)    Estimated body mass index is 31.17 kg/m² as calculated from the following:    Height as of this encounter: 1.702 m (5' 7\").    Weight as of this encounter: 90.3 kg (199 lb).    Intake/Output Summary (Last 24 hours) at 10/23/2024 1441  Last data filed at 10/23/2024 0559  Gross per 24 hour   Intake 65.67 ml   Output 600 ml   Net -534.33 ml         Physical Exam:  /69   Pulse 78   Temp 97.8 °F (36.6 °C) (Oral)   Resp 17   Ht 1.702 m (5' 7\")   Wt 90.3 kg (199 lb)   SpO2 92%   BMI 31.17 kg/m²      General:          Well nourished.    Patient appears stronger and more energetic.   Patient is sitting up in bed.   Afebrile.   Patient is talking and answering questions well.   Head:               Normocephalic, atraumatic  Eyes:               Sclerae appear normal.  Pupils equally round.  ENT:                Nares appear normal.  Moist oral mucosa  Neck:               No restricted ROM.  Trachea midline   CV:                  RRR.  No m/r/g.  No jugular venous distension.  Lungs:             CTAB.  No wheezing, rhonchi, or rales.  Symmetric expansion.  Abdomen:        Soft, nontender, mildly distended.  Extremities:     No cyanosis or clubbing.  No edema  Skin:                No rashes.  Normal coloration.   Warm and dry.    Neuro:             CN II-XII grossly intact.    Psych:             Normal mood and affect.       Signed:  German Arcos MD    Part of this note may have been written by using a voice dictation software.  The note has been proof read but may still contain some grammatical/other typographical errors.

## 2024-10-23 NOTE — PROGRESS NOTES
ACUTE OCCUPATIONAL THERAPY GOALS:   (Developed with and agreed upon by patient and/or caregiver.)  1. Patient will perform grooming with supervision.  2. Patient will perform upper body dressing with supervision.  3. Patient will perform lower body dressing with SBA.  4. Patient will perform bathing with SBA.  5. Patient will perform toileting and toilet transfer with SBA.  6. Patient will perform ADL functional mobility and tranfers in room with SBA.  7. Patient/family to demonstrate knowledge of home safety and DME recommendations.    Goals to be achieved in 7 days.     OCCUPATIONAL THERAPY Initial Assessment, Daily Note, and AM          Acknowledge Orders  Time  OT Charge Capture  Rehab Caseload Tracker      Pritesh Orellana is a 79 y.o. male   PRIMARY DIAGNOSIS: Sepsis due to pneumonia (HCC)  Septicemia (HCC) [A41.9]  General weakness [R53.1]  Sepsis due to pneumonia (HCC) [J18.9, A41.9]  Pneumonia of both lungs due to infectious organism, unspecified part of lung [J18.9]       Reason for Referral: Generalized Muscle Weakness (M62.81)  Inpatient: Payor: MEDICARE / Plan: MEDICARE PART A AND B / Product Type: *No Product type* /     ASSESSMENT:     REHAB RECOMMENDATIONS:   Recommendation to date pending progress:  Setting:  No further skilled occupational therapy after discharge from hospital    Equipment:    To Be Determined     ASSESSMENT:  Mr. Orellana was admitted with above diagnosis after 7-8 radiation treatments for prostrate cancer. Pt is normally independent with all self care and mobility to include driving. Pt is noted today with generalized weakness, decreased balance and overall decreased tolerance to activity. Pt would benefit from OT to maximize safety and independence with self care and functional mobility. OT will follow. Pt should do well to discharge home with wife. PT has recommended outpatient rehab.      [facility] AM-PAC™ “6 Clicks” Daily Activity Inpatient Short Form:   I S SBA CGA Min Mod Max Total NT Comments   BASIC ADLs:              Upper Body Bathing  [] [] [] [] [x] [] [] [] [] []     Lower Body Bathing [] [] [] [] [] [x] [] [] [] []     Toileting [] [] [] [] [x] [] [] [] [] []    Upper Body Dressing [] [] [] [] [x] [] [] [] [] []    Lower Body Dressing [] [] [] [] [] [x] [] [] [] []    Feeding [x] [] [] [] [] [] [] [] [] []    Grooming [] [] [] [x] [] [] [] [] [] [] Standing at sink    Personal Device Care [] [] [] [] [] [] [] [] [] []    Functional Mobility [] [] [] [x] [x] [] [] [] [] [] Rolator    I=Independent, Mod I=Modified Independent, S=Supervision/Setup, SBA=Standby Assistance, CGA=Contact Guard Assistance, Min=Minimal Assistance, Mod=Moderate Assistance, Max=Maximal Assistance, Total=Total Assistance, NT=Not Tested    PLAN:   FREQUENCY/DURATION   OT Plan of Care: 3 times/week for duration of hospital stay or until stated goals are met, whichever comes first.    PROBLEM LIST:   (Skilled intervention is medically necessary to address:)  Decreased ADL/Functional Activities  Decreased Activity Tolerance  Decreased Balance  Decreased Strength   INTERVENTIONS PLANNED:  (Benefits and precautions of occupational therapy have been discussed with the patient.)  Self Care Training  Therapeutic Activity  Education         TREATMENT:     EVALUATION: LOW COMPLEXITY: (Untimed Charge)  The initial evaluation charge encompasses clinical chart review, objective assessment, interpretation of assessment, and skilled monitoring of the patient's response to treatment in order to develop a plan of care.     TREATMENT:   Self Care (25 minutes): Patient participated in lower body bathing, toileting, lower body dressing, grooming, and functional mobility ADLs in unsupported sitting and standing with minimal verbal and manual cueing to decrease assistance required, increase activity tolerance, and increase safety awareness. Patient also participated in functional mobility, functional

## 2024-10-23 NOTE — PROGRESS NOTES
O2: PRECAUTION / LINES / DRAINS:   Pre Treatment:  no complaints         Post Treatment: no complaints Vitals        Oxygen      External Catheter-removed at end of session    RESTRICTIONS/PRECAUTIONS:                    GROSS EVALUATION:  Intact Impaired (Comments):   AROM []  Generally decreased, functional    PROM []    Strength []  Generally decreased, functional - ambulatory with RW   Balance [] Sitting - Static: Good  Sitting - Dynamic: Fair, +  Standing - Static: Fair, +  Standing - Dynamic: Fair   Posture [] N/A   Sensation []     Coordination []  Generally decreased, functional    Tone []     Edema []    Activity Tolerance []  Generally decreased    []      COGNITION/  PERCEPTION: Intact Impaired (Comments):   Orientation [x]     Vision [x]     Hearing []  Hearing aids   Cognition  [x]       MOBILITY: I Mod I S SBA CGA Min Mod Max Total  NT x2 Comments:   Bed Mobility    Rolling [] [] [] [] [] [] [] [] [] [] []    Supine to Sit [] [] [] [] [] [] [] [] [] [] []    Scooting [] [] [] [] [] [] [] [] [] [] []    Sit to Supine [] [] [] [] [] [] [] [] [] [] []    Transfers    Sit to Stand [] [] [x] [] [] [] [] [] [] [] []    Bed to Chair [] [] [x] [] [] [] [] [] [] [] []    Stand to Sit [] [] [x] [] [] [] [] [] [] [] []    Toilet  [] [] [x] [x] [] [] [] [] [] [] [] Supervision with rollator, STANDBY ASSIST without AD   I=Independent, Mod I=Modified Independent, S=Supervision, SBA=Standby Assistance, CGA=Contact Guard Assistance,   Min=Minimal Assistance, Mod=Moderate Assistance, Max=Maximal Assistance, Total=Total Assistance, NT=Not Tested    GAIT: I Mod I S SBA CGA Min Mod Max Total  NT x2 Comments:   Level of Assistance [] [] [x] [] [] [] [] [] [] [] []    Distance 300 feet    DME Rollator    Gait Quality Decreased domenico , Decreased step length, and Trunk flexion    Weightbearing Status      Stairs      I=Independent, Mod I=Modified Independent, S=Supervision, SBA=Standby Assistance, CGA=Contact Guard  Assistance,   Min=Minimal Assistance, Mod=Moderate Assistance, Max=Maximal Assistance, Total=Total Assistance, NT=Not Tested    PLAN:   FREQUENCY AND DURATION: 5 times/week for duration of hospital stay or until stated goals are met, whichever comes first.    THERAPY PROGNOSIS: Good    PROBLEM LIST:   (Skilled intervention is medically necessary to address:)  Decreased ADL/Functional Activities  Decreased Activity Tolerance  Decreased Balance  Decreased Gait Ability  Decreased Strength  Decreased Transfer Abilities INTERVENTIONS PLANNED:   (Benefits and precautions of physical therapy have been discussed with the patient.)  Therapeutic Activity  Therapeutic Exercise/HEP  Gait Training  Education       TREATMENT:       TREATMENT:   Therapeutic Activity (30 Minutes): Therapeutic activity included Transfer Training, Ambulation on level ground, Sitting balance , and Standing balance to improve functional Activity tolerance, Balance, Coordination, Mobility, Strength, and ROM.    TREATMENT GRID:  N/A    AFTER TREATMENT PRECAUTIONS: Bed/Chair Locked, Call light within reach, Chair, Needs within reach, RN notified, and Visitors at bedside    INTERDISCIPLINARY COLLABORATION:  MD/ PA/ NP , RN/ PCT, and OT/ REYES    EDUCATION: Education Given To: Patient  Education Provided: Role of Therapy;Plan of Care  Education Method: Verbal  Education Outcome: Verbalized understanding    TIME IN/OUT:  Time In: 1120  Time Out: 1150  Minutes: 30    Irma Ignacio, PT

## 2024-10-23 NOTE — CARE COORDINATION
Pt is for discharge home today with family and no needs/supportive care orders received for MSW at this time.    Referral for outpatient gastroenterologist for colonoscopy.          10/23/24 8432   Social/Functional History   Lives With Spouse   Type of Home House   Bathroom Equipment None   Home Equipment Cane;Rollator   ADL Assistance Independent   Mode of Transportation Car   Services At/After Discharge   Transition of Care Consult (CM Consult) Discharge Planning   Services At/After Discharge Outpatient  (GI)    Resource Information Provided? No   Mode of Transport at Discharge Self   Confirm Follow Up Transport Family   Condition of Participation: Discharge Planning   The Plan for Transition of Care is related to the following treatment goals: Patient to return home with family with outpatient GI follow-up.   The Patient and/or Patient Representative was provided with a Choice of Provider? Patient   The Patient and/Or Patient Representative agree with the Discharge Plan? Yes   Freedom of Choice list was provided with basic dialogue that supports the patient's individualized plan of care/goals, treatment preferences, and shares the quality data associated with the providers?  Yes     Crystal Morales, MSW, LBSW    [facility]

## 2024-10-24 ENCOUNTER — HOSPITAL ENCOUNTER (OUTPATIENT)
Dept: RADIATION ONCOLOGY | Age: 79
Setting detail: RECURRING SERIES
Discharge: HOME | End: 2024-10-27
Payer: MEDICARE

## 2024-10-24 ENCOUNTER — TELEPHONE (OUTPATIENT)
Dept: INTERNAL MEDICINE CLINIC | Facility: CLINIC | Age: 79
End: 2024-10-24

## 2024-10-24 ENCOUNTER — TELEPHONE (OUTPATIENT)
Dept: GASTROENTEROLOGY | Age: 79
End: 2024-10-24

## 2024-10-24 LAB
RAD ONC ARIA COURSE FIRST TREATMENT DATE: NORMAL
RAD ONC ARIA COURSE ID: NORMAL
RAD ONC ARIA COURSE INTENT: NORMAL
RAD ONC ARIA COURSE LAST TREATMENT DATE: NORMAL
RAD ONC ARIA COURSE SESSION NUMBER: 9
RAD ONC ARIA COURSE START DATE: NORMAL
RAD ONC ARIA COURSE TREATMENT ELAPSED DAYS: 15
RAD ONC ARIA PLAN FRACTIONS TREATED TO DATE: 9
RAD ONC ARIA PLAN ID: NORMAL
RAD ONC ARIA PLAN PRESCRIBED DOSE PER FRACTION: 2.5 GY
RAD ONC ARIA PLAN PRIMARY REFERENCE POINT: NORMAL
RAD ONC ARIA PLAN TOTAL FRACTIONS PRESCRIBED: 28
RAD ONC ARIA PLAN TOTAL PRESCRIBED DOSE: 7000 CGY
RAD ONC ARIA REFERENCE POINT DOSAGE GIVEN TO DATE: 22.5 GY
RAD ONC ARIA REFERENCE POINT ID: NORMAL
RAD ONC ARIA REFERENCE POINT SESSION DOSAGE GIVEN: 2.5 GY

## 2024-10-24 PROCEDURE — 77385 HC NTSTY MODUL RAD TX DLVR SMPL: CPT

## 2024-10-24 NOTE — TELEPHONE ENCOUNTER
----- Message from TORI Cheng sent at 10/22/2024 10:06 AM EDT -----  Hi! Would you be able to call patient's wife to set up colonoscopy with Dr. Hicks for lower abdominal pain, family history of colon CA? Would like to be scheduled in a few weeks. Wife and patient both aware. Thank you!

## 2024-10-24 NOTE — TELEPHONE ENCOUNTER
[facility]   11/20/2024 11:15 AM [facility] RAD ONC EDGE [facility]   11/27/2024  7:45 AM MAT LAB [facility] DEP   12/2/2024  3:00 PM Duncan De Luna III, MD [facility]   12/4/2024  2:20 PM Natasha Rodriguez MD [facility] DEP   1/7/2025  3:40 PM Natasha Rodriguez MD [facility] DEP   1/24/2025  9:20 AM Jessica Zamorano, APRN - CNP [facility] AMB   2/12/2025  8:30 AM Wilson Wright MD [facility]       Grace Altman LPN

## 2024-10-25 ENCOUNTER — HOSPITAL ENCOUNTER (OUTPATIENT)
Dept: RADIATION ONCOLOGY | Age: 79
Setting detail: RECURRING SERIES
Discharge: HOME | End: 2024-10-28
Payer: MEDICARE

## 2024-10-25 LAB
RAD ONC ARIA COURSE FIRST TREATMENT DATE: NORMAL
RAD ONC ARIA COURSE ID: NORMAL
RAD ONC ARIA COURSE INTENT: NORMAL
RAD ONC ARIA COURSE LAST TREATMENT DATE: NORMAL
RAD ONC ARIA COURSE SESSION NUMBER: 10
RAD ONC ARIA COURSE START DATE: NORMAL
RAD ONC ARIA COURSE TREATMENT ELAPSED DAYS: 16
RAD ONC ARIA PLAN FRACTIONS TREATED TO DATE: 10
RAD ONC ARIA PLAN ID: NORMAL
RAD ONC ARIA PLAN PRESCRIBED DOSE PER FRACTION: 2.5 GY
RAD ONC ARIA PLAN PRIMARY REFERENCE POINT: NORMAL
RAD ONC ARIA PLAN TOTAL FRACTIONS PRESCRIBED: 28
RAD ONC ARIA PLAN TOTAL PRESCRIBED DOSE: 7000 CGY
RAD ONC ARIA REFERENCE POINT DOSAGE GIVEN TO DATE: 25 GY
RAD ONC ARIA REFERENCE POINT ID: NORMAL
RAD ONC ARIA REFERENCE POINT SESSION DOSAGE GIVEN: 2.5 GY

## 2024-10-25 PROCEDURE — 77385 HC NTSTY MODUL RAD TX DLVR SMPL: CPT

## 2024-10-25 PROCEDURE — 77336 RADIATION PHYSICS CONSULT: CPT

## 2024-10-25 NOTE — ON TREATMENT VISIT
[facility] RADIATION ONCOLOGY ON TREATMENT VISIT    Patient: Pritesh Orellana MRN: [id number]  SSN: [id number]    YOB: 1945  Age: 79 y.o.  Sex: male      10/25/24    Diagnosis:   Cancer Staging   Malignant neoplasm of prostate (HCC)  Staging form: Prostate, AJCC 8th Edition  - Clinical: Stage IIC (cT1c, cN0, cM0, PSA: 7, Grade Group: 3) - Signed by Kvng Richards MD on 7/19/2024      This is a 79 y.o. male who is currently receiving moderate hypofractionation for prostate cancer.    Current RT dose: 22.5/70 Gy in 9/28 fractions.     Concurrent ADT:8/1/24    Subjective:  Week 1: No complaints.  Week 2: Has noticed some increase in loose stool this week. Also had some trouble getting bladder full.   Week 2: Seen within same treatment bin as he missed few days this week as he was admitted to the hospital with sepsis from pneumonia. He is now discharged and has resumed treatment. No pelvic complaints.     Objective:  There were no vitals filed for this visit.  Pain 0/10    General: Alert and conversant, in NAD  Skin: no XRT issues.    Assessment:  Patient is tolerating radiation therapy well with expected XRT effects.    Plan:  -Continue RT as planned.  -Discussed holding 1 of his stool softeners if diarrhea persists.   -Treatment images reviewed.  -The patient has a documented plan of care to address pain.  Pain is not present.      Kvng Richards MD  10/25/24

## 2024-10-26 LAB
BACTERIA SPEC CULT: NORMAL
BACTERIA SPEC CULT: NORMAL
SERVICE CMNT-IMP: NORMAL
SERVICE CMNT-IMP: NORMAL

## 2024-10-28 ENCOUNTER — TELEPHONE (OUTPATIENT)
Dept: GASTROENTEROLOGY | Age: 79
End: 2024-10-28

## 2024-10-28 ENCOUNTER — TELEPHONE (OUTPATIENT)
Dept: INTERNAL MEDICINE CLINIC | Facility: CLINIC | Age: 79
End: 2024-10-28

## 2024-10-28 ENCOUNTER — HOSPITAL ENCOUNTER (OUTPATIENT)
Dept: RADIATION ONCOLOGY | Age: 79
Setting detail: RECURRING SERIES
Discharge: HOME | End: 2024-10-31
Payer: MEDICARE

## 2024-10-28 LAB
RAD ONC ARIA COURSE FIRST TREATMENT DATE: NORMAL
RAD ONC ARIA COURSE ID: NORMAL
RAD ONC ARIA COURSE INTENT: NORMAL
RAD ONC ARIA COURSE LAST TREATMENT DATE: NORMAL
RAD ONC ARIA COURSE SESSION NUMBER: 11
RAD ONC ARIA COURSE START DATE: NORMAL
RAD ONC ARIA COURSE TREATMENT ELAPSED DAYS: 19
RAD ONC ARIA PLAN FRACTIONS TREATED TO DATE: 11
RAD ONC ARIA PLAN ID: NORMAL
RAD ONC ARIA PLAN PRESCRIBED DOSE PER FRACTION: 2.5 GY
RAD ONC ARIA PLAN PRIMARY REFERENCE POINT: NORMAL
RAD ONC ARIA PLAN TOTAL FRACTIONS PRESCRIBED: 28
RAD ONC ARIA PLAN TOTAL PRESCRIBED DOSE: 7000 CGY
RAD ONC ARIA REFERENCE POINT DOSAGE GIVEN TO DATE: 27.5 GY
RAD ONC ARIA REFERENCE POINT ID: NORMAL
RAD ONC ARIA REFERENCE POINT SESSION DOSAGE GIVEN: 2.5 GY

## 2024-10-28 PROCEDURE — 77385 HC NTSTY MODUL RAD TX DLVR SMPL: CPT

## 2024-10-29 ENCOUNTER — TELEPHONE (OUTPATIENT)
Dept: GASTROENTEROLOGY | Age: 79
End: 2024-10-29

## 2024-10-29 ENCOUNTER — OFFICE VISIT (OUTPATIENT)
Dept: INTERNAL MEDICINE CLINIC | Facility: CLINIC | Age: 79
End: 2024-10-29

## 2024-10-29 ENCOUNTER — HOSPITAL ENCOUNTER (OUTPATIENT)
Dept: RADIATION ONCOLOGY | Age: 79
Setting detail: RECURRING SERIES
Discharge: HOME | End: 2024-11-01
Payer: MEDICARE

## 2024-10-29 VITALS
WEIGHT: 195 LBS | SYSTOLIC BLOOD PRESSURE: 142 MMHG | OXYGEN SATURATION: 97 % | HEART RATE: 82 BPM | TEMPERATURE: 97.3 F | DIASTOLIC BLOOD PRESSURE: 78 MMHG | HEIGHT: 67 IN | BODY MASS INDEX: 30.61 KG/M2

## 2024-10-29 DIAGNOSIS — C61 MALIGNANT NEOPLASM OF PROSTATE (HCC): ICD-10-CM

## 2024-10-29 DIAGNOSIS — Z09 HOSPITAL DISCHARGE FOLLOW-UP: Primary | ICD-10-CM

## 2024-10-29 DIAGNOSIS — Z12.11 SCREEN FOR COLON CANCER: ICD-10-CM

## 2024-10-29 DIAGNOSIS — A41.9 SEPSIS DUE TO PNEUMONIA (HCC): ICD-10-CM

## 2024-10-29 DIAGNOSIS — E55.9 VITAMIN D DEFICIENCY: ICD-10-CM

## 2024-10-29 DIAGNOSIS — E78.2 MIXED HYPERLIPIDEMIA: ICD-10-CM

## 2024-10-29 DIAGNOSIS — R93.3 ABNORMAL CT SCAN, COLON: ICD-10-CM

## 2024-10-29 DIAGNOSIS — J18.9 SEPSIS DUE TO PNEUMONIA (HCC): ICD-10-CM

## 2024-10-29 LAB
RAD ONC ARIA COURSE FIRST TREATMENT DATE: NORMAL
RAD ONC ARIA COURSE ID: NORMAL
RAD ONC ARIA COURSE INTENT: NORMAL
RAD ONC ARIA COURSE LAST TREATMENT DATE: NORMAL
RAD ONC ARIA COURSE SESSION NUMBER: 12
RAD ONC ARIA COURSE START DATE: NORMAL
RAD ONC ARIA COURSE TREATMENT ELAPSED DAYS: 20
RAD ONC ARIA PLAN FRACTIONS TREATED TO DATE: 12
RAD ONC ARIA PLAN ID: NORMAL
RAD ONC ARIA PLAN PRESCRIBED DOSE PER FRACTION: 2.5 GY
RAD ONC ARIA PLAN PRIMARY REFERENCE POINT: NORMAL
RAD ONC ARIA PLAN TOTAL FRACTIONS PRESCRIBED: 28
RAD ONC ARIA PLAN TOTAL PRESCRIBED DOSE: 7000 CGY
RAD ONC ARIA REFERENCE POINT DOSAGE GIVEN TO DATE: 30 GY
RAD ONC ARIA REFERENCE POINT ID: NORMAL
RAD ONC ARIA REFERENCE POINT SESSION DOSAGE GIVEN: 2.5 GY

## 2024-10-29 PROCEDURE — 77385 HC NTSTY MODUL RAD TX DLVR SMPL: CPT

## 2024-10-29 ASSESSMENT — PATIENT HEALTH QUESTIONNAIRE - PHQ9
SUM OF ALL RESPONSES TO PHQ QUESTIONS 1-9: 0
2. FEELING DOWN, DEPRESSED OR HOPELESS: NOT AT ALL
SUM OF ALL RESPONSES TO PHQ9 QUESTIONS 1 & 2: 0
SUM OF ALL RESPONSES TO PHQ QUESTIONS 1-9: 0
1. LITTLE INTEREST OR PLEASURE IN DOING THINGS: NOT AT ALL
SUM OF ALL RESPONSES TO PHQ QUESTIONS 1-9: 0
SUM OF ALL RESPONSES TO PHQ QUESTIONS 1-9: 0

## 2024-10-29 NOTE — PROGRESS NOTES
free fluid or free air. No pathologic adenopathy. No abdominal  aortic aneurysm. There is a 2.6 mm circumscribed fat density in the mesentery on  the left.    Osseous structures show no evidence of acute fracture or suspicious lesion.  Postoperative changes are seen in the lower lumbar spine.    Impression  Hyperdensity is seen in the proximal colon as described above. Assuming this  patient did not ingest any oral contrast or hyperdense medication, hemorrhage in  this area cannot be ruled out.    Otherwise no convincing evidence of an acute process in the abdomen or pelvis.    Mild bibasilar atelectasis.    Bilateral renal parapelvic cysts.    Circumscribed fat density structure in the left mesentery may represent a focus  of mesenteric infarction or a prominent epiploic appendage. It does not appear  inflamed.    Electronically signed by REBEKAH LOPEZ        An electronic signature was used to authenticate this note.  --GOMEZ Miller - CNP

## 2024-10-29 NOTE — TELEPHONE ENCOUNTER
Called pt lvm requesting a call back to schedule procedure. I'll mail letter due 3x calling no answer.

## 2024-10-30 ENCOUNTER — HOSPITAL ENCOUNTER (OUTPATIENT)
Dept: RADIATION ONCOLOGY | Age: 79
Setting detail: RECURRING SERIES
Discharge: HOME | End: 2024-11-02
Payer: MEDICARE

## 2024-10-30 LAB
RAD ONC ARIA COURSE FIRST TREATMENT DATE: NORMAL
RAD ONC ARIA COURSE ID: NORMAL
RAD ONC ARIA COURSE INTENT: NORMAL
RAD ONC ARIA COURSE LAST TREATMENT DATE: NORMAL
RAD ONC ARIA COURSE SESSION NUMBER: 13
RAD ONC ARIA COURSE START DATE: NORMAL
RAD ONC ARIA COURSE TREATMENT ELAPSED DAYS: 21
RAD ONC ARIA PLAN FRACTIONS TREATED TO DATE: 13
RAD ONC ARIA PLAN ID: NORMAL
RAD ONC ARIA PLAN PRESCRIBED DOSE PER FRACTION: 2.5 GY
RAD ONC ARIA PLAN PRIMARY REFERENCE POINT: NORMAL
RAD ONC ARIA PLAN TOTAL FRACTIONS PRESCRIBED: 28
RAD ONC ARIA PLAN TOTAL PRESCRIBED DOSE: 7000 CGY
RAD ONC ARIA REFERENCE POINT DOSAGE GIVEN TO DATE: 32.5 GY
RAD ONC ARIA REFERENCE POINT ID: NORMAL
RAD ONC ARIA REFERENCE POINT SESSION DOSAGE GIVEN: 2.5 GY

## 2024-10-30 PROCEDURE — 77385 HC NTSTY MODUL RAD TX DLVR SMPL: CPT

## 2024-10-31 ENCOUNTER — HOSPITAL ENCOUNTER (OUTPATIENT)
Dept: RADIATION ONCOLOGY | Age: 79
Setting detail: RECURRING SERIES
Discharge: HOME | End: 2024-11-03
Payer: MEDICARE

## 2024-10-31 ENCOUNTER — TELEPHONE (OUTPATIENT)
Age: 79
End: 2024-10-31

## 2024-10-31 LAB
RAD ONC ARIA COURSE FIRST TREATMENT DATE: NORMAL
RAD ONC ARIA COURSE ID: NORMAL
RAD ONC ARIA COURSE INTENT: NORMAL
RAD ONC ARIA COURSE LAST TREATMENT DATE: NORMAL
RAD ONC ARIA COURSE SESSION NUMBER: 14
RAD ONC ARIA COURSE START DATE: NORMAL
RAD ONC ARIA COURSE TREATMENT ELAPSED DAYS: 22
RAD ONC ARIA PLAN FRACTIONS TREATED TO DATE: 14
RAD ONC ARIA PLAN ID: NORMAL
RAD ONC ARIA PLAN PRESCRIBED DOSE PER FRACTION: 2.5 GY
RAD ONC ARIA PLAN PRIMARY REFERENCE POINT: NORMAL
RAD ONC ARIA PLAN TOTAL FRACTIONS PRESCRIBED: 28
RAD ONC ARIA PLAN TOTAL PRESCRIBED DOSE: 7000 CGY
RAD ONC ARIA REFERENCE POINT DOSAGE GIVEN TO DATE: 35 GY
RAD ONC ARIA REFERENCE POINT ID: NORMAL
RAD ONC ARIA REFERENCE POINT SESSION DOSAGE GIVEN: 2.5 GY

## 2024-10-31 PROCEDURE — 77385 HC NTSTY MODUL RAD TX DLVR SMPL: CPT

## 2024-10-31 NOTE — TELEPHONE ENCOUNTER
Patient called stating you recommended for him to have an colonoscopy while he was in the hospital on 10/21 due to family history of colon CA and lower abdominal pain and he declined at that time. He is calling back to schedule an colonoscopy, but he would like to know since he is almost finished with his radiation could he have the colonoscopy completed at the beginning of January instead unless it is urgent that he has this procedure now.

## 2024-11-01 ENCOUNTER — HOSPITAL ENCOUNTER (OUTPATIENT)
Dept: RADIATION ONCOLOGY | Age: 79
Setting detail: RECURRING SERIES
Discharge: HOME | End: 2024-11-04
Payer: MEDICARE

## 2024-11-01 LAB
RAD ONC ARIA COURSE FIRST TREATMENT DATE: NORMAL
RAD ONC ARIA COURSE ID: NORMAL
RAD ONC ARIA COURSE INTENT: NORMAL
RAD ONC ARIA COURSE LAST TREATMENT DATE: NORMAL
RAD ONC ARIA COURSE SESSION NUMBER: 15
RAD ONC ARIA COURSE START DATE: NORMAL
RAD ONC ARIA COURSE TREATMENT ELAPSED DAYS: 23
RAD ONC ARIA PLAN FRACTIONS TREATED TO DATE: 15
RAD ONC ARIA PLAN ID: NORMAL
RAD ONC ARIA PLAN PRESCRIBED DOSE PER FRACTION: 2.5 GY
RAD ONC ARIA PLAN PRIMARY REFERENCE POINT: NORMAL
RAD ONC ARIA PLAN TOTAL FRACTIONS PRESCRIBED: 28
RAD ONC ARIA PLAN TOTAL PRESCRIBED DOSE: 7000 CGY
RAD ONC ARIA REFERENCE POINT DOSAGE GIVEN TO DATE: 37.5 GY
RAD ONC ARIA REFERENCE POINT ID: NORMAL
RAD ONC ARIA REFERENCE POINT SESSION DOSAGE GIVEN: 2.5 GY

## 2024-11-01 PROCEDURE — 77385 HC NTSTY MODUL RAD TX DLVR SMPL: CPT

## 2024-11-01 PROCEDURE — 77336 RADIATION PHYSICS CONSULT: CPT

## 2024-11-01 ASSESSMENT — ENCOUNTER SYMPTOMS
DIARRHEA: 0
SHORTNESS OF BREATH: 0
COUGH: 0
CONSTIPATION: 0
ABDOMINAL PAIN: 0
BLOOD IN STOOL: 0
NAUSEA: 0
VOMITING: 0

## 2024-11-01 NOTE — ON TREATMENT VISIT
[facility] RADIATION ONCOLOGY ON TREATMENT VISIT    Patient: Pritesh Orellana MRN: [id number]  SSN: [id number]    YOB: 1945  Age: 79 y.o.  Sex: male      11/01/24    Diagnosis:   Cancer Staging   Malignant neoplasm of prostate (HCC)  Staging form: Prostate, AJCC 8th Edition  - Clinical: Stage IIC (cT1c, cN0, cM0, PSA: 7, Grade Group: 3) - Signed by Kvng Richards MD on 7/19/2024      This is a 79 y.o. male who is currently receiving moderate hypofractionation for prostate cancer.    Current RT dose: 35/70 Gy in 15/28 fractions.     Concurrent ADT:8/1/24    Subjective:  Week 1: No complaints.  Week 2: Has noticed some increase in loose stool this week. Also had some trouble getting bladder full.   Week 2: Seen within same treatment bin as he missed few days this week as he was admitted to the hospital with sepsis from pneumonia. He is now discharged and has resumed treatment. No pelvic complaints.   Week 3: No complaints.     Objective:  There were no vitals filed for this visit.  Pain 0/10    General: Alert and conversant, in NAD  Skin: no XRT issues.    Assessment:  Patient is tolerating radiation therapy well with expected XRT effects.    Plan:  -Continue RT as planned.  -Discussed holding 1 of his stool softeners if diarrhea persists.   -Treatment images reviewed.  -The patient has a documented plan of care to address pain.  Pain is not present.      Kvng Richards MD  11/01/24

## 2024-11-04 ENCOUNTER — HOSPITAL ENCOUNTER (OUTPATIENT)
Dept: RADIATION ONCOLOGY | Age: 79
Setting detail: RECURRING SERIES
Discharge: HOME | End: 2024-11-07
Payer: MEDICARE

## 2024-11-04 LAB
RAD ONC ARIA COURSE FIRST TREATMENT DATE: NORMAL
RAD ONC ARIA COURSE ID: NORMAL
RAD ONC ARIA COURSE INTENT: NORMAL
RAD ONC ARIA COURSE LAST TREATMENT DATE: NORMAL
RAD ONC ARIA COURSE SESSION NUMBER: 16
RAD ONC ARIA COURSE START DATE: NORMAL
RAD ONC ARIA COURSE TREATMENT ELAPSED DAYS: 26
RAD ONC ARIA PLAN FRACTIONS TREATED TO DATE: 16
RAD ONC ARIA PLAN ID: NORMAL
RAD ONC ARIA PLAN PRESCRIBED DOSE PER FRACTION: 2.5 GY
RAD ONC ARIA PLAN PRIMARY REFERENCE POINT: NORMAL
RAD ONC ARIA PLAN TOTAL FRACTIONS PRESCRIBED: 28
RAD ONC ARIA PLAN TOTAL PRESCRIBED DOSE: 7000 CGY
RAD ONC ARIA REFERENCE POINT DOSAGE GIVEN TO DATE: 40 GY
RAD ONC ARIA REFERENCE POINT ID: NORMAL
RAD ONC ARIA REFERENCE POINT SESSION DOSAGE GIVEN: 2.5 GY

## 2024-11-04 PROCEDURE — 77385 HC NTSTY MODUL RAD TX DLVR SMPL: CPT

## 2024-11-05 ENCOUNTER — HOSPITAL ENCOUNTER (OUTPATIENT)
Dept: RADIATION ONCOLOGY | Age: 79
Setting detail: RECURRING SERIES
Discharge: HOME | End: 2024-11-08
Payer: MEDICARE

## 2024-11-05 DIAGNOSIS — Z76.0 MEDICATION REFILL: ICD-10-CM

## 2024-11-05 LAB
RAD ONC ARIA COURSE FIRST TREATMENT DATE: NORMAL
RAD ONC ARIA COURSE ID: NORMAL
RAD ONC ARIA COURSE INTENT: NORMAL
RAD ONC ARIA COURSE LAST TREATMENT DATE: NORMAL
RAD ONC ARIA COURSE SESSION NUMBER: 17
RAD ONC ARIA COURSE START DATE: NORMAL
RAD ONC ARIA COURSE TREATMENT ELAPSED DAYS: 27
RAD ONC ARIA PLAN FRACTIONS TREATED TO DATE: 17
RAD ONC ARIA PLAN ID: NORMAL
RAD ONC ARIA PLAN PRESCRIBED DOSE PER FRACTION: 2.5 GY
RAD ONC ARIA PLAN PRIMARY REFERENCE POINT: NORMAL
RAD ONC ARIA PLAN TOTAL FRACTIONS PRESCRIBED: 28
RAD ONC ARIA PLAN TOTAL PRESCRIBED DOSE: 7000 CGY
RAD ONC ARIA REFERENCE POINT DOSAGE GIVEN TO DATE: 42.5 GY
RAD ONC ARIA REFERENCE POINT ID: NORMAL
RAD ONC ARIA REFERENCE POINT SESSION DOSAGE GIVEN: 2.5 GY

## 2024-11-05 PROCEDURE — 77385 HC NTSTY MODUL RAD TX DLVR SMPL: CPT

## 2024-11-05 RX ORDER — DICLOFENAC SODIUM 75 MG/1
75 TABLET, DELAYED RELEASE ORAL 2 TIMES DAILY
Qty: 180 TABLET | Refills: 0 | OUTPATIENT
Start: 2024-11-05

## 2024-11-06 ENCOUNTER — HOSPITAL ENCOUNTER (OUTPATIENT)
Dept: RADIATION ONCOLOGY | Age: 79
Setting detail: RECURRING SERIES
Discharge: HOME | End: 2024-11-09
Payer: MEDICARE

## 2024-11-06 LAB
RAD ONC ARIA COURSE FIRST TREATMENT DATE: NORMAL
RAD ONC ARIA COURSE ID: NORMAL
RAD ONC ARIA COURSE INTENT: NORMAL
RAD ONC ARIA COURSE LAST TREATMENT DATE: NORMAL
RAD ONC ARIA COURSE SESSION NUMBER: 18
RAD ONC ARIA COURSE START DATE: NORMAL
RAD ONC ARIA COURSE TREATMENT ELAPSED DAYS: 28
RAD ONC ARIA PLAN FRACTIONS TREATED TO DATE: 18
RAD ONC ARIA PLAN ID: NORMAL
RAD ONC ARIA PLAN PRESCRIBED DOSE PER FRACTION: 2.5 GY
RAD ONC ARIA PLAN PRIMARY REFERENCE POINT: NORMAL
RAD ONC ARIA PLAN TOTAL FRACTIONS PRESCRIBED: 28
RAD ONC ARIA PLAN TOTAL PRESCRIBED DOSE: 7000 CGY
RAD ONC ARIA REFERENCE POINT DOSAGE GIVEN TO DATE: 45 GY
RAD ONC ARIA REFERENCE POINT ID: NORMAL
RAD ONC ARIA REFERENCE POINT SESSION DOSAGE GIVEN: 2.5 GY

## 2024-11-06 PROCEDURE — 77385 HC NTSTY MODUL RAD TX DLVR SMPL: CPT

## 2024-11-07 ENCOUNTER — HOSPITAL ENCOUNTER (OUTPATIENT)
Dept: RADIATION ONCOLOGY | Age: 79
Setting detail: RECURRING SERIES
End: 2024-11-07
Payer: MEDICARE

## 2024-11-07 DIAGNOSIS — Z76.0 MEDICATION REFILL: ICD-10-CM

## 2024-11-07 RX ORDER — DICLOFENAC SODIUM 75 MG/1
75 TABLET, DELAYED RELEASE ORAL 2 TIMES DAILY
Qty: 180 TABLET | Refills: 0 | Status: SHIPPED | OUTPATIENT
Start: 2024-11-07

## 2024-11-08 ENCOUNTER — APPOINTMENT (OUTPATIENT)
Dept: RADIATION ONCOLOGY | Age: 79
End: 2024-11-08
Payer: MEDICARE

## 2024-11-11 ENCOUNTER — HOSPITAL ENCOUNTER (OUTPATIENT)
Dept: RADIATION ONCOLOGY | Age: 79
Setting detail: RECURRING SERIES
Discharge: HOME | End: 2024-11-14
Payer: MEDICARE

## 2024-11-11 LAB
RAD ONC ARIA COURSE FIRST TREATMENT DATE: NORMAL
RAD ONC ARIA COURSE ID: NORMAL
RAD ONC ARIA COURSE INTENT: NORMAL
RAD ONC ARIA COURSE LAST TREATMENT DATE: NORMAL
RAD ONC ARIA COURSE SESSION NUMBER: 19
RAD ONC ARIA COURSE START DATE: NORMAL
RAD ONC ARIA COURSE TREATMENT ELAPSED DAYS: 33
RAD ONC ARIA PLAN FRACTIONS TREATED TO DATE: 19
RAD ONC ARIA PLAN ID: NORMAL
RAD ONC ARIA PLAN PRESCRIBED DOSE PER FRACTION: 2.5 GY
RAD ONC ARIA PLAN PRIMARY REFERENCE POINT: NORMAL
RAD ONC ARIA PLAN TOTAL FRACTIONS PRESCRIBED: 28
RAD ONC ARIA PLAN TOTAL PRESCRIBED DOSE: 7000 CGY
RAD ONC ARIA REFERENCE POINT DOSAGE GIVEN TO DATE: 47.5 GY
RAD ONC ARIA REFERENCE POINT ID: NORMAL
RAD ONC ARIA REFERENCE POINT SESSION DOSAGE GIVEN: 2.5 GY

## 2024-11-11 PROCEDURE — 77385 HC NTSTY MODUL RAD TX DLVR SMPL: CPT

## 2024-11-12 ENCOUNTER — HOSPITAL ENCOUNTER (OUTPATIENT)
Dept: RADIATION ONCOLOGY | Age: 79
Setting detail: RECURRING SERIES
Discharge: HOME | End: 2024-11-15
Payer: MEDICARE

## 2024-11-12 LAB
RAD ONC ARIA COURSE FIRST TREATMENT DATE: NORMAL
RAD ONC ARIA COURSE ID: NORMAL
RAD ONC ARIA COURSE INTENT: NORMAL
RAD ONC ARIA COURSE LAST TREATMENT DATE: NORMAL
RAD ONC ARIA COURSE SESSION NUMBER: 20
RAD ONC ARIA COURSE START DATE: NORMAL
RAD ONC ARIA COURSE TREATMENT ELAPSED DAYS: 34
RAD ONC ARIA PLAN FRACTIONS TREATED TO DATE: 20
RAD ONC ARIA PLAN ID: NORMAL
RAD ONC ARIA PLAN PRESCRIBED DOSE PER FRACTION: 2.5 GY
RAD ONC ARIA PLAN PRIMARY REFERENCE POINT: NORMAL
RAD ONC ARIA PLAN TOTAL FRACTIONS PRESCRIBED: 28
RAD ONC ARIA PLAN TOTAL PRESCRIBED DOSE: 7000 CGY
RAD ONC ARIA REFERENCE POINT DOSAGE GIVEN TO DATE: 50 GY
RAD ONC ARIA REFERENCE POINT ID: NORMAL
RAD ONC ARIA REFERENCE POINT SESSION DOSAGE GIVEN: 2.5 GY

## 2024-11-12 PROCEDURE — 77385 HC NTSTY MODUL RAD TX DLVR SMPL: CPT

## 2024-11-12 PROCEDURE — 77336 RADIATION PHYSICS CONSULT: CPT

## 2024-11-12 RX ORDER — TAMSULOSIN HYDROCHLORIDE 0.4 MG/1
0.4 CAPSULE ORAL DAILY
Qty: 30 CAPSULE | Refills: 5 | Status: SHIPPED | OUTPATIENT
Start: 2024-11-12

## 2024-11-12 NOTE — ON TREATMENT VISIT
[facility] RADIATION ONCOLOGY ON TREATMENT VISIT    Patient: Pritesh Orellana MRN: [id number]  SSN: [id number]    YOB: 1945  Age: 79 y.o.  Sex: male      11/12/24    Diagnosis:   Cancer Staging   Malignant neoplasm of prostate (HCC)  Staging form: Prostate, AJCC 8th Edition  - Clinical: Stage IIC (cT1c, cN0, cM0, PSA: 7, Grade Group: 3) - Signed by Kvng Richards MD on 7/19/2024      This is a 79 y.o. male who is currently receiving moderate hypofractionation for prostate cancer.    Current RT dose: 47.5/70 Gy in 19/28 fractions.     Concurrent ADT:8/1/24    Subjective:  Week 1: No complaints.  Week 2: Has noticed some increase in loose stool this week. Also had some trouble getting bladder full.   Week 2: Seen within same treatment bin as he missed few days this week as he was admitted to the hospital with sepsis from pneumonia. He is now discharged and has resumed treatment. No pelvic complaints.   Week 3: No complaints.   Week 4: Has pressure like sensation in the bladder, doesn't feel he is emptying all the way.     Objective:  There were no vitals filed for this visit.  Pain 0/10    General: Alert and conversant, in NAD  Skin: no XRT issues.    Assessment:  Patient is tolerating radiation therapy well with expected XRT effects.    Plan:  -Continue RT as planned.  -Discussed holding 1 of his stool softeners if diarrhea persists.   -flomax 0.4 mg qhs.   -Treatment images reviewed.  -The patient has a documented plan of care to address pain.  Pain is not present.      Kvng Richards MD  11/12/24

## 2024-11-13 ENCOUNTER — HOSPITAL ENCOUNTER (OUTPATIENT)
Dept: RADIATION ONCOLOGY | Age: 79
Setting detail: RECURRING SERIES
Discharge: HOME | End: 2024-11-16
Payer: MEDICARE

## 2024-11-13 LAB
RAD ONC ARIA COURSE FIRST TREATMENT DATE: NORMAL
RAD ONC ARIA COURSE ID: NORMAL
RAD ONC ARIA COURSE INTENT: NORMAL
RAD ONC ARIA COURSE LAST TREATMENT DATE: NORMAL
RAD ONC ARIA COURSE SESSION NUMBER: 21
RAD ONC ARIA COURSE START DATE: NORMAL
RAD ONC ARIA COURSE TREATMENT ELAPSED DAYS: 35
RAD ONC ARIA PLAN FRACTIONS TREATED TO DATE: 21
RAD ONC ARIA PLAN ID: NORMAL
RAD ONC ARIA PLAN PRESCRIBED DOSE PER FRACTION: 2.5 GY
RAD ONC ARIA PLAN PRIMARY REFERENCE POINT: NORMAL
RAD ONC ARIA PLAN TOTAL FRACTIONS PRESCRIBED: 28
RAD ONC ARIA PLAN TOTAL PRESCRIBED DOSE: 7000 CGY
RAD ONC ARIA REFERENCE POINT DOSAGE GIVEN TO DATE: 52.5 GY
RAD ONC ARIA REFERENCE POINT ID: NORMAL
RAD ONC ARIA REFERENCE POINT SESSION DOSAGE GIVEN: 2.5 GY

## 2024-11-13 PROCEDURE — 77385 HC NTSTY MODUL RAD TX DLVR SMPL: CPT

## 2024-11-14 ENCOUNTER — HOSPITAL ENCOUNTER (OUTPATIENT)
Dept: RADIATION ONCOLOGY | Age: 79
Setting detail: RECURRING SERIES
Discharge: HOME | End: 2024-11-17
Payer: MEDICARE

## 2024-11-14 LAB
RAD ONC ARIA COURSE FIRST TREATMENT DATE: NORMAL
RAD ONC ARIA COURSE ID: NORMAL
RAD ONC ARIA COURSE INTENT: NORMAL
RAD ONC ARIA COURSE LAST TREATMENT DATE: NORMAL
RAD ONC ARIA COURSE SESSION NUMBER: 22
RAD ONC ARIA COURSE START DATE: NORMAL
RAD ONC ARIA COURSE TREATMENT ELAPSED DAYS: 36
RAD ONC ARIA PLAN FRACTIONS TREATED TO DATE: 22
RAD ONC ARIA PLAN ID: NORMAL
RAD ONC ARIA PLAN PRESCRIBED DOSE PER FRACTION: 2.5 GY
RAD ONC ARIA PLAN PRIMARY REFERENCE POINT: NORMAL
RAD ONC ARIA PLAN TOTAL FRACTIONS PRESCRIBED: 28
RAD ONC ARIA PLAN TOTAL PRESCRIBED DOSE: 7000 CGY
RAD ONC ARIA REFERENCE POINT DOSAGE GIVEN TO DATE: 55 GY
RAD ONC ARIA REFERENCE POINT ID: NORMAL
RAD ONC ARIA REFERENCE POINT SESSION DOSAGE GIVEN: 2.5 GY

## 2024-11-14 PROCEDURE — 77385 HC NTSTY MODUL RAD TX DLVR SMPL: CPT

## 2024-11-15 ENCOUNTER — HOSPITAL ENCOUNTER (OUTPATIENT)
Dept: RADIATION ONCOLOGY | Age: 79
Setting detail: RECURRING SERIES
Discharge: HOME | End: 2024-11-18
Payer: MEDICARE

## 2024-11-15 ENCOUNTER — APPOINTMENT (OUTPATIENT)
Dept: RADIATION ONCOLOGY | Age: 79
End: 2024-11-15
Payer: MEDICARE

## 2024-11-15 LAB
RAD ONC ARIA COURSE FIRST TREATMENT DATE: NORMAL
RAD ONC ARIA COURSE ID: NORMAL
RAD ONC ARIA COURSE INTENT: NORMAL
RAD ONC ARIA COURSE LAST TREATMENT DATE: NORMAL
RAD ONC ARIA COURSE SESSION NUMBER: 23
RAD ONC ARIA COURSE START DATE: NORMAL
RAD ONC ARIA COURSE TREATMENT ELAPSED DAYS: 37
RAD ONC ARIA PLAN FRACTIONS TREATED TO DATE: 23
RAD ONC ARIA PLAN ID: NORMAL
RAD ONC ARIA PLAN PRESCRIBED DOSE PER FRACTION: 2.5 GY
RAD ONC ARIA PLAN PRIMARY REFERENCE POINT: NORMAL
RAD ONC ARIA PLAN TOTAL FRACTIONS PRESCRIBED: 28
RAD ONC ARIA PLAN TOTAL PRESCRIBED DOSE: 7000 CGY
RAD ONC ARIA REFERENCE POINT DOSAGE GIVEN TO DATE: 57.5 GY
RAD ONC ARIA REFERENCE POINT ID: NORMAL
RAD ONC ARIA REFERENCE POINT SESSION DOSAGE GIVEN: 2.5 GY

## 2024-11-15 PROCEDURE — 77385 HC NTSTY MODUL RAD TX DLVR SMPL: CPT

## 2024-11-15 NOTE — ON TREATMENT VISIT
[facility] RADIATION ONCOLOGY ON TREATMENT VISIT    Patient: Pritesh Orellana MRN: [id number]  SSN: [id number]    YOB: 1945  Age: 79 y.o.  Sex: male      11/15/24    Diagnosis:   Cancer Staging   Malignant neoplasm of prostate (HCC)  Staging form: Prostate, AJCC 8th Edition  - Clinical: Stage IIC (cT1c, cN0, cM0, PSA: 7, Grade Group: 3) - Signed by Kvng Richards MD on 7/19/2024      This is a 79 y.o. male who is currently receiving moderate hypofractionation for prostate cancer.    Current RT dose: 60/70 Gy in 24/28 fractions.     Concurrent ADT:8/1/24    Subjective:  Week 1: No complaints.  Week 2: Has noticed some increase in loose stool this week. Also had some trouble getting bladder full.   Week 2: Seen within same treatment bin as he missed few days this week as he was admitted to the hospital with sepsis from pneumonia. He is now discharged and has resumed treatment. No pelvic complaints.   Week 3: No complaints.   Week 4: Has pressure like sensation in the bladder, doesn't feel he is emptying all the way.   Week 5: Flomax helped urinary hesitancy. No other complaints.     Objective:  There were no vitals filed for this visit.  Pain 0/10    General: Alert and conversant, in NAD  Skin: no XRT issues.    Assessment:  Patient is tolerating radiation therapy well with expected XRT effects.    Plan:  -Continue RT as planned.  -Discussed holding 1 of his stool softeners if diarrhea persists.   -flomax 0.4 mg qhs.   -Treatment images reviewed.  -The patient has a documented plan of care to address pain.  Pain is not present.      Kvng Richards MD  11/15/24

## 2024-11-18 ENCOUNTER — APPOINTMENT (OUTPATIENT)
Dept: RADIATION ONCOLOGY | Age: 79
End: 2024-11-18
Payer: MEDICARE

## 2024-11-18 ENCOUNTER — HOSPITAL ENCOUNTER (OUTPATIENT)
Dept: RADIATION ONCOLOGY | Age: 79
Setting detail: RECURRING SERIES
Discharge: HOME | End: 2024-11-21
Payer: MEDICARE

## 2024-11-18 LAB
RAD ONC ARIA COURSE FIRST TREATMENT DATE: NORMAL
RAD ONC ARIA COURSE ID: NORMAL
RAD ONC ARIA COURSE INTENT: NORMAL
RAD ONC ARIA COURSE LAST TREATMENT DATE: NORMAL
RAD ONC ARIA COURSE SESSION NUMBER: 24
RAD ONC ARIA COURSE START DATE: NORMAL
RAD ONC ARIA COURSE TREATMENT ELAPSED DAYS: 40
RAD ONC ARIA PLAN FRACTIONS TREATED TO DATE: 24
RAD ONC ARIA PLAN ID: NORMAL
RAD ONC ARIA PLAN PRESCRIBED DOSE PER FRACTION: 2.5 GY
RAD ONC ARIA PLAN PRIMARY REFERENCE POINT: NORMAL
RAD ONC ARIA PLAN TOTAL FRACTIONS PRESCRIBED: 28
RAD ONC ARIA PLAN TOTAL PRESCRIBED DOSE: 7000 CGY
RAD ONC ARIA REFERENCE POINT DOSAGE GIVEN TO DATE: 60 GY
RAD ONC ARIA REFERENCE POINT ID: NORMAL
RAD ONC ARIA REFERENCE POINT SESSION DOSAGE GIVEN: 2.5 GY

## 2024-11-18 PROCEDURE — 77385 HC NTSTY MODUL RAD TX DLVR SMPL: CPT

## 2024-11-19 ENCOUNTER — APPOINTMENT (OUTPATIENT)
Dept: RADIATION ONCOLOGY | Age: 79
End: 2024-11-19
Payer: MEDICARE

## 2024-11-19 ENCOUNTER — HOSPITAL ENCOUNTER (OUTPATIENT)
Dept: RADIATION ONCOLOGY | Age: 79
Setting detail: RECURRING SERIES
Discharge: HOME | End: 2024-11-22
Payer: MEDICARE

## 2024-11-19 LAB
RAD ONC ARIA COURSE FIRST TREATMENT DATE: NORMAL
RAD ONC ARIA COURSE ID: NORMAL
RAD ONC ARIA COURSE INTENT: NORMAL
RAD ONC ARIA COURSE LAST TREATMENT DATE: NORMAL
RAD ONC ARIA COURSE SESSION NUMBER: 25
RAD ONC ARIA COURSE START DATE: NORMAL
RAD ONC ARIA COURSE TREATMENT ELAPSED DAYS: 41
RAD ONC ARIA PLAN FRACTIONS TREATED TO DATE: 25
RAD ONC ARIA PLAN ID: NORMAL
RAD ONC ARIA PLAN PRESCRIBED DOSE PER FRACTION: 2.5 GY
RAD ONC ARIA PLAN PRIMARY REFERENCE POINT: NORMAL
RAD ONC ARIA PLAN TOTAL FRACTIONS PRESCRIBED: 28
RAD ONC ARIA PLAN TOTAL PRESCRIBED DOSE: 7000 CGY
RAD ONC ARIA REFERENCE POINT DOSAGE GIVEN TO DATE: 62.5 GY
RAD ONC ARIA REFERENCE POINT ID: NORMAL
RAD ONC ARIA REFERENCE POINT SESSION DOSAGE GIVEN: 2.5 GY

## 2024-11-19 PROCEDURE — 77336 RADIATION PHYSICS CONSULT: CPT

## 2024-11-19 PROCEDURE — 77385 HC NTSTY MODUL RAD TX DLVR SMPL: CPT

## 2024-11-20 ENCOUNTER — HOSPITAL ENCOUNTER (OUTPATIENT)
Dept: RADIATION ONCOLOGY | Age: 79
Setting detail: RECURRING SERIES
Discharge: HOME | End: 2024-11-23
Payer: MEDICARE

## 2024-11-20 ENCOUNTER — APPOINTMENT (OUTPATIENT)
Dept: RADIATION ONCOLOGY | Age: 79
End: 2024-11-20
Payer: MEDICARE

## 2024-11-20 LAB
RAD ONC ARIA COURSE FIRST TREATMENT DATE: NORMAL
RAD ONC ARIA COURSE ID: NORMAL
RAD ONC ARIA COURSE INTENT: NORMAL
RAD ONC ARIA COURSE LAST TREATMENT DATE: NORMAL
RAD ONC ARIA COURSE SESSION NUMBER: 26
RAD ONC ARIA COURSE START DATE: NORMAL
RAD ONC ARIA COURSE TREATMENT ELAPSED DAYS: 42
RAD ONC ARIA PLAN FRACTIONS TREATED TO DATE: 26
RAD ONC ARIA PLAN ID: NORMAL
RAD ONC ARIA PLAN PRESCRIBED DOSE PER FRACTION: 2.5 GY
RAD ONC ARIA PLAN PRIMARY REFERENCE POINT: NORMAL
RAD ONC ARIA PLAN TOTAL FRACTIONS PRESCRIBED: 28
RAD ONC ARIA PLAN TOTAL PRESCRIBED DOSE: 7000 CGY
RAD ONC ARIA REFERENCE POINT DOSAGE GIVEN TO DATE: 65 GY
RAD ONC ARIA REFERENCE POINT ID: NORMAL
RAD ONC ARIA REFERENCE POINT SESSION DOSAGE GIVEN: 2.5 GY

## 2024-11-20 PROCEDURE — 77385 HC NTSTY MODUL RAD TX DLVR SMPL: CPT

## 2024-11-21 ENCOUNTER — HOSPITAL ENCOUNTER (OUTPATIENT)
Dept: RADIATION ONCOLOGY | Age: 79
Setting detail: RECURRING SERIES
Discharge: HOME | End: 2024-11-24
Payer: MEDICARE

## 2024-11-21 LAB
RAD ONC ARIA COURSE FIRST TREATMENT DATE: NORMAL
RAD ONC ARIA COURSE ID: NORMAL
RAD ONC ARIA COURSE INTENT: NORMAL
RAD ONC ARIA COURSE LAST TREATMENT DATE: NORMAL
RAD ONC ARIA COURSE SESSION NUMBER: 27
RAD ONC ARIA COURSE START DATE: NORMAL
RAD ONC ARIA COURSE TREATMENT ELAPSED DAYS: 43
RAD ONC ARIA PLAN FRACTIONS TREATED TO DATE: 27
RAD ONC ARIA PLAN ID: NORMAL
RAD ONC ARIA PLAN PRESCRIBED DOSE PER FRACTION: 2.5 GY
RAD ONC ARIA PLAN PRIMARY REFERENCE POINT: NORMAL
RAD ONC ARIA PLAN TOTAL FRACTIONS PRESCRIBED: 28
RAD ONC ARIA PLAN TOTAL PRESCRIBED DOSE: 7000 CGY
RAD ONC ARIA REFERENCE POINT DOSAGE GIVEN TO DATE: 67.5 GY
RAD ONC ARIA REFERENCE POINT ID: NORMAL
RAD ONC ARIA REFERENCE POINT SESSION DOSAGE GIVEN: 2.5 GY

## 2024-11-21 PROCEDURE — 77385 HC NTSTY MODUL RAD TX DLVR SMPL: CPT

## 2024-11-22 ENCOUNTER — HOSPITAL ENCOUNTER (OUTPATIENT)
Dept: RADIATION ONCOLOGY | Age: 79
Setting detail: RECURRING SERIES
Discharge: HOME | End: 2024-11-25
Payer: MEDICARE

## 2024-11-22 DIAGNOSIS — C61 PROSTATE CANCER (HCC): Primary | ICD-10-CM

## 2024-11-22 LAB
RAD ONC ARIA COURSE FIRST TREATMENT DATE: NORMAL
RAD ONC ARIA COURSE ID: NORMAL
RAD ONC ARIA COURSE INTENT: NORMAL
RAD ONC ARIA COURSE LAST TREATMENT DATE: NORMAL
RAD ONC ARIA COURSE SESSION NUMBER: 28
RAD ONC ARIA COURSE START DATE: NORMAL
RAD ONC ARIA COURSE TREATMENT ELAPSED DAYS: 44
RAD ONC ARIA PLAN FRACTIONS TREATED TO DATE: 28
RAD ONC ARIA PLAN ID: NORMAL
RAD ONC ARIA PLAN PRESCRIBED DOSE PER FRACTION: 2.5 GY
RAD ONC ARIA PLAN PRIMARY REFERENCE POINT: NORMAL
RAD ONC ARIA PLAN TOTAL FRACTIONS PRESCRIBED: 28
RAD ONC ARIA PLAN TOTAL PRESCRIBED DOSE: 7000 CGY
RAD ONC ARIA REFERENCE POINT DOSAGE GIVEN TO DATE: 70 GY
RAD ONC ARIA REFERENCE POINT ID: NORMAL
RAD ONC ARIA REFERENCE POINT SESSION DOSAGE GIVEN: 2.5 GY

## 2024-11-22 PROCEDURE — 77336 RADIATION PHYSICS CONSULT: CPT

## 2024-11-22 PROCEDURE — 77385 HC NTSTY MODUL RAD TX DLVR SMPL: CPT

## 2024-11-22 NOTE — ON TREATMENT VISIT
[facility] RADIATION ONCOLOGY ON TREATMENT VISIT    Patient: Pritesh Orellana MRN: [id number]  SSN: [id number]    YOB: 1945  Age: 79 y.o.  Sex: male      11/22/24    Diagnosis:   Cancer Staging   Malignant neoplasm of prostate (HCC)  Staging form: Prostate, AJCC 8th Edition  - Clinical: Stage IIC (cT1c, cN0, cM0, PSA: 7, Grade Group: 3) - Signed by Kvng Richards MD on 7/19/2024      This is a 79 y.o. male who is currently receiving moderate hypofractionation for prostate cancer.    Current RT dose: 70/70 Gy in 28/28 fractions.     Concurrent ADT:8/1/24    Subjective:  Week 1: No complaints.  Week 2: Has noticed some increase in loose stool this week. Also had some trouble getting bladder full.   Week 2: Seen within same treatment bin as he missed few days this week as he was admitted to the hospital with sepsis from pneumonia. He is now discharged and has resumed treatment. No pelvic complaints.   Week 3: No complaints.   Week 4: Has pressure like sensation in the bladder, doesn't feel he is emptying all the way.   Week 5: Flomax significant helped with urination. No other issues.   Last treatment: No complaints.     Objective:  There were no vitals filed for this visit.  Pain 0/10    General: Alert and conversant, in NAD  Skin: no XRT issues.    Assessment:  Patient is tolerating radiation therapy well with expected XRT effects.    Plan:  -Finished RT as planned.  -Discussed holding 1 of his stool softeners if diarrhea persists.   -flomax 0.4 mg qhs.   -RTC 6 months with PSA and testosterone.   -Treatment images reviewed.  -The patient has a documented plan of care to address pain.  Pain is not present.      Kvng Richards MD  11/22/24

## 2024-11-22 NOTE — ON TREATMENT VISIT
[facility] RADIATION ONCOLOGY ON TREATMENT VISIT    Patient: Pritesh Orellana MRN: [id number]  SSN: [id number]    YOB: 1945  Age: 79 y.o.  Sex: male      11/22/24    Diagnosis:   Cancer Staging   Malignant neoplasm of prostate (HCC)  Staging form: Prostate, AJCC 8th Edition  - Clinical: Stage IIC (cT1c, cN0, cM0, PSA: 7, Grade Group: 3) - Signed by Kvng Richards MD on 7/19/2024      This is a 79 y.o. male who is currently receiving moderate hypofractionation for prostate cancer.    Current RT dose: 60/70 Gy in 24/28 fractions.     Concurrent ADT:8/1/24    Subjective:  Week 1: No complaints.  Week 2: Has noticed some increase in loose stool this week. Also had some trouble getting bladder full.   Week 2: Seen within same treatment bin as he missed few days this week as he was admitted to the hospital with sepsis from pneumonia. He is now discharged and has resumed treatment. No pelvic complaints.   Week 3: No complaints.   Week 4: Has pressure like sensation in the bladder, doesn't feel he is emptying all the way.   Week 5: Flomax significant helped with urination. No other issues.     Objective:  There were no vitals filed for this visit.  Pain 0/10    General: Alert and conversant, in NAD  Skin: no XRT issues.    Assessment:  Patient is tolerating radiation therapy well with expected XRT effects.    Plan:  -Continue RT as planned.  -Discussed holding 1 of his stool softeners if diarrhea persists.   -flomax 0.4 mg qhs.   -Treatment images reviewed.  -The patient has a documented plan of care to address pain.  Pain is not present.      Kvng Richards MD  11/22/24

## 2024-11-29 ENCOUNTER — LAB (OUTPATIENT)
Dept: INTERNAL MEDICINE CLINIC | Facility: CLINIC | Age: 79
End: 2024-11-29

## 2024-11-29 DIAGNOSIS — E55.9 VITAMIN D DEFICIENCY: ICD-10-CM

## 2024-11-29 DIAGNOSIS — E78.2 MIXED HYPERLIPIDEMIA: ICD-10-CM

## 2024-12-02 ENCOUNTER — OFFICE VISIT (OUTPATIENT)
Dept: ORTHOPEDIC SURGERY | Age: 79
End: 2024-12-02
Payer: MEDICARE

## 2024-12-02 DIAGNOSIS — M20.12 VALGUS DEFORMITY OF BOTH GREAT TOES: Primary | ICD-10-CM

## 2024-12-02 DIAGNOSIS — M79.671 BILATERAL FOOT PAIN: ICD-10-CM

## 2024-12-02 DIAGNOSIS — M79.672 BILATERAL FOOT PAIN: ICD-10-CM

## 2024-12-02 DIAGNOSIS — M20.11 VALGUS DEFORMITY OF BOTH GREAT TOES: Primary | ICD-10-CM

## 2024-12-02 LAB
25(OH)D3 SERPL-MCNC: 32.4 NG/ML (ref 30–100)
ALBUMIN SERPL-MCNC: 3.4 G/DL (ref 3.2–4.6)
ALBUMIN/GLOB SERPL: 0.9 (ref 1–1.9)
ALP SERPL-CCNC: 48 U/L (ref 40–129)
ALT SERPL-CCNC: 24 U/L (ref 8–55)
ANION GAP SERPL CALC-SCNC: 11 MMOL/L (ref 7–16)
AST SERPL-CCNC: 32 U/L (ref 15–37)
BASOPHILS # BLD: 0 K/UL (ref 0–0.2)
BASOPHILS NFR BLD: 1 % (ref 0–2)
BILIRUB SERPL-MCNC: 0.4 MG/DL (ref 0–1.2)
BUN SERPL-MCNC: 13 MG/DL (ref 8–23)
CALCIUM SERPL-MCNC: 9.1 MG/DL (ref 8.8–10.2)
CHLORIDE SERPL-SCNC: 103 MMOL/L (ref 98–107)
CHOLEST SERPL-MCNC: 177 MG/DL (ref 0–200)
CO2 SERPL-SCNC: 26 MMOL/L (ref 20–29)
CREAT SERPL-MCNC: 0.85 MG/DL (ref 0.8–1.3)
DIFFERENTIAL METHOD BLD: ABNORMAL
EOSINOPHIL # BLD: 0.4 K/UL (ref 0–0.8)
EOSINOPHIL NFR BLD: 9 % (ref 0.5–7.8)
ERYTHROCYTE [DISTWIDTH] IN BLOOD BY AUTOMATED COUNT: 14.6 % (ref 11.9–14.6)
GLOBULIN SER CALC-MCNC: 3.6 G/DL (ref 2.3–3.5)
GLUCOSE SERPL-MCNC: 91 MG/DL (ref 70–99)
HCT VFR BLD AUTO: 38.2 % (ref 41.1–50.3)
HDLC SERPL-MCNC: 75 MG/DL (ref 40–60)
HDLC SERPL: 2.4 (ref 0–5)
HGB BLD-MCNC: 12.3 G/DL (ref 13.6–17.2)
IMM GRANULOCYTES # BLD AUTO: 0 K/UL (ref 0–0.5)
IMM GRANULOCYTES NFR BLD AUTO: 0 % (ref 0–5)
LDLC SERPL CALC-MCNC: 84 MG/DL (ref 0–100)
LYMPHOCYTES # BLD: 1 K/UL (ref 0.5–4.6)
LYMPHOCYTES NFR BLD: 22 % (ref 13–44)
MCH RBC QN AUTO: 31 PG (ref 26.1–32.9)
MCHC RBC AUTO-ENTMCNC: 32.2 G/DL (ref 31.4–35)
MCV RBC AUTO: 96.2 FL (ref 82–102)
MONOCYTES # BLD: 0.5 K/UL (ref 0.1–1.3)
MONOCYTES NFR BLD: 11 % (ref 4–12)
NEUTS SEG # BLD: 2.7 K/UL (ref 1.7–8.2)
NEUTS SEG NFR BLD: 57 % (ref 43–78)
NRBC # BLD: 0 K/UL (ref 0–0.2)
PLATELET # BLD AUTO: 254 K/UL (ref 150–450)
PMV BLD AUTO: 9.5 FL (ref 9.4–12.3)
POTASSIUM SERPL-SCNC: 4.1 MMOL/L (ref 3.5–5.1)
PROT SERPL-MCNC: 6.9 G/DL (ref 6.3–8.2)
RBC # BLD AUTO: 3.97 M/UL (ref 4.23–5.6)
SODIUM SERPL-SCNC: 140 MMOL/L (ref 136–145)
TRIGL SERPL-MCNC: 89 MG/DL (ref 0–150)
VLDLC SERPL CALC-MCNC: 18 MG/DL (ref 6–23)
WBC # BLD AUTO: 4.7 K/UL (ref 4.3–11.1)

## 2024-12-02 PROCEDURE — 99214 OFFICE O/P EST MOD 30 MIN: CPT | Performed by: ORTHOPAEDIC SURGERY

## 2024-12-02 PROCEDURE — 1123F ACP DISCUSS/DSCN MKR DOCD: CPT | Performed by: ORTHOPAEDIC SURGERY

## 2024-12-02 PROCEDURE — G8428 CUR MEDS NOT DOCUMENT: HCPCS | Performed by: ORTHOPAEDIC SURGERY

## 2024-12-02 PROCEDURE — 1036F TOBACCO NON-USER: CPT | Performed by: ORTHOPAEDIC SURGERY

## 2024-12-02 PROCEDURE — G8417 CALC BMI ABV UP PARAM F/U: HCPCS | Performed by: ORTHOPAEDIC SURGERY

## 2024-12-02 PROCEDURE — G8482 FLU IMMUNIZE ORDER/ADMIN: HCPCS | Performed by: ORTHOPAEDIC SURGERY

## 2024-12-02 NOTE — PROGRESS NOTES
right second proximal interphalangeal joint resection arthroplasty and partial metatarsal head resection    Outpatient - 1-1/4 hours, c-arm, sagittal saw, K-wires , Fitzpatrick plates & screws ,     Anesthesia -ankle block with monitored anesthesia care       The patient understands the diagnosis of bunions and claw toes, conservative and surgical treatment.  R/C outlined including the risk of recurrence, risk of non-healing of skin and bone with/without infection,  potential loss/amputation of a toe(s) secondary to circulation loss, the risk of stiffness in the toes, and the overall risk of swelling that could be prolonged.  The patient understands the use of internal and/or external k-wire type fixation and that it may take 6 months to a year before the patient can comfortably get back into regular/dress shoes.  Generalized surgical risks and complications were discussed.  The patient accepts and would like to proceed with surgery.

## 2024-12-04 ENCOUNTER — OFFICE VISIT (OUTPATIENT)
Dept: INTERNAL MEDICINE CLINIC | Facility: CLINIC | Age: 79
End: 2024-12-04

## 2024-12-04 VITALS
BODY MASS INDEX: 30.48 KG/M2 | TEMPERATURE: 97.2 F | WEIGHT: 194.2 LBS | DIASTOLIC BLOOD PRESSURE: 84 MMHG | HEART RATE: 88 BPM | HEIGHT: 67 IN | SYSTOLIC BLOOD PRESSURE: 120 MMHG | OXYGEN SATURATION: 97 %

## 2024-12-04 DIAGNOSIS — M20.12 VALGUS DEFORMITY OF BOTH GREAT TOES: ICD-10-CM

## 2024-12-04 DIAGNOSIS — Z00.00 MEDICARE ANNUAL WELLNESS VISIT, SUBSEQUENT: Primary | ICD-10-CM

## 2024-12-04 DIAGNOSIS — E55.9 VITAMIN D DEFICIENCY: ICD-10-CM

## 2024-12-04 DIAGNOSIS — I10 PRIMARY HYPERTENSION: ICD-10-CM

## 2024-12-04 DIAGNOSIS — M20.11 VALGUS DEFORMITY OF BOTH GREAT TOES: ICD-10-CM

## 2024-12-04 DIAGNOSIS — G25.81 RLS (RESTLESS LEGS SYNDROME): ICD-10-CM

## 2024-12-04 DIAGNOSIS — C61 MALIGNANT NEOPLASM OF PROSTATE (HCC): ICD-10-CM

## 2024-12-04 DIAGNOSIS — Z87.01 HISTORY OF PNEUMONIA: ICD-10-CM

## 2024-12-04 DIAGNOSIS — K63.9 COLON ABNORMALITY: ICD-10-CM

## 2024-12-04 DIAGNOSIS — E78.2 MIXED HYPERLIPIDEMIA: ICD-10-CM

## 2024-12-04 PROBLEM — A41.9 SEPSIS DUE TO PNEUMONIA (HCC): Status: RESOLVED | Noted: 2024-10-22 | Resolved: 2024-12-04

## 2024-12-04 PROBLEM — J18.9 SEPSIS DUE TO PNEUMONIA (HCC): Status: RESOLVED | Noted: 2024-10-22 | Resolved: 2024-12-04

## 2024-12-04 SDOH — HEALTH STABILITY: PHYSICAL HEALTH: ON AVERAGE, HOW MANY DAYS PER WEEK DO YOU ENGAGE IN MODERATE TO STRENUOUS EXERCISE (LIKE A BRISK WALK)?: 0 DAYS

## 2024-12-04 ASSESSMENT — PATIENT HEALTH QUESTIONNAIRE - PHQ9
SUM OF ALL RESPONSES TO PHQ QUESTIONS 1-9: 0
SUM OF ALL RESPONSES TO PHQ QUESTIONS 1-9: 0
1. LITTLE INTEREST OR PLEASURE IN DOING THINGS: NOT AT ALL
SUM OF ALL RESPONSES TO PHQ QUESTIONS 1-9: 0
SUM OF ALL RESPONSES TO PHQ9 QUESTIONS 1 & 2: 0
SUM OF ALL RESPONSES TO PHQ QUESTIONS 1-9: 0
2. FEELING DOWN, DEPRESSED OR HOPELESS: NOT AT ALL

## 2024-12-04 ASSESSMENT — LIFESTYLE VARIABLES
HOW MANY STANDARD DRINKS CONTAINING ALCOHOL DO YOU HAVE ON A TYPICAL DAY: PATIENT DOES NOT DRINK
HOW OFTEN DO YOU HAVE A DRINK CONTAINING ALCOHOL: 1
HOW OFTEN DO YOU HAVE A DRINK CONTAINING ALCOHOL: NEVER
HOW MANY STANDARD DRINKS CONTAINING ALCOHOL DO YOU HAVE ON A TYPICAL DAY: 0
HOW OFTEN DO YOU HAVE SIX OR MORE DRINKS ON ONE OCCASION: 1

## 2024-12-04 ASSESSMENT — ENCOUNTER SYMPTOMS
RHINORRHEA: 0
ABDOMINAL PAIN: 0
SHORTNESS OF BREATH: 0
COUGH: 0
BLOOD IN STOOL: 0

## 2024-12-04 NOTE — PROGRESS NOTES
[facility]      2024    Patient Name: Pritesh Orellana  :  1945    Subjective:    Chief Complaint:  Chief Complaint   Patient presents with    Follow-up    Medicare AWV         HPI Here for Medicare Wellness visit; patient had fasting labs done recently and results were reviewed in detail today;   He has hx of bilateral foot pain, saw Dr. De Luna 2 days ago for f/u; xrays revealed R hallux valgus and thick second hammertoe deformity; surgery discussed, and he would like to proceed; records reviewed;   He was hospitalized at [facility] 10/21-10/23/24 for septicemia, bilateral pneumonia; he had radiation tx for prostate cancer 2 weeks prior to admission; CT abd/pelv also showed hypodensity in proximal colon; he declined inpatient colonoscopy and has not scheduled outpatient colonoscopy after multiple calls to schedule; he was given Zosyn and d/cd to complete 10 day course of Augmentin; records reviewed;   Patient states his Urologist instructed him to hold off on colonoscopy for now; after further discussion, he declined colonoscopy \"right now\"; he knows he can contact our office and Dr. Hicks's office if he decides to proceed with GI f/u;   He denies SOB, fever, chills; he has had some ear fullness, sinus congestion lately; he is taking Flonase otc, with some relief; he has had some black tarry stools after he completed XRT, but this has since resolved;   Colonoscopy:   Eye exam:   Dental exam:   Pneumovax 23:   Prevnar 13: 2015  Shingrix:   Tdap:   Fluvax: 2024  RSV: 11/3/2024  Moderna Covid 19: 2/10, 3/10/21  Moderna booster; 2022   HTN:  Patient compliant with taking blood pressure medications: Yes  Discussed importance of following low sodium DASH diet, increasing physical activity, taking medications as ordered, decreasing alcohol intake, keeping f/u visits to recheck blood pressure, monitoring blood pressure at home and keeping a log, with

## 2024-12-05 ENCOUNTER — HOSPITAL ENCOUNTER (OUTPATIENT)
Dept: GENERAL RADIOLOGY | Age: 79
Discharge: HOME | End: 2024-12-08
Payer: MEDICARE

## 2024-12-05 DIAGNOSIS — Z87.01 HISTORY OF PNEUMONIA: ICD-10-CM

## 2024-12-05 PROCEDURE — 71046 X-RAY EXAM CHEST 2 VIEWS: CPT

## 2024-12-18 DIAGNOSIS — M20.11 VALGUS DEFORMITY OF BOTH GREAT TOES: Primary | ICD-10-CM

## 2024-12-18 DIAGNOSIS — M79.672 BILATERAL FOOT PAIN: ICD-10-CM

## 2024-12-18 DIAGNOSIS — M79.671 BILATERAL FOOT PAIN: ICD-10-CM

## 2024-12-18 DIAGNOSIS — M20.12 VALGUS DEFORMITY OF BOTH GREAT TOES: Primary | ICD-10-CM

## 2025-01-03 RX ORDER — HYDROCHLOROTHIAZIDE 12.5 MG/1
12.5 CAPSULE ORAL DAILY
COMMUNITY

## 2025-01-03 RX ORDER — ACETAMINOPHEN 500 MG
1000 TABLET ORAL EVERY 6 HOURS PRN
COMMUNITY

## 2025-01-03 NOTE — PERIOP NOTE
Patient verified name and .  Order for consent NOT found in EHR at time of PAT visit. Unable to verify case posting against order; surgery verified by patient.    Type 1B surgery, phone assessment complete.  Orders not received.  Labs per surgeon: none ordered  Labs per anesthesia protocol: POC K+, Hgb s/h for DOS per the patient's request    Patient answered medical/surgical history questions at their best of ability. All prior to admission medications documented in EPIC.    Patient instructed to continue taking all prescription medications up to the day of surgery but to take only the following medications the day of surgery according to anesthesia guidelines with a small sip of water: Advair, duoneb if needed,  flonase if needed.  Also, patient is requested to take 2 Tylenol in the morning and then again before bed on the day before surgery. Regular or extra strength may be used.       Patient informed that all vitamins and supplements should be held 7 days prior to surgery and NSAIDS 5 days prior to surgery. Prescription meds to hold: diclofenac-voltaren hold 5 days prior to surgery    Patient instructed on the following:    > Arrive at [facility]PC Entrance, time of arrival to be called the day before by 1700  > NPO after midnight, unless otherwise indicated, including gum, mints, and ice chips  > Please drink 32 ounces of non-caffeinated clear liquids 2 hours prior to your arrival to avoid dehydration.    > Responsible adult must drive patient to the hospital, stay during surgery, and patient will need supervision 24 hours after anesthesia  > Use non moisturizing soap in shower the night before surgery and on the morning of surgery  > All piercings must be removed prior to arrival.    > Leave all valuables (money and jewelry) at home but bring insurance card and ID on DOS.   > You may be required to pay a deductible or co-pay on the day of your procedure. You can pre-pay by calling [phone] if your surgery is at

## 2025-01-03 NOTE — PERIOP NOTE
Thank you for completing your phone assessment with me today. The following is a list of Pre-op instructions that you requested. Should you have any questions, please call # [phone].      Surgery Date: 1/17/25    Location: [facility], [address] [place] [address].  YOU WILL RECEIVE A CALL FROM A PREOP NURSE BY 4PM THE BUSINESS DAY PRIOR TO SURGERY. IF YOU HAVE NOT RECEIVED A CALL BY 4PM, YOU MAY THEN CALL THE NUMBER LISTED BELOW TO REQUEST THE ARRIVAL TIME. DO NOT CALL PRIOR TO 4PM the business day prior to your surgery date. (#[phone] MAIN Preop or Outpatient Center [phone]) If you have any questions on the day of surgery, please call the pre-op department at the telephone number listed.     No food after midnight the night before surgery. UPDATE: a recent change per Duncan HENRY, Please finish 32 ounces of non-caffeinated clear liquids 2 hours prior to their arrival to avoid dehydration (preferably water).  Once the clear fluids are completed do not chew gum, no hard candy, no mints, no additional fluids (Nothing in mouth including tobacco)     Please take ONLY the following medications on the morning of surgery with a small sip of water: Advair, Duoneb if needed, flonase if needed.  Please continue all regularly scheduled prescription medication the day/night prior to surgery unless otherwise instructed below.    Prescription medication to hold prior to surgery: diclofenac-Voltaren- hold 5 days prior to surgery.  ALSO stop all vitamins & supplements 7 days prior to surgery and stop all NSAIDS (Aspirin, ibuprofen/motrin/advil, naproxen/aleve, Excedrin, Goody & BC Powder) 5 days before your surgery. Should you have a surgery date that does not allow for the amount of time instructed above, please stop taking vitamins, supplements, and NSAIDS IMMEDIATELY. However, You may take tylenol if needed for minor headache/pain.    On the day before

## 2025-01-07 ENCOUNTER — OFFICE VISIT (OUTPATIENT)
Dept: INTERNAL MEDICINE CLINIC | Facility: CLINIC | Age: 80
End: 2025-01-07
Payer: MEDICARE

## 2025-01-07 VITALS
SYSTOLIC BLOOD PRESSURE: 120 MMHG | HEART RATE: 82 BPM | OXYGEN SATURATION: 98 % | BODY MASS INDEX: 29.89 KG/M2 | HEIGHT: 68 IN | DIASTOLIC BLOOD PRESSURE: 80 MMHG | WEIGHT: 197.2 LBS | RESPIRATION RATE: 17 BRPM | TEMPERATURE: 97.5 F

## 2025-01-07 DIAGNOSIS — Z12.11 SCREEN FOR COLON CANCER: ICD-10-CM

## 2025-01-07 DIAGNOSIS — J00 ACUTE NASOPHARYNGITIS: Primary | ICD-10-CM

## 2025-01-07 PROCEDURE — 1123F ACP DISCUSS/DSCN MKR DOCD: CPT | Performed by: INTERNAL MEDICINE

## 2025-01-07 PROCEDURE — 1159F MED LIST DOCD IN RCRD: CPT | Performed by: INTERNAL MEDICINE

## 2025-01-07 PROCEDURE — M1299 PR FLU IMMUNIZE ORDER/ADMIN: HCPCS | Performed by: INTERNAL MEDICINE

## 2025-01-07 PROCEDURE — 1160F RVW MEDS BY RX/DR IN RCRD: CPT | Performed by: INTERNAL MEDICINE

## 2025-01-07 PROCEDURE — 3074F SYST BP LT 130 MM HG: CPT | Performed by: INTERNAL MEDICINE

## 2025-01-07 PROCEDURE — G8417 CALC BMI ABV UP PARAM F/U: HCPCS | Performed by: INTERNAL MEDICINE

## 2025-01-07 PROCEDURE — 99213 OFFICE O/P EST LOW 20 MIN: CPT | Performed by: INTERNAL MEDICINE

## 2025-01-07 PROCEDURE — 3079F DIAST BP 80-89 MM HG: CPT | Performed by: INTERNAL MEDICINE

## 2025-01-07 PROCEDURE — G8427 DOCREV CUR MEDS BY ELIG CLIN: HCPCS | Performed by: INTERNAL MEDICINE

## 2025-01-07 PROCEDURE — 1126F AMNT PAIN NOTED NONE PRSNT: CPT | Performed by: INTERNAL MEDICINE

## 2025-01-07 PROCEDURE — 1036F TOBACCO NON-USER: CPT | Performed by: INTERNAL MEDICINE

## 2025-01-07 RX ORDER — AZITHROMYCIN 250 MG/1
TABLET, FILM COATED ORAL
Qty: 6 TABLET | Refills: 0 | Status: SHIPPED | OUTPATIENT
Start: 2025-01-07 | End: 2025-01-17

## 2025-01-07 ASSESSMENT — PATIENT HEALTH QUESTIONNAIRE - PHQ9
SUM OF ALL RESPONSES TO PHQ9 QUESTIONS 1 & 2: 0
SUM OF ALL RESPONSES TO PHQ QUESTIONS 1-9: 0
2. FEELING DOWN, DEPRESSED OR HOPELESS: NOT AT ALL
SUM OF ALL RESPONSES TO PHQ QUESTIONS 1-9: 0
1. LITTLE INTEREST OR PLEASURE IN DOING THINGS: NOT AT ALL
SUM OF ALL RESPONSES TO PHQ QUESTIONS 1-9: 0
SUM OF ALL RESPONSES TO PHQ QUESTIONS 1-9: 0

## 2025-01-07 ASSESSMENT — ENCOUNTER SYMPTOMS
RHINORRHEA: 1
COUGH: 0
ABDOMINAL PAIN: 0
BLOOD IN STOOL: 0
SHORTNESS OF BREATH: 0

## 2025-01-07 NOTE — PROGRESS NOTES
arthralgias and myalgias.   Skin: Negative.    Neurological:  Negative for numbness and headaches.   Psychiatric/Behavioral:  Negative for dysphoric mood and sleep disturbance. The patient is not nervous/anxious.    All other systems reviewed and are negative.      Objective:  /80   Pulse 82   Temp 97.5 °F (36.4 °C) (Temporal)   Resp 17   Ht 1.715 m (5' 7.5\")   Wt 89.4 kg (197 lb 3.2 oz)   SpO2 98%   BMI 30.43 kg/m²     Examination:  Physical Exam  Vitals reviewed.   Constitutional:       Appearance: Normal appearance.   HENT:      Head: Normocephalic and atraumatic.      Nose: Congestion and rhinorrhea present.      Mouth/Throat:      Mouth: Mucous membranes are moist.      Pharynx: Oropharynx is clear.   Eyes:      Extraocular Movements: Extraocular movements intact.      Conjunctiva/sclera: Conjunctivae normal.      Pupils: Pupils are equal, round, and reactive to light.   Cardiovascular:      Rate and Rhythm: Normal rate and regular rhythm.      Pulses: Normal pulses.      Heart sounds: Normal heart sounds.   Pulmonary:      Effort: Pulmonary effort is normal.      Breath sounds: Normal breath sounds.   Abdominal:      General: Abdomen is flat. Bowel sounds are normal.      Palpations: Abdomen is soft.   Musculoskeletal:         General: Normal range of motion.      Cervical back: Normal range of motion and neck supple.   Skin:     General: Skin is warm and dry.   Neurological:      General: No focal deficit present.      Mental Status: He is alert and oriented to person, place, and time. Mental status is at baseline.   Psychiatric:         Mood and Affect: Mood normal.         Behavior: Behavior normal.           Assessment/Plan:    Pritesh \"Arjun\" was seen today for follow-up chronic condition.    Diagnoses and all orders for this visit:    Acute nasopharyngitis  Instructed to take medications as prescribed, and to call if no improvement in symptoms.     -     azithromycin (ZITHROMAX) 250 MG

## 2025-01-15 ENCOUNTER — TELEPHONE (OUTPATIENT)
Dept: INTERNAL MEDICINE CLINIC | Facility: CLINIC | Age: 80
End: 2025-01-15

## 2025-01-15 DIAGNOSIS — I10 PRIMARY HYPERTENSION: ICD-10-CM

## 2025-01-15 NOTE — TELEPHONE ENCOUNTER
Medication Refill Request    Name of Medication : hydrochlorothiazide    Strength of Medication: 12.5    Directions: n/a    30 day or 90 day supply: 90    Preferred [facility]: [facility] on [address]    Last Appt. Date: 1-7-25    Next Appt. Date: n/a    Additional Information For Provider:

## 2025-01-16 RX ORDER — OXYCODONE HYDROCHLORIDE 5 MG/1
5 TABLET ORAL
Status: CANCELLED | OUTPATIENT
Start: 2025-01-16 | End: 2025-01-17

## 2025-01-16 RX ORDER — NALOXONE HYDROCHLORIDE 0.4 MG/ML
INJECTION, SOLUTION INTRAMUSCULAR; INTRAVENOUS; SUBCUTANEOUS PRN
Status: CANCELLED | OUTPATIENT
Start: 2025-01-16

## 2025-01-16 RX ORDER — HYDROCHLOROTHIAZIDE 12.5 MG/1
12.5 TABLET ORAL EVERY OTHER DAY
Qty: 90 TABLET | Refills: 1 | Status: SHIPPED | OUTPATIENT
Start: 2025-01-16

## 2025-01-16 RX ORDER — SODIUM CHLORIDE, SODIUM LACTATE, POTASSIUM CHLORIDE, CALCIUM CHLORIDE 600; 310; 30; 20 MG/100ML; MG/100ML; MG/100ML; MG/100ML
INJECTION, SOLUTION INTRAVENOUS CONTINUOUS
Status: CANCELLED | OUTPATIENT
Start: 2025-01-16

## 2025-01-16 RX ORDER — ONDANSETRON 2 MG/ML
4 INJECTION INTRAMUSCULAR; INTRAVENOUS
Status: CANCELLED | OUTPATIENT
Start: 2025-01-16 | End: 2025-01-17

## 2025-01-16 NOTE — PERIOP NOTE
Preop department called to notify patient of arrival time for scheduled procedure. Instructions given to   - Arrive at [facility]PC Entrance [address].  - No solid food after midnight & Please drink 32 ounces of water 2 hours prior to your arrival to avoid dehydration unless otherwise indicated. No gum, mints, or ice chips.   - Have a responsible adult to drive patient to the hospital, stay during surgery, and patient will need supervision 24 hours after anesthesia.   - Use antibacterial soap in shower the night before surgery and on the morning of surgery.       Was patient contacted: yes, pt  Voicemail left: n/a  Numbers contacted: [phone]   Arrival time: 1000  Time to complete 32 ounces of water: 0800

## 2025-01-17 ENCOUNTER — APPOINTMENT (OUTPATIENT)
Dept: GENERAL RADIOLOGY | Age: 80
End: 2025-01-17
Attending: ORTHOPAEDIC SURGERY
Payer: MEDICARE

## 2025-01-17 ENCOUNTER — ANESTHESIA EVENT (OUTPATIENT)
Dept: SURGERY | Age: 80
End: 2025-01-17
Payer: MEDICARE

## 2025-01-17 ENCOUNTER — ANESTHESIA (OUTPATIENT)
Dept: SURGERY | Age: 80
End: 2025-01-17
Payer: MEDICARE

## 2025-01-17 ENCOUNTER — HOSPITAL ENCOUNTER (OUTPATIENT)
Age: 80
Setting detail: OUTPATIENT SURGERY
Discharge: HOME | End: 2025-01-17
Attending: ORTHOPAEDIC SURGERY | Admitting: ORTHOPAEDIC SURGERY
Payer: MEDICARE

## 2025-01-17 VITALS
SYSTOLIC BLOOD PRESSURE: 145 MMHG | TEMPERATURE: 97.3 F | RESPIRATION RATE: 18 BRPM | HEART RATE: 81 BPM | WEIGHT: 194 LBS | OXYGEN SATURATION: 96 % | BODY MASS INDEX: 30.45 KG/M2 | DIASTOLIC BLOOD PRESSURE: 76 MMHG | HEIGHT: 67 IN

## 2025-01-17 DIAGNOSIS — G89.18 ACUTE POSTOPERATIVE PAIN: Primary | ICD-10-CM

## 2025-01-17 LAB
HGB BLD-MCNC: 13 G/DL (ref 13.6–17.2)
POTASSIUM BLD-SCNC: 3.7 MMOL/L (ref 3.5–5.1)

## 2025-01-17 PROCEDURE — 2780000004 HC MISC SCREW $251-500: Performed by: ORTHOPAEDIC SURGERY

## 2025-01-17 PROCEDURE — 3600000004 HC SURGERY LEVEL 4 BASE: Performed by: ORTHOPAEDIC SURGERY

## 2025-01-17 PROCEDURE — C1713 ANCHOR/SCREW BN/BN,TIS/BN: HCPCS | Performed by: ORTHOPAEDIC SURGERY

## 2025-01-17 PROCEDURE — 76942 ECHO GUIDE FOR BIOPSY: CPT | Performed by: ANESTHESIOLOGY

## 2025-01-17 PROCEDURE — 7100000000 HC PACU RECOVERY - FIRST 15 MIN: Performed by: ORTHOPAEDIC SURGERY

## 2025-01-17 PROCEDURE — 2500000003 HC RX 250 WO HCPCS: Performed by: NURSE ANESTHETIST, CERTIFIED REGISTERED

## 2025-01-17 PROCEDURE — 2500000003 HC RX 250 WO HCPCS: Performed by: NURSE PRACTITIONER

## 2025-01-17 PROCEDURE — 3700000000 HC ANESTHESIA ATTENDED CARE: Performed by: ORTHOPAEDIC SURGERY

## 2025-01-17 PROCEDURE — 6360000002 HC RX W HCPCS: Performed by: NURSE ANESTHETIST, CERTIFIED REGISTERED

## 2025-01-17 PROCEDURE — 85018 HEMOGLOBIN: CPT

## 2025-01-17 PROCEDURE — 7100000010 HC PHASE II RECOVERY - FIRST 15 MIN: Performed by: ORTHOPAEDIC SURGERY

## 2025-01-17 PROCEDURE — 6360000002 HC RX W HCPCS: Performed by: NURSE PRACTITIONER

## 2025-01-17 PROCEDURE — 3700000001 HC ADD 15 MINUTES (ANESTHESIA): Performed by: ORTHOPAEDIC SURGERY

## 2025-01-17 PROCEDURE — 6360000002 HC RX W HCPCS: Performed by: ANESTHESIOLOGY

## 2025-01-17 PROCEDURE — 2580000003 HC RX 258: Performed by: NURSE ANESTHETIST, CERTIFIED REGISTERED

## 2025-01-17 PROCEDURE — 7100000001 HC PACU RECOVERY - ADDTL 15 MIN: Performed by: ORTHOPAEDIC SURGERY

## 2025-01-17 PROCEDURE — 7100000011 HC PHASE II RECOVERY - ADDTL 15 MIN: Performed by: ORTHOPAEDIC SURGERY

## 2025-01-17 PROCEDURE — 84132 ASSAY OF SERUM POTASSIUM: CPT

## 2025-01-17 PROCEDURE — 3600000014 HC SURGERY LEVEL 4 ADDTL 15MIN: Performed by: ORTHOPAEDIC SURGERY

## 2025-01-17 PROCEDURE — 2709999900 HC NON-CHARGEABLE SUPPLY: Performed by: ORTHOPAEDIC SURGERY

## 2025-01-17 PROCEDURE — 2720000010 HC SURG SUPPLY STERILE: Performed by: ORTHOPAEDIC SURGERY

## 2025-01-17 DEVICE — CANNULATED SCREW
Type: IMPLANTABLE DEVICE | Site: FOOT | Status: FUNCTIONAL
Brand: DARTFIRE EDGE

## 2025-01-17 DEVICE — PLATING
Type: IMPLANTABLE DEVICE | Site: FOOT | Status: FUNCTIONAL
Brand: ORTHOLOC 3DI

## 2025-01-17 DEVICE — FUSION PLATE
Type: IMPLANTABLE DEVICE | Site: FOOT | Status: FUNCTIONAL
Brand: ORTHOLOC 3DI

## 2025-01-17 DEVICE — PLATE SCREW
Type: IMPLANTABLE DEVICE | Site: FOOT | Status: FUNCTIONAL
Brand: ORTHOLOC 3DI

## 2025-01-17 DEVICE — WIRE ORTH 1.1MM DIA 229MM SMOOTH DBL BAYNT TIP S STL K: Type: IMPLANTABLE DEVICE | Status: FUNCTIONAL

## 2025-01-17 RX ORDER — SODIUM CHLORIDE 0.9 % (FLUSH) 0.9 %
5-40 SYRINGE (ML) INJECTION PRN
Status: DISCONTINUED | OUTPATIENT
Start: 2025-01-17 | End: 2025-01-17 | Stop reason: HOSPADM

## 2025-01-17 RX ORDER — CEPHALEXIN 500 MG/1
500 CAPSULE ORAL 4 TIMES DAILY
Qty: 12 CAPSULE | Refills: 0 | Status: SHIPPED | OUTPATIENT
Start: 2025-01-17

## 2025-01-17 RX ORDER — PROPOFOL 10 MG/ML
INJECTION, EMULSION INTRAVENOUS
Status: DISCONTINUED | OUTPATIENT
Start: 2025-01-17 | End: 2025-01-17 | Stop reason: SDUPTHER

## 2025-01-17 RX ORDER — OXYCODONE HYDROCHLORIDE 5 MG/1
5 TABLET ORAL EVERY 6 HOURS PRN
Qty: 20 TABLET | Refills: 0 | Status: SHIPPED | OUTPATIENT
Start: 2025-01-17 | End: 2025-01-22

## 2025-01-17 RX ORDER — ONDANSETRON 2 MG/ML
INJECTION INTRAMUSCULAR; INTRAVENOUS
Status: DISCONTINUED | OUTPATIENT
Start: 2025-01-17 | End: 2025-01-17 | Stop reason: SDUPTHER

## 2025-01-17 RX ORDER — BUPIVACAINE HYDROCHLORIDE 2.5 MG/ML
INJECTION, SOLUTION EPIDURAL; INFILTRATION; INTRACAUDAL
Status: COMPLETED | OUTPATIENT
Start: 2025-01-17 | End: 2025-01-17

## 2025-01-17 RX ORDER — FENTANYL CITRATE 50 UG/ML
100 INJECTION, SOLUTION INTRAMUSCULAR; INTRAVENOUS
Status: COMPLETED | OUTPATIENT
Start: 2025-01-17 | End: 2025-01-17

## 2025-01-17 RX ORDER — SODIUM CHLORIDE, SODIUM LACTATE, POTASSIUM CHLORIDE, CALCIUM CHLORIDE 600; 310; 30; 20 MG/100ML; MG/100ML; MG/100ML; MG/100ML
INJECTION, SOLUTION INTRAVENOUS CONTINUOUS
Status: DISCONTINUED | OUTPATIENT
Start: 2025-01-17 | End: 2025-01-17 | Stop reason: HOSPADM

## 2025-01-17 RX ORDER — MIDAZOLAM HYDROCHLORIDE 5 MG/5ML
4 INJECTION, SOLUTION INTRAMUSCULAR; INTRAVENOUS
Status: COMPLETED | OUTPATIENT
Start: 2025-01-17 | End: 2025-01-17

## 2025-01-17 RX ORDER — LIDOCAINE HYDROCHLORIDE 20 MG/ML
INJECTION, SOLUTION EPIDURAL; INFILTRATION; INTRACAUDAL; PERINEURAL
Status: DISCONTINUED | OUTPATIENT
Start: 2025-01-17 | End: 2025-01-17 | Stop reason: SDUPTHER

## 2025-01-17 RX ORDER — DEXAMETHASONE SODIUM PHOSPHATE 10 MG/ML
INJECTION INTRAMUSCULAR; INTRAVENOUS
Status: DISCONTINUED | OUTPATIENT
Start: 2025-01-17 | End: 2025-01-17 | Stop reason: SDUPTHER

## 2025-01-17 RX ORDER — SODIUM CHLORIDE 0.9 % (FLUSH) 0.9 %
5-40 SYRINGE (ML) INJECTION EVERY 12 HOURS SCHEDULED
Status: DISCONTINUED | OUTPATIENT
Start: 2025-01-17 | End: 2025-01-17 | Stop reason: HOSPADM

## 2025-01-17 RX ORDER — EPHEDRINE SULFATE 5 MG/ML
INJECTION INTRAVENOUS
Status: DISCONTINUED | OUTPATIENT
Start: 2025-01-17 | End: 2025-01-17 | Stop reason: SDUPTHER

## 2025-01-17 RX ORDER — SODIUM CHLORIDE 9 MG/ML
INJECTION, SOLUTION INTRAVENOUS PRN
Status: DISCONTINUED | OUTPATIENT
Start: 2025-01-17 | End: 2025-01-17 | Stop reason: HOSPADM

## 2025-01-17 RX ORDER — LIDOCAINE HYDROCHLORIDE 10 MG/ML
1 INJECTION, SOLUTION INFILTRATION; PERINEURAL
Status: DISCONTINUED | OUTPATIENT
Start: 2025-01-17 | End: 2025-01-17 | Stop reason: HOSPADM

## 2025-01-17 RX ADMIN — PHENYLEPHRINE HYDROCHLORIDE 150 MCG: 10 INJECTION INTRAVENOUS at 12:35

## 2025-01-17 RX ADMIN — PHENYLEPHRINE HYDROCHLORIDE 150 MCG: 10 INJECTION INTRAVENOUS at 12:25

## 2025-01-17 RX ADMIN — PROPOFOL 200 MCG/KG/MIN: 10 INJECTION, EMULSION INTRAVENOUS at 12:10

## 2025-01-17 RX ADMIN — CEFAZOLIN 2000 MG: 2 INJECTION, POWDER, FOR SOLUTION INTRAMUSCULAR; INTRAVENOUS at 12:12

## 2025-01-17 RX ADMIN — FENTANYL CITRATE 50 MCG: 50 INJECTION INTRAMUSCULAR; INTRAVENOUS at 11:10

## 2025-01-17 RX ADMIN — ONDANSETRON 4 MG: 2 INJECTION INTRAMUSCULAR; INTRAVENOUS at 12:17

## 2025-01-17 RX ADMIN — LIDOCAINE HYDROCHLORIDE 50 MG: 20 INJECTION, SOLUTION EPIDURAL; INFILTRATION; INTRACAUDAL; PERINEURAL at 12:10

## 2025-01-17 RX ADMIN — PHENYLEPHRINE HYDROCHLORIDE 100 MCG: 10 INJECTION INTRAVENOUS at 12:40

## 2025-01-17 RX ADMIN — PHENYLEPHRINE HYDROCHLORIDE 100 MCG: 10 INJECTION INTRAVENOUS at 12:46

## 2025-01-17 RX ADMIN — BUPIVACAINE HYDROCHLORIDE 40 ML: 2.5 INJECTION, SOLUTION EPIDURAL; INFILTRATION; INTRACAUDAL; PERINEURAL at 11:10

## 2025-01-17 RX ADMIN — MIDAZOLAM HYDROCHLORIDE 2 MG: 1 INJECTION, SOLUTION INTRAMUSCULAR; INTRAVENOUS at 11:10

## 2025-01-17 RX ADMIN — DEXAMETHASONE SODIUM PHOSPHATE 10 MG: 10 INJECTION INTRAMUSCULAR; INTRAVENOUS at 12:17

## 2025-01-17 RX ADMIN — EPHEDRINE SULFATE 10 MG: 5 INJECTION INTRAVENOUS at 12:46

## 2025-01-17 ASSESSMENT — PAIN - FUNCTIONAL ASSESSMENT
PAIN_FUNCTIONAL_ASSESSMENT: 0-10
PAIN_FUNCTIONAL_ASSESSMENT: NONE - DENIES PAIN
PAIN_FUNCTIONAL_ASSESSMENT: 0-10

## 2025-01-17 NOTE — ANESTHESIA PROCEDURE NOTES
Peripheral Block    Patient location during procedure: pre-op  Reason for block: post-op pain management and at surgeon's request  Start time: 1/17/2025 11:10 AM  End time: 1/17/2025 11:21 AM  Staffing  Performed: anesthesiologist   Anesthesiologist: Dmitriy Youngblood Jr., MD  Performed by: Dmitriy Youngblood Jr., MD  Authorized by: Dmitriy Youngblood Jr., MD    Preanesthetic Checklist  Completed: patient identified, IV checked, site marked, risks and benefits discussed, surgical/procedural consents, equipment checked, pre-op evaluation, timeout performed, anesthesia consent given, oxygen available, monitors applied/VS acknowledged, fire risk safety assessment completed and verbalized and blood product R/B/A discussed and consented  Peripheral Block   Patient position: supine  Prep: ChloraPrep  Provider prep: mask and sterile gloves  Patient monitoring: cardiac monitor, continuous pulse ox, oxygen, IV access, frequent blood pressure checks and responsive to questions  Block type: Ankle  Laterality: right  Injection technique: single-shot  Guidance: ultrasound guided    Needle   Needle type: Quincke   Needle gauge: 25 G.  Needle localization: ultrasound guidance (minimal motor response at >0.4 mA)  Needle length: 10 cm  Assessment   Injection assessment: negative aspiration for heme, no paresthesia on injection, local visualized surrounding nerve on ultrasound and no intravascular symptoms  Slow fractionated injection: yes  Hemodynamics: stable  Outcomes: patient tolerated procedure well and uncomplicated    Additional Notes  Risks/benefits/alternatives discussed including damage to nerve or nerve, bleeding, infection, and a reaction to our medications.      Needle inserted and placed in close proximity to the nerve under real time ultrasound guidance.  Ultrasound was used to visualize the spread of local anesthetic in close proximity to the nerve being blocked.  The nerve appeared anatomically normal and there were no

## 2025-01-17 NOTE — H&P
Outpatient Surgery History and Physical:  Pritesh Orellana was seen and examined.    CHIEF COMPLAINT:   right foot.     PE:   /71   Pulse 69   Temp 98.1 °F (36.7 °C) (Oral)   Resp 16   Ht 1.702 m (5' 7\")   Wt 88 kg (194 lb)   SpO2 99%   BMI 30.38 kg/m²     Heart:   Regular rhythm      Lungs:  Are clear      Past Medical History:    Past Medical History:   Diagnosis Date    Allergic rhinitis     Anemia 2024    Arthritis     Asthma     inhaler daily    BPH (benign prostatic hyperplasia)     Cancer ([facility]) 2017    Top of right ear    Erectile dysfunction     Hearing loss     History of blood transfusion 2012    Hypercholesterolemia     Hypertension     medication    Lumbar stenosis     Osteoarthritis     Pneumonia 10/21/2024    Prolonged emergence from general anesthesia 2012    with bilateral knee replacement    Prostate cancer ([facility]) 07/2024    radiation    Restless legs syndrome     Rosacea     Septicemia ([facility]) 10/21/2024    Skin cancer 2019    BCC  top of right ear    Vitamin D deficiency        Surgical History:   Past Surgical History:   Procedure Laterality Date    ARTHRODESIS Left 02/15/2024    Left first MTP arthrodesis performed by Duncan De Luna III, MD at [facility]    CATARACT REMOVAL      Bilateral    CHOLECYSTECTOMY      COLONOSCOPY  2021    EYE SURGERY      FOOT SURGERY Left 02/15/2024    left partial metatarsal head resections performed by Duncan De Luna III, MD at [facility]    HAMMER TOE SURGERY Left 02/15/2024    left second and third proximal interphalangeal joint resection arthroplasties performed by Duncan De Luna III, MD at [facility]    HX JOINT REPLACEMENT SURGERY Bilateral 2012    LORIN Turner, knee replacement    JOINT REPLACEMENT  2012    Bilateral knee replacements    LUMBAR FUSION N/A 10/02/2023    L4-L5 laminectomy and fusion with allograft, transforaminal lumbar interbody fusion, and instrumentation, right S1 hemilaminectomy. performed by Eugene Sanon MD at [facility] MAIN OR     PROSTATE BIOPSY N/A 2024    PROSTATE BIOPSY TRANSPERINEAL performed by Dmitriy Mccarthy MD at [facility] MAIN OR    SINUS SURGERY      TONSILLECTOMY      TOTAL KNEE ARTHROPLASTY Bilateral        Social History: Patient  reports that he has never smoked. He has never been exposed to tobacco smoke. He has never used smokeless tobacco. He reports that he does not drink alcohol and does not use drugs.    Family History:   Family History   Problem Relation Age of Onset    Kidney Disease Father           age 38 nephritis    Asthma Father          Nephritis    Allergy (Severe) Father     Pancreatic Cancer Mother     Cancer Mother           pancreatic    Colon Cancer Sister     Cancer Sister          colon cancer       Allergies: Reviewed per EMR  Mixed ragweed, Peanut butter flavoring agent (non-screening), and Chocolate    Medications:    Prior to Admission medications    Medication Sig Start Date End Date Taking? Authorizing Provider   azithromycin (ZITHROMAX) 250 MG tablet 500mg on day 1 followed by 250mg on days 2 - 5 25 Yes Natasha Rodriguez MD   hydroCHLOROthiazide 12.5 MG capsule Take 1 capsule by mouth daily   Yes ProviderFartun MD   Multiple Vitamins-Minerals (MULTIVITAMIN ADULTS PO) Take by mouth Daily   Yes Fartun Enriquez MD   Ascorbic Acid (VITAMIN C PO) Take 500 mg by mouth Daily   Yes Fartun Enriquez MD   acetaminophen (TYLENOL) 500 MG tablet Take 2 tablets by mouth every 6 hours as needed for Pain   Yes Fartun Enriquez MD   tamsulosin (FLOMAX) 0.4 MG capsule Take 1 capsule by mouth daily  Patient taking differently: Take 1 capsule by mouth nightly 24  Yes Kvng Richards MD   gabapentin (NEURONTIN) 100 MG capsule Take 1 capsule by mouth nightly for 180 days. Intended supply: 30 days 24 Yes Natasha Rodriguez MD   lisinopril (PRINIVIL;ZESTRIL) 20 MG tablet Take 1 tablet by mouth daily 24  Yes

## 2025-01-17 NOTE — OP NOTE
Operative Note    Patient:Pritesh Orellana  MRN: [id number]    Date Of Surgery: 1/17/2025    Surgeon: Duncan De Luna MD    Assistant Surgeon: None    Pre Op Diagnosis:  Pre-Op Diagnosis Codes:      * Valgus deformity of both great toes [M20.11, M20.12]     * Bilateral foot pain [M79.671, M79.672]      Post Op Diagnosis:   same    Procedures Performed:  Right first MTP arthrodesis, 56633  Right second proximal interphalangeal joint resection arthroplasty, 67345  Right second partial metatarsal head resection, 87461    Implants:   Implant Name Type Inv. Item Serial No.  Lot No. LRB No. Used Action   WIRE ORTH 1.1MM CHRISTELLE 229MM SMOOTH DBL BAYNT TIP S STL K - [id number]  WIRE ORTH 1.1MM CHRISTELLE 229MM SMOOTH DBL BAYNT TIP S STL K  [employer] [facility] [id number] Right 1 Implanted   PLATE BNE SM MED RT MTP STRL ORTHOLOC 3DI - [id number]  PLATE BNE SM MED RT MTP STRL ORTHOLOC 3DI  [employer] [facility] [id number] Right 1 Implanted   PLATE BNE STRL ORTHOLOC 3DI - [id number]  PLATE BNE STRL ORTHOLOC 3DI  [employer] [facility] [id number] Right 1 Implanted   PLATING - [id number]  PLATING  [employer] [facility] [id number] Right 1 Implanted   PLATE - [id number]  PLATE  [employer] [facility] [id number] Right 1 Implanted   SCREW BONE DARTFIRE EDGE 3.5MM 26MM LESLIE COMPRESS HEADED STHRD - [id number]  SCREW BONE DARTFIRE EDGE 3.5MM 26MM LESLIE COMPRESS HEADED STHRD  [employer] [facility] [id number] Right 1 Implanted   SCREW BNE L 14 MM CHRISTELLE 3.5 MM LCK STRL ORTHOLOC 3DI - [id number]  SCREW BNE L 14 MM CHRISTELLE 3.5 MM LCK STRL ORTHOLOC FoldeesI  [employer] [facility] [id number] Right 1 Implanted   SCREW BNE L 14 MM CHRISTELLE 3.5 MM LCK STRL ORTHOLOC 3DI - [id number]  SCREW BNE L 14 MM CHRISTELLE 3.5 MM LCK STRL ORTHOLOC 3DI  [employer] [facility] [id number] Right 1 Implanted   SCREW BNE L 18 MM CHRISTELLE 3.5 MM CORTICAL LO PROF STRL ORTHOLOC - [id number]  SCREW BNE L 18 MM CHRISTELLE 3.5 MM

## 2025-01-17 NOTE — ANESTHESIA POSTPROCEDURE EVALUATION
Department of Anesthesiology  Postprocedure Note    Patient: Pritesh Orellana  MRN: [id number]  YOB: 1945  Date of evaluation: 1/17/2025    Procedure Summary       Date: 01/17/25 Room / Location: [facility] OP OR 01 / [facility]    Anesthesia Start: 1206 Anesthesia Stop: 1300    Procedures:       Right first MP arthrodesis (Right)      Right second proximal interphalangeal joint resection arthroplasty (Right)      partial metatarsal head resetion (Right: Toes) Diagnosis:       Valgus deformity of both great toes      Bilateral foot pain      (Valgus deformity of both great toes [M20.11, M20.12])      (Bilateral foot pain [M79.671, M79.672])    Surgeons: Duncan De Luna III, MD Responsible Provider: Dmitriy Youngblood Jr., MD    Anesthesia Type: TIVA, general ASA Status: 3            Anesthesia Type: No value filed.    Joceline Phase I: Joceline Score: 8    Joceline Phase II:      Anesthesia Post Evaluation    Patient location during evaluation: PACU  Patient participation: complete - patient participated  Level of consciousness: awake  Pain score: 0  Airway patency: patent  Nausea & Vomiting: no nausea and no vomiting  Cardiovascular status: blood pressure returned to baseline and hemodynamically stable  Respiratory status: acceptable, spontaneous ventilation and nonlabored ventilation  Hydration status: euvolemic  Multimodal analgesia pain management approach  Pain management: adequate    No notable events documented.

## 2025-01-17 NOTE — ANESTHESIA PRE PROCEDURE
\"POCHCT\" in the last 72 hours.    Coags: No results found for: \"PROTIME\", \"INR\", \"APTT\"    HCG (If Applicable): No results found for: \"PREGTESTUR\", \"PREGSERUM\", \"HCG\", \"HCGQUANT\"     ABGs: No results found for: \"PHART\", \"PO2ART\", \"BYY3FEV\", \"XDK8WUP\", \"BEART\", \"O7UFAMFC\"     Type & Screen (If Applicable):  No results found for: \"LABABO\"    Drug/Infectious Status (If Applicable):  Lab Results   Component Value Date/Time    HEPCAB NONREACTIVE 05/22/2023 10:35 AM       COVID-19 Screening (If Applicable): No results found for: \"COVID19\"        Anesthesia Evaluation  Patient summary reviewed and Nursing notes reviewed  Airway: Mallampati: I          Dental: normal exam         Pulmonary:normal exam    (+)           asthma:                            Cardiovascular:  Exercise tolerance: good (>4 METS)  (+) hypertension: mild      ECG reviewed  Rhythm: regular  Rate: normal    Stress test reviewed             ROS comment: EKG SR with occ ectopic beats    Stress negative for ischemia, low risk       Neuro/Psych:   (+) neuromuscular disease:            GI/Hepatic/Renal: Neg GI/Hepatic/Renal ROS            Endo/Other: Negative Endo/Other ROS                    Abdominal: normal exam            Vascular: negative vascular ROS.         Other Findings:         Anesthesia Plan      TIVA and general     ASA 3       Induction: intravenous.      Anesthetic plan and risks discussed with patient and spouse.      Plan discussed with CRNA.          Post-op pain plan if not by surgeon: single peripheral nerve block        ILAN RIVERA JR, MD   1/17/2025

## 2025-01-22 ENCOUNTER — TELEPHONE (OUTPATIENT)
Dept: ORTHOPEDIC SURGERY | Age: 80
End: 2025-01-22

## 2025-01-22 NOTE — TELEPHONE ENCOUNTER
Spoke with patient. He was told that the second toe is pinned and the great toe will sometimes cross over the second toe. He will send a picture to his mychart.

## 2025-01-22 NOTE — TELEPHONE ENCOUNTER
His big toe is crossing back across his second toe. He thinks he needs to have it looked at. His surgery was the 17th.

## 2025-01-31 ENCOUNTER — OFFICE VISIT (OUTPATIENT)
Dept: ORTHOPEDIC SURGERY | Age: 80
End: 2025-01-31

## 2025-01-31 DIAGNOSIS — M20.12 VALGUS DEFORMITY OF BOTH GREAT TOES: Primary | ICD-10-CM

## 2025-01-31 DIAGNOSIS — M20.11 VALGUS DEFORMITY OF BOTH GREAT TOES: Primary | ICD-10-CM

## 2025-01-31 DIAGNOSIS — M20.42 HAMMER TOE OF LEFT FOOT: ICD-10-CM

## 2025-01-31 PROCEDURE — 99024 POSTOP FOLLOW-UP VISIT: CPT | Performed by: NURSE PRACTITIONER

## 2025-01-31 NOTE — PROGRESS NOTES
Name: Pritesh Orellana  YOB: 1945  Gender: male  MRN: [id number]    Procedure Performed:  Right first MTP arthrodesis  Right second proximal interphalangeal joint resection arthroplasty  Right second partial metatarsal head resection        Date of Procedure: 01/17/2025      Subjective: Patient reports he is done well since his surgery.  His pain is well-managed under control.  He knows what to expect as he is had the same procedure performed to the left foot recently.      Physical Examination: K wires intact in the second phalanx.  He is incisional areas appear to be healing well and show no signs of infection.  He can wiggle his toes effectively without pain.  He has no pain palpation throughout the arch of the foot.  He has no signs of DVT at this time.  Great toe seems well surgically aligned and corrected at this stage postoperatively.        Imaging:   No imaging reviewed          Assessment:   Status post right first MP joint fusion with second PIP RA and partial metatarsal head resection.  We discussed progression care today as well as expectations until next follow-up visit.  Question concerns were addressed, he verbalized understanding of today's conversation.      Plan:   3 This is stable chronic illness/condition  Treatment at this time: Sutures removed, Steri-Strips were placed.  Patient will continue to monitor protect the K wire and Jurgan pinball intact in the second phalanx.  He will continue wear the postop shoe as a matter medical necessity where he will continue bear weight on the affected extremity as he can tolerate and swelling allows.  Activities will continue to be limited at this time excluding high-impact.  Elevation was encouraged regularly.  He may now get the affected extremity wet including showering only.  Follow-up will be in approximately 2 weeks for pin out x-rays.    Studies ordered: Foot XR needed @ Next Visit    Weight-bearing status: WBAT in

## 2025-02-02 DIAGNOSIS — G25.81 RLS (RESTLESS LEGS SYNDROME): ICD-10-CM

## 2025-02-03 DIAGNOSIS — G25.81 RLS (RESTLESS LEGS SYNDROME): ICD-10-CM

## 2025-02-03 RX ORDER — PRAMIPEXOLE DIHYDROCHLORIDE 0.5 MG/1
0.5 TABLET ORAL NIGHTLY
Qty: 90 TABLET | Refills: 1 | OUTPATIENT
Start: 2025-02-03

## 2025-02-03 RX ORDER — PRAMIPEXOLE DIHYDROCHLORIDE 0.5 MG/1
0.5 TABLET ORAL NIGHTLY
Qty: 90 TABLET | Refills: 1 | Status: SHIPPED | OUTPATIENT
Start: 2025-02-03

## 2025-02-12 ENCOUNTER — OFFICE VISIT (OUTPATIENT)
Dept: ORTHOPEDIC SURGERY | Age: 80
End: 2025-02-12

## 2025-02-12 DIAGNOSIS — M20.11 VALGUS DEFORMITY OF BOTH GREAT TOES: Primary | ICD-10-CM

## 2025-02-12 DIAGNOSIS — M20.12 VALGUS DEFORMITY OF BOTH GREAT TOES: Primary | ICD-10-CM

## 2025-02-12 PROCEDURE — 99024 POSTOP FOLLOW-UP VISIT: CPT | Performed by: NURSE PRACTITIONER

## 2025-02-12 RX ORDER — PRAVASTATIN SODIUM 10 MG
10 TABLET ORAL DAILY
Qty: 30 TABLET | Refills: 5 | Status: SHIPPED | OUTPATIENT
Start: 2025-02-12

## 2025-02-12 NOTE — PROGRESS NOTES
Name: Pritesh Orellana  YOB: 1945  Gender: male  MRN: [id number]    Procedure Performed:  Right first MTP arthrodesis  Right second proximal interphalangeal joint resection arthroplasty  Right second partial metatarsal head resection           Date of Procedure: 01/17/2025      Subjective: Patient reports overall that his foot feels good.  He seems pleased with the progress he is making and the overall appearance of his foot.      Physical Examination: The incisional areas are not yet fully healed, are currently scabbed in most areas however they show no signs of infection or have any drainage present.  K wires intact in the second phalanx today.  He has palpable pulses and intact sensation to the foot.  He can wiggle his toes effectively without pain.  Range of motion from the first IP joint was performed without pain.  There is expected swelling across the foot dorsally as well as throughout the great toe and in second phalanx.         Imaging:   Interpretation of imaging  Right foot XR: AP, Lateral, Oblique views     ICD-10-CM    1. Valgus deformity of both great toes  M20.11 XR FOOT RIGHT (MIN 3 VIEWS)    M20.12          Report: AP, lateral, oblique x-ray of the right foot demonstrates no hardware failures    Impression: Hallux valgus correction without hardware failures   Cristina Felipe, APRN - CNP           Assessment:   Status post MTP first joint fusion with second PIP RA and metatarsal head resection.      Plan:   3 This is stable chronic illness/condition  Treatment at this time: K wires are in pinball were extracted from second phalanx today without difficulty.  The patient may now begin to wean from the bunion splint as well as the postop shoe and back in a comfortable shoes as he can tolerate.  There was encouragement to continue elevating the affected extremity for swelling.  Resuming physical activities is okay now excluding high impact such as running, jumping and

## 2025-03-07 ENCOUNTER — OFFICE VISIT (OUTPATIENT)
Age: 80
End: 2025-03-07
Payer: MEDICARE

## 2025-03-07 VITALS
HEART RATE: 80 BPM | BODY MASS INDEX: 30.83 KG/M2 | TEMPERATURE: 97.5 F | SYSTOLIC BLOOD PRESSURE: 166 MMHG | HEIGHT: 67 IN | WEIGHT: 196.4 LBS | DIASTOLIC BLOOD PRESSURE: 91 MMHG | OXYGEN SATURATION: 94 %

## 2025-03-07 DIAGNOSIS — R93.3 ABNORMAL FINDINGS ON DIAGNOSTIC IMAGING OF OTHER PARTS OF DIGESTIVE TRACT: ICD-10-CM

## 2025-03-07 DIAGNOSIS — K63.9 COLON ABNORMALITY: Primary | ICD-10-CM

## 2025-03-07 PROCEDURE — 99214 OFFICE O/P EST MOD 30 MIN: CPT | Performed by: PHYSICIAN ASSISTANT

## 2025-03-07 PROCEDURE — G8427 DOCREV CUR MEDS BY ELIG CLIN: HCPCS | Performed by: PHYSICIAN ASSISTANT

## 2025-03-07 PROCEDURE — 3080F DIAST BP >= 90 MM HG: CPT | Performed by: PHYSICIAN ASSISTANT

## 2025-03-07 PROCEDURE — 1160F RVW MEDS BY RX/DR IN RCRD: CPT | Performed by: PHYSICIAN ASSISTANT

## 2025-03-07 PROCEDURE — 1036F TOBACCO NON-USER: CPT | Performed by: PHYSICIAN ASSISTANT

## 2025-03-07 PROCEDURE — 1123F ACP DISCUSS/DSCN MKR DOCD: CPT | Performed by: PHYSICIAN ASSISTANT

## 2025-03-07 PROCEDURE — G8417 CALC BMI ABV UP PARAM F/U: HCPCS | Performed by: PHYSICIAN ASSISTANT

## 2025-03-07 PROCEDURE — 1126F AMNT PAIN NOTED NONE PRSNT: CPT | Performed by: PHYSICIAN ASSISTANT

## 2025-03-07 PROCEDURE — 3077F SYST BP >= 140 MM HG: CPT | Performed by: PHYSICIAN ASSISTANT

## 2025-03-07 PROCEDURE — 1159F MED LIST DOCD IN RCRD: CPT | Performed by: PHYSICIAN ASSISTANT

## 2025-03-07 NOTE — PROGRESS NOTES
Pritesh Orellana (:  1945) is a 80 y.o. male new patient referred to our office for evaluation of the following chief complaint(s):  Follow-up (New referral for colon screening)        Assessment & Plan   ASSESSMENT/PLAN:  1. Colon abnormality  -     CT ABDOMEN PELVIS W IV CONTRAST Additional Contrast? None; Future  2. Abnormal findings on diagnostic imaging of other parts of digestive tract  -     CT ABDOMEN PELVIS W IV CONTRAST Additional Contrast? None; Future       -Suspect artifact on CT imaging during hospitalization as he did not ever have GIB and CEA was normal. Had a colonoscopy 6 years ago in [facility] that was normal. Just completed XRT for prostate cancer 2024 so still < 6 months out. Discussed risks of proceeding with colonoscopy now and also discussed risks and benefits of routine screening colonoscopy including age-based guidelines that do not recommend further screening after 75. If CT is normal could consider deferring colonoscopy entirely. If he is very interested in screening could revisit this once he's cleared to have a colonoscopy by his radiation oncologist. Has follow-up in May.    Subjective   SUBJECTIVE/OBJECTIVE  Pritehs Orellana is a 80 y.o. year old male referred to our office for colon cancer screening. Referral note reviewed from 2025.  Patient was evaluated by Laith and Dr. Hicks on 10/22/2024 during hospitalization for a possible hyperdensity in the proximal colon, bleeding versus contrast versus medication as well as diverticulosis and hiatal hernia.  Patient noted to have a history of prostate cancer and radiation treatment several weeks prior.had started daily he has lost 7-8 pounds and was noted to have diarrhea since then though denied overt blood in stool.  Inpatient colonoscopy was offered but patient felt too weak to complete the bowel prep and declined.  CEA was obtained and WNL (0.8). More recent labs are notable for slight anemia with Hgb

## 2025-03-12 ENCOUNTER — PROCEDURE VISIT (OUTPATIENT)
Dept: NEUROLOGY | Age: 80
End: 2025-03-12
Payer: MEDICARE

## 2025-03-12 VITALS — WEIGHT: 195 LBS | BODY MASS INDEX: 30.61 KG/M2 | OXYGEN SATURATION: 97 % | HEART RATE: 88 BPM | HEIGHT: 67 IN

## 2025-03-12 DIAGNOSIS — R20.0 NUMBNESS AND TINGLING OF BOTH FEET: Primary | ICD-10-CM

## 2025-03-12 DIAGNOSIS — R20.2 NUMBNESS AND TINGLING OF BOTH FEET: Primary | ICD-10-CM

## 2025-03-12 PROCEDURE — 95885 MUSC TST DONE W/NERV TST LIM: CPT | Performed by: PSYCHIATRY & NEUROLOGY

## 2025-03-12 PROCEDURE — 95911 NRV CNDJ TEST 9-10 STUDIES: CPT | Performed by: PSYCHIATRY & NEUROLOGY

## 2025-03-12 NOTE — PROGRESS NOTES
EMG/Nerve Conduction Study Procedure Note  [address]    [address]  [place]  [address]   [phone]      Hx:    Exam:     80 y.o.  mw  male     EMG:: BLE paresthesias. 3-4+ pitting edema to mid-thigh; weak and paresthesia legs feet.   Hx lumbar stenosis and is post op.  No fascic.  No clonus.  No Babinski.    Deafness and hearing aids.  No definite family history of such.  Operations low back lumbar as well as right knee etc. for lumbar stenosis.  No Mees lines at the fingernails.  Referring:    Dr Natasha Rodriguez  Technologist ::   Maxim Rhodes  Hgt:      5'7\"         Summary   needle EMG feet / BLE...                    Controlled environmental factors / EMG lab.  Temperature.   NCV : sensory segments:         Markedly abnormal = = ABSENT/no response of the bilateral sural SCV SNAP.  NCV plantar  sensory segments:     Deferred.     NCV Motor MCV segments:          Markedly abnormal = = the peroneal and tibial MCV = ABSENT /no response CMAP.  However left peroneal with markedly reduced amplitudes of this CMAP with slowed and prolonged TL.  The proximal peroneal to the tibialis anterior bilaterally = = is normal.  F-wave studies:        Abnormal = prolonged left peroneal F waves = = others are absent.  H-REFLEX Studies:    Markedly abnormal = ABSENT /no response bilateral H-reflexes.   NEEDLE EMG:   Tested muscles::      bilateral TA MG VL ===   abnormal right TA and left MG w minimal fibs/pse and red interference.             INTERPRETATION:      ELECTROPHYSIOLOGIC EVIDENCE OF A DISTAL LENGTH-DEPENDENT SENSORIMOTOR POLYNEUROPATHY.  NUMEROUS CAUSES POSSIBLE.  SOME EVIDENCE AS NOTED ON NEEDLE TESTING OF AXONAL DENERVATION DISTALLY.          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                 Procedure Details:    Further clinical correlation is warranted

## 2025-03-13 ENCOUNTER — HOSPITAL ENCOUNTER (OUTPATIENT)
Dept: CT IMAGING | Age: 80
Discharge: HOME | End: 2025-03-16
Payer: MEDICARE

## 2025-03-13 DIAGNOSIS — R93.3 ABNORMAL FINDINGS ON DIAGNOSTIC IMAGING OF OTHER PARTS OF DIGESTIVE TRACT: ICD-10-CM

## 2025-03-13 DIAGNOSIS — K63.9 COLON ABNORMALITY: ICD-10-CM

## 2025-03-13 LAB — CREAT BLD-MCNC: 0.74 MG/DL (ref 0.8–1.5)

## 2025-03-13 PROCEDURE — 74177 CT ABD & PELVIS W/CONTRAST: CPT

## 2025-03-13 PROCEDURE — 6360000004 HC RX CONTRAST MEDICATION: Performed by: PHYSICIAN ASSISTANT

## 2025-03-13 PROCEDURE — 82565 ASSAY OF CREATININE: CPT

## 2025-03-13 RX ORDER — IOPAMIDOL 755 MG/ML
100 INJECTION, SOLUTION INTRAVASCULAR
Status: COMPLETED | OUTPATIENT
Start: 2025-03-13 | End: 2025-03-13

## 2025-03-13 RX ADMIN — IOPAMIDOL 100 ML: 755 INJECTION, SOLUTION INTRAVENOUS at 10:00

## 2025-03-21 ENCOUNTER — PATIENT MESSAGE (OUTPATIENT)
Age: 80
End: 2025-03-21

## 2025-03-21 ENCOUNTER — RESULTS FOLLOW-UP (OUTPATIENT)
Dept: CT IMAGING | Age: 80
End: 2025-03-21

## 2025-03-21 ENCOUNTER — OFFICE VISIT (OUTPATIENT)
Dept: INTERNAL MEDICINE CLINIC | Facility: CLINIC | Age: 80
End: 2025-03-21

## 2025-03-21 VITALS
TEMPERATURE: 97.2 F | RESPIRATION RATE: 14 BRPM | SYSTOLIC BLOOD PRESSURE: 170 MMHG | WEIGHT: 191.2 LBS | HEART RATE: 72 BPM | BODY MASS INDEX: 30.01 KG/M2 | HEIGHT: 67 IN | DIASTOLIC BLOOD PRESSURE: 90 MMHG | OXYGEN SATURATION: 95 %

## 2025-03-21 DIAGNOSIS — J01.10 ACUTE NON-RECURRENT FRONTAL SINUSITIS: ICD-10-CM

## 2025-03-21 DIAGNOSIS — I25.10 CORONARY ARTERY CALCIFICATION: Primary | ICD-10-CM

## 2025-03-21 DIAGNOSIS — I10 PRIMARY HYPERTENSION: ICD-10-CM

## 2025-03-21 RX ORDER — HYDROCHLOROTHIAZIDE 12.5 MG/1
12.5 TABLET ORAL DAILY
Qty: 90 TABLET | Refills: 1 | Status: SHIPPED | OUTPATIENT
Start: 2025-03-21

## 2025-03-21 ASSESSMENT — ENCOUNTER SYMPTOMS
ABDOMINAL PAIN: 0
DIARRHEA: 0
VOMITING: 0
WHEEZING: 0
COUGH: 1
SINUS PRESSURE: 1
NAUSEA: 0
CONSTIPATION: 0
BLOOD IN STOOL: 0
SHORTNESS OF BREATH: 1
SORE THROAT: 0
RHINORRHEA: 1

## 2025-03-21 NOTE — PROGRESS NOTES
[facility]      3/21/2025    Patient Name: Pritesh Orellana  :  1945      Chief Complaint:  Chief Complaint   Patient presents with    Congestion     Pt states this started for him a couple of weeks ago    Results         HPI  Patient presents today for follow-up on recent CT scan that was ordered by GI. Per 3/7/25 GI visit, \"Patient was evaluated by Laith and Dr. Hicks on 10/22/2024 during hospitalization for a possible hyperdensity in the proximal colon, bleeding versus contrast versus medication as well as diverticulosis and hiatal hernia.  Patient noted to have a history of prostate cancer and radiation treatment several weeks prior.had started daily he has lost 7-8 pounds and was noted to have diarrhea since then though denied overt blood in stool.  Inpatient colonoscopy was offered but patient felt too weak to complete the bowel prep and declined.  CEA was obtained and WNL (0.8). More recent labs are notable for slight anemia with Hgb 13.0 on 2025.\" CT was completed on 3/13/25 to further evaluate which showed no evidence of acute process in the abdomen or pelvis. Incidental finding of severe coronary artery calcification. He does report occasional CP and SOB that he has always attributed to his asthma as it is alleviated by use of his albuterol. He had a nuclear stress test (ordered by PCP; never evaluated by cardiology) on 23 for c/o CP. Findings were non-diagnostic due to pharmacologic infusion; probably normal scan.     He has complaint today of rhinorrhea, postnasal drainage, congestion, cough x 2-3 weeks. Taking Mucinex but denies any improvement. No fever, chills, wheezing.     BP elevated in the office today. Patient reports compliance with lisinopril and HCTZ as prescribed. Reports history of white coat syndrome, although he has not checked his BP at home in quite some time. Plans to begin checking it today.         Past Medical History:   Diagnosis Date    Allergic

## 2025-03-24 ENCOUNTER — TELEPHONE (OUTPATIENT)
Dept: INTERNAL MEDICINE CLINIC | Facility: CLINIC | Age: 80
End: 2025-03-24

## 2025-03-24 DIAGNOSIS — M51.369 DEGENERATION OF INTERVERTEBRAL DISC OF LUMBAR REGION, UNSPECIFIED WHETHER PAIN PRESENT: Primary | ICD-10-CM

## 2025-03-25 NOTE — TELEPHONE ENCOUNTER
Nerve conduction study revealed: neuropathy / peripheral polyneuropathy and this superimposed on old features radiculopathy/lumbosacral.  Further clinical correlation is recommended/warranted.     He is s/p lumbar spinal arthrodesis, has not seen Dr. Dent since last June; I have ordered repeat lumbar xray, should f/u w/ Dr. Dent;

## 2025-03-25 NOTE — TELEPHONE ENCOUNTER
I called pt at 0820  Per MD Rodriguez   Nerve conduction study revealed: neuropathy / peripheral polyneuropathy and this superimposed on old features radiculopathy/lumbosacral.  Further clinical correlation is recommended/warranted.     He is s/p lumbar spinal arthrodesis, has not seen Dr. Dent since last June; I have ordered repeat lumbar xray, should f/u w/ Dr. Dent;        Pt voiced understanding

## 2025-03-26 ENCOUNTER — OFFICE VISIT (OUTPATIENT)
Dept: ORTHOPEDIC SURGERY | Age: 80
End: 2025-03-26

## 2025-03-26 DIAGNOSIS — M20.11 VALGUS DEFORMITY OF BOTH GREAT TOES: Primary | ICD-10-CM

## 2025-03-26 DIAGNOSIS — M20.12 VALGUS DEFORMITY OF BOTH GREAT TOES: Primary | ICD-10-CM

## 2025-03-26 DIAGNOSIS — M20.42 HAMMER TOE OF LEFT FOOT: ICD-10-CM

## 2025-03-26 PROCEDURE — M5021 MISC M-GEL TOE SPREADER: HCPCS | Performed by: NURSE PRACTITIONER

## 2025-03-26 PROCEDURE — 99024 POSTOP FOLLOW-UP VISIT: CPT | Performed by: NURSE PRACTITIONER

## 2025-03-26 NOTE — PROGRESS NOTES
Patient was fitted and instructed on a M-Gel Toe Spreaders for the right foot. Patient read and signed documenting they understand and agree to [facility]'s current DME return policy.

## 2025-03-26 NOTE — PROGRESS NOTES
Name: Pritesh Orellana  YOB: 1945  Gender: male  MRN: [id number]    Procedure Performed:  Right first MTP arthrodesis  Right second proximal interphalangeal joint resection arthroplasty  Right second partial metatarsal head resection           Date of Procedure: 01/17/2025      Subjective: Patient reports that his foot overall feels good, his big concern today is that the great toe as well as the third toe are now leaning towards the second toe putting pressure on it again.  He really has no pain to report and has been going to therapy and receiving benefits from this.      Physical Examination: The incisional areas well-healed.  Swelling is definitely resolving however still noted especially to the second toe.  He has palpable pulses and intact sensation to the foot.  The great toe does slightly overlap the second phalanx.  He wiggles all toes effectively without pain.  He has no pain on palpation throughout the arch of the foot or under the first or second MTP joint plantarly.         Imaging:   Interpretation of imaging  Right foot XR: AP, Lateral, Oblique views     ICD-10-CM    1. Valgus deformity of both great toes  M20.11 XR FOOT RIGHT (MIN 3 VIEWS)    M20.12       2. Hammer toe of left foot  M20.42 XR FOOT RIGHT (MIN 3 VIEWS)         Report: AP, lateral, oblique x-ray of the right foot demonstrates hallux valgus correction without hardware failure    Impression: Hallux valgus correction without hardware failure   Cristina Felipe, APRN - CNP           Assessment:   Status post right first MTP joint fusion with second PIP RA and partial metatarsal head resection.      Plan:   3 This is stable chronic illness/condition  Treatment at this time: Time with no intervention and Physical Therapy, the patient will attempt toe spacers in between the first and second as well as the second and third toes today to see if this helps with the positioning of the toes especially while wearing

## 2025-03-27 ENCOUNTER — HOSPITAL ENCOUNTER (OUTPATIENT)
Dept: GENERAL RADIOLOGY | Age: 80
Discharge: HOME | End: 2025-03-30
Payer: MEDICARE

## 2025-03-27 DIAGNOSIS — M51.369 DEGENERATION OF INTERVERTEBRAL DISC OF LUMBAR REGION, UNSPECIFIED WHETHER PAIN PRESENT: ICD-10-CM

## 2025-03-27 PROCEDURE — 72110 X-RAY EXAM L-2 SPINE 4/>VWS: CPT

## 2025-04-07 ENCOUNTER — RESULTS FOLLOW-UP (OUTPATIENT)
Dept: INTERNAL MEDICINE CLINIC | Facility: CLINIC | Age: 80
End: 2025-04-07

## 2025-04-10 ENCOUNTER — TELEPHONE (OUTPATIENT)
Dept: INTERNAL MEDICINE CLINIC | Facility: CLINIC | Age: 80
End: 2025-04-10

## 2025-04-10 DIAGNOSIS — M47.816 LUMBAR FACET ARTHROPATHY: Primary | ICD-10-CM

## 2025-04-10 DIAGNOSIS — M43.16 SPONDYLOLISTHESIS OF LUMBAR REGION: ICD-10-CM

## 2025-04-17 ENCOUNTER — OFFICE VISIT (OUTPATIENT)
Dept: NEUROSURGERY | Age: 80
End: 2025-04-17
Payer: MEDICARE

## 2025-04-17 DIAGNOSIS — M48.062 SPINAL STENOSIS, LUMBAR REGION, WITH NEUROGENIC CLAUDICATION: ICD-10-CM

## 2025-04-17 DIAGNOSIS — M54.16 LUMBAR RADICULOPATHY: Primary | ICD-10-CM

## 2025-04-17 DIAGNOSIS — M47.816 LUMBAR FACET ARTHROPATHY: ICD-10-CM

## 2025-04-17 DIAGNOSIS — M43.16 SPONDYLOLISTHESIS OF LUMBAR REGION: ICD-10-CM

## 2025-04-17 PROCEDURE — G8417 CALC BMI ABV UP PARAM F/U: HCPCS

## 2025-04-17 PROCEDURE — 1123F ACP DISCUSS/DSCN MKR DOCD: CPT

## 2025-04-17 PROCEDURE — 1159F MED LIST DOCD IN RCRD: CPT

## 2025-04-17 PROCEDURE — 1036F TOBACCO NON-USER: CPT

## 2025-04-17 PROCEDURE — 99204 OFFICE O/P NEW MOD 45 MIN: CPT

## 2025-04-17 PROCEDURE — G8427 DOCREV CUR MEDS BY ELIG CLIN: HCPCS

## 2025-04-17 RX ORDER — MELOXICAM 7.5 MG/1
7.5 TABLET ORAL DAILY
Qty: 30 TABLET | Refills: 1 | Status: CANCELLED | OUTPATIENT
Start: 2025-04-17

## 2025-04-17 RX ORDER — MELOXICAM 7.5 MG/1
7.5 TABLET ORAL DAILY
Qty: 30 TABLET | Refills: 1 | Status: SHIPPED | OUTPATIENT
Start: 2025-04-17

## 2025-04-17 NOTE — PROGRESS NOTES
[facility] NOTE:   History of Present Illness:    CC: Back pain and bilateral foot numbness, worse on the right    Pritesh Orellana is a 80 y.o. male who presents today for evaluation of right leg weakness and pain.  He states that he has an area of numbness that starts on the anterior portion of his right leg and wraps around his calf.  This ends around his foot.  This has been significantly worsening since his surgery.  His wife states that he now walks with his right foot turned out and is having significant mobility issues, he is tripping several times a day.  Use a walker for any prolonged periods of walking or cane.  He feels that he is able to walk about 5 to 600 feet before he feels like his leg is going to give out.  He has been working with physical therapy for the last 6 to 7 weeks which she has noted some positive results with his ability to walk.  He has also had bilateral foot surgeries the last several years.  He was also diagnosed with prostate cancer and underwent radiation.. History of L4-L5 laminectomy and fusion with allograft, transforaminal lumbar interbody fusion, and instrumentation, right S1 hemilaminectomy with Dr. Saldaña 10/2023.           Red Flag Signs Acute  (Days) Subacute  (Weeks) Chronic  (Months) Absent   Incontinence  []  []  []  [x]   Severe Weakness  []  []  [x]  []   Severe Sensory Loss  []  []  [x]  []   Gait Disturbance  []  []  [x]  []       Anticoagulant: No  Tobacco: No  Diabetes: No    Past Medical History:   Diagnosis Date    Allergic rhinitis     Anemia 2024    Arthritis     Asthma     inhaler daily    BPH (benign prostatic hyperplasia)     Cancer (HCC) 2017    Top of right ear    Erectile dysfunction     Hearing loss     History of blood transfusion 2012    Hypercholesterolemia     Hypertension     medication    Lumbar stenosis     Osteoarthritis     Pneumonia 10/21/2024    Prolonged emergence from general anesthesia 2012

## 2025-04-23 ENCOUNTER — APPOINTMENT (OUTPATIENT)
Dept: URBAN - METROPOLITAN AREA CLINIC 329 | Facility: CLINIC | Age: 80
Setting detail: DERMATOLOGY
End: 2025-04-23

## 2025-04-23 DIAGNOSIS — L72.0 EPIDERMAL CYST: ICD-10-CM | Status: STABLE

## 2025-04-23 DIAGNOSIS — L57.0 ACTINIC KERATOSIS: ICD-10-CM | Status: INADEQUATELY CONTROLLED

## 2025-04-23 DIAGNOSIS — L72.8 OTHER FOLLICULAR CYSTS OF THE SKIN AND SUBCUTANEOUS TISSUE: ICD-10-CM

## 2025-04-23 DIAGNOSIS — D22 MELANOCYTIC NEVI: ICD-10-CM

## 2025-04-23 DIAGNOSIS — L81.4 OTHER MELANIN HYPERPIGMENTATION: ICD-10-CM

## 2025-04-23 DIAGNOSIS — D18.0 HEMANGIOMA: ICD-10-CM

## 2025-04-23 DIAGNOSIS — Z85.828 PERSONAL HISTORY OF OTHER MALIGNANT NEOPLASM OF SKIN: ICD-10-CM

## 2025-04-23 DIAGNOSIS — L82.1 OTHER SEBORRHEIC KERATOSIS: ICD-10-CM

## 2025-04-23 PROBLEM — D18.01 HEMANGIOMA OF SKIN AND SUBCUTANEOUS TISSUE: Status: ACTIVE | Noted: 2025-04-23

## 2025-04-23 PROBLEM — D22.71 MELANOCYTIC NEVI OF RIGHT LOWER LIMB, INCLUDING HIP: Status: ACTIVE | Noted: 2025-04-23

## 2025-04-23 PROBLEM — D22.62 MELANOCYTIC NEVI OF LEFT UPPER LIMB, INCLUDING SHOULDER: Status: ACTIVE | Noted: 2025-04-23

## 2025-04-23 PROBLEM — D22.5 MELANOCYTIC NEVI OF TRUNK: Status: ACTIVE | Noted: 2025-04-23

## 2025-04-23 PROCEDURE — 99213 OFFICE O/P EST LOW 20 MIN: CPT | Mod: 25

## 2025-04-23 PROCEDURE — 17000 DESTRUCT PREMALG LESION: CPT

## 2025-04-23 PROCEDURE — ? FULL BODY SKIN EXAM

## 2025-04-23 PROCEDURE — ? LIQUID NITROGEN

## 2025-04-23 PROCEDURE — ? COUNSELING

## 2025-04-23 PROCEDURE — ? TREATMENT REGIMEN

## 2025-04-23 PROCEDURE — 17003 DESTRUCT PREMALG LES 2-14: CPT

## 2025-04-23 ASSESSMENT — LOCATION SIMPLE DESCRIPTION DERM
LOCATION SIMPLE: SCALP
LOCATION SIMPLE: ABDOMEN
LOCATION SIMPLE: RIGHT THIGH
LOCATION SIMPLE: UPPER BACK
LOCATION SIMPLE: LEFT FOREARM
LOCATION SIMPLE: LEFT PRETIBIAL REGION
LOCATION SIMPLE: LEFT CHEEK
LOCATION SIMPLE: RIGHT SHOULDER
LOCATION SIMPLE: RIGHT EAR
LOCATION SIMPLE: RIGHT CHEEK
LOCATION SIMPLE: RIGHT UPPER BACK
LOCATION SIMPLE: LEFT TEMPLE
LOCATION SIMPLE: RIGHT FOREARM
LOCATION SIMPLE: LEFT EYEBROW
LOCATION SIMPLE: LEFT UPPER BACK

## 2025-04-23 ASSESSMENT — LOCATION DETAILED DESCRIPTION DERM
LOCATION DETAILED: LEFT SUPERIOR CENTRAL MALAR CHEEK
LOCATION DETAILED: PERIUMBILICAL SKIN
LOCATION DETAILED: RIGHT DISTAL DORSAL FOREARM
LOCATION DETAILED: LEFT MID TEMPLE
LOCATION DETAILED: LEFT SUPERIOR PARIETAL SCALP
LOCATION DETAILED: INFERIOR THORACIC SPINE
LOCATION DETAILED: RIGHT SUPERIOR HELIX
LOCATION DETAILED: LEFT DISTAL DORSAL FOREARM
LOCATION DETAILED: LEFT RIB CAGE
LOCATION DETAILED: LEFT PROXIMAL PRETIBIAL REGION
LOCATION DETAILED: LEFT CENTRAL EYEBROW
LOCATION DETAILED: EPIGASTRIC SKIN
LOCATION DETAILED: RIGHT ANTERIOR PROXIMAL THIGH
LOCATION DETAILED: RIGHT SUPERIOR PREAURICULAR CHEEK
LOCATION DETAILED: RIGHT INFERIOR UPPER BACK
LOCATION DETAILED: LEFT MID-UPPER BACK
LOCATION DETAILED: RIGHT INFERIOR POSTAURICULAR SKIN
LOCATION DETAILED: RIGHT POSTERIOR SHOULDER
LOCATION DETAILED: RIGHT CENTRAL MALAR CHEEK
LOCATION DETAILED: LEFT MEDIAL MALAR CHEEK

## 2025-04-23 ASSESSMENT — LOCATION ZONE DERM
LOCATION ZONE: FACE
LOCATION ZONE: TRUNK
LOCATION ZONE: EAR
LOCATION ZONE: SCALP
LOCATION ZONE: ARM
LOCATION ZONE: LEG

## 2025-04-23 NOTE — PROCEDURE: REASSURANCE
Detail Level: Detailed
Additional Notes (Optional): Reassured patient cyst is benign and stable.
Hide Additional Notes?: No
Additional Notes (Optional): Advised if not irritating, removal isn’t necessary.

## 2025-04-23 NOTE — HPI: EVALUATION OF SKIN LESION(S)
What Type Of Note Output Would You Prefer (Optional)?: Bullet Format
Hpi Title: Evaluation of Skin Lesions
Additional History: Patient states he has scaly spots on his scalp and arms. He states he also has bruises on his arms. He states he also has a cyst on his back and a spot under his ear.

## 2025-04-24 ENCOUNTER — INITIAL CONSULT (OUTPATIENT)
Age: 80
End: 2025-04-24
Payer: MEDICARE

## 2025-04-24 VITALS
DIASTOLIC BLOOD PRESSURE: 82 MMHG | HEART RATE: 75 BPM | BODY MASS INDEX: 29.95 KG/M2 | HEIGHT: 67 IN | SYSTOLIC BLOOD PRESSURE: 182 MMHG

## 2025-04-24 DIAGNOSIS — R07.2 PRECORDIAL PAIN: ICD-10-CM

## 2025-04-24 DIAGNOSIS — I10 ESSENTIAL HYPERTENSION: ICD-10-CM

## 2025-04-24 DIAGNOSIS — Z76.89 ENCOUNTER TO ESTABLISH CARE: Primary | ICD-10-CM

## 2025-04-24 DIAGNOSIS — E78.2 HYPERLIPIDEMIA, MIXED: ICD-10-CM

## 2025-04-24 PROCEDURE — 3079F DIAST BP 80-89 MM HG: CPT | Performed by: INTERNAL MEDICINE

## 2025-04-24 PROCEDURE — 1123F ACP DISCUSS/DSCN MKR DOCD: CPT | Performed by: INTERNAL MEDICINE

## 2025-04-24 PROCEDURE — G8417 CALC BMI ABV UP PARAM F/U: HCPCS | Performed by: INTERNAL MEDICINE

## 2025-04-24 PROCEDURE — 99204 OFFICE O/P NEW MOD 45 MIN: CPT | Performed by: INTERNAL MEDICINE

## 2025-04-24 PROCEDURE — 3077F SYST BP >= 140 MM HG: CPT | Performed by: INTERNAL MEDICINE

## 2025-04-24 PROCEDURE — G8428 CUR MEDS NOT DOCUMENT: HCPCS | Performed by: INTERNAL MEDICINE

## 2025-04-24 PROCEDURE — 93000 ELECTROCARDIOGRAM COMPLETE: CPT | Performed by: INTERNAL MEDICINE

## 2025-04-24 PROCEDURE — 1036F TOBACCO NON-USER: CPT | Performed by: INTERNAL MEDICINE

## 2025-04-24 ASSESSMENT — ENCOUNTER SYMPTOMS
VOMITING: 0
DOUBLE VISION: 0
ABDOMINAL PAIN: 0
HEMOPTYSIS: 0
COUGH: 0
BLURRED VISION: 0
NAUSEA: 0
ORTHOPNEA: 0
BACK PAIN: 0
BLOATING: 0
SHORTNESS OF BREATH: 0

## 2025-04-24 NOTE — PROGRESS NOTES
[facility] CARDIOLOGY  [address] [place] [address], [address]  [place] [address]  PHONE: [phone]    25    NAME:  Pritesh Orellana  : 1945  MRN: [id number]         SUBJECTIVE:   Pritesh Orellana is a 80 y.o. male seen for a visit regarding the following:     Chief Complaint   Patient presents with    Chest Pain     Getting sharp pains in chest.       HPI:      No prior history of coronary disease.  History of hypertension, hyperlipidemia.  Noted coronary artery calcification on CT scan from 2024.  Prior neg stress from 2023.    Some issues with CP-sporadic episodes that can come on at any time and sometimes radiates to his ear.  No definite exertional component.  Last ~2 weeks ago; episodes can last around 5 to 10 minutes.  Denies any dyspnea on exertion.  States mostly impeded by some arthritis issues.  No cardiac limiting factors.  Denies any dyspnea on exertion.  Well-controlled home BPs..     Past Medical History, Past Surgical History, Family history, Social History, and Medications were all reviewed with the patient today and updated as necessary.     Allergies   Allergen Reactions    Mixed Ragweed      Other reaction(s): itchy eyes    Peanut Butter Flavoring Agent (Non-Screening)      Other reaction(s): diarrhea    Chocolate Rash     Other reaction(s): diarrhea       Patient Active Problem List   Diagnosis    Asthma    Hyperlipidemia    Hypertension    Intermittent left-sided chest pain    RLS (restless legs syndrome)    Spinal stenosis, lumbar region, with neurogenic claudication    Lumbar facet arthropathy    Spondylolisthesis of lumbar region    Lumbar radiculopathy    S/P lumbar fusion    Hammer toe of left foot    Valgus deformity of both great toes    Vitamin D deficiency    Rosacea    BPH (benign prostatic hyperplasia)    History of basal cell cancer    Elevated prostate specific antigen (PSA)    Malignant neoplasm of prostate (HCC)    General weakness    Colon

## 2025-04-28 ENCOUNTER — HOSPITAL ENCOUNTER (OUTPATIENT)
Dept: MRI IMAGING | Age: 80
Discharge: HOME | End: 2025-05-01
Payer: MEDICARE

## 2025-04-28 DIAGNOSIS — M43.16 SPONDYLOLISTHESIS OF LUMBAR REGION: ICD-10-CM

## 2025-04-28 DIAGNOSIS — M48.062 SPINAL STENOSIS, LUMBAR REGION, WITH NEUROGENIC CLAUDICATION: ICD-10-CM

## 2025-04-28 DIAGNOSIS — M54.16 LUMBAR RADICULOPATHY: ICD-10-CM

## 2025-04-28 DIAGNOSIS — M47.816 LUMBAR FACET ARTHROPATHY: ICD-10-CM

## 2025-04-28 PROCEDURE — 72148 MRI LUMBAR SPINE W/O DYE: CPT

## 2025-05-04 NOTE — PROGRESS NOTES
History    Marital status:      Spouse name: Not on file    Number of children: 3    Years of education: Not on file    Highest education level: Not on file   Occupational History    Occupation: retired Christian    Tobacco Use    Smoking status: Never     Passive exposure: Never    Smokeless tobacco: Never   Vaping Use    Vaping status: Never Used   Substance and Sexual Activity    Alcohol use: Never    Drug use: Never    Sexual activity: Yes     Partners: Female   Other Topics Concern    Not on file   Social History Narrative    Not on file     Social Drivers of Health     Financial Resource Strain: Low Risk  (4/4/2024)    Overall Financial Resource Strain (CARDIA)     Difficulty of Paying Living Expenses: Not hard at all   Food Insecurity: No Food Insecurity (10/22/2024)    Hunger Vital Sign     Worried About Running Out of Food in the Last Year: Never true     Ran Out of Food in the Last Year: Never true   Transportation Needs: No Transportation Needs (10/22/2024)    PRAPARE - Transportation     Lack of Transportation (Medical): No     Lack of Transportation (Non-Medical): No   Physical Activity: Unknown (12/4/2024)    Exercise Vital Sign     Days of Exercise per Week: 0 days     Minutes of Exercise per Session: Not on file   Stress: Not on file   Social Connections: Not on file   Intimate Partner Violence: Low Risk  (9/14/2024)    Received from [facility]    Interpersonal Safety     Feels UN-safe at Home or Work/School: no     Feels Unsafe: Not on file     Physical Signs of Abuse Present: no   Housing Stability: Low Risk  (10/22/2024)    Housing Stability Vital Sign     Unable to Pay for Housing in the Last Year: No     Number of Times Moved in the Last Year: 1     Homeless in the Last Year: No       Social History     Tobacco Use   Smoking Status Never    Passive exposure: Never   Smokeless Tobacco Never     Social History     Substance and Sexual Activity   Alcohol Use Never

## 2025-05-06 ENCOUNTER — OFFICE VISIT (OUTPATIENT)
Dept: NEUROSURGERY | Age: 80
End: 2025-05-06
Payer: MEDICARE

## 2025-05-06 VITALS
HEART RATE: 68 BPM | DIASTOLIC BLOOD PRESSURE: 81 MMHG | SYSTOLIC BLOOD PRESSURE: 175 MMHG | WEIGHT: 191 LBS | BODY MASS INDEX: 29.98 KG/M2 | HEIGHT: 67 IN | TEMPERATURE: 97.2 F | OXYGEN SATURATION: 96 %

## 2025-05-06 DIAGNOSIS — M47.816 LUMBAR FACET ARTHROPATHY: ICD-10-CM

## 2025-05-06 DIAGNOSIS — Z98.1 S/P LUMBAR SPINAL FUSION: ICD-10-CM

## 2025-05-06 DIAGNOSIS — M54.16 LUMBAR RADICULOPATHY: Primary | ICD-10-CM

## 2025-05-06 DIAGNOSIS — M48.062 SPINAL STENOSIS, LUMBAR REGION, WITH NEUROGENIC CLAUDICATION: ICD-10-CM

## 2025-05-06 DIAGNOSIS — M43.16 SPONDYLOLISTHESIS OF LUMBAR REGION: ICD-10-CM

## 2025-05-06 PROCEDURE — 1159F MED LIST DOCD IN RCRD: CPT | Performed by: STUDENT IN AN ORGANIZED HEALTH CARE EDUCATION/TRAINING PROGRAM

## 2025-05-06 PROCEDURE — 1125F AMNT PAIN NOTED PAIN PRSNT: CPT | Performed by: STUDENT IN AN ORGANIZED HEALTH CARE EDUCATION/TRAINING PROGRAM

## 2025-05-06 PROCEDURE — G8417 CALC BMI ABV UP PARAM F/U: HCPCS | Performed by: STUDENT IN AN ORGANIZED HEALTH CARE EDUCATION/TRAINING PROGRAM

## 2025-05-06 PROCEDURE — 3079F DIAST BP 80-89 MM HG: CPT | Performed by: STUDENT IN AN ORGANIZED HEALTH CARE EDUCATION/TRAINING PROGRAM

## 2025-05-06 PROCEDURE — 99214 OFFICE O/P EST MOD 30 MIN: CPT | Performed by: STUDENT IN AN ORGANIZED HEALTH CARE EDUCATION/TRAINING PROGRAM

## 2025-05-06 PROCEDURE — 1036F TOBACCO NON-USER: CPT | Performed by: STUDENT IN AN ORGANIZED HEALTH CARE EDUCATION/TRAINING PROGRAM

## 2025-05-06 PROCEDURE — G8427 DOCREV CUR MEDS BY ELIG CLIN: HCPCS | Performed by: STUDENT IN AN ORGANIZED HEALTH CARE EDUCATION/TRAINING PROGRAM

## 2025-05-06 PROCEDURE — 1123F ACP DISCUSS/DSCN MKR DOCD: CPT | Performed by: STUDENT IN AN ORGANIZED HEALTH CARE EDUCATION/TRAINING PROGRAM

## 2025-05-06 PROCEDURE — 3077F SYST BP >= 140 MM HG: CPT | Performed by: STUDENT IN AN ORGANIZED HEALTH CARE EDUCATION/TRAINING PROGRAM

## 2025-05-06 RX ORDER — MELOXICAM 15 MG/1
15 TABLET ORAL DAILY
Qty: 90 TABLET | Refills: 1 | Status: SHIPPED | OUTPATIENT
Start: 2025-05-06

## 2025-05-22 ENCOUNTER — HOSPITAL ENCOUNTER (OUTPATIENT)
Dept: LAB | Age: 80
Discharge: HOME | End: 2025-05-22
Payer: MEDICARE

## 2025-05-22 DIAGNOSIS — C61 PROSTATE CANCER (HCC): ICD-10-CM

## 2025-05-22 LAB — PSA SERPL-MCNC: <0.1 NG/ML (ref 0–4)

## 2025-05-22 PROCEDURE — 36415 COLL VENOUS BLD VENIPUNCTURE: CPT

## 2025-05-22 PROCEDURE — 84153 ASSAY OF PSA TOTAL: CPT

## 2025-05-22 PROCEDURE — 84403 ASSAY OF TOTAL TESTOSTERONE: CPT

## 2025-05-23 ENCOUNTER — LAB (OUTPATIENT)
Dept: INTERNAL MEDICINE CLINIC | Facility: CLINIC | Age: 80
End: 2025-05-23

## 2025-05-23 ENCOUNTER — OFFICE VISIT (OUTPATIENT)
Dept: INTERNAL MEDICINE CLINIC | Facility: CLINIC | Age: 80
End: 2025-05-23

## 2025-05-23 VITALS
HEIGHT: 67 IN | TEMPERATURE: 98 F | WEIGHT: 191.8 LBS | HEART RATE: 71 BPM | SYSTOLIC BLOOD PRESSURE: 160 MMHG | OXYGEN SATURATION: 97 % | BODY MASS INDEX: 30.1 KG/M2 | DIASTOLIC BLOOD PRESSURE: 80 MMHG

## 2025-05-23 DIAGNOSIS — I10 PRIMARY HYPERTENSION: ICD-10-CM

## 2025-05-23 DIAGNOSIS — J00 ACUTE NASOPHARYNGITIS: ICD-10-CM

## 2025-05-23 DIAGNOSIS — E78.2 MIXED HYPERLIPIDEMIA: ICD-10-CM

## 2025-05-23 DIAGNOSIS — C61 MALIGNANT NEOPLASM OF PROSTATE (HCC): ICD-10-CM

## 2025-05-23 DIAGNOSIS — K63.9 COLON ABNORMALITY: ICD-10-CM

## 2025-05-23 DIAGNOSIS — I10 PRIMARY HYPERTENSION: Primary | ICD-10-CM

## 2025-05-23 DIAGNOSIS — E55.9 VITAMIN D DEFICIENCY: ICD-10-CM

## 2025-05-23 DIAGNOSIS — M54.16 LUMBAR RADICULOPATHY: ICD-10-CM

## 2025-05-23 LAB
25(OH)D3 SERPL-MCNC: 36.4 NG/ML (ref 30–100)
ALBUMIN SERPL-MCNC: 3.6 G/DL (ref 3.2–4.6)
ALBUMIN/GLOB SERPL: 1 (ref 1–1.9)
ALP SERPL-CCNC: 54 U/L (ref 40–129)
ALT SERPL-CCNC: 30 U/L (ref 8–55)
ANION GAP SERPL CALC-SCNC: 10 MMOL/L (ref 7–16)
APPEARANCE UR: CLEAR
AST SERPL-CCNC: 34 U/L (ref 15–37)
BASOPHILS # BLD: 0.03 K/UL (ref 0–0.2)
BASOPHILS NFR BLD: 0.4 % (ref 0–2)
BILIRUB SERPL-MCNC: 0.4 MG/DL (ref 0–1.2)
BILIRUB UR QL: NEGATIVE
BUN SERPL-MCNC: 18 MG/DL (ref 8–23)
CALCIUM SERPL-MCNC: 9.7 MG/DL (ref 8.8–10.2)
CHLORIDE SERPL-SCNC: 100 MMOL/L (ref 98–107)
CHOLEST SERPL-MCNC: 186 MG/DL (ref 0–200)
CO2 SERPL-SCNC: 27 MMOL/L (ref 20–29)
COLOR UR: ABNORMAL
CREAT SERPL-MCNC: 0.85 MG/DL (ref 0.8–1.3)
DIFFERENTIAL METHOD BLD: ABNORMAL
EOSINOPHIL # BLD: 0.27 K/UL (ref 0–0.8)
EOSINOPHIL NFR BLD: 3.5 % (ref 0.5–7.8)
ERYTHROCYTE [DISTWIDTH] IN BLOOD BY AUTOMATED COUNT: 13.1 % (ref 11.9–14.6)
GLOBULIN SER CALC-MCNC: 3.7 G/DL (ref 2.3–3.5)
GLUCOSE SERPL-MCNC: 101 MG/DL (ref 70–99)
GLUCOSE UR STRIP.AUTO-MCNC: NEGATIVE MG/DL
HCT VFR BLD AUTO: 37.8 % (ref 41.1–50.3)
HDLC SERPL-MCNC: 84 MG/DL (ref 40–60)
HDLC SERPL: 2.2 (ref 0–5)
HGB BLD-MCNC: 12.6 G/DL (ref 13.6–17.2)
HGB UR QL STRIP: NEGATIVE
IMM GRANULOCYTES # BLD AUTO: 0.02 K/UL (ref 0–0.5)
IMM GRANULOCYTES NFR BLD AUTO: 0.3 % (ref 0–5)
KETONES UR QL STRIP.AUTO: ABNORMAL MG/DL
LDLC SERPL CALC-MCNC: 82 MG/DL (ref 0–100)
LEUKOCYTE ESTERASE UR QL STRIP.AUTO: NEGATIVE
LYMPHOCYTES # BLD: 1.54 K/UL (ref 0.5–4.6)
LYMPHOCYTES NFR BLD: 20.2 % (ref 13–44)
MCH RBC QN AUTO: 31.5 PG (ref 26.1–32.9)
MCHC RBC AUTO-ENTMCNC: 33.3 G/DL (ref 31.4–35)
MCV RBC AUTO: 94.5 FL (ref 82–102)
MONOCYTES # BLD: 0.53 K/UL (ref 0.1–1.3)
MONOCYTES NFR BLD: 6.9 % (ref 4–12)
NEUTS SEG # BLD: 5.25 K/UL (ref 1.7–8.2)
NEUTS SEG NFR BLD: 68.7 % (ref 43–78)
NITRITE UR QL STRIP.AUTO: NEGATIVE
NRBC # BLD: 0 K/UL (ref 0–0.2)
PH UR STRIP: 6.5 (ref 5–9)
PLATELET # BLD AUTO: 311 K/UL (ref 150–450)
PMV BLD AUTO: 9.2 FL (ref 9.4–12.3)
POTASSIUM SERPL-SCNC: 4.5 MMOL/L (ref 3.5–5.1)
PROT SERPL-MCNC: 7.3 G/DL (ref 6.3–8.2)
PROT UR STRIP-MCNC: NEGATIVE MG/DL
RBC # BLD AUTO: 4 M/UL (ref 4.23–5.6)
SODIUM SERPL-SCNC: 138 MMOL/L (ref 136–145)
SP GR UR REFRACTOMETRY: 1.02 (ref 1–1.02)
T4 FREE SERPL-MCNC: 1.2 NG/DL (ref 0.9–1.7)
TESTOST SERPL-MCNC: 4 NG/DL (ref 264–916)
TRIGL SERPL-MCNC: 98 MG/DL (ref 0–150)
TSH, 3RD GENERATION: 0.76 UIU/ML (ref 0.27–4.2)
UROBILINOGEN UR QL STRIP.AUTO: 0.2 EU/DL (ref 0.2–1)
VLDLC SERPL CALC-MCNC: 20 MG/DL (ref 6–23)
WBC # BLD AUTO: 7.6 K/UL (ref 4.3–11.1)

## 2025-05-23 RX ORDER — AZITHROMYCIN 250 MG/1
TABLET, FILM COATED ORAL
Qty: 6 TABLET | Refills: 0 | Status: SHIPPED | OUTPATIENT
Start: 2025-05-23 | End: 2025-06-02

## 2025-05-23 RX ORDER — CHLORHEXIDINE GLUCONATE ORAL RINSE 1.2 MG/ML
15 SOLUTION DENTAL 2 TIMES DAILY
Qty: 118 ML | Refills: 1 | Status: SHIPPED | OUTPATIENT
Start: 2025-05-23

## 2025-05-23 RX ORDER — LISINOPRIL AND HYDROCHLOROTHIAZIDE 12.5; 2 MG/1; MG/1
2 TABLET ORAL DAILY
Qty: 180 TABLET | Refills: 1 | Status: SHIPPED | OUTPATIENT
Start: 2025-05-23

## 2025-05-23 ASSESSMENT — ENCOUNTER SYMPTOMS
COUGH: 0
RHINORRHEA: 0
BLOOD IN STOOL: 0
SHORTNESS OF BREATH: 0
ABDOMINAL PAIN: 0

## 2025-05-23 NOTE — PROGRESS NOTES
[facility]      2025    Patient Name: Pritesh Orellana  :  1945    Subjective:    Chief Complaint:  Chief Complaint   Patient presents with    Follow-up     1-2month follow up         HPI Here for f/u, saw JJ Phillip in our office in March; he was referred to cardiology at that time, noted to have coronary artery calcification on CT scan from 2024; she saw Dr. Vargas in April; nuclear stress test, Echo ordered; Echo revealed EF 45-50%, moderate concentric hypertrophy, mild global hypokinesis, diastolic dysfxn, mild MR, moderately dilated LA; he has stress test scheduled next week; records reviewed;   He has lumbar radiculopathy, saw Dr. Salinas 25, has hx of L4-5 fusion; Mobic increased to 15 mg qd, is to continue PT, advised to use cane of walking poles for stability; records reviewed;   He did not do fasting labs prior to today's visit; he would like to do the labs today;   He has hx of colon abnormality, saw PA Grinnell in March, referred for CT abdomen/pelv; study 3/14/25 negative for acute process in abd/pelv; he is to f/u in 3 months; records reviewed;   He has hx of prostate cancer, saw Dr. Richards in July, decided on External beam radiation; last tx was -; records reviewed; he has f/u w/ Dr. Richards next week;   C/o sinus congestion x3 weeks; denies fever, but has thick mucus, cough; no known sick contacts; he is taking Mucinex otc, with some relief;   HTN:  Patient compliant with taking blood pressure medications: Yes  Discussed importance of following low sodium DASH diet, increasing physical activity, taking medications as ordered, decreasing alcohol intake, keeping f/u visits to recheck blood pressure, monitoring blood pressure at home and keeping a log, with goal blood pressure <140/90.  Home bp readings are: 140s/80s      Past Medical History:   Diagnosis Date    Allergic rhinitis     Anemia     Arthritis     Asthma     inhaler daily    BPH (benign prostatic

## 2025-05-27 ENCOUNTER — RESULTS FOLLOW-UP (OUTPATIENT)
Dept: INTERNAL MEDICINE CLINIC | Facility: CLINIC | Age: 80
End: 2025-05-27

## 2025-05-29 ENCOUNTER — PATIENT MESSAGE (OUTPATIENT)
Dept: INTERNAL MEDICINE CLINIC | Facility: CLINIC | Age: 80
End: 2025-05-29

## 2025-05-29 ENCOUNTER — HOSPITAL ENCOUNTER (OUTPATIENT)
Dept: RADIATION ONCOLOGY | Age: 80
Setting detail: RECURRING SERIES
Discharge: HOME | End: 2025-06-01
Payer: MEDICARE

## 2025-05-29 VITALS
HEART RATE: 71 BPM | OXYGEN SATURATION: 97 % | HEIGHT: 67 IN | RESPIRATION RATE: 15 BRPM | TEMPERATURE: 97.7 F | BODY MASS INDEX: 30.34 KG/M2 | SYSTOLIC BLOOD PRESSURE: 152 MMHG | DIASTOLIC BLOOD PRESSURE: 84 MMHG | WEIGHT: 193.3 LBS

## 2025-05-29 DIAGNOSIS — C61 MALIGNANT NEOPLASM OF PROSTATE (HCC): Primary | ICD-10-CM

## 2025-05-29 PROCEDURE — 99211 OFF/OP EST MAY X REQ PHY/QHP: CPT

## 2025-05-29 NOTE — PROGRESS NOTES
Radiation Oncology - Follow Up        Pritesh Orellana  is a 80 y.o. year old male with Prostate  cancer who is following up for:: Assessment from radiation therapy treatment. RT end 11-22-24    Vitals:    05/29/25 1127   BP: (!) 152/84   Pulse: 71   Resp: 15   Temp: 97.7 °F (36.5 °C)   SpO2: 97%   Pain:0/10      Assessment:  No Urology follow up.  Taking Flomax daily.   To follow up in 6 months with labs prior.         Test Results, if applicable:  PSA: DX 5-22-25. <0.1 Testosterone 4.   AUA: 16   PET / CT / PSMA:       
distress; appears stated age  HEENT: normocephalic, atraumatic; EOMI  Extremities: no cyanosis, clubbing, or edema  Musculoskeletal: mobility intact x4; normal ROM in all joints  Neuro: AOx3; sensation intact x 4; CNII-XII grossly intact  Psych: appropriate affect, insight, and judgement    LABORATORY:   Lab Results   Component Value Date/Time     05/23/2025 11:05 AM    K 4.5 05/23/2025 11:05 AM     05/23/2025 11:05 AM    CO2 27 05/23/2025 11:05 AM    BUN 18 05/23/2025 11:05 AM    GFRAA >60 06/15/2022 02:20 PM    MG 1.8 10/23/2024 03:28 AM    GLOB 3.7 05/23/2025 11:05 AM    ALT 30 05/23/2025 11:05 AM     Lab Results   Component Value Date/Time    WBC 7.6 05/23/2025 11:05 AM    HGB 12.6 05/23/2025 11:05 AM    HCT 37.8 05/23/2025 11:05 AM     05/23/2025 11:05 AM       RADIOLOGY:  No new    IMPRESSION:  Pritesh Orellana is a 80 y.o. male with history of unfavorable intermediate prostate cancer status post short-term ADT with definitive radiation therapy completed on 11/22/2024.    PLAN:    1) Patient is recovering as anticipated from his prior course of radiotherapy.  2) Future follow up will be coordinated with other providers.    3) RTC 6 months with PSA and testosterone.   4) Interested in speaking to someone regarding erectile dysfunction and the possibility of a penile pump. I have discussed this with Dr. Mccarthy who would recommend the patient been seen at a higher volume center such as [facility] or [facility]. We will refer him if he is interested.     I spent a total of 15 minutes today performing the following care related activities for Pritesh Orellana: Face to face exam/evaluation, /Educate Patient/Caregivers, Pre-Charting/Documenting after the visit, and Interpreting/Ordering Meds, Tests, Procedures    Kvng Richards MD  05/29/25

## 2025-05-30 ENCOUNTER — CLINICAL DOCUMENTATION (OUTPATIENT)
Age: 80
End: 2025-05-30

## 2025-06-11 ENCOUNTER — TELEPHONE (OUTPATIENT)
Age: 80
End: 2025-06-11

## 2025-06-11 NOTE — TELEPHONE ENCOUNTER
Pt wants to know what his results were because he looked it up on mychart and saw the word \"infarct\" which made him concerned and would like an explanation of his results.

## 2025-06-12 NOTE — PROGRESS NOTES
evaluation of adjacent structures.    Impression  Foci of uptake within the prostate gland, consistent with known disease.      Electronically signed by Konrad Green         ASSESSMENT:    Mr. Pritesh Orellana is a 80 y.o. 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 4+3 in HILDA, 3+4 in 1 core. Both positive cores on the right. ASAP x 1. PSA 7.0 on 4/23/24. TOI at biopsy no nodules or induration MRI 6/5/24 with PR5 x 1.    He is doing well from a prostate cancer treatment standpoint.  I see no evidence of disease recurrence.    In regards to his ED we talked about treatment options.  He does have moderate cardiac risk factors and prefers to stay away from PDE 5 inhibitors if possible.  He is most interested in injection therapy.  I will refer him to [facility] at [facility] to prescribe and train him on how to do injections.  Patient will see Dr. Richards in November for his next PSA check and we will plan to see him next May with a PSA and total testosterone.        PLAN:   -Discussion of ED   -Referral to [facility] ([facility] for injection therapy)  -Continue to follow Dr Richards  -RTC May of next year   ________________________________________      I have seen and examined this patient.  I have reviewed and edited the note started by the MA and agree with the outlined plan.  Part of this note was written by using a voice dictation software. The note has been proof read but may still contain some grammatical/other typographical errors.      Manuel Mccarthy MD  Urologic Oncology  [facility]

## 2025-06-16 ENCOUNTER — OFFICE VISIT (OUTPATIENT)
Age: 80
End: 2025-06-16
Payer: MEDICARE

## 2025-06-16 ENCOUNTER — TELEPHONE (OUTPATIENT)
Dept: UROLOGY | Age: 80
End: 2025-06-16

## 2025-06-16 VITALS
WEIGHT: 196 LBS | HEART RATE: 89 BPM | BODY MASS INDEX: 30.76 KG/M2 | HEIGHT: 67 IN | TEMPERATURE: 97.8 F | RESPIRATION RATE: 16 BRPM | SYSTOLIC BLOOD PRESSURE: 157 MMHG | OXYGEN SATURATION: 96 % | DIASTOLIC BLOOD PRESSURE: 86 MMHG

## 2025-06-16 DIAGNOSIS — N52.35 ERECTILE DYSFUNCTION FOLLOWING RADIATION THERAPY: ICD-10-CM

## 2025-06-16 DIAGNOSIS — C61 PROSTATE CANCER (HCC): Primary | ICD-10-CM

## 2025-06-16 PROCEDURE — 1036F TOBACCO NON-USER: CPT | Performed by: UROLOGY

## 2025-06-16 PROCEDURE — 1126F AMNT PAIN NOTED NONE PRSNT: CPT | Performed by: UROLOGY

## 2025-06-16 PROCEDURE — 3079F DIAST BP 80-89 MM HG: CPT | Performed by: UROLOGY

## 2025-06-16 PROCEDURE — 1123F ACP DISCUSS/DSCN MKR DOCD: CPT | Performed by: UROLOGY

## 2025-06-16 PROCEDURE — 99213 OFFICE O/P EST LOW 20 MIN: CPT | Performed by: UROLOGY

## 2025-06-16 PROCEDURE — G8417 CALC BMI ABV UP PARAM F/U: HCPCS | Performed by: UROLOGY

## 2025-06-16 PROCEDURE — G8428 CUR MEDS NOT DOCUMENT: HCPCS | Performed by: UROLOGY

## 2025-06-16 PROCEDURE — 3077F SYST BP >= 140 MM HG: CPT | Performed by: UROLOGY

## 2025-06-16 ASSESSMENT — PATIENT HEALTH QUESTIONNAIRE - PHQ9
1. LITTLE INTEREST OR PLEASURE IN DOING THINGS: NOT AT ALL
SUM OF ALL RESPONSES TO PHQ QUESTIONS 1-9: 0
2. FEELING DOWN, DEPRESSED OR HOPELESS: NOT AT ALL
SUM OF ALL RESPONSES TO PHQ QUESTIONS 1-9: 0

## 2025-06-16 ASSESSMENT — ENCOUNTER SYMPTOMS
RESPIRATORY NEGATIVE: 1
GASTROINTESTINAL NEGATIVE: 1

## 2025-06-16 NOTE — PATIENT INSTRUCTIONS
Patient Information from Today's Visit    The members of your Oncology Medical Home are listed below:    Physician Provider: Dmitriy Mccarthy, Urologic Oncologist  Advanced Practice Clinician: TORI Baugh  Medical Assistant: Blank CARTER CMA  :Cathy TODD  Supportive Care Services: Margo CORLEY LMSW    Diagnosis (Information Sheet Provided on Day of Diagnosis): Prostate    Follow Up Instructions:   We will place a referral to [facility] for injection therapy.  We will also plan to see you back in May of next year. If you need anything prior please do not hesitate to call our office.    Has Treatment Plan Been Finalized? No    Current Labs:   No visits with results within 3 Day(s) from this visit.   Latest known visit with results is:   Ancillary Procedure on 05/28/2025   Component Date Value Ref Range Status    Stress Target HR 05/28/2025 140  bpm Final    Baseline HR 05/28/2025 69  bpm Final    Baseline Systolic BP 05/28/2025 180  mmHg Final    Baseline Diastolic BP 05/28/2025 100  mmHg Final    Stress Peak HR 05/28/2025 96  bpm Final    Stress Systolic BP 05/28/2025 142  mmHg Final    Stress Diastolic BP 05/28/2025 72  mmHg Final    Stress Rate Pressure Product 05/28/2025 13,632  bpm*mmHg Final    Stress Percent HR Achieved 05/28/2025 69  % Final    Body Surface Area 05/28/2025 2.02  m2 Final    Nuc Stress EF 05/28/2025 46  % Final           Please refer to After Visit Summary or STYLIGHT for upcoming appointment information. Please call our office for rescheduling needs at least 24 hours before your scheduled appointment time.If you have any questions regarding your upcoming schedule please reach out to your care team through STYLIGHT or call [phone].     Please notify your assigned Nurse Navigator of any unplanned hospital admissions or Emergency Room visits within 24 hours of

## 2025-06-19 ENCOUNTER — OFFICE VISIT (OUTPATIENT)
Age: 80
End: 2025-06-19
Payer: MEDICARE

## 2025-06-19 ENCOUNTER — TELEPHONE (OUTPATIENT)
Dept: UROLOGY | Age: 80
End: 2025-06-19

## 2025-06-19 VITALS
HEART RATE: 72 BPM | BODY MASS INDEX: 30.61 KG/M2 | HEIGHT: 67 IN | SYSTOLIC BLOOD PRESSURE: 126 MMHG | WEIGHT: 195 LBS | DIASTOLIC BLOOD PRESSURE: 62 MMHG

## 2025-06-19 DIAGNOSIS — R94.39 ABNORMAL CARDIOVASCULAR STRESS TEST: Primary | ICD-10-CM

## 2025-06-19 DIAGNOSIS — R94.39 ABNORMAL CARDIOVASCULAR STRESS TEST: ICD-10-CM

## 2025-06-19 DIAGNOSIS — R07.2 PRECORDIAL PAIN: ICD-10-CM

## 2025-06-19 DIAGNOSIS — E78.2 HYPERLIPIDEMIA, MIXED: ICD-10-CM

## 2025-06-19 DIAGNOSIS — I10 ESSENTIAL HYPERTENSION: ICD-10-CM

## 2025-06-19 PROBLEM — I20.0 ACCELERATING ANGINA (HCC): Status: ACTIVE | Noted: 2025-06-19

## 2025-06-19 LAB
ANION GAP SERPL CALC-SCNC: 13 MMOL/L (ref 7–16)
BUN SERPL-MCNC: 21 MG/DL (ref 8–23)
CALCIUM SERPL-MCNC: 9.7 MG/DL (ref 8.8–10.2)
CHLORIDE SERPL-SCNC: 101 MMOL/L (ref 98–107)
CO2 SERPL-SCNC: 24 MMOL/L (ref 20–29)
CREAT SERPL-MCNC: 0.83 MG/DL (ref 0.8–1.3)
ERYTHROCYTE [DISTWIDTH] IN BLOOD BY AUTOMATED COUNT: 12.8 % (ref 11.9–14.6)
GLUCOSE SERPL-MCNC: 84 MG/DL (ref 70–99)
HCT VFR BLD AUTO: 38.4 % (ref 41.1–50.3)
HGB BLD-MCNC: 12.5 G/DL (ref 13.6–17.2)
MCH RBC QN AUTO: 31.6 PG (ref 26.1–32.9)
MCHC RBC AUTO-ENTMCNC: 32.6 G/DL (ref 31.4–35)
MCV RBC AUTO: 97 FL (ref 82–102)
NRBC # BLD: 0 K/UL (ref 0–0.2)
PLATELET # BLD AUTO: 269 K/UL (ref 150–450)
PMV BLD AUTO: 9.2 FL (ref 9.4–12.3)
POTASSIUM SERPL-SCNC: 4.2 MMOL/L (ref 3.5–5.1)
RBC # BLD AUTO: 3.96 M/UL (ref 4.23–5.6)
SODIUM SERPL-SCNC: 139 MMOL/L (ref 136–145)
WBC # BLD AUTO: 6.1 K/UL (ref 4.3–11.1)

## 2025-06-19 PROCEDURE — 1123F ACP DISCUSS/DSCN MKR DOCD: CPT | Performed by: INTERNAL MEDICINE

## 2025-06-19 PROCEDURE — 3078F DIAST BP <80 MM HG: CPT | Performed by: INTERNAL MEDICINE

## 2025-06-19 PROCEDURE — 1126F AMNT PAIN NOTED NONE PRSNT: CPT | Performed by: INTERNAL MEDICINE

## 2025-06-19 PROCEDURE — 3074F SYST BP LT 130 MM HG: CPT | Performed by: INTERNAL MEDICINE

## 2025-06-19 PROCEDURE — G8428 CUR MEDS NOT DOCUMENT: HCPCS | Performed by: INTERNAL MEDICINE

## 2025-06-19 PROCEDURE — 1036F TOBACCO NON-USER: CPT | Performed by: INTERNAL MEDICINE

## 2025-06-19 PROCEDURE — G8417 CALC BMI ABV UP PARAM F/U: HCPCS | Performed by: INTERNAL MEDICINE

## 2025-06-19 PROCEDURE — 99215 OFFICE O/P EST HI 40 MIN: CPT | Performed by: INTERNAL MEDICINE

## 2025-06-19 RX ORDER — SODIUM CHLORIDE 0.9 % (FLUSH) 0.9 %
5-40 SYRINGE (ML) INJECTION EVERY 12 HOURS SCHEDULED
OUTPATIENT
Start: 2025-06-19

## 2025-06-19 RX ORDER — SODIUM CHLORIDE 0.9 % (FLUSH) 0.9 %
5-40 SYRINGE (ML) INJECTION PRN
OUTPATIENT
Start: 2025-06-19

## 2025-06-19 RX ORDER — SODIUM CHLORIDE 9 MG/ML
INJECTION, SOLUTION INTRAVENOUS CONTINUOUS
OUTPATIENT
Start: 2025-06-19

## 2025-06-19 RX ORDER — METOPROLOL SUCCINATE 25 MG/1
25 TABLET, EXTENDED RELEASE ORAL DAILY
Qty: 30 TABLET | Refills: 11 | Status: SHIPPED | OUTPATIENT
Start: 2025-06-19

## 2025-06-19 RX ORDER — SODIUM CHLORIDE 9 MG/ML
INJECTION, SOLUTION INTRAVENOUS PRN
OUTPATIENT
Start: 2025-06-19

## 2025-06-19 ASSESSMENT — ENCOUNTER SYMPTOMS
VOMITING: 0
DOUBLE VISION: 0
ABDOMINAL PAIN: 0
BLOATING: 0
NAUSEA: 0
HEMOPTYSIS: 0
COUGH: 0
BLURRED VISION: 0
SHORTNESS OF BREATH: 0
ORTHOPNEA: 0
BACK PAIN: 0

## 2025-06-19 NOTE — PROGRESS NOTES
for bloating, abdominal pain, nausea and vomiting.   Genitourinary:  Negative for dysuria.   Neurological:  Negative for dizziness, light-headedness and weakness.   Psychiatric/Behavioral:  Negative for altered mental status.        PHYSICAL EXAM:    /62   Pulse 72   Ht 1.702 m (5' 7\")   Wt 88.5 kg (195 lb)   BMI 30.54 kg/m²      Physical Exam  Constitutional:       Appearance: Normal appearance.   HENT:      Head: Normocephalic and atraumatic.      Mouth/Throat:      Mouth: Mucous membranes are moist.   Eyes:      Pupils: Pupils are equal, round, and reactive to light.   Cardiovascular:      Rate and Rhythm: Normal rate and regular rhythm.      Pulses: Normal pulses.   Pulmonary:      Effort: Pulmonary effort is normal.      Breath sounds: Normal breath sounds.   Abdominal:      General: Bowel sounds are normal. There is no distension.      Palpations: Abdomen is soft.      Tenderness: There is no abdominal tenderness.   Musculoskeletal:         General: No swelling.      Cervical back: Normal range of motion.   Skin:     General: Skin is warm and dry.   Neurological:      General: No focal deficit present.      Mental Status: He is alert and oriented to person, place, and time.         Medical problems and test results were reviewed with the patient today.     Recent Results (from the past 4 weeks)   Testosterone Total Only, Male    Collection Time: 05/22/25 12:05 PM   Result Value Ref Range    Testosterone 4 (L) 264 - 916 ng/dL   PSA, Diagnostic    Collection Time: 05/22/25 12:05 PM   Result Value Ref Range    PSA <0.1 0.0 - 4.0 ng/mL   Vitamin D 25 Hydroxy    Collection Time: 05/23/25 11:05 AM   Result Value Ref Range    Vit D, 25-Hydroxy 36.4 30.0 - 100.0 ng/mL   Urinalysis    Collection Time: 05/23/25 11:05 AM   Result Value Ref Range    Color, UA DARK YELLOW      Appearance CLEAR      Specific Gravity, UA 1.023 1.001 - 1.023      pH, Urine 6.5 5.0 - 9.0      Protein, UA Negative Negative mg/dL

## 2025-06-23 NOTE — PROGRESS NOTES
Patient pre-assessment complete for [facility] scheduled for , arrival time 1200. Patient verified using . Patient instructed to bring a list of all home medications on the day of procedure. NPO status reinforced. Patient informed to take a full dose aspirin 325mg  or 81 mg x 4 on the day of procedure. Patient instructed to HOLD lisinopril-hctz. Instructed they can take all other medications excluding vitamins & supplements. Patient verbalizes understanding of all instructions & denies any questions at this time.

## 2025-06-24 ENCOUNTER — APPOINTMENT (OUTPATIENT)
Dept: CT IMAGING | Age: 80
DRG: 234 | End: 2025-06-24
Attending: INTERNAL MEDICINE
Payer: MEDICARE

## 2025-06-24 ENCOUNTER — HOSPITAL ENCOUNTER (INPATIENT)
Age: 80
LOS: 8 days | Discharge: NURSING, REHAB OR LONG-TERM CARE | DRG: 234 | End: 2025-07-02
Attending: INTERNAL MEDICINE | Admitting: SURGERY
Payer: MEDICARE

## 2025-06-24 ENCOUNTER — PREP FOR PROCEDURE (OUTPATIENT)
Dept: CARDIOTHORACIC SURGERY | Age: 80
End: 2025-06-24

## 2025-06-24 DIAGNOSIS — I20.0 UNSTABLE ANGINA (HCC): Primary | ICD-10-CM

## 2025-06-24 DIAGNOSIS — Z95.1 S/P CABG X 4: ICD-10-CM

## 2025-06-24 DIAGNOSIS — R06.02 SHORTNESS OF BREATH: ICD-10-CM

## 2025-06-24 DIAGNOSIS — R06.09 DYSPNEA ON EXERTION: ICD-10-CM

## 2025-06-24 DIAGNOSIS — I20.0 ACCELERATING ANGINA (HCC): ICD-10-CM

## 2025-06-24 LAB
APPEARANCE UR: CLEAR
BILIRUB UR QL: NEGATIVE
COLOR UR: ABNORMAL
ECHO BSA: 2.04 M2
EKG ATRIAL RATE: 60 BPM
EKG DIAGNOSIS: NORMAL
EKG P AXIS: 56 DEGREES
EKG P-R INTERVAL: 178 MS
EKG Q-T INTERVAL: 408 MS
EKG QRS DURATION: 94 MS
EKG QTC CALCULATION (BAZETT): 408 MS
EKG R AXIS: 47 DEGREES
EKG T AXIS: 51 DEGREES
EKG VENTRICULAR RATE: 60 BPM
GLUCOSE UR STRIP.AUTO-MCNC: NEGATIVE MG/DL
HGB UR QL STRIP: NEGATIVE
KETONES UR QL STRIP.AUTO: NEGATIVE MG/DL
LEUKOCYTE ESTERASE UR QL STRIP.AUTO: NEGATIVE
NITRITE UR QL STRIP.AUTO: NEGATIVE
PH UR STRIP: 6.5 (ref 5–9)
PROT UR STRIP-MCNC: NEGATIVE MG/DL
SP GR UR REFRACTOMETRY: 1.03 (ref 1–1.02)
UROBILINOGEN UR QL STRIP.AUTO: 0.2 EU/DL (ref 0.2–1)

## 2025-06-24 PROCEDURE — 93458 L HRT ARTERY/VENTRICLE ANGIO: CPT | Performed by: INTERNAL MEDICINE

## 2025-06-24 PROCEDURE — 81003 URINALYSIS AUTO W/O SCOPE: CPT

## 2025-06-24 PROCEDURE — C1769 GUIDE WIRE: HCPCS | Performed by: INTERNAL MEDICINE

## 2025-06-24 PROCEDURE — 2580000003 HC RX 258: Performed by: INTERNAL MEDICINE

## 2025-06-24 PROCEDURE — 93010 ELECTROCARDIOGRAM REPORT: CPT | Performed by: INTERNAL MEDICINE

## 2025-06-24 PROCEDURE — 6370000000 HC RX 637 (ALT 250 FOR IP): Performed by: PHYSICIAN ASSISTANT

## 2025-06-24 PROCEDURE — B2111ZZ FLUOROSCOPY OF MULTIPLE CORONARY ARTERIES USING LOW OSMOLAR CONTRAST: ICD-10-PCS | Performed by: INTERNAL MEDICINE

## 2025-06-24 PROCEDURE — 6360000002 HC RX W HCPCS: Performed by: INTERNAL MEDICINE

## 2025-06-24 PROCEDURE — C1894 INTRO/SHEATH, NON-LASER: HCPCS | Performed by: INTERNAL MEDICINE

## 2025-06-24 PROCEDURE — 2709999900 HC NON-CHARGEABLE SUPPLY: Performed by: INTERNAL MEDICINE

## 2025-06-24 PROCEDURE — 4A023N7 MEASUREMENT OF CARDIAC SAMPLING AND PRESSURE, LEFT HEART, PERCUTANEOUS APPROACH: ICD-10-PCS | Performed by: INTERNAL MEDICINE

## 2025-06-24 PROCEDURE — 93005 ELECTROCARDIOGRAM TRACING: CPT | Performed by: INTERNAL MEDICINE

## 2025-06-24 PROCEDURE — 2140000001 HC CVICU INTERMEDIATE R&B

## 2025-06-24 PROCEDURE — 6360000004 HC RX CONTRAST MEDICATION: Performed by: INTERNAL MEDICINE

## 2025-06-24 PROCEDURE — 87641 MR-STAPH DNA AMP PROBE: CPT

## 2025-06-24 PROCEDURE — 2500000003 HC RX 250 WO HCPCS: Performed by: INTERNAL MEDICINE

## 2025-06-24 PROCEDURE — 99152 MOD SED SAME PHYS/QHP 5/>YRS: CPT | Performed by: INTERNAL MEDICINE

## 2025-06-24 PROCEDURE — B2151ZZ FLUOROSCOPY OF LEFT HEART USING LOW OSMOLAR CONTRAST: ICD-10-PCS | Performed by: INTERNAL MEDICINE

## 2025-06-24 RX ORDER — SODIUM CHLORIDE 0.9 % (FLUSH) 0.9 %
5-40 SYRINGE (ML) INJECTION EVERY 12 HOURS SCHEDULED
Status: DISCONTINUED | OUTPATIENT
Start: 2025-06-24 | End: 2025-06-26

## 2025-06-24 RX ORDER — NITROGLYCERIN 0.4 MG/1
0.4 TABLET SUBLINGUAL EVERY 5 MIN PRN
Status: DISCONTINUED | OUTPATIENT
Start: 2025-06-24 | End: 2025-06-26

## 2025-06-24 RX ORDER — ACETAMINOPHEN 650 MG/1
650 SUPPOSITORY RECTAL EVERY 6 HOURS PRN
Status: DISCONTINUED | OUTPATIENT
Start: 2025-06-24 | End: 2025-06-26

## 2025-06-24 RX ORDER — ACETAMINOPHEN 325 MG/1
650 TABLET ORAL EVERY 6 HOURS PRN
Status: DISCONTINUED | OUTPATIENT
Start: 2025-06-24 | End: 2025-06-26

## 2025-06-24 RX ORDER — LIDOCAINE HYDROCHLORIDE 10 MG/ML
INJECTION, SOLUTION INFILTRATION; PERINEURAL PRN
Status: DISCONTINUED | OUTPATIENT
Start: 2025-06-24 | End: 2025-06-24 | Stop reason: HOSPADM

## 2025-06-24 RX ORDER — SODIUM CHLORIDE 0.9 % (FLUSH) 0.9 %
5-40 SYRINGE (ML) INJECTION PRN
Status: DISCONTINUED | OUTPATIENT
Start: 2025-06-24 | End: 2025-06-28

## 2025-06-24 RX ORDER — SODIUM CHLORIDE 0.9 % (FLUSH) 0.9 %
5-40 SYRINGE (ML) INJECTION PRN
Status: DISCONTINUED | OUTPATIENT
Start: 2025-06-24 | End: 2025-06-24

## 2025-06-24 RX ORDER — SODIUM CHLORIDE 0.9 % (FLUSH) 0.9 %
5-40 SYRINGE (ML) INJECTION EVERY 12 HOURS SCHEDULED
Status: DISCONTINUED | OUTPATIENT
Start: 2025-06-24 | End: 2025-06-24

## 2025-06-24 RX ORDER — MAGNESIUM SULFATE IN WATER 40 MG/ML
2000 INJECTION, SOLUTION INTRAVENOUS PRN
Status: DISCONTINUED | OUTPATIENT
Start: 2025-06-24 | End: 2025-06-28

## 2025-06-24 RX ORDER — AMLODIPINE BESYLATE 5 MG/1
5 TABLET ORAL DAILY
Status: DISCONTINUED | OUTPATIENT
Start: 2025-06-25 | End: 2025-06-26

## 2025-06-24 RX ORDER — ASPIRIN 81 MG/1
324 TABLET, CHEWABLE ORAL DAILY
Status: DISCONTINUED | OUTPATIENT
Start: 2025-06-24 | End: 2025-06-24 | Stop reason: HOSPADM

## 2025-06-24 RX ORDER — METOPROLOL SUCCINATE 50 MG/1
50 TABLET, EXTENDED RELEASE ORAL DAILY
Qty: 90 TABLET | Refills: 3 | Status: SHIPPED | OUTPATIENT
Start: 2025-06-24 | End: 2025-07-02

## 2025-06-24 RX ORDER — NITROGLYCERIN 0.4 MG/1
0.4 TABLET SUBLINGUAL EVERY 5 MIN PRN
Qty: 25 TABLET | Refills: 3 | Status: ON HOLD | OUTPATIENT
Start: 2025-06-24

## 2025-06-24 RX ORDER — SODIUM CHLORIDE 9 MG/ML
INJECTION, SOLUTION INTRAVENOUS CONTINUOUS
Status: DISCONTINUED | OUTPATIENT
Start: 2025-06-24 | End: 2025-06-24 | Stop reason: HOSPADM

## 2025-06-24 RX ORDER — GABAPENTIN 100 MG/1
100 CAPSULE ORAL NIGHTLY
Status: DISCONTINUED | OUTPATIENT
Start: 2025-06-24 | End: 2025-07-02 | Stop reason: HOSPADM

## 2025-06-24 RX ORDER — IOPAMIDOL 755 MG/ML
INJECTION, SOLUTION INTRAVASCULAR PRN
Status: DISCONTINUED | OUTPATIENT
Start: 2025-06-24 | End: 2025-06-24 | Stop reason: HOSPADM

## 2025-06-24 RX ORDER — AMIODARONE HYDROCHLORIDE 200 MG/1
200 TABLET ORAL EVERY 12 HOURS
Status: COMPLETED | OUTPATIENT
Start: 2025-06-25 | End: 2025-06-26

## 2025-06-24 RX ORDER — SODIUM CHLORIDE 9 MG/ML
INJECTION, SOLUTION INTRAVENOUS PRN
Status: DISCONTINUED | OUTPATIENT
Start: 2025-06-24 | End: 2025-06-28

## 2025-06-24 RX ORDER — TAMSULOSIN HYDROCHLORIDE 0.4 MG/1
0.4 CAPSULE ORAL DAILY
Status: DISCONTINUED | OUTPATIENT
Start: 2025-06-24 | End: 2025-06-27

## 2025-06-24 RX ORDER — FAMOTIDINE 20 MG/1
20 TABLET, FILM COATED ORAL 2 TIMES DAILY
Status: DISCONTINUED | OUTPATIENT
Start: 2025-06-25 | End: 2025-06-26

## 2025-06-24 RX ORDER — ATORVASTATIN CALCIUM 80 MG/1
80 TABLET, FILM COATED ORAL DAILY
Qty: 90 TABLET | Refills: 3 | Status: SHIPPED | OUTPATIENT
Start: 2025-06-24 | End: 2025-07-02

## 2025-06-24 RX ORDER — ASPIRIN 81 MG/1
81 TABLET, CHEWABLE ORAL DAILY
Status: DISCONTINUED | OUTPATIENT
Start: 2025-06-25 | End: 2025-06-26

## 2025-06-24 RX ORDER — POLYETHYLENE GLYCOL 3350 17 G/17G
17 POWDER, FOR SOLUTION ORAL DAILY PRN
Status: DISCONTINUED | OUTPATIENT
Start: 2025-06-24 | End: 2025-06-26

## 2025-06-24 RX ORDER — SODIUM CHLORIDE 9 MG/ML
INJECTION, SOLUTION INTRAVENOUS CONTINUOUS
Status: DISCONTINUED | OUTPATIENT
Start: 2025-06-24 | End: 2025-06-24

## 2025-06-24 RX ORDER — POTASSIUM CHLORIDE 1500 MG/1
40 TABLET, EXTENDED RELEASE ORAL PRN
Status: DISCONTINUED | OUTPATIENT
Start: 2025-06-24 | End: 2025-06-26

## 2025-06-24 RX ORDER — IPRATROPIUM BROMIDE AND ALBUTEROL SULFATE 2.5; .5 MG/3ML; MG/3ML
1 SOLUTION RESPIRATORY (INHALATION) EVERY 6 HOURS PRN
Status: DISCONTINUED | OUTPATIENT
Start: 2025-06-24 | End: 2025-07-02 | Stop reason: HOSPADM

## 2025-06-24 RX ORDER — ASPIRIN 81 MG/1
81 TABLET ORAL DAILY
Qty: 90 TABLET | Refills: 0 | Status: SHIPPED | OUTPATIENT
Start: 2025-06-24 | End: 2025-07-02

## 2025-06-24 RX ORDER — ACETAMINOPHEN 325 MG/1
650 TABLET ORAL EVERY 4 HOURS PRN
Status: DISCONTINUED | OUTPATIENT
Start: 2025-06-24 | End: 2025-06-24

## 2025-06-24 RX ORDER — POTASSIUM CHLORIDE 7.45 MG/ML
10 INJECTION INTRAVENOUS PRN
Status: DISCONTINUED | OUTPATIENT
Start: 2025-06-24 | End: 2025-06-26

## 2025-06-24 RX ADMIN — SODIUM CHLORIDE: 9 INJECTION, SOLUTION INTRAVENOUS at 12:27

## 2025-06-24 RX ADMIN — FENTANYL CITRATE 50 MCG: 50 INJECTION INTRAMUSCULAR; INTRAVENOUS at 12:00

## 2025-06-24 RX ADMIN — GABAPENTIN 100 MG: 100 CAPSULE ORAL at 19:57

## 2025-06-24 RX ADMIN — TAMSULOSIN HYDROCHLORIDE 0.4 MG: 0.4 CAPSULE ORAL at 18:20

## 2025-06-24 RX ADMIN — MIDAZOLAM HYDROCHLORIDE 2 MG: 1 INJECTION, SOLUTION INTRAMUSCULAR; INTRAVENOUS at 12:00

## 2025-06-24 NOTE — PROGRESS NOTES
TRANSFER - IN REPORT:    Verbal report received from Crystal DO  on [facility]  being received from [facility] for routine progression of patient care      Report consisted of patient's Situation, Background, Assessment and   Recommendations(SBAR).     Information from the following report(s) Nurse Handoff Report was reviewed with the receiving nurse.    Opportunity for questions and clarification was provided.      Assessment completed upon patient's arrival to unit and care assumed.

## 2025-06-24 NOTE — PROGRESS NOTES
[facility] w/ Dr. Vernon  CV consult  R radial access  TR band to R radial @ 12mL  No s/sxs of bleeding or hematoma to R radial access site    Heparin 5000iu IC  Versed 2mg IV  Fentanyl 50mcg IV      TRANSFER - OUT REPORT:    Verbal report given to HI Rojas on [facility]  being transferred to [facility] for routine progression of patient care       Report consisted of patient's Situation, Background, Assessment and   Recommendations(SBAR).     Information from the following report(s) Nurse Handoff Report and MAR was reviewed with the receiving nurse.    Opportunity for questions and clarification was provided.      Patient transported with:  Registered Nurse

## 2025-06-24 NOTE — PROGRESS NOTES
Patient received to [facility] room # 10  Ambulatory from [facility]. Patient scheduled for LHC today with Dr romano. Procedure reviewed & questions answered, voiced good understanding consent obtained & placed on chart. All medications and medical history reviewed. Will prep patient per orders. Patient & family updated on plan of care.      The patient has a fraility score of 4-VULNERABLE, based on use of assistive device.    Patient took Aspirin 324mg today at 0630 prior to arrival.

## 2025-06-24 NOTE — CONSULTS
MD Rodriguez Eid MD R. Grant Willis, MD, MS          CONSULT NOTE    Pritesh Waltersbridge         6/19/2025 1945    REFERRING PHYSICIAN:  Dr. Vernon       HISTORY OF PRESENT ILLNESS:  The patient is a 80 y.o. male who was seen in the office by Dr Vargas. He has noted recurrent chest pain with exertion with radiation to his neck and up to his ears. Pain eventually resolves with rest after 4-5 minutes. He also complains of shortness of breath that he attributes to \"asthma.\" He has been using his inhaler much more frequently in the past few months. He ambulates with a cane.   Nuclear stress test was concerning for prior infarct with darshan-infarction ischemia.   He underwent cardiac catheterization today that showed severe multivessel disease.   Recent echocardiogram showed a depressed EF of 45-50% with mild to moderate MR.     Cardiac risk factors are as follows:  Hypertension and Cancer within 5 years    Primary symptom for surgery:  unstable angina  Most recent EF_45-50__%  Acute   Prior cardiac arrhythmia  none      No history of stroke, TIA, prior cardiac surgery, prior vascular surgery, anesthetic complication, lung disease, DVT or PE, GI bleeding       Past Medical History:   Diagnosis Date    Allergic rhinitis     Anemia 2024    Arthritis     Asthma     inhaler daily    BPH (benign prostatic hyperplasia)     Cancer (HCC) 2017    Top of right ear    Erectile dysfunction     Hearing loss     History of blood transfusion 2012    Hypercholesterolemia     Hypertension     medication    Lumbar stenosis     Osteoarthritis     Pneumonia 10/21/2024    Prolonged emergence from general anesthesia 2012    with bilateral knee replacement    Prostate cancer (HCC) 07/2024    radiation    Restless legs syndrome     Rosacea     Septicemia ([facility]) 10/21/2024    Skin cancer 2019    BCC  top of right ear    Vitamin D deficiency        Past Surgical History:   Procedure Laterality Date     facility-administered medications on file prior to encounter.     Current Outpatient Medications on File Prior to Encounter   Medication Sig Dispense Refill    lisinopril-hydroCHLOROthiazide (PRINZIDE;ZESTORETIC) 20-12.5 MG per tablet Take 2 tablets by mouth daily 180 tablet 1    meloxicam (MOBIC) 15 MG tablet Take 1 tablet by mouth daily 90 tablet 1    pramipexole (MIRAPEX) 0.5 MG tablet Take 1 tablet by mouth nightly 90 tablet 1    fluticasone-salmeterol (ADVAIR) 500-50 MCG/ACT AEPB diskus inhaler Inhale 1 puff into the lungs in the morning and 1 puff in the evening. 1 each 11    metroNIDAZOLE (METROGEL) 1 % gel Apply 1 Tube topically daily 1 each 0    Melatonin-Theanine (SLEEP+CALM) 3-50 MG CHEW Take by mouth nightly      chlorhexidine (PERIDEX) 0.12 % solution Swish and spit 15 mLs 2 times daily PLACE 15 ML IN THE MOUTH SWISH IN MOUTH FOR 30 SECONDS THEN SPIT  mL 1    Multiple Vitamins-Minerals (MULTIVITAMIN ADULTS PO) Take by mouth Daily      Ascorbic Acid (VITAMIN C PO) Take 500 mg by mouth Daily      acetaminophen (TYLENOL) 500 MG tablet Take 2 tablets by mouth every 6 hours as needed for Pain      tamsulosin (FLOMAX) 0.4 MG capsule Take 1 capsule by mouth daily 30 capsule 5    ipratropium 0.5 mg-albuterol 2.5 mg (DUONEB) 0.5-2.5 (3) MG/3ML SOLN nebulizer solution Take 3 mLs by nebulization every 6 hours as needed for Shortness of Breath 360 mL 11    gabapentin (NEURONTIN) 100 MG capsule Take 1 capsule by mouth nightly for 180 days. Intended supply: 30 days 30 capsule 5    vitamin D (CHOLECALCIFEROL) 25 MCG (1000 UT) TABS tablet Take 1 tablet by mouth daily      fluticasone (FLONASE) 50 MCG/ACT nasal spray daily as needed 1 spray in each nostril         REVIEW OF SYSTEMS:  Review of Systems   Constitutional: Negative for chills, fever, malaise/fatigue, weight gain and weight loss.   HENT:  Negative for ear pain, hearing loss, nosebleeds, sore throat and tinnitus.    Eyes:  Negative for blurred vision,

## 2025-06-24 NOTE — PROGRESS NOTES
TRANSFER - OUT REPORT:    Verbal report given to HI Fatima on Pritesh Polk Milroy being transferred to 2225 for routine progression of patient care       Report consisted of patient’s Situation, Background, Assessment and Recommendations (SBAR).     Information from the following report(s) SBAR, Procedure Summary, and Cardiac Rhythm NSR was reviewed with the receiving nurse.    Opportunity for questions and clarification was provided.      R [facility] site C/D/I.

## 2025-06-24 NOTE — PROGRESS NOTES
Radial compression band removed at approximately 1615 after slowly reducing air from 12 cc to zero as per hospital protocol.  No bleeding or hematoma noted. 2 x 2 gauze with tegaderm placed over puncture site. The affected extremity is warm and dry to the touch. Frequent vital signs printed and placed on bedside chart.  Patient instructed to call if any bleeding noted on gauze.  Patient verbalized understanding the nursing instructions.

## 2025-06-24 NOTE — PROGRESS NOTES
4 Eyes Skin Assessment     NAME:  Pritesh Orellana  YOB: 1945  MEDICAL RECORD NUMBER:  [id number]    The patient is being assessed for  Admission    I agree that at least one RN has performed a thorough Head to Toe Skin Assessment on the patient. ALL assessment sites listed below have been assessed.      Areas assessed by both nurses:    Head, Face, Ears, Shoulders, Back, Chest, Arms, Elbows, Hands, Sacrum. Buttock, Coccyx, Ischium, Legs. Feet and Heels, and Under Medical Devices         Does the Patient have a Wound? No noted wound(s)       Dax Prevention initiated by RN: No  Wound Care Orders initiated by RN: No    Pressure Injury (Stage 3,4, Unstageable, DTI, NWPT, and Complex wounds) if present, place Wound referral order by RN under : No    New Ostomies, if present place, Ostomy referral order under : No     Nurse 1 eSignature: Electronically signed by Akua Lewis RN on 6/24/25 at 7:37 PM EDT    **SHARE this note so that the co-signing nurse can place an eSignature**    Nurse 2 eSignature: Electronically signed by Wilson Garcia RN on 6/24/25 at 10:50 PM EDT

## 2025-06-25 ENCOUNTER — APPOINTMENT (OUTPATIENT)
Dept: CT IMAGING | Age: 80
DRG: 234 | End: 2025-06-25
Attending: INTERNAL MEDICINE
Payer: MEDICARE

## 2025-06-25 ENCOUNTER — APPOINTMENT (OUTPATIENT)
Dept: ULTRASOUND IMAGING | Age: 80
DRG: 234 | End: 2025-06-25
Attending: INTERNAL MEDICINE
Payer: MEDICARE

## 2025-06-25 ENCOUNTER — ANESTHESIA EVENT (OUTPATIENT)
Dept: SURGERY | Age: 80
End: 2025-06-25
Payer: MEDICARE

## 2025-06-25 PROBLEM — I51.9 ASYMPTOMATIC LV DYSFUNCTION: Status: ACTIVE | Noted: 2025-06-25

## 2025-06-25 PROBLEM — R06.02 SHORTNESS OF BREATH: Status: RESOLVED | Noted: 2025-06-24 | Resolved: 2025-06-25

## 2025-06-25 PROBLEM — I25.110 ATHEROSCLEROTIC HEART DISEASE OF NATIVE CORONARY ARTERY WITH UNSTABLE ANGINA PECTORIS (HCC): Status: ACTIVE | Noted: 2025-06-24

## 2025-06-25 PROBLEM — R06.02 SHORTNESS OF BREATH: Status: ACTIVE | Noted: 2025-06-24

## 2025-06-25 PROBLEM — I25.10 CAD, MULTIPLE VESSEL: Status: ACTIVE | Noted: 2025-06-25

## 2025-06-25 PROBLEM — R06.09 DYSPNEA ON EXERTION: Status: ACTIVE | Noted: 2025-06-24

## 2025-06-25 LAB
ALBUMIN SERPL-MCNC: 3.1 G/DL (ref 3.2–4.6)
ALBUMIN/GLOB SERPL: 1.1 (ref 1–1.9)
ALP SERPL-CCNC: 44 U/L (ref 40–129)
ALT SERPL-CCNC: 24 U/L (ref 8–55)
ANION GAP SERPL CALC-SCNC: 9 MMOL/L (ref 7–16)
AST SERPL-CCNC: 26 U/L (ref 15–37)
BILIRUB SERPL-MCNC: 0.3 MG/DL (ref 0–1.2)
BUN SERPL-MCNC: 17 MG/DL (ref 8–23)
CALCIUM SERPL-MCNC: 9 MG/DL (ref 8.8–10.2)
CHLORIDE SERPL-SCNC: 102 MMOL/L (ref 98–107)
CO2 SERPL-SCNC: 26 MMOL/L (ref 20–29)
CREAT SERPL-MCNC: 0.76 MG/DL (ref 0.8–1.3)
ERYTHROCYTE [DISTWIDTH] IN BLOOD BY AUTOMATED COUNT: 12.4 % (ref 11.9–14.6)
EST. AVERAGE GLUCOSE BLD GHB EST-MCNC: 117 MG/DL
GLOBULIN SER CALC-MCNC: 2.8 G/DL (ref 2.3–3.5)
GLUCOSE SERPL-MCNC: 88 MG/DL (ref 70–99)
HBA1C MFR BLD: 5.7 % (ref 0–5.6)
HCT VFR BLD AUTO: 34.2 % (ref 41.1–50.3)
HGB BLD-MCNC: 11.5 G/DL (ref 13.6–17.2)
INR PPP: 1.1
MAGNESIUM SERPL-MCNC: 2 MG/DL (ref 1.8–2.4)
MCH RBC QN AUTO: 31.7 PG (ref 26.1–32.9)
MCHC RBC AUTO-ENTMCNC: 33.6 G/DL (ref 31.4–35)
MCV RBC AUTO: 94.2 FL (ref 82–102)
MRSA DNA SPEC QL NAA+PROBE: NOT DETECTED
NRBC # BLD: 0 K/UL (ref 0–0.2)
PLATELET # BLD AUTO: 224 K/UL (ref 150–450)
PMV BLD AUTO: 9.1 FL (ref 9.4–12.3)
POTASSIUM SERPL-SCNC: 3.9 MMOL/L (ref 3.5–5.1)
PROT SERPL-MCNC: 5.9 G/DL (ref 6.3–8.2)
PROTHROMBIN TIME: 13.9 SEC (ref 11.3–14.9)
RBC # BLD AUTO: 3.63 M/UL (ref 4.23–5.6)
S AUREUS CPE NOSE QL NAA+PROBE: DETECTED
SODIUM SERPL-SCNC: 136 MMOL/L (ref 136–145)
WBC # BLD AUTO: 5.2 K/UL (ref 4.3–11.1)

## 2025-06-25 PROCEDURE — 36415 COLL VENOUS BLD VENIPUNCTURE: CPT

## 2025-06-25 PROCEDURE — 2140000001 HC CVICU INTERMEDIATE R&B

## 2025-06-25 PROCEDURE — 86850 RBC ANTIBODY SCREEN: CPT

## 2025-06-25 PROCEDURE — 80053 COMPREHEN METABOLIC PANEL: CPT

## 2025-06-25 PROCEDURE — 6370000000 HC RX 637 (ALT 250 FOR IP): Performed by: INTERNAL MEDICINE

## 2025-06-25 PROCEDURE — 93970 EXTREMITY STUDY: CPT | Performed by: RADIOLOGY

## 2025-06-25 PROCEDURE — 93880 EXTRACRANIAL BILAT STUDY: CPT | Performed by: RADIOLOGY

## 2025-06-25 PROCEDURE — 86923 COMPATIBILITY TEST ELECTRIC: CPT

## 2025-06-25 PROCEDURE — 86901 BLOOD TYPING SEROLOGIC RH(D): CPT

## 2025-06-25 PROCEDURE — 93880 EXTRACRANIAL BILAT STUDY: CPT

## 2025-06-25 PROCEDURE — 99232 SBSQ HOSP IP/OBS MODERATE 35: CPT | Performed by: INTERNAL MEDICINE

## 2025-06-25 PROCEDURE — 6370000000 HC RX 637 (ALT 250 FOR IP): Performed by: PHYSICIAN ASSISTANT

## 2025-06-25 PROCEDURE — 93970 EXTREMITY STUDY: CPT

## 2025-06-25 PROCEDURE — 2500000003 HC RX 250 WO HCPCS: Performed by: PHYSICIAN ASSISTANT

## 2025-06-25 PROCEDURE — 85610 PROTHROMBIN TIME: CPT

## 2025-06-25 PROCEDURE — 85027 COMPLETE CBC AUTOMATED: CPT

## 2025-06-25 PROCEDURE — 83036 HEMOGLOBIN GLYCOSYLATED A1C: CPT

## 2025-06-25 PROCEDURE — 86900 BLOOD TYPING SEROLOGIC ABO: CPT

## 2025-06-25 PROCEDURE — 71250 CT THORAX DX C-: CPT

## 2025-06-25 PROCEDURE — 83735 ASSAY OF MAGNESIUM: CPT

## 2025-06-25 RX ORDER — NALOXONE HYDROCHLORIDE 0.4 MG/ML
INJECTION, SOLUTION INTRAMUSCULAR; INTRAVENOUS; SUBCUTANEOUS PRN
Status: CANCELLED | OUTPATIENT
Start: 2025-06-25

## 2025-06-25 RX ORDER — SODIUM CHLORIDE 0.9 % (FLUSH) 0.9 %
5-40 SYRINGE (ML) INJECTION EVERY 12 HOURS SCHEDULED
Status: CANCELLED | OUTPATIENT
Start: 2025-06-25

## 2025-06-25 RX ORDER — OXYCODONE HYDROCHLORIDE 5 MG/1
5 TABLET ORAL
Refills: 0 | Status: CANCELLED | OUTPATIENT
Start: 2025-06-25

## 2025-06-25 RX ORDER — ONDANSETRON 2 MG/ML
4 INJECTION INTRAMUSCULAR; INTRAVENOUS
Status: CANCELLED | OUTPATIENT
Start: 2025-06-25

## 2025-06-25 RX ORDER — FENTANYL CITRATE 50 UG/ML
100 INJECTION, SOLUTION INTRAMUSCULAR; INTRAVENOUS
Refills: 0 | Status: CANCELLED | OUTPATIENT
Start: 2025-06-25

## 2025-06-25 RX ORDER — SODIUM CHLORIDE 0.9 % (FLUSH) 0.9 %
5-40 SYRINGE (ML) INJECTION PRN
Status: CANCELLED | OUTPATIENT
Start: 2025-06-25

## 2025-06-25 RX ORDER — IPRATROPIUM BROMIDE AND ALBUTEROL SULFATE 2.5; .5 MG/3ML; MG/3ML
1 SOLUTION RESPIRATORY (INHALATION)
Status: CANCELLED | OUTPATIENT
Start: 2025-06-25

## 2025-06-25 RX ORDER — ATORVASTATIN CALCIUM 40 MG/1
40 TABLET, FILM COATED ORAL NIGHTLY
Status: DISCONTINUED | OUTPATIENT
Start: 2025-06-25 | End: 2025-07-02 | Stop reason: HOSPADM

## 2025-06-25 RX ORDER — FENTANYL CITRATE 50 UG/ML
50 INJECTION, SOLUTION INTRAMUSCULAR; INTRAVENOUS EVERY 5 MIN PRN
Refills: 0 | Status: CANCELLED | OUTPATIENT
Start: 2025-06-25

## 2025-06-25 RX ORDER — ACETAMINOPHEN 500 MG
1000 TABLET ORAL ONCE
Status: CANCELLED | OUTPATIENT
Start: 2025-06-25 | End: 2025-06-25

## 2025-06-25 RX ORDER — MIDAZOLAM HYDROCHLORIDE 2 MG/2ML
2 INJECTION, SOLUTION INTRAMUSCULAR; INTRAVENOUS
Status: CANCELLED | OUTPATIENT
Start: 2025-06-25

## 2025-06-25 RX ORDER — HALOPERIDOL 5 MG/ML
1 INJECTION INTRAMUSCULAR
Status: CANCELLED | OUTPATIENT
Start: 2025-06-25

## 2025-06-25 RX ORDER — SODIUM CHLORIDE, SODIUM LACTATE, POTASSIUM CHLORIDE, CALCIUM CHLORIDE 600; 310; 30; 20 MG/100ML; MG/100ML; MG/100ML; MG/100ML
INJECTION, SOLUTION INTRAVENOUS CONTINUOUS
Status: CANCELLED | OUTPATIENT
Start: 2025-06-25

## 2025-06-25 RX ORDER — LIDOCAINE HYDROCHLORIDE 10 MG/ML
1 INJECTION, SOLUTION INFILTRATION; PERINEURAL
Status: CANCELLED | OUTPATIENT
Start: 2025-06-25

## 2025-06-25 RX ADMIN — ATORVASTATIN CALCIUM 40 MG: 40 TABLET, FILM COATED ORAL at 20:13

## 2025-06-25 RX ADMIN — GABAPENTIN 100 MG: 100 CAPSULE ORAL at 20:13

## 2025-06-25 RX ADMIN — AMIODARONE HYDROCHLORIDE 200 MG: 200 TABLET ORAL at 17:02

## 2025-06-25 RX ADMIN — AMLODIPINE BESYLATE 5 MG: 5 TABLET ORAL at 08:50

## 2025-06-25 RX ADMIN — ASPIRIN 81 MG: 81 TABLET, CHEWABLE ORAL at 08:50

## 2025-06-25 RX ADMIN — FAMOTIDINE 20 MG: 20 TABLET, FILM COATED ORAL at 20:13

## 2025-06-25 RX ADMIN — SODIUM CHLORIDE, PRESERVATIVE FREE 10 ML: 5 INJECTION INTRAVENOUS at 08:51

## 2025-06-25 RX ADMIN — TAMSULOSIN HYDROCHLORIDE 0.4 MG: 0.4 CAPSULE ORAL at 08:50

## 2025-06-25 RX ADMIN — SODIUM CHLORIDE, PRESERVATIVE FREE 10 ML: 5 INJECTION INTRAVENOUS at 19:05

## 2025-06-25 RX ADMIN — FAMOTIDINE 20 MG: 20 TABLET, FILM COATED ORAL at 08:50

## 2025-06-25 ASSESSMENT — PAIN SCALES - GENERAL
PAINLEVEL_OUTOF10: 0
PAINLEVEL_OUTOF10: 0

## 2025-06-25 NOTE — PERIOP NOTE
Nursing staff notified that pt scheduled for CABG tomorrow with planned arrival to pre-op at 0530.

## 2025-06-25 NOTE — PLAN OF CARE
Problem: Safety - Adult  Goal: Free from fall injury  6/25/2025 0931 by Jackie Delgadillo RN  Flowsheets  Taken 6/25/2025 0931  Free From Fall Injury: Instruct family/caregiver on patient safety  Taken 6/25/2025 0700  Free From Fall Injury: Instruct family/caregiver on patient safety  6/25/2025 0009 by Esther Ashton RN  Outcome: Progressing  6/24/2025 2124 by Wilson Garcia RN  Outcome: Progressing  Flowsheets (Taken 6/24/2025 2122)  Free From Fall Injury: Instruct family/caregiver on patient safety  6/24/2025 2122 by Wlison Garcia RN  Outcome: Progressing  Flowsheets (Taken 6/24/2025 2122)  Free From Fall Injury: Instruct family/caregiver on patient safety     Problem: Discharge Planning  Goal: Discharge to home or other facility with appropriate resources  6/25/2025 0931 by Jackie Delgadillo RN  Flowsheets  Taken 6/25/2025 0931  Discharge to home or other facility with appropriate resources:   Identify barriers to discharge with patient and caregiver   Identify discharge learning needs (meds, wound care, etc)  Taken 6/25/2025 0700  Discharge to home or other facility with appropriate resources: Identify barriers to discharge with patient and caregiver  6/25/2025 0009 by Esther Ashton RN  Outcome: Progressing  6/24/2025 2122 by Wilson Garcia RN  Outcome: Progressing  Flowsheets  Taken 6/24/2025 2122  Discharge to home or other facility with appropriate resources:   Identify barriers to discharge with patient and caregiver   Arrange for needed discharge resources and transportation as appropriate   Identify discharge learning needs (meds, wound care, etc)   Arrange for interpreters to assist at discharge as needed   Refer to discharge planning if patient needs post-hospital services based on physician order or complex needs related to functional status, cognitive ability or social support system  Taken 6/24/2025 1900  Discharge to home or other facility with appropriate resources:   Identify barriers to  [facility] discharge with patient and caregiver   Arrange for needed discharge resources and transportation as appropriate   Identify discharge learning needs (meds, wound care, etc)   Arrange for interpreters to assist at discharge as needed   Refer to discharge planning if patient needs post-hospital services based on physician order or complex needs related to functional status, cognitive ability or social support system     Problem: Skin/Tissue Integrity  Goal: Skin integrity remains intact  Description: 1.  Monitor for areas of redness and/or skin breakdown  2.  Assess vascular access sites hourly  3.  Every 4-6 hours minimum:  Change oxygen saturation probe site  4.  Every 4-6 hours:  If on nasal continuous positive airway pressure, respiratory therapy assess nares and determine need for appliance change or resting period  6/25/2025 0931 by Jackie Delgadillo, RN  Flowsheets  Taken 6/25/2025 0931  Skin Integrity Remains Intact:   Monitor for areas of redness and/or skin breakdown   Check visual cues for pain  Taken 6/25/2025 0700  Skin Integrity Remains Intact: Monitor for areas of redness and/or skin breakdown  6/25/2025 0009 by Esther Ashton, RN  Outcome: Progressing  6/24/2025 2124 by Wilson Garcia, RN  Outcome: Progressing  Flowsheets (Taken 6/24/2025 1900)  Skin Integrity Remains Intact:   Monitor for areas of redness and/or skin breakdown   Assess vascular access sites hourly   Every 4-6 hours minimum:  Change oxygen saturation probe site   Every 4-6 hours:  If on nasal continuous positive airway pressure, assess nares and determine need for appliance change or resting period   Turn and reposition as indicated   Assess need for specialty bed   Pressure redistribution bed/mattress (bed type)   Check visual cues for pain   Monitor skin under medical devices   Positioning devices

## 2025-06-25 NOTE — PROGRESS NOTES
Patient viewed pre-op CABG dvd. Teaching also verbally done with the patient. No questions asked. Consents signed per protocol.

## 2025-06-25 NOTE — PROGRESS NOTES
Today's Date: 6/25/2025  Date of Admission: 6/24/2025      Chart Reviewed.  Subjective:     Patient denies any chest pain overnight.     Medications Reviewed.    Objective:       Vitals:    06/25/25 0158 06/25/25 0645 06/25/25 0715 06/25/25 0850   BP:  (!) 157/70 (!) 157/70 (!) 157/70   Pulse:       Resp:  16 16    Temp:  97.8 °F (36.6 °C) 97.8 °F (36.6 °C)    TempSrc:  Oral Oral    SpO2:  96% 96%    Weight: 85.7 kg (188 lb 15 oz)      Height:           Intake and Output  Current Shift: 06/25 0701 - 06/25 1900  In: -   Out: 270 [Urine:270]   Last 3 Shifts: 06/23 1901 - 06/25 0700  In: 480 [P.O.:480]  Out: 1402 [Urine:1400]    Physical Exam:  General: Well Developed, Well Nourished, No Acute Distress, Alert & Oriented x 3, Appropriate mood  Neck: supple, no JVD  Heart: S1S2 with RRR without murmurs or gallops  Lungs: Clear throughout auscultation bilaterally without adventitious sounds  Abd: soft, nontender, nondistended, with good bowel sounds  Ext: no edema bilaterally  Skin: warm and dry    LABS  Data Review:   Recent Labs     06/25/25  0502   MG 2.0   WBC 5.2   HGB 11.5*   HCT 34.2*      INR 1.1       Estimated Creatinine Clearance: 74 mL/min (based on SCr of 0.83 mg/dL).      Assessment/Plan:      *Accelerating angina (HCC)  Continue ASA, no BB due to bradycardia at baseline, continue statin, carotid US and vein mapping pending, for CABG surgery tomorrow     Active Hospital Problems    Asymptomatic LV dysfunction  No BB for now due to bradycardia, holding ACE-I pre-op       Hypertension  BP elevated, Norvasc started, will titrate as needed       Hyperlipidemia  Continue statin       CAD, multiple vessel  S/p CABG      Unstable angina (HCC)  As above, CABG surgery planned for tomorrow morning      Dyspnea on exertion  As above      Pat Henderson PA-C

## 2025-06-25 NOTE — ANESTHESIA PRE PROCEDURE
Department of Anesthesiology  Preprocedure Note       Name:  Pritesh Orellana   Age:  80 y.o.  :  1945                                          MRN:  [id number]         Date:  2025      Surgeon: Surgeon(s):  Salvatore Arango MD    Procedure: Procedure(s):  CORONARY ARTERY BYPASS GRAFTING/LESLIE CLIPPING/ OR #4PATIENT  IS IP IN ROOM # 2221    Medications prior to admission:   Prior to Admission medications    Medication Sig Start Date End Date Taking? Authorizing Provider   metoprolol succinate (TOPROL XL) 50 MG extended release tablet Take 1 tablet by mouth daily 25  Yes Gerald Vernon MD   atorvastatin (LIPITOR) 80 MG tablet Take 1 tablet by mouth daily 25  Yes Gerald Vernon MD   aspirin 81 MG EC tablet Take 1 tablet by mouth daily 25  Yes Gerald Vernon MD   nitroGLYCERIN (NITROSTAT) 0.4 MG SL tablet Place 1 tablet under the tongue every 5 minutes as needed for Chest pain up to max of 3 total doses. If no relief after 1 dose, call 911. 25  Yes Gerald Vernon MD   ALBUTEROL SULFATE HFA IN Inhale 90 mcg into the lungs 4 times daily as needed (for shortness of breath and wheezing)   Yes Fartun Enriquez MD   chlorhexidine (PERIDEX) 0.12 % solution Swish and spit 15 mLs 2 times daily PLACE 15 ML IN THE MOUTH SWISH IN MOUTH FOR 30 SECONDS THEN SPIT OUT 25  Yes Natasha Rodriguez MD   lisinopril-hydroCHLOROthiazide (PRINZIDE;ZESTORETIC) 20-12.5 MG per tablet Take 2 tablets by mouth daily 25  Yes Natasha Rodriguez MD   meloxicam (MOBIC) 15 MG tablet Take 1 tablet by mouth daily 25  Yes Sin Salinas MD   pramipexole (MIRAPEX) 0.5 MG tablet Take 1 tablet by mouth nightly 2/3/25  Yes Natasha Rodriguez MD   Ascorbic Acid (VITAMIN C PO) Take 500 mg by mouth Daily   Yes Fartun Enriquez MD   acetaminophen (TYLENOL) 500 MG tablet Take 2 tablets by mouth every 6 hours as needed for Pain   Yes Fartun Enriquez MD   tamsulosin (FLOMAX) 0.4

## 2025-06-25 NOTE — PLAN OF CARE
Problem: Safety - Adult  Goal: Free from fall injury  6/24/2025 2124 by Wilson Garcia, RN  Outcome: Progressing  Flowsheets (Taken 6/24/2025 2122)  Free From Fall Injury: Instruct family/caregiver on patient safety  6/24/2025 2122 by Wilson Garcia, RN  Outcome: Progressing  Flowsheets (Taken 6/24/2025 2122)  Free From Fall Injury: Instruct family/caregiver on patient safety     Problem: Discharge Planning  Goal: Discharge to home or other facility with appropriate resources  Outcome: Progressing  Flowsheets (Taken 6/24/2025 2122)  Discharge to home or other facility with appropriate resources:   Identify barriers to discharge with patient and caregiver   Arrange for needed discharge resources and transportation as appropriate   Identify discharge learning needs (meds, wound care, etc)   Arrange for interpreters to assist at discharge as needed   Refer to discharge planning if patient needs post-hospital services based on physician order or complex needs related to functional status, cognitive ability or social support system     Problem: Skin/Tissue Integrity  Goal: Skin integrity remains intact  Description: 1.  Monitor for areas of redness and/or skin breakdown  2.  Assess vascular access sites hourly  3.  Every 4-6 hours minimum:  Change oxygen saturation probe site  4.  Every 4-6 hours:  If on nasal continuous positive airway pressure, respiratory therapy assess nares and determine need for appliance change or resting period  Outcome: Progressing

## 2025-06-25 NOTE — PROGRESS NOTES
[facility] CARDIOLOGY PROGRESS NOTE    6/25/2025 6:19 AM    Admit Date: 6/24/2025        Subjective:   Stable overnight without angina, CHF, or palpitations. Vitals stable and controlled. No other complaints overnight. Tolerating meds well.     Objective:      Vitals:    06/24/25 2212 06/24/25 2341 06/25/25 0148 06/25/25 0158   BP: 125/70  122/64    Pulse: 59 53 58    Resp: 18  18    Temp: 97.3 °F (36.3 °C)  98 °F (36.7 °C)    TempSrc: Oral  Temporal    SpO2: 97%  96%    Weight:    85.7 kg (188 lb 15 oz)   Height:           Physical Exam:  Neck- supple, no JVD  CV- regular rate and rhythm no MRG  Lung- clear bilaterally  Abd- soft, nontender, nondistended  Ext- no edema, right radial clean, dry, intact  Skin- warm and dry    Data Review:   Pertinent lab and noninvasive imaging over the past 24 hours reviewed and evaluated.    Recent Labs     06/25/25  0502   MG 2.0   WBC 5.2   HGB 11.5*   HCT 34.2*      INR 1.1     Lab Results   Component Value Date    LDL 82 05/23/2025     Left heart catheterization 6/24/2025:    Mild to moderately reduced left ventricular systolic function.  LVEF 40-45%.  Normal LVEDP.  Dense inferior akinesis present.  1+ mitral regurgitation present.  No aortic valve gradient.    Severe diffuse 3 vessel coronary calcification present    Severe mid distal LAD stenosis diffuse in nature.  Will require extensive stenting.  Patient appears to have appropriate target distally.    Complex trifurcating obtuse marginal which supplies of aspiratory lateral wall with the 2 larger branches both having severe proximal disease.  If patient is considered for coronary bypass grafting would recommend grafting both these vessels.    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    Echocardiogram 5/5/2025:  Left Ventricle Mildly reduced left ventricular systolic function with a visually estimated EF of 45 - 50%. Left ventricle size is normal. Moderately increased wall  thickness. Findings consistent with moderate concentric hypertrophy. Mild global hypokinesis present. Grade I diastolic dysfunction. E/E' Ratio (Averaged) is 11.81.   Left Atrium Left atrium is moderately dilated. LA Volume Index BP is 46 ml/m2. Normal flow patterns in the pulmonary veins.   Interatrial Septum No interatrial shunt visualized with color Doppler.   Right Ventricle Right ventricle size is normal. RV Basal Dimension is 3.5 cm. RV Mid Dimension is 2.6 cm. Normal systolic function.   Right Atrium Right atrium size is normal.   Aortic Valve Trileaflet valve. No regurgitation. No significant stenosis.   Mitral Valve Valve structure is normal. Mild to moderate regurgitation with a centrally directed jet. No stenosis noted.   Tricuspid Valve Valve structure is normal. Trace regurgitation. No stenosis noted. Normal RVSP. Est. RA Pressure is 3 mmHg.   Pulmonic Valve The pulmonic valve visualization is suboptimal but appears to be functioning normally. Physiologically normal regurgitation. No stenosis noted.   Pulmonary Artery Main pulmonary artery is normal in size.   Aorta Normal sized aortic root.   IVC/Hepatic Veins IVC size is normal.   Pericardium No pericardial effusion.       Assessment and Plan:     Active Hospital Problems    Asymptomatic LV dysfunction-euvolemic and lying flat comfortably.  LV function mildly depressed as noted above.  Severe multivessel coronary disease, CABG planned for Thursday.  Maximize GDMT postoperatively, convert amlodipine to ARB or Entresto postop.      Unstable angina (HCC)-currently chest pain-free.  Maximize medications pre-CABG.  Continue aspirin and add high intensity statin.  Heart rate already in the mid to upper 50s, will not allow beta-blocker.  Systolics in the 120s this morning.  Titrate meds as tolerated postop.      *Accelerating angina (HCC)-currently chest pain-free overnight.  Severe CAD on catheterization as noted above.  Cardiothoracic surgery evaluated for

## 2025-06-25 NOTE — CARE COORDINATION
CM spoke to patient at bedside on this day. Patient confirmed demographic information and insurance information. Patient reports that he lives with his spouse and son, in a 1 story house with 3 steps to enter from the garage. Patient reports that he is independent with ADLs, uses a cane and is an active . Patient has grab bars in shower. Patient has no home care services. Patient confirmed PCP information and last PCP visit was a month ago. Patient stated that he is agreeable to go home at time of discharge and his spouse will transport him home.     No CM needs voiced or noted. CM will continue to follow patient for any discharge planning needs.     ASSESSMENT NOTE    Attending Physician: Salvatore Arango MD  Admit Problem: Accelerating angina (HCC) [I20.0]  Unstable angina (HCC) [I20.0]  Date/Time of Admission: 6/24/2025 11:54 AM  Problem List:  Patient Active Problem List   Diagnosis    Asthma    Hyperlipidemia    Hypertension    Intermittent left-sided chest pain    RLS (restless legs syndrome)    Spinal stenosis, lumbar region, with neurogenic claudication    Lumbar facet arthropathy    Spondylolisthesis of lumbar region    Lumbar radiculopathy    S/P lumbar fusion    Hammer toe of left foot    Valgus deformity of both great toes    Vitamin D deficiency    Rosacea    BPH (benign prostatic hyperplasia)    History of basal cell cancer    Elevated prostate specific antigen (PSA)    Malignant neoplasm of prostate (HCC)    General weakness    Colon abnormality    Bilateral foot pain    Accelerating angina (HCC)    Unstable angina (HCC)    Asymptomatic LV dysfunction    Atherosclerotic heart disease of native coronary artery with unstable angina pectoris (HCC)    Dyspnea on exertion    Shortness of breath       Service Assessment  Patient Orientation (P) Alert and Oriented   Cognition (P) Alert   History Provided By (P) Patient   Primary Caregiver (P) Self   Accompanied By/Relationship     Support Systems   (P) Yes   Patient's  Info     Mode of Transportation (P) Car   Education     Occupation (P) Retired   Type of Occupation       Discharge Planning   Type of Residence (P) House   Living Arrangements (P) Spouse/Significant Other, Children   Support Systems (P) Spouse/Significant Other, Children   Current Services Prior To Admission (P) Durable Medical Equipment   Potential Assistance Needed (P) Other (Comment) (TBD by clinicals)   DME (P) Cane   DME     DME Ordered? (P) No   Potential Assistance Purchasing Medications (P) No   Meds-to-Beds: Does the patient want to have any new prescriptions delivered to bedside prior to discharge? Yes   Type of Home Care Services None   Patient expects to be discharged to: (P) House   Follow Up Appointment: Best Day/Time (P) Monday AM   One/Two Story Residence: (P) One story   # of Interior Steps     Height of Each Step (in)     Interior Rails     Lift Chair Available     History of Falls? (P) No     Services At/After Discharge  Transition of Care Consult (CM Consult): (P) Discharge Planning   Internal Home Health     Internal Hospice     Reason Outside Agency Chosen     Partner SNF     Reason Why Partner SNF Not Chosen     Internal Comfort Care     Reason Outside Comfort Care Chosen     Services At/After Discharge     [place] Resource Information Provided? (P) No   Mode of Transport at Discharge (P) Other (see comment) (spouse)   Hospital Transport Time of Discharge     Confirm Follow Up Transport (P) Family     Condition of Participation: Discharge Planning  The plan for Transition of Care is related to the following treatment goals: (P) home   The Patient and/or Patient Representative was provided with a Choice of Provider? (P) Patient   Name of the Patient Representative who was provided with the Choice of Provider and agrees with the Discharge Plan?     The Patient and/or Patient Representative Agree with the Discharge Plan? (P) Yes   Freedom of Choice list was

## 2025-06-26 ENCOUNTER — ANESTHESIA (OUTPATIENT)
Dept: SURGERY | Age: 80
End: 2025-06-26
Payer: MEDICARE

## 2025-06-26 ENCOUNTER — APPOINTMENT (OUTPATIENT)
Dept: GENERAL RADIOLOGY | Age: 80
DRG: 234 | End: 2025-06-26
Attending: INTERNAL MEDICINE
Payer: MEDICARE

## 2025-06-26 PROBLEM — J45.40 MODERATE PERSISTENT ASTHMA WITHOUT COMPLICATION: Status: ACTIVE | Noted: 2020-12-10

## 2025-06-26 PROBLEM — J98.11 ATELECTASIS: Status: ACTIVE | Noted: 2025-06-26

## 2025-06-26 PROBLEM — Z95.1 S/P CABG X 4: Status: ACTIVE | Noted: 2025-06-26

## 2025-06-26 PROBLEM — R09.02 HYPOXEMIA: Status: ACTIVE | Noted: 2025-06-26

## 2025-06-26 PROBLEM — I25.10 CAD IN NATIVE ARTERY: Status: ACTIVE | Noted: 2025-06-26

## 2025-06-26 LAB
ANION GAP BLD CALC-SCNC: ABNORMAL MMOL/L
ANION GAP SERPL CALC-SCNC: 10 MMOL/L (ref 7–16)
ANION GAP SERPL CALC-SCNC: 12 MMOL/L (ref 7–16)
APTT PPP: 37.5 SEC (ref 23.3–37.4)
BASE DEFICIT BLD-SCNC: 0.8 MMOL/L
BASE DEFICIT BLD-SCNC: 1 MMOL/L
BASE DEFICIT BLD-SCNC: 2.5 MMOL/L
BASE DEFICIT BLD-SCNC: 3 MMOL/L
BASE DEFICIT BLD-SCNC: 3.1 MMOL/L
BASE DEFICIT BLD-SCNC: 3.1 MMOL/L
BASE DEFICIT BLD-SCNC: 3.8 MMOL/L
BASE EXCESS BLD CALC-SCNC: 1.9 MMOL/L
BDY SITE: NORMAL
BUN SERPL-MCNC: 13 MG/DL (ref 8–23)
BUN SERPL-MCNC: 16 MG/DL (ref 8–23)
CA-I BLD-MCNC: 1.08 MMOL/L (ref 1.12–1.32)
CA-I BLD-MCNC: 1.1 MMOL/L (ref 1.12–1.32)
CA-I BLD-MCNC: 1.11 MMOL/L (ref 1.12–1.32)
CA-I BLD-MCNC: 1.12 MMOL/L (ref 1.12–1.32)
CA-I BLD-MCNC: 1.18 MMOL/L (ref 1.12–1.32)
CA-I BLD-MCNC: 1.2 MMOL/L (ref 1.12–1.32)
CA-I BLD-MCNC: 1.37 MMOL/L (ref 1.12–1.32)
CALCIUM SERPL-MCNC: 8.5 MG/DL (ref 8.8–10.2)
CALCIUM SERPL-MCNC: 9 MG/DL (ref 8.8–10.2)
CHLORIDE SERPL-SCNC: 102 MMOL/L (ref 98–107)
CHLORIDE SERPL-SCNC: 109 MMOL/L (ref 98–107)
CO2 BLD-SCNC: 21 MMOL/L (ref 13–23)
CO2 BLD-SCNC: 21 MMOL/L (ref 13–23)
CO2 BLD-SCNC: 22 MMOL/L (ref 13–23)
CO2 BLD-SCNC: 23 MMOL/L (ref 13–23)
CO2 BLD-SCNC: 25 MMOL/L (ref 13–23)
CO2 SERPL-SCNC: 20 MMOL/L (ref 20–29)
CO2 SERPL-SCNC: 23 MMOL/L (ref 20–29)
CREAT SERPL-MCNC: 0.81 MG/DL (ref 0.8–1.3)
CREAT SERPL-MCNC: 0.86 MG/DL (ref 0.8–1.3)
EKG ATRIAL RATE: 82 BPM
EKG DIAGNOSIS: NORMAL
EKG P AXIS: 98 DEGREES
EKG P-R INTERVAL: 188 MS
EKG Q-T INTERVAL: 398 MS
EKG QRS DURATION: 84 MS
EKG QTC CALCULATION (BAZETT): 464 MS
EKG R AXIS: 55 DEGREES
EKG T AXIS: 46 DEGREES
EKG VENTRICULAR RATE: 82 BPM
ERYTHROCYTE [DISTWIDTH] IN BLOOD BY AUTOMATED COUNT: 12.3 % (ref 11.9–14.6)
ERYTHROCYTE [DISTWIDTH] IN BLOOD BY AUTOMATED COUNT: 12.5 % (ref 11.9–14.6)
ERYTHROCYTE [DISTWIDTH] IN BLOOD BY AUTOMATED COUNT: 12.6 % (ref 11.9–14.6)
ERYTHROCYTE [DISTWIDTH] IN BLOOD BY AUTOMATED COUNT: 12.6 % (ref 11.9–14.6)
FIBRINOGEN PPP-MCNC: 235 MG/DL (ref 190–501)
GAS FLOW.O2 O2 DELIVERY SYS: NORMAL
GLUCOSE BLD STRIP.AUTO-MCNC: 102 MG/DL (ref 65–100)
GLUCOSE BLD STRIP.AUTO-MCNC: 107 MG/DL (ref 65–100)
GLUCOSE BLD STRIP.AUTO-MCNC: 107 MG/DL (ref 65–100)
GLUCOSE BLD STRIP.AUTO-MCNC: 109 MG/DL (ref 65–100)
GLUCOSE BLD STRIP.AUTO-MCNC: 110 MG/DL (ref 65–100)
GLUCOSE BLD STRIP.AUTO-MCNC: 111 MG/DL (ref 65–100)
GLUCOSE BLD STRIP.AUTO-MCNC: 117 MG/DL (ref 65–100)
GLUCOSE BLD STRIP.AUTO-MCNC: 121 MG/DL (ref 65–100)
GLUCOSE BLD STRIP.AUTO-MCNC: 136 MG/DL (ref 65–100)
GLUCOSE BLD STRIP.AUTO-MCNC: 136 MG/DL (ref 65–100)
GLUCOSE BLD STRIP.AUTO-MCNC: 138 MG/DL (ref 65–100)
GLUCOSE BLD STRIP.AUTO-MCNC: 139 MG/DL (ref 65–100)
GLUCOSE BLD STRIP.AUTO-MCNC: 174 MG/DL (ref 65–100)
GLUCOSE BLD STRIP.AUTO-MCNC: 184 MG/DL (ref 65–100)
GLUCOSE BLD STRIP.AUTO-MCNC: 211 MG/DL (ref 65–100)
GLUCOSE BLD STRIP.AUTO-MCNC: 89 MG/DL (ref 65–100)
GLUCOSE BLD STRIP.AUTO-MCNC: 90 MG/DL (ref 65–100)
GLUCOSE BLD STRIP.AUTO-MCNC: 95 MG/DL (ref 65–100)
GLUCOSE SERPL-MCNC: 93 MG/DL (ref 70–99)
GLUCOSE SERPL-MCNC: 94 MG/DL (ref 70–99)
HCO3 BLD-SCNC: 21.9 MMOL/L (ref 21–28)
HCO3 BLD-SCNC: 22.1 MMOL/L (ref 21–28)
HCO3 BLD-SCNC: 22.9 MMOL/L (ref 21–28)
HCO3 BLD-SCNC: 22.9 MMOL/L (ref 21–28)
HCO3 BLD-SCNC: 23.1 MMOL/L (ref 21–28)
HCO3 BLD-SCNC: 24.4 MMOL/L (ref 21–28)
HCO3 BLD-SCNC: 24.6 MMOL/L (ref 21–28)
HCO3 BLD-SCNC: 25.9 MMOL/L (ref 21–28)
HCT VFR BLD AUTO: 24.6 % (ref 41.1–50.3)
HCT VFR BLD AUTO: 26.5 % (ref 41.1–50.3)
HCT VFR BLD AUTO: 27.4 % (ref 41.1–50.3)
HCT VFR BLD AUTO: 35.8 % (ref 41.1–50.3)
HGB BLD-MCNC: 12 G/DL (ref 13.6–17.2)
HGB BLD-MCNC: 8.2 G/DL (ref 13.6–17.2)
HGB BLD-MCNC: 8.8 G/DL (ref 13.6–17.2)
HGB BLD-MCNC: 9.2 G/DL (ref 13.6–17.2)
HISTORY CHECK: NORMAL
INR PPP: 1.5
IPAP/PIP/HIGH PEEP: 20
MAGNESIUM SERPL-MCNC: 2 MG/DL (ref 1.8–2.4)
MAGNESIUM SERPL-MCNC: 2.3 MG/DL (ref 1.8–2.4)
MAGNESIUM SERPL-MCNC: 2.4 MG/DL (ref 1.8–2.4)
MAGNESIUM SERPL-MCNC: 2.9 MG/DL (ref 1.8–2.4)
MCH RBC QN AUTO: 31.5 PG (ref 26.1–32.9)
MCH RBC QN AUTO: 31.6 PG (ref 26.1–32.9)
MCH RBC QN AUTO: 31.7 PG (ref 26.1–32.9)
MCH RBC QN AUTO: 32.2 PG (ref 26.1–32.9)
MCHC RBC AUTO-ENTMCNC: 33.2 G/DL (ref 31.4–35)
MCHC RBC AUTO-ENTMCNC: 33.3 G/DL (ref 31.4–35)
MCHC RBC AUTO-ENTMCNC: 33.5 G/DL (ref 31.4–35)
MCHC RBC AUTO-ENTMCNC: 33.6 G/DL (ref 31.4–35)
MCV RBC AUTO: 94 FL (ref 82–102)
MCV RBC AUTO: 94.2 FL (ref 82–102)
MCV RBC AUTO: 95.3 FL (ref 82–102)
MCV RBC AUTO: 96.5 FL (ref 82–102)
NRBC # BLD: 0 K/UL (ref 0–0.2)
O2/TOTAL GAS SETTING VFR VENT: 60 %
PCO2 BLD: 37.6 MMHG (ref 35–45)
PCO2 BLD: 39.1 MMHG (ref 35–45)
PCO2 BLD: 41.1 MMHG (ref 35–45)
PCO2 BLD: 41.6 MMHG (ref 35–45)
PCO2 BLD: 42.4 MMHG (ref 35–45)
PCO2 BLD: 43.3 MMHG (ref 35–45)
PCO2 BLD: 44.6 MMHG (ref 35–45)
PCO2 BLD: 45.4 MMHG (ref 35–45)
PEEP RESPIRATORY: 8 CMH2O
PH BLD: 7.32 (ref 7.35–7.45)
PH BLD: 7.32 (ref 7.35–7.45)
PH BLD: 7.33 (ref 7.35–7.45)
PH BLD: 7.35 (ref 7.35–7.45)
PH BLD: 7.36 (ref 7.35–7.45)
PH BLD: 7.36 (ref 7.35–7.45)
PH BLD: 7.37 (ref 7.35–7.45)
PH BLD: 7.45 (ref 7.35–7.45)
PLATELET # BLD AUTO: 126 K/UL (ref 150–450)
PLATELET # BLD AUTO: 127 K/UL (ref 150–450)
PLATELET # BLD AUTO: 150 K/UL (ref 150–450)
PLATELET # BLD AUTO: 228 K/UL (ref 150–450)
PMV BLD AUTO: 9.1 FL (ref 9.4–12.3)
PMV BLD AUTO: 9.2 FL (ref 9.4–12.3)
PO2 BLD: 217 MMHG (ref 75–100)
PO2 BLD: 244 MMHG (ref 75–100)
PO2 BLD: 264 MMHG (ref 75–100)
PO2 BLD: 269 MMHG (ref 75–100)
PO2 BLD: 292 MMHG (ref 75–100)
PO2 BLD: 299 MMHG (ref 75–100)
PO2 BLD: 419 MMHG (ref 75–100)
PO2 BLD: 96 MMHG (ref 75–100)
POTASSIUM BLD-SCNC: 3.4 MMOL/L (ref 3.5–5.1)
POTASSIUM BLD-SCNC: 3.5 MMOL/L (ref 3.5–5.1)
POTASSIUM BLD-SCNC: 3.6 MMOL/L (ref 3.5–5.1)
POTASSIUM BLD-SCNC: 4 MMOL/L (ref 3.5–5.1)
POTASSIUM BLD-SCNC: 4.3 MMOL/L (ref 3.5–5.1)
POTASSIUM BLD-SCNC: 4.3 MMOL/L (ref 3.5–5.1)
POTASSIUM BLD-SCNC: 4.4 MMOL/L (ref 3.5–5.1)
POTASSIUM SERPL-SCNC: 3.7 MMOL/L (ref 3.5–5.1)
POTASSIUM SERPL-SCNC: 3.9 MMOL/L (ref 3.5–5.1)
POTASSIUM SERPL-SCNC: 4 MMOL/L (ref 3.5–5.1)
POTASSIUM SERPL-SCNC: 4.2 MMOL/L (ref 3.5–5.1)
PROTHROMBIN TIME: 18.3 SEC (ref 11.3–14.9)
RBC # BLD AUTO: 2.55 M/UL (ref 4.23–5.6)
RBC # BLD AUTO: 2.78 M/UL (ref 4.23–5.6)
RBC # BLD AUTO: 2.91 M/UL (ref 4.23–5.6)
RBC # BLD AUTO: 3.81 M/UL (ref 4.23–5.6)
SAO2 % BLD: 100 %
SAO2 % BLD: 97.1 % (ref 94–98)
SERVICE CMNT-IMP: ABNORMAL
SERVICE CMNT-IMP: NORMAL
SODIUM BLD-SCNC: 140 MMOL/L (ref 136–145)
SODIUM BLD-SCNC: 141 MMOL/L (ref 136–145)
SODIUM BLD-SCNC: 142 MMOL/L (ref 136–145)
SODIUM BLD-SCNC: 142 MMOL/L (ref 136–145)
SODIUM BLD-SCNC: 143 MMOL/L (ref 136–145)
SODIUM SERPL-SCNC: 137 MMOL/L (ref 136–145)
SODIUM SERPL-SCNC: 139 MMOL/L (ref 136–145)
SPECIMEN SITE: ABNORMAL
SPECIMEN TYPE: NORMAL
VENTILATION MODE VENT: NORMAL
VT SETTING VENT: 450 ML
WBC # BLD AUTO: 11.8 K/UL (ref 4.3–11.1)
WBC # BLD AUTO: 12.8 K/UL (ref 4.3–11.1)
WBC # BLD AUTO: 5.8 K/UL (ref 4.3–11.1)
WBC # BLD AUTO: 7.4 K/UL (ref 4.3–11.1)

## 2025-06-26 PROCEDURE — 2500000003 HC RX 250 WO HCPCS: Performed by: NURSE ANESTHETIST, CERTIFIED REGISTERED

## 2025-06-26 PROCEDURE — 021209W BYPASS CORONARY ARTERY, THREE ARTERIES FROM AORTA WITH AUTOLOGOUS VENOUS TISSUE, OPEN APPROACH: ICD-10-PCS | Performed by: SURGERY

## 2025-06-26 PROCEDURE — 99232 SBSQ HOSP IP/OBS MODERATE 35: CPT | Performed by: INTERNAL MEDICINE

## 2025-06-26 PROCEDURE — 33268 EXCL LAA OPN OTH PX ANY METH: CPT | Performed by: SURGERY

## 2025-06-26 PROCEDURE — 06BQ4ZZ EXCISION OF LEFT SAPHENOUS VEIN, PERCUTANEOUS ENDOSCOPIC APPROACH: ICD-10-PCS | Performed by: SURGERY

## 2025-06-26 PROCEDURE — 6360000002 HC RX W HCPCS: Performed by: PHYSICIAN ASSISTANT

## 2025-06-26 PROCEDURE — 85384 FIBRINOGEN ACTIVITY: CPT

## 2025-06-26 PROCEDURE — 82803 BLOOD GASES ANY COMBINATION: CPT

## 2025-06-26 PROCEDURE — 6370000000 HC RX 637 (ALT 250 FOR IP): Performed by: PHYSICIAN ASSISTANT

## 2025-06-26 PROCEDURE — 82962 GLUCOSE BLOOD TEST: CPT

## 2025-06-26 PROCEDURE — 37799 UNLISTED PX VASCULAR SURGERY: CPT

## 2025-06-26 PROCEDURE — 6360000002 HC RX W HCPCS: Performed by: SURGERY

## 2025-06-26 PROCEDURE — P9045 ALBUMIN (HUMAN), 5%, 250 ML: HCPCS | Performed by: SURGERY

## 2025-06-26 PROCEDURE — 2580000003 HC RX 258: Performed by: ANESTHESIOLOGY

## 2025-06-26 PROCEDURE — 82947 ASSAY GLUCOSE BLOOD QUANT: CPT

## 2025-06-26 PROCEDURE — 2709999900 HC NON-CHARGEABLE SUPPLY: Performed by: SURGERY

## 2025-06-26 PROCEDURE — 84295 ASSAY OF SERUM SODIUM: CPT

## 2025-06-26 PROCEDURE — 94002 VENT MGMT INPAT INIT DAY: CPT

## 2025-06-26 PROCEDURE — 93010 ELECTROCARDIOGRAM REPORT: CPT | Performed by: INTERNAL MEDICINE

## 2025-06-26 PROCEDURE — 94761 N-INVAS EAR/PLS OXIMETRY MLT: CPT

## 2025-06-26 PROCEDURE — 3600000008 HC SURGERY OHS BASE: Performed by: SURGERY

## 2025-06-26 PROCEDURE — 85730 THROMBOPLASTIN TIME PARTIAL: CPT

## 2025-06-26 PROCEDURE — 2500000003 HC RX 250 WO HCPCS

## 2025-06-26 PROCEDURE — 6370000000 HC RX 637 (ALT 250 FOR IP): Performed by: NURSE ANESTHETIST, CERTIFIED REGISTERED

## 2025-06-26 PROCEDURE — 2580000003 HC RX 258: Performed by: PHYSICIAN ASSISTANT

## 2025-06-26 PROCEDURE — 82330 ASSAY OF CALCIUM: CPT

## 2025-06-26 PROCEDURE — 99223 1ST HOSP IP/OBS HIGH 75: CPT | Performed by: INTERNAL MEDICINE

## 2025-06-26 PROCEDURE — P9047 ALBUMIN (HUMAN), 25%, 50ML: HCPCS

## 2025-06-26 PROCEDURE — 3700000001 HC ADD 15 MINUTES (ANESTHESIA): Performed by: SURGERY

## 2025-06-26 PROCEDURE — 3600000018 HC SURGERY OHS ADDTL 15MIN: Performed by: SURGERY

## 2025-06-26 PROCEDURE — 94640 AIRWAY INHALATION TREATMENT: CPT

## 2025-06-26 PROCEDURE — B24BZZ4 ULTRASONOGRAPHY OF HEART WITH AORTA, TRANSESOPHAGEAL: ICD-10-PCS | Performed by: SURGERY

## 2025-06-26 PROCEDURE — 2100000001 HC CVICU R&B

## 2025-06-26 PROCEDURE — 36415 COLL VENOUS BLD VENIPUNCTURE: CPT

## 2025-06-26 PROCEDURE — 2700000000 HC OXYGEN THERAPY PER DAY

## 2025-06-26 PROCEDURE — 2500000003 HC RX 250 WO HCPCS: Performed by: PHYSICIAN ASSISTANT

## 2025-06-26 PROCEDURE — 85027 COMPLETE CBC AUTOMATED: CPT

## 2025-06-26 PROCEDURE — C1889 IMPLANT/INSERT DEVICE, NOC: HCPCS | Performed by: SURGERY

## 2025-06-26 PROCEDURE — C1713 ANCHOR/SCREW BN/BN,TIS/BN: HCPCS | Performed by: SURGERY

## 2025-06-26 PROCEDURE — 2500000003 HC RX 250 WO HCPCS: Performed by: SURGERY

## 2025-06-26 PROCEDURE — 3700000000 HC ANESTHESIA ATTENDED CARE: Performed by: SURGERY

## 2025-06-26 PROCEDURE — 80048 BASIC METABOLIC PNL TOTAL CA: CPT

## 2025-06-26 PROCEDURE — P9045 ALBUMIN (HUMAN), 5%, 250 ML: HCPCS | Performed by: NURSE ANESTHETIST, CERTIFIED REGISTERED

## 2025-06-26 PROCEDURE — 6360000002 HC RX W HCPCS: Performed by: INTERNAL MEDICINE

## 2025-06-26 PROCEDURE — 33508 ENDOSCOPIC VEIN HARVEST: CPT | Performed by: SURGERY

## 2025-06-26 PROCEDURE — 6360000002 HC RX W HCPCS

## 2025-06-26 PROCEDURE — 71045 X-RAY EXAM CHEST 1 VIEW: CPT

## 2025-06-26 PROCEDURE — 6370000000 HC RX 637 (ALT 250 FOR IP): Performed by: INTERNAL MEDICINE

## 2025-06-26 PROCEDURE — 02100Z9 BYPASS CORONARY ARTERY, ONE ARTERY FROM LEFT INTERNAL MAMMARY, OPEN APPROACH: ICD-10-PCS | Performed by: SURGERY

## 2025-06-26 PROCEDURE — 33519 CABG ARTERY-VEIN THREE: CPT | Performed by: SURGERY

## 2025-06-26 PROCEDURE — 2580000003 HC RX 258: Performed by: NURSE ANESTHETIST, CERTIFIED REGISTERED

## 2025-06-26 PROCEDURE — C1769 GUIDE WIRE: HCPCS | Performed by: SURGERY

## 2025-06-26 PROCEDURE — C1751 CATH, INF, PER/CENT/MIDLINE: HCPCS | Performed by: SURGERY

## 2025-06-26 PROCEDURE — 02L70CK OCCLUSION OF LEFT ATRIAL APPENDAGE WITH EXTRALUMINAL DEVICE, OPEN APPROACH: ICD-10-PCS | Performed by: SURGERY

## 2025-06-26 PROCEDURE — 83735 ASSAY OF MAGNESIUM: CPT

## 2025-06-26 PROCEDURE — 36592 COLLECT BLOOD FROM PICC: CPT

## 2025-06-26 PROCEDURE — 93005 ELECTROCARDIOGRAM TRACING: CPT | Performed by: PHYSICIAN ASSISTANT

## 2025-06-26 PROCEDURE — 5A1221Z PERFORMANCE OF CARDIAC OUTPUT, CONTINUOUS: ICD-10-PCS | Performed by: SURGERY

## 2025-06-26 PROCEDURE — 2580000003 HC RX 258

## 2025-06-26 PROCEDURE — 6360000002 HC RX W HCPCS: Performed by: NURSE ANESTHETIST, CERTIFIED REGISTERED

## 2025-06-26 PROCEDURE — P9045 ALBUMIN (HUMAN), 5%, 250 ML: HCPCS

## 2025-06-26 PROCEDURE — 85610 PROTHROMBIN TIME: CPT

## 2025-06-26 PROCEDURE — 84132 ASSAY OF SERUM POTASSIUM: CPT

## 2025-06-26 PROCEDURE — 2720000010 HC SURG SUPPLY STERILE: Performed by: SURGERY

## 2025-06-26 PROCEDURE — 33533 CABG ARTERIAL SINGLE: CPT | Performed by: SURGERY

## 2025-06-26 PROCEDURE — C1729 CATH, DRAINAGE: HCPCS | Performed by: SURGERY

## 2025-06-26 DEVICE — CLIP MED SUTURE LESS 40 MM SYS 1 HND STRL ATRICLIP FLX V: Type: IMPLANTABLE DEVICE | Status: FUNCTIONAL

## 2025-06-26 RX ORDER — AMIODARONE HYDROCHLORIDE 200 MG/1
200 TABLET ORAL 2 TIMES DAILY
Status: DISCONTINUED | OUTPATIENT
Start: 2025-06-26 | End: 2025-07-02 | Stop reason: HOSPADM

## 2025-06-26 RX ORDER — ONDANSETRON 2 MG/ML
4 INJECTION INTRAMUSCULAR; INTRAVENOUS EVERY 4 HOURS PRN
Status: DISCONTINUED | OUTPATIENT
Start: 2025-06-26 | End: 2025-06-28

## 2025-06-26 RX ORDER — PAPAVERINE HYDROCHLORIDE 30 MG/ML
INJECTION INTRAMUSCULAR; INTRAVENOUS PRN
Status: DISCONTINUED | OUTPATIENT
Start: 2025-06-26 | End: 2025-06-26 | Stop reason: HOSPADM

## 2025-06-26 RX ORDER — VECURONIUM BROMIDE 1 MG/ML
INJECTION, POWDER, LYOPHILIZED, FOR SOLUTION INTRAVENOUS
Status: DISCONTINUED | OUTPATIENT
Start: 2025-06-26 | End: 2025-06-26 | Stop reason: SDUPTHER

## 2025-06-26 RX ORDER — SODIUM CHLORIDE, SODIUM LACTATE, POTASSIUM CHLORIDE, CALCIUM CHLORIDE 600; 310; 30; 20 MG/100ML; MG/100ML; MG/100ML; MG/100ML
INJECTION, SOLUTION INTRAVENOUS CONTINUOUS
Status: DISCONTINUED | OUTPATIENT
Start: 2025-06-26 | End: 2025-06-26 | Stop reason: HOSPADM

## 2025-06-26 RX ORDER — FENTANYL CITRATE 0.05 MG/ML
INJECTION, SOLUTION INTRAMUSCULAR; INTRAVENOUS
Status: DISCONTINUED | OUTPATIENT
Start: 2025-06-26 | End: 2025-06-26 | Stop reason: SDUPTHER

## 2025-06-26 RX ORDER — NICARDIPINE HYDROCHLORIDE 0.1 MG/ML
INJECTION INTRAVENOUS
Status: DISCONTINUED | OUTPATIENT
Start: 2025-06-26 | End: 2025-06-26 | Stop reason: SDUPTHER

## 2025-06-26 RX ORDER — INSULIN LISPRO 100 [IU]/ML
0-6 INJECTION, SOLUTION INTRAVENOUS; SUBCUTANEOUS NIGHTLY
Status: DISCONTINUED | OUTPATIENT
Start: 2025-06-27 | End: 2025-06-28

## 2025-06-26 RX ORDER — INSULIN LISPRO 100 [IU]/ML
0-6 INJECTION, SOLUTION INTRAVENOUS; SUBCUTANEOUS
Status: DISCONTINUED | OUTPATIENT
Start: 2025-06-27 | End: 2025-06-28

## 2025-06-26 RX ORDER — HEPARIN SODIUM 1000 [USP'U]/ML
INJECTION, SOLUTION INTRAVENOUS; SUBCUTANEOUS
Status: DISCONTINUED | OUTPATIENT
Start: 2025-06-26 | End: 2025-06-26 | Stop reason: SDUPTHER

## 2025-06-26 RX ORDER — SODIUM CHLORIDE 0.9 % (FLUSH) 0.9 %
5-40 SYRINGE (ML) INJECTION PRN
Status: DISCONTINUED | OUTPATIENT
Start: 2025-06-26 | End: 2025-06-28

## 2025-06-26 RX ORDER — PHENYLEPHRINE HYDROCHLORIDE 10 MG/ML
INJECTION INTRAVENOUS
Status: DISCONTINUED | OUTPATIENT
Start: 2025-06-26 | End: 2025-06-26 | Stop reason: SDUPTHER

## 2025-06-26 RX ORDER — TRAMADOL HYDROCHLORIDE 50 MG/1
50 TABLET ORAL EVERY 6 HOURS PRN
Status: DISCONTINUED | OUTPATIENT
Start: 2025-06-26 | End: 2025-06-28

## 2025-06-26 RX ORDER — ROCURONIUM BROMIDE 10 MG/ML
INJECTION, SOLUTION INTRAVENOUS
Status: DISCONTINUED | OUTPATIENT
Start: 2025-06-26 | End: 2025-06-26 | Stop reason: SDUPTHER

## 2025-06-26 RX ORDER — POLYETHYLENE GLYCOL 3350 17 G/17G
17 POWDER, FOR SOLUTION ORAL DAILY
Status: DISCONTINUED | OUTPATIENT
Start: 2025-06-27 | End: 2025-06-28

## 2025-06-26 RX ORDER — MORPHINE SULFATE 4 MG/ML
3 INJECTION, SOLUTION INTRAMUSCULAR; INTRAVENOUS
Status: DISCONTINUED | OUTPATIENT
Start: 2025-06-26 | End: 2025-06-26

## 2025-06-26 RX ORDER — NITROGLYCERIN 20 MG/100ML
0-100 INJECTION INTRAVENOUS CONTINUOUS PRN
Status: DISCONTINUED | OUTPATIENT
Start: 2025-06-26 | End: 2025-06-28

## 2025-06-26 RX ORDER — PROPOFOL 10 MG/ML
INJECTION, EMULSION INTRAVENOUS
Status: DISCONTINUED | OUTPATIENT
Start: 2025-06-26 | End: 2025-06-26 | Stop reason: SDUPTHER

## 2025-06-26 RX ORDER — DEXTROSE MONOHYDRATE 100 MG/ML
INJECTION, SOLUTION INTRAVENOUS CONTINUOUS PRN
Status: DISCONTINUED | OUTPATIENT
Start: 2025-06-26 | End: 2025-06-28

## 2025-06-26 RX ORDER — FAMOTIDINE 20 MG/1
20 TABLET, FILM COATED ORAL 2 TIMES DAILY
Status: DISCONTINUED | OUTPATIENT
Start: 2025-06-26 | End: 2025-06-28

## 2025-06-26 RX ORDER — AMINOCAPROIC ACID 250 MG/ML
INJECTION, SOLUTION INTRAVENOUS
Status: DISCONTINUED | OUTPATIENT
Start: 2025-06-26 | End: 2025-06-26 | Stop reason: SDUPTHER

## 2025-06-26 RX ORDER — ACETAMINOPHEN 325 MG/1
650 TABLET ORAL EVERY 6 HOURS
Status: DISCONTINUED | OUTPATIENT
Start: 2025-06-26 | End: 2025-07-01

## 2025-06-26 RX ORDER — MIDAZOLAM HYDROCHLORIDE 1 MG/ML
INJECTION, SOLUTION INTRAMUSCULAR; INTRAVENOUS
Status: DISCONTINUED | OUTPATIENT
Start: 2025-06-26 | End: 2025-06-26 | Stop reason: SDUPTHER

## 2025-06-26 RX ORDER — VANCOMYCIN HYDROCHLORIDE 1 G/20ML
INJECTION, POWDER, LYOPHILIZED, FOR SOLUTION INTRAVENOUS PRN
Status: DISCONTINUED | OUTPATIENT
Start: 2025-06-26 | End: 2025-06-26 | Stop reason: HOSPADM

## 2025-06-26 RX ORDER — VASOPRESSIN 20 [USP'U]/ML
INJECTION, SOLUTION INTRAVENOUS
Status: DISCONTINUED | OUTPATIENT
Start: 2025-06-26 | End: 2025-06-26 | Stop reason: SDUPTHER

## 2025-06-26 RX ORDER — SODIUM CHLORIDE, SODIUM LACTATE, POTASSIUM CHLORIDE, CALCIUM CHLORIDE 600; 310; 30; 20 MG/100ML; MG/100ML; MG/100ML; MG/100ML
INJECTION, SOLUTION INTRAVENOUS
Status: DISCONTINUED | OUTPATIENT
Start: 2025-06-26 | End: 2025-06-26 | Stop reason: SDUPTHER

## 2025-06-26 RX ORDER — SENNA AND DOCUSATE SODIUM 50; 8.6 MG/1; MG/1
1 TABLET, FILM COATED ORAL 2 TIMES DAILY
Status: DISCONTINUED | OUTPATIENT
Start: 2025-06-27 | End: 2025-06-28

## 2025-06-26 RX ORDER — ALBUMIN HUMAN 50 G/1000ML
25 SOLUTION INTRAVENOUS ONCE
Status: COMPLETED | OUTPATIENT
Start: 2025-06-26 | End: 2025-06-26

## 2025-06-26 RX ORDER — MIDAZOLAM HYDROCHLORIDE 2 MG/2ML
1 INJECTION, SOLUTION INTRAMUSCULAR; INTRAVENOUS
Status: DISCONTINUED | OUTPATIENT
Start: 2025-06-26 | End: 2025-06-26

## 2025-06-26 RX ORDER — MORPHINE SULFATE 4 MG/ML
4 INJECTION, SOLUTION INTRAMUSCULAR; INTRAVENOUS
Status: DISCONTINUED | OUTPATIENT
Start: 2025-06-26 | End: 2025-06-26

## 2025-06-26 RX ORDER — KETOROLAC TROMETHAMINE 15 MG/ML
15 INJECTION, SOLUTION INTRAMUSCULAR; INTRAVENOUS EVERY 6 HOURS PRN
Status: DISCONTINUED | OUTPATIENT
Start: 2025-06-26 | End: 2025-06-27

## 2025-06-26 RX ORDER — NITROGLYCERIN 20 MG/100ML
INJECTION INTRAVENOUS
Status: DISCONTINUED | OUTPATIENT
Start: 2025-06-26 | End: 2025-06-26 | Stop reason: SDUPTHER

## 2025-06-26 RX ORDER — ALBUMIN HUMAN 50 G/1000ML
SOLUTION INTRAVENOUS
Status: DISCONTINUED | OUTPATIENT
Start: 2025-06-26 | End: 2025-06-26 | Stop reason: SDUPTHER

## 2025-06-26 RX ORDER — PROTAMINE SULFATE 10 MG/ML
INJECTION, SOLUTION INTRAVENOUS
Status: DISCONTINUED | OUTPATIENT
Start: 2025-06-26 | End: 2025-06-26 | Stop reason: SDUPTHER

## 2025-06-26 RX ORDER — SODIUM CHLORIDE 9 MG/ML
INJECTION, SOLUTION INTRAVENOUS PRN
Status: DISCONTINUED | OUTPATIENT
Start: 2025-06-26 | End: 2025-06-27

## 2025-06-26 RX ORDER — OXYCODONE AND ACETAMINOPHEN 5; 325 MG/1; MG/1
1 TABLET ORAL EVERY 4 HOURS PRN
Status: DISCONTINUED | OUTPATIENT
Start: 2025-06-26 | End: 2025-06-28

## 2025-06-26 RX ORDER — DEXMEDETOMIDINE HYDROCHLORIDE 4 UG/ML
INJECTION, SOLUTION INTRAVENOUS
Status: DISCONTINUED | OUTPATIENT
Start: 2025-06-26 | End: 2025-06-26 | Stop reason: SDUPTHER

## 2025-06-26 RX ORDER — EPHEDRINE SULFATE 5 MG/ML
INJECTION INTRAVENOUS
Status: DISCONTINUED | OUTPATIENT
Start: 2025-06-26 | End: 2025-06-26 | Stop reason: SDUPTHER

## 2025-06-26 RX ORDER — METOPROLOL TARTRATE 25 MG/1
12.5 TABLET, FILM COATED ORAL 2 TIMES DAILY
Status: DISCONTINUED | OUTPATIENT
Start: 2025-06-27 | End: 2025-06-27

## 2025-06-26 RX ORDER — ASPIRIN 81 MG/1
81 TABLET ORAL DAILY
Status: DISCONTINUED | OUTPATIENT
Start: 2025-06-27 | End: 2025-07-02 | Stop reason: HOSPADM

## 2025-06-26 RX ORDER — CHLORHEXIDINE GLUCONATE ORAL RINSE 1.2 MG/ML
10 SOLUTION DENTAL 2 TIMES DAILY
Status: DISCONTINUED | OUTPATIENT
Start: 2025-06-26 | End: 2025-06-26

## 2025-06-26 RX ORDER — DEXMEDETOMIDINE HYDROCHLORIDE 4 UG/ML
.1-1.5 INJECTION, SOLUTION INTRAVENOUS CONTINUOUS
Status: DISCONTINUED | OUTPATIENT
Start: 2025-06-26 | End: 2025-06-27

## 2025-06-26 RX ORDER — MAGNESIUM SULFATE 1 G/100ML
1000 INJECTION INTRAVENOUS PRN
Status: DISCONTINUED | OUTPATIENT
Start: 2025-06-26 | End: 2025-06-28

## 2025-06-26 RX ORDER — LIDOCAINE HYDROCHLORIDE 20 MG/ML
INJECTION, SOLUTION INTRAVENOUS
Status: DISCONTINUED | OUTPATIENT
Start: 2025-06-26 | End: 2025-06-26 | Stop reason: SDUPTHER

## 2025-06-26 RX ORDER — NOREPINEPHRINE BITARTRATE 0.02 MG/ML
INJECTION, SOLUTION INTRAVENOUS
Status: DISCONTINUED | OUTPATIENT
Start: 2025-06-26 | End: 2025-06-26 | Stop reason: SDUPTHER

## 2025-06-26 RX ORDER — NOREPINEPHRINE BITARTRATE 0.02 MG/ML
.01-.2 INJECTION, SOLUTION INTRAVENOUS CONTINUOUS
Status: DISCONTINUED | OUTPATIENT
Start: 2025-06-26 | End: 2025-06-27

## 2025-06-26 RX ORDER — POTASSIUM CHLORIDE 29.8 MG/ML
20 INJECTION INTRAVENOUS PRN
Status: DISCONTINUED | OUTPATIENT
Start: 2025-06-26 | End: 2025-06-28

## 2025-06-26 RX ORDER — NICARDIPINE HYDROCHLORIDE 2.5 MG/ML
INJECTION INTRAVENOUS
Status: DISCONTINUED | OUTPATIENT
Start: 2025-06-26 | End: 2025-06-26 | Stop reason: SDUPTHER

## 2025-06-26 RX ORDER — SODIUM CHLORIDE 0.9 % (FLUSH) 0.9 %
5-40 SYRINGE (ML) INJECTION EVERY 12 HOURS SCHEDULED
Status: DISCONTINUED | OUTPATIENT
Start: 2025-06-26 | End: 2025-06-28

## 2025-06-26 RX ADMIN — ALBUMIN (HUMAN) 25 G: 12.5 INJECTION, SOLUTION INTRAVENOUS at 16:54

## 2025-06-26 RX ADMIN — PHENYLEPHRINE HYDROCHLORIDE 50 MCG: 10 INJECTION INTRAVENOUS at 08:26

## 2025-06-26 RX ADMIN — Medication 2000 MG: at 11:16

## 2025-06-26 RX ADMIN — PHENYLEPHRINE HYDROCHLORIDE 100 MCG: 10 INJECTION INTRAVENOUS at 07:23

## 2025-06-26 RX ADMIN — VECURONIUM BROMIDE 3 MG: 1 INJECTION, POWDER, LYOPHILIZED, FOR SOLUTION INTRAVENOUS at 07:47

## 2025-06-26 RX ADMIN — Medication 3 AMPULE: at 04:05

## 2025-06-26 RX ADMIN — MIDAZOLAM 2 MG: 1 INJECTION INTRAMUSCULAR; INTRAVENOUS at 07:00

## 2025-06-26 RX ADMIN — PROPOFOL 30 MG: 10 INJECTION, EMULSION INTRAVENOUS at 07:51

## 2025-06-26 RX ADMIN — NICARDIPINE HYDROCHLORIDE 0.3 MG: 25 INJECTION INTRAVENOUS at 08:30

## 2025-06-26 RX ADMIN — SODIUM CHLORIDE, SODIUM LACTATE, POTASSIUM CHLORIDE, AND CALCIUM CHLORIDE: 600; 310; 30; 20 INJECTION, SOLUTION INTRAVENOUS at 07:00

## 2025-06-26 RX ADMIN — VECURONIUM BROMIDE 2 MG: 1 INJECTION, POWDER, LYOPHILIZED, FOR SOLUTION INTRAVENOUS at 08:52

## 2025-06-26 RX ADMIN — FENTANYL CITRATE 250 MCG: 0.05 INJECTION, SOLUTION INTRAMUSCULAR; INTRAVENOUS at 07:11

## 2025-06-26 RX ADMIN — FAMOTIDINE 20 MG: 20 TABLET, FILM COATED ORAL at 04:05

## 2025-06-26 RX ADMIN — ACETAMINOPHEN 650 MG: 325 TABLET ORAL at 16:31

## 2025-06-26 RX ADMIN — NICARDIPINE HYDROCHLORIDE 0.3 MG: 0.1 INJECTION INTRAVENOUS at 07:58

## 2025-06-26 RX ADMIN — PHENYLEPHRINE HYDROCHLORIDE 100 MCG: 10 INJECTION INTRAVENOUS at 07:12

## 2025-06-26 RX ADMIN — VASOPRESSIN 1 UNITS: 20 INJECTION INTRAVENOUS at 10:48

## 2025-06-26 RX ADMIN — MORPHINE SULFATE 4 MG: 4 INJECTION INTRAVENOUS at 13:58

## 2025-06-26 RX ADMIN — AMINOCAPROIC ACID 1 G/HR: 250 INJECTION, SOLUTION INTRAVENOUS at 08:01

## 2025-06-26 RX ADMIN — AMIODARONE HYDROCHLORIDE 200 MG: 200 TABLET ORAL at 04:05

## 2025-06-26 RX ADMIN — TUBERCULIN PURIFIED PROTEIN DERIVATIVE 5 UNITS: 5 INJECTION, SOLUTION INTRADERMAL at 20:20

## 2025-06-26 RX ADMIN — Medication 2000 MG: at 07:35

## 2025-06-26 RX ADMIN — NITROGLYCERIN 25 MCG: 20 INJECTION INTRAVENOUS at 07:50

## 2025-06-26 RX ADMIN — Medication 1 AMPULE: at 20:24

## 2025-06-26 RX ADMIN — ALBUMIN (HUMAN) 12.5 G: 12.5 SOLUTION INTRAVENOUS at 10:56

## 2025-06-26 RX ADMIN — FAMOTIDINE 20 MG: 20 TABLET, FILM COATED ORAL at 16:47

## 2025-06-26 RX ADMIN — HEPARIN SODIUM 23000 UNITS: 1000 INJECTION INTRAVENOUS; SUBCUTANEOUS at 08:21

## 2025-06-26 RX ADMIN — ARFORMOTEROL TARTRATE: 15 SOLUTION RESPIRATORY (INHALATION) at 19:22

## 2025-06-26 RX ADMIN — NITROGLYCERIN 50 MCG: 20 INJECTION INTRAVENOUS at 07:52

## 2025-06-26 RX ADMIN — SODIUM CHLORIDE, SODIUM LACTATE, POTASSIUM CHLORIDE, AND CALCIUM CHLORIDE: .6; .31; .03; .02 INJECTION, SOLUTION INTRAVENOUS at 06:02

## 2025-06-26 RX ADMIN — INSULIN HUMAN 3 UNITS/HR: 1 INJECTION, SOLUTION INTRAVENOUS at 09:27

## 2025-06-26 RX ADMIN — NITROGLYCERIN 150 MCG: 20 INJECTION INTRAVENOUS at 07:56

## 2025-06-26 RX ADMIN — ASPIRIN 81 MG: 81 TABLET, CHEWABLE ORAL at 04:05

## 2025-06-26 RX ADMIN — CEFAZOLIN 2000 MG: 10 INJECTION, POWDER, FOR SOLUTION INTRAVENOUS at 18:41

## 2025-06-26 RX ADMIN — EPHEDRINE SULFATE 5 MG: 5 INJECTION INTRAVENOUS at 07:17

## 2025-06-26 RX ADMIN — POTASSIUM CHLORIDE 20 MEQ: 29.8 INJECTION, SOLUTION INTRAVENOUS at 16:46

## 2025-06-26 RX ADMIN — TRAMADOL HYDROCHLORIDE 50 MG: 50 TABLET, COATED ORAL at 16:31

## 2025-06-26 RX ADMIN — ROCURONIUM BROMIDE 50 MG: 10 INJECTION, SOLUTION INTRAVENOUS at 07:11

## 2025-06-26 RX ADMIN — TAMSULOSIN HYDROCHLORIDE 0.4 MG: 0.4 CAPSULE ORAL at 20:24

## 2025-06-26 RX ADMIN — VASOPRESSIN 1 UNITS: 20 INJECTION INTRAVENOUS at 10:27

## 2025-06-26 RX ADMIN — VECURONIUM BROMIDE 2 MG: 1 INJECTION, POWDER, LYOPHILIZED, FOR SOLUTION INTRAVENOUS at 09:51

## 2025-06-26 RX ADMIN — NITROGLYCERIN 10 MCG/MIN: 20 INJECTION INTRAVENOUS at 07:35

## 2025-06-26 RX ADMIN — SODIUM CHLORIDE, PRESERVATIVE FREE 10 ML: 5 INJECTION INTRAVENOUS at 20:25

## 2025-06-26 RX ADMIN — AMINOCAPROIC ACID 10000 MG: 250 INJECTION, SOLUTION INTRAVENOUS at 07:35

## 2025-06-26 RX ADMIN — LIDOCAINE HYDROCHLORIDE 100 MG: 20 INJECTION, SOLUTION INTRAVENOUS at 07:11

## 2025-06-26 RX ADMIN — DEXMEDETOMIDINE HYDROCHLORIDE 0.5 MCG/KG/HR: 4 INJECTION, SOLUTION INTRAVENOUS at 08:59

## 2025-06-26 RX ADMIN — GABAPENTIN 100 MG: 100 CAPSULE ORAL at 20:24

## 2025-06-26 RX ADMIN — EPHEDRINE SULFATE 5 MG: 5 INJECTION INTRAVENOUS at 07:19

## 2025-06-26 RX ADMIN — FENTANYL CITRATE 250 MCG: 0.05 INJECTION, SOLUTION INTRAMUSCULAR; INTRAVENOUS at 07:47

## 2025-06-26 RX ADMIN — SODIUM CHLORIDE, SODIUM LACTATE, POTASSIUM CHLORIDE, CALCIUM CHLORIDE: 600; 310; 30; 20 INJECTION, SOLUTION INTRAVENOUS at 07:32

## 2025-06-26 RX ADMIN — NITROGLYCERIN 50 MCG: 20 INJECTION INTRAVENOUS at 07:53

## 2025-06-26 RX ADMIN — PROPOFOL 100 MG: 10 INJECTION, EMULSION INTRAVENOUS at 07:11

## 2025-06-26 RX ADMIN — SODIUM CHLORIDE 1.6 UNITS/HR: 9 INJECTION, SOLUTION INTRAVENOUS at 14:15

## 2025-06-26 RX ADMIN — MIDAZOLAM 3 MG: 1 INJECTION INTRAMUSCULAR; INTRAVENOUS at 10:03

## 2025-06-26 RX ADMIN — PROTAMINE SULFATE 280 MG: 10 INJECTION, SOLUTION INTRAVENOUS at 10:36

## 2025-06-26 RX ADMIN — AMIODARONE HYDROCHLORIDE 200 MG: 200 TABLET ORAL at 20:24

## 2025-06-26 RX ADMIN — ARFORMOTEROL TARTRATE: 15 SOLUTION RESPIRATORY (INHALATION) at 13:45

## 2025-06-26 RX ADMIN — Medication 0.03 MCG/KG/MIN: at 10:19

## 2025-06-26 RX ADMIN — ATORVASTATIN CALCIUM 40 MG: 40 TABLET, FILM COATED ORAL at 20:24

## 2025-06-26 RX ADMIN — KETOROLAC TROMETHAMINE 15 MG: 15 INJECTION, SOLUTION INTRAMUSCULAR; INTRAVENOUS at 18:41

## 2025-06-26 RX ADMIN — ACETAMINOPHEN 650 MG: 325 TABLET ORAL at 23:13

## 2025-06-26 RX ADMIN — SODIUM CHLORIDE: 0.9 INJECTION, SOLUTION INTRAVENOUS at 12:31

## 2025-06-26 ASSESSMENT — PAIN DESCRIPTION - LOCATION
LOCATION: CHEST;INCISION;MEDIASTINUM;STERNUM
LOCATION: CHEST;INCISION;MEDIASTINUM;STERNUM

## 2025-06-26 ASSESSMENT — PAIN DESCRIPTION - DESCRIPTORS
DESCRIPTORS: ACHING;SORE
DESCRIPTORS: ACHING;SORE

## 2025-06-26 ASSESSMENT — PULMONARY FUNCTION TESTS
PIF_VALUE: 19
PIF_VALUE: 18
PIF_VALUE: 20

## 2025-06-26 ASSESSMENT — PAIN SCALES - GENERAL
PAINLEVEL_OUTOF10: 0
PAINLEVEL_OUTOF10: 0
PAINLEVEL_OUTOF10: 8
PAINLEVEL_OUTOF10: 0
PAINLEVEL_OUTOF10: 3
PAINLEVEL_OUTOF10: 0
PAINLEVEL_OUTOF10: 6
PAINLEVEL_OUTOF10: 3
PAINLEVEL_OUTOF10: 0

## 2025-06-26 ASSESSMENT — PAIN DESCRIPTION - ORIENTATION
ORIENTATION: MID;ANTERIOR
ORIENTATION: ANTERIOR;MID

## 2025-06-26 ASSESSMENT — PAIN - FUNCTIONAL ASSESSMENT: PAIN_FUNCTIONAL_ASSESSMENT: 0-10

## 2025-06-26 NOTE — PLAN OF CARE
Problem: Safety - Adult  Goal: Free from fall injury  Outcome: Progressing  Flowsheets (Taken 6/26/2025 1145 by Alden Carrillo, RN)  Free From Fall Injury: Based on caregiver fall risk screen, instruct family/caregiver to ask for assistance with transferring infant if caregiver noted to have fall risk factors     Problem: Discharge Planning  Goal: Discharge to home or other facility with appropriate resources  Outcome: Progressing     Problem: Skin/Tissue Integrity  Goal: Skin integrity remains intact  Description: 1.  Monitor for areas of redness and/or skin breakdown  2.  Assess vascular access sites hourly  3.  Every 4-6 hours minimum:  Change oxygen saturation probe site  4.  Every 4-6 hours:  If on nasal continuous positive airway pressure, respiratory therapy assess nares and determine need for appliance change or resting period  Outcome: Progressing  Flowsheets (Taken 6/26/2025 1145 by Alden Carrillo, RN)  Skin Integrity Remains Intact:   Monitor for areas of redness and/or skin breakdown   Assess vascular access sites hourly   Every 4-6 hours minimum:  Change oxygen saturation probe site   Pressure redistribution bed/mattress (bed type)

## 2025-06-26 NOTE — PROGRESS NOTES
4 Eyes Skin Assessment     NAME:  Pritesh Orellana  YOB: 1945  MEDICAL RECORD NUMBER:  [id number]    The patient is being assessed for  Admission    I agree that at least one RN has performed a thorough Head to Toe Skin Assessment on the patient. ALL assessment sites listed below have been assessed.      Areas assessed by both nurses:    Head, Face, Ears, Shoulders, Back, Chest, Arms, Elbows, Hands, Sacrum. Buttock, Coccyx, Ischium, Legs. Feet and Heels, and Under Medical Devices         Does the Patient have a Wound? No noted wound(s)       Dax Prevention initiated by RN: Yes  Wound Care Orders initiated by RN: No    Pressure Injury (Stage 1,2,3,4, Unstageable, DTI, NWPT, and Complex wounds) if present, place Wound referral order by RN under : No    New Ostomies, if present place, Ostomy referral order under : No     Nurse 1 eSignature: Electronically signed by Marti Sevilla RN on 6/26/25 at 2:31 PM EDT    **SHARE this note so that the co-signing nurse can place an eSignature**    Nurse 2 eSignature: Electronically signed by ALBINA LING RN on 6/26/25 at 6:38 PM EDT

## 2025-06-26 NOTE — ANESTHESIA PROCEDURE NOTES
Procedure Performed: NED       Start Time:  6/26/2025 7:30 AM       End Time:   6/26/2025 8:23 AM    Anesthesia Information  Performed Personally  Anesthesiologist:  Cuba Mckinney MD        Preanesthesia Checklist:  Patient identified, IV assessed, risks and benefits discussed, monitors and equipment assessed, procedure being performed at surgeon's request and anesthesia consent obtained.    General Procedure Information  Diagnostic Indications for Echo:  assessment of ascending aorta, assessment of surgical repair and hemodynamic monitoring  Physician Requesting Echo: Salvatore Arango MD  Location performed:  OR  Intubated  Bite block placed  Heart visualized  Probe Insertion:  Easy  Probe Type:  Mulitplane  Modalities:  2D only, pulse wave Doppler and color flow mapping    Echocardiographic and Doppler Measurements    Ventricles    Ventricle  Cavity Size  Cavity          Dimension  Hypertrophy  Thrombus  Global FXN  EF    RV  normal    No  No  normal      LV  normal    No  No  normal (50-70%)  50%       Ventricular Regional Function:  1- Basal Anteroseptal:  normal  2- Basal Anterior:  normal  3- Basal Anterolateral:  normal  4- Basal Inferolateral:  normal  5- Basal Inferior:  normal and hypokinetic  6- Basal Inferoseptal:  normal  7- Mid Anteroseptal:  normal  8- Mid Anterior:  normal  9- Mid Anterolateral:  normal  10- Mid Inferolateral:  normal  11- Mid Inferior:  normal and hypokinetic  12- Mid Inferoseptal:  normal  13- Apical Anterior:  normal  14- Apical Lateral:  normal  15- Apical Inferior:  normal  16- Apical Septal:  normal  17- [place]:  normal        Valves     Valves  Annulus  Stenosis Measurements   Regurg  Leaflet   Morph  Leaflet   Motion Valve Comments    Aortic normal Stenosis none.            trace   normal normal       Mitral normal none             mild normal normal    Tricuspid normal   not present         none    normal      Pulmonic normal                      Aorta     Aorta  Size

## 2025-06-26 NOTE — ANESTHESIA POSTPROCEDURE EVALUATION
Department of Anesthesiology  Postprocedure Note    Patient: Pritesh Orellana  MRN: [id number]  YOB: 1945  Date of evaluation: 6/26/2025    Procedure Summary       Date: 06/26/25 Room / Location: [facility] MAIN OR 04 CARDIAC / [facility] MAIN OR    Anesthesia Start: 0649 Anesthesia Stop: 1146    Procedures:       CORONARY ARTERY BYPASS GRAFTING (CABG X4) LIMA, LEFT LEG ENDOSCOPIC SAPHENOUS VEIN HARVEST / LEFT ATRIAL APPENDAGE CLIP      TRANSESOPHAGEAL ECHOCARDIOGRAM Diagnosis:       Atherosclerotic heart disease of native coronary artery with unstable angina pectoris (HCC)      Dyspnea on exertion      Shortness of breath      Accelerating angina (HCC)      (Atherosclerotic heart disease of native coronary artery with unstable angina pectoris (HCC) [I25.110])      (Dyspnea on exertion [R06.09])      (Shortness of breath [R06.02])      (Accelerating angina (HCC) [I20.0])    Providers: Salvatore Arango MD Responsible Provider: Cuba Mckinney MD    Anesthesia Type: general ASA Status: 4            Anesthesia Type: No value filed.    Joceline Phase I: Joceline Score: 10    Joceline Phase II: Joceline Score: 10    Anesthesia Post Evaluation    Patient location during evaluation: ICU  Patient participation: complete - patient cannot participate  Level of consciousness: sedated and ventilated  Airway patency: patent  Cardiovascular status: hemodynamically stable  Respiratory status: acceptable and ventilator  Comments: BP (!) 97/46   Pulse 92   Temp 97.9 °F (36.6 °C) (Bladder)   Resp 28   Ht 1.702 m (5' 7.01\")   Wt 84.8 kg (187 lb)   SpO2 100%   BMI 29.28 kg/m²     Handoff given to Critical Care physician by phone, time given for questions and all questions addressed.    Pain management: adequate    No notable events documented.

## 2025-06-26 NOTE — PROGRESS NOTES
Patient out from OR; placed on ventilator on documented settings. Ventilator check complete; patient has a #8.0 ET tube secured at the 24 at the teeth.  Patient is  sedated.  Patient is not able to follow commands.  Breath sounds are diminished.  Trachea is midline, Negative for subcutaneous air, and chest excursion is symmetric. Patient is also Negative for cyanosis and is Negative for pitting edema.  All alarms are set and audible.  Resuscitation bag is  at the head of the bed.      Ventilator Settings  Mode FIO2 Rate Tidal Volume Pressure PEEP I:E Ratio   AC/PRVC  60% 16     450     8 1:3.2      Peak airway pressure:   20  Minute ventilation:   7.41    Tessa Yates RCP

## 2025-06-26 NOTE — PROGRESS NOTES
[facility] CARDIOLOGY PROGRESS NOTE    6/26/2025 7:11 AM    Admit Date: 6/24/2025        Subjective:   Stable overnight without angina, CHF, or palpitations. Vitals stable and controlled. No other complaints overnight. Tolerating meds well.     Objective:      Vitals:    06/26/25 0405 06/26/25 0407 06/26/25 0422 06/26/25 0557   BP: 129/87  (!) 145/75 (!) 163/79   Pulse: 62   66   Resp: 18   18   Temp: 97 °F (36.1 °C)   97.7 °F (36.5 °C)   TempSrc: Temporal   Temporal   SpO2: 94%   96%   Weight:  85.2 kg (187 lb 13.3 oz)  84.8 kg (187 lb)   Height:    1.702 m (5' 7\")       Physical Exam:  Neck- supple, no JVD  CV- regular rate and rhythm no MRG  Lung- clear bilaterally  Abd- soft, nontender, nondistended  Ext- no edema, right radial clean, dry, intact  Skin- warm and dry    Data Review:   Pertinent lab and noninvasive imaging over the past 24 hours reviewed and evaluated.    Recent Labs     06/25/25  0502 06/26/25  0505    137   K 3.9 3.7   MG 2.0 2.0   BUN 17 16   WBC 5.2 5.8   HGB 11.5* 12.0*   HCT 34.2* 35.8*    228   INR 1.1  --      Lab Results   Component Value Date    LDL 82 05/23/2025     Left heart catheterization 6/24/2025:    Mild to moderately reduced left ventricular systolic function.  LVEF 40-45%.  Normal LVEDP.  Dense inferior akinesis present.  1+ mitral regurgitation present.  No aortic valve gradient.    Severe diffuse 3 vessel coronary calcification present    Severe mid distal LAD stenosis diffuse in nature.  Will require extensive stenting.  Patient appears to have appropriate target distally.    Complex trifurcating obtuse marginal which supplies of aspiratory lateral wall with the 2 larger branches both having severe proximal disease.  If patient is considered for coronary bypass grafting would recommend grafting both these vessels.    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    Echocardiogram 5/5/2025:  Left Ventricle Mildly reduced left

## 2025-06-26 NOTE — CARE COORDINATION
CM reviewed chart.     Patient is currently in CVICU at this time. No CM needs voiced or noted. PT evaluation is pending.     CM will continue to follow patient for any discharge planning needs.

## 2025-06-26 NOTE — OP NOTE
Operative Note      Patient: Pritesh Orellana  YOB: 1945  MRN: [id number]    Date of Procedure: 6/26/2025    Pre-Op Diagnosis Codes:      * Atherosclerotic heart disease of native coronary artery with unstable angina pectoris (HCC) [I25.110]     * Dyspnea on exertion [R06.09]     * Shortness of breath [R06.02]     * Accelerating angina (HCC) [I20.0]  Multivessel Coronary Artery Disease with LAD involvement    Post-Op Diagnosis: Same       Procedure(s):  CORONARY ARTERY BYPASS GRAFTING (CABG X4) LIMA, LEFT LEG ENDOSCOPIC SAPHENOUS VEIN HARVEST / LEFT ATRIAL APPENDAGE CLIP  TRANSESOPHAGEAL ECHOCARDIOGRAM    LIMA to LAD  SVG to PDA  SVG to OM2  SVG to OM3    40mm atricure clip to LESLIE    Surgeon(s):  Salvatore Arango MD    Assistant:   Surgical Assistant: Kellee Thorne -> performed endovein harvest    Anesthesia: General    Estimated Blood Loss (mL): 200     Complications: None    Specimens:   * No specimens in log *    Implants:  Implant Name Type Inv. Item Serial No.  Lot No. LRB No. Used Action   CLIP MED SUTURE LESS 40 MM SYS 1 HND STRL ATRICLIP FLX V - [id number]  CLIP MED SUTURE LESS 40 MM SYS 1 HND STRL ATRICLIP FLX V  [employer]WD [id number] N/A 1 Implanted         Drains:   Chest Tube Left Pleural (Active)   Chest Tube Airleak No 06/26/25 1135   Status Continuous Suction 06/26/25 1135   Suction -20 cm H2O 06/26/25 1135   Y Connector Used Yes 06/26/25 1135   Drainage Description Sanguinous 06/26/25 1135   Dressing Status New dressing applied 06/26/25 1135   Chest Tube Dressing Petroleum Jelly 06/26/25 1135   Site Assessment Clean, dry & intact 06/26/25 1135   Surrounding Skin Clean, dry & intact 06/26/25 1135   Output (ml) 10 ml 06/26/25 1135       Chest Tube Anterior Mediastinal 2 (Active)   Chest Tube Airleak No 06/26/25 1135   Status Continuous Suction 06/26/25 1135   Suction -20 cm H2O 06/26/25 1135   Y Connector Used Yes 06/26/25 1135   Drainage Description  Sanguinous 06/26/25 1135   Dressing Status New dressing applied 06/26/25 1135   Chest Tube Dressing Petroleum Jelly 06/26/25 1135   Site Assessment Clean, dry & intact 06/26/25 1135   Surrounding Skin Clean, dry & intact 06/26/25 1135       Findings:  Infection Present At Time Of Surgery (PATOS) (choose all levels that have infection present):  No infection present  Other Findings:   Cardiopulmonary bypass time: 112 minutes  Aortic Cross Clamp Time: 88 minutes    VALDES to LAD, LAD 3mm severely diseased  SVG to PDA, PDA 2.5mm moderately diseased  SVG to OM2, OM2 2.5mm moderately diseased  SVG to OM3, OM3 2.5mm moderately diseased    All grafts with good signals at the end of the operation    Detailed Description of Procedure:   After informed consent was obtained the patient was taken to the operating room, prepped and draped in the usual sterile fashion. A timeout was performed. A endovein harvest was performed taking the left saphenous vein. A median sternotomy was performed. The sternal retractor was placed to expose the anterior mediastinum. The remnant thymic tissue was dissected and divided to expose the left innominate vein and the anterior pericardium.     The sternal retractor was removed and the Rul Tract retractor was placed to expose the left internal mammary artery. The left internal mammary artery was harvested in a skeletonized fashion. Topical papaverine was applied to the left internal mammary artery. The patient was heparinized. A chest tube was placed into the left pleural space. The distal end of the left internal mammary artery was clipped twice, divided sharply and secured with a small vascular bulldog clamp.     The rul tract was removed and a sternal retractor was replaced. A pericardial well was created. Purse string sutures were placed in the distal ascending aorta with 2-0 SH ethibond sutures and the ascending aorta was cannulated with a 20F suarez fem flex cannula and de-aired. This

## 2025-06-26 NOTE — CONSULTS
Cardiovascular ICU Consult Note: 6/26/2025  Pritesh Orellana                                                     Admission Date: 6/24/2025     The patient's chart is reviewed and the patient is discussed with the staff.    Subjective:     Patient is seen at the request of Salvatore Arango MD  for respiratory management status post cardiac surgery.  Patient had S/P CABG x 4.  Currently is sedated in CV-ICU and orally intubated receiving  mechanical ventilation. Followed in pulmonary clinic for moderate persistent asthma on Wixela 500. No obstruction on spirometry 2024.     We have been asked to see in the CV-ICU for mechanical ventilation management and weaning.    Current Outpatient Medications   Medication Instructions    acetaminophen (TYLENOL) 1,000 mg, EVERY 6 HOURS PRN    ALBUTEROL SULFATE HFA IN 90 mcg, 4 TIMES DAILY PRN    Ascorbic Acid (VITAMIN C PO) 500 mg, DAILY    aspirin 81 mg, Oral, DAILY    atorvastatin (LIPITOR) 80 mg, Oral, DAILY    chlorhexidine (PERIDEX) 0.12 % solution 15 mLs, Swish & Spit, 2 TIMES DAILY, PLACE 15 ML IN THE MOUTH SWISH IN MOUTH FOR 30 SECONDS THEN SPIT OUT    fluticasone (FLONASE) 50 MCG/ACT nasal spray DAILY PRN    fluticasone-salmeterol (ADVAIR) 500-50 MCG/ACT AEPB diskus inhaler 1 puff, Inhalation, EVERY 12 HOURS    gabapentin (NEURONTIN) 100 mg, Oral, NIGHTLY, Intended supply: 30 days    ipratropium 0.5 mg-albuterol 2.5 mg (DUONEB) 0.5-2.5 (3) MG/3ML SOLN nebulizer solution 3 mLs, Nebulization, EVERY 6 HOURS PRN    lisinopril-hydroCHLOROthiazide (PRINZIDE;ZESTORETIC) 20-12.5 MG per tablet 2 tablets, Oral, DAILY    Melatonin-Theanine (SLEEP+CALM) 3-50 MG CHEW EVERY BEDTIME    meloxicam (MOBIC) 15 mg, Oral, DAILY    metoprolol succinate (TOPROL XL) 50 mg, Oral, DAILY    metroNIDAZOLE (METROGEL) 1 % gel 1 Tube, Topical, DAILY    Multiple Vitamins-Minerals (MULTIVITAMIN ADULTS PO) DAILY    nitroGLYCERIN (NITROSTAT) 0.4 mg, SubLINGual, EVERY 5 MIN PRN, up to  sedated but no gross neuro deficits  Psychiatric:  sedated and unable to assess at this time    CXR:  no acute findings, expected post op appearance.      CT 6/25: mild atelectasis at R diaphragm otherwise lungs are clear    LINES:    ETT  (Active)     Chest Tube Left Pleural (Active)       Chest Tube Anterior Mediastinal 2 (Active)       NG/OG/NJ/NE Tube Orogastric 18 fr (Active)       Urinary Catheter 06/26/25 Juarez (Active)     Introducer 06/26/25 (Active)       Arterial Line 06/26/25 Radial (Active)       Pacing Wires (Active)       CVC  06/26/25 Right Internal jugular (Active)       DRIPS:   Dose (mcg/kg/min) Propofol : *30 mg  Dose (mcg/kg/hr) Dexmedetomidine: 0.5 mcg/kg/hr  Dose (mcg/min) Norepinephrine: 5.088 mcg/min  Dose (mg/hr) Nicardipine: *0.3 mg  Dose (units/hr) Insulin : 0 Units/hr     CI:       Ventilator Settings:  Ideal body weight: 66.1 kg (145 lb 12.2 oz)  Adjusted ideal body weight: 73.6 kg (162 lb 4.1 oz)  Mode FIO2 Rate Tidal Volume Pressure   AC/PRVC    60 %  16 bpm         8     Peak airway pressure:     Minute ventilation:    Recent Labs     06/26/25  0744   BE 1.9     Recent Labs     06/25/25  0502 06/26/25  0505   WBC 5.2 5.8   HGB 11.5* 12.0*   HCT 34.2* 35.8*    228   INR 1.1  --      Recent Labs     06/25/25  0502 06/26/25  0505    137   K 3.9 3.7    102   CO2 26 23   BUN 17 16   CREATININE 0.76* 0.81   MG 2.0 2.0     No results for input(s): \"PROCAL\", \"LACTATE\" in the last 72 hours.  05/02/25    ECHO (TTE) COMPLETE (PRN CONTRAST/BUBBLE/STRAIN/3D) 05/05/2025  7:54 PM (Final)    Interpretation Summary    Left Ventricle: Mildly reduced left ventricular systolic function with a visually estimated EF of 45 - 50%. Left ventricle size is normal. Moderately increased wall thickness. Findings consistent with moderate concentric hypertrophy. Mild global hypokinesis present. Grade I diastolic dysfunction. E/E' Ratio (Averaged) is 11.81.    Mitral Valve: Mild to moderate

## 2025-06-26 NOTE — ANESTHESIA POSTPROCEDURE EVALUATION
Department of Anesthesiology  Postprocedure Note    Patient: Pritesh Orellana  MRN: [id number]  YOB: 1945  Date of evaluation: 6/26/2025    Procedure Summary       Date: 06/26/25 Room / Location: [facility] MAIN OR 04 CARDIAC / [facility] MA[facility] OR    Anesthesia Start: 0649 Anesthesia Stop: 1146    Procedures:       CORONARY ARTERY BYPASS GRAFTING (CABG X4) LIMA, LEFT LEG ENDOSCOPIC SAPHENOUS VEIN HARVEST / LEFT ATRIAL APPENDAGE CLIP      TRANSESOPHAGEAL ECHOCARDIOGRAM Diagnosis:       Atherosclerotic heart disease of native coronary artery with unstable angina pectoris (HCC)      Dyspnea on exertion      Shortness of breath      Accelerating angina (HCC)      (Atherosclerotic heart disease of native coronary artery with unstable angina pectoris (HCC) [I25.110])      (Dyspnea on exertion [R06.09])      (Shortness of breath [R06.02])      (Accelerating angina (HCC) [I20.0])    Providers: Salvatore Arango MD Responsible Provider: Cuba Mckinney MD    Anesthesia Type: general ASA Status: 4            Anesthesia Type: No value filed.    Joceline Phase I: Joceline Score: 10    Joceline Phase II: Joceline Score: 10    Anesthesia Post Evaluation    Patient location during evaluation: PACU  Patient participation: complete - patient participated  Level of consciousness: awake and alert  Airway patency: patent  Nausea & Vomiting: no nausea and no vomiting  Cardiovascular status: hemodynamically stable  Respiratory status: acceptable, nonlabored ventilation and spontaneous ventilation  Hydration status: euvolemic  Comments: BP (!) 97/46   Pulse 92   Temp 97.9 °F (36.6 °C) (Bladder)   Resp 28   Ht 1.702 m (5' 7.01\")   Wt 84.8 kg (187 lb)   SpO2 100%   BMI 29.28 kg/m²     Multimodal analgesia pain management approach  Pain management: adequate and satisfactory to patient    No notable events documented.

## 2025-06-26 NOTE — PROGRESS NOTES
TRANSFER - IN REPORT:    Verbal report received from CVOR team (name) on Pritesh Polk [place]  being received from CVOR (unit) for routine post-op      Report consisted of patient’s Situation, Background, Assessment and   Recommendations(SBAR).     Information from the following report(s) SBAR, Kardex, OR Summary, Intake/Output, MAR, and Cardiac Rhythm temp paced was reviewed with the receiving nurse.      Per anesthesia on admission patient intubation Normal    Collaborative team agrees of surgeon, anesthesia, RT, and RN agrees to proceed with CV weaning protocol    Opportunity for questions and clarification was provided.      Assessment completed upon patient’s arrival to unit and care assumed by Marti Sevilla RN.

## 2025-06-26 NOTE — ANESTHESIA PROCEDURE NOTES
Arterial Line:    An arterial line was placed using ultrasound guidance, in the OR for the following indication(s): continuous blood pressure monitoring and blood sampling needed.    A 20 gauge (size) (length), Arrow (type) catheter was placed, Seldinger technique used, into the left radial artery, secured by Tegaderm and tape.  Anesthesia type: Local  Local infiltration: Injection    Events:  patient tolerated procedure well with no complications and EBL < 5mL.    Additional notes:  Left arm prepped with ChloraPrep, 0.8ml of 1% lidocaine infiltrated at skin, ultrasound guided Seldinger technique, good blood return, good waveform.      Potential access sites were examined with ultrasound and acceptable patent access site selected as noted above.  Needle path and artery access visualized in real time using ultrasound, an image of wire in vessel recorded for permanent record.    6/26/2025 7:06 AM6/26/2025 7:08 AM  Anesthesiologist: Cuba Mckinney MD  Resident/CRNA: Jan Irvin APRN - CRNA  Performed: Resident/CRNA   Preanesthetic Checklist  Completed: patient identified, IV checked, site marked, risks and benefits discussed, surgical/procedural consents, equipment checked, pre-op evaluation, timeout performed, anesthesia consent given, oxygen available, monitors applied/VS acknowledged, fire risk safety assessment completed and verbalized and blood product R/B/A discussed and consented

## 2025-06-26 NOTE — OR NURSING
TRANSFER - OUT REPORT:    Verbal report given to ALBINA GAMBLE RN on Pritesh Polk [place]  being transferred to CVICU for routine post-op       Report consisted of patient's Situation, Background, Assessment and   Recommendations(SBAR).     Information from the following report(s) Nurse Handoff Report and Surgery Report was reviewed with the receiving nurse.           Lines:   Peripheral IV 06/24/25 Right Antecubital (Active)   Site Assessment Clean, dry & intact 06/26/25 0557   Line Status No blood return;Flushed 06/26/25 0557   Line Care Connections checked and tightened 06/26/25 0324   Phlebitis Assessment No symptoms 06/26/25 0557   Infiltration Assessment 0 06/26/25 0557   Alcohol Cap Used Yes 06/26/25 0557   Dressing Status Clean, dry & intact 06/26/25 0557   Dressing Type Transparent 06/26/25 0557       Peripheral IV 06/24/25 Distal;Left Cephalic (Active)   Site Assessment Clean, dry & intact 06/26/25 0557   Line Status Flushed;No blood return 06/26/25 0557   Line Care Connections checked and tightened 06/26/25 0324   Phlebitis Assessment No symptoms 06/26/25 0557   Infiltration Assessment 0 06/26/25 0557   Alcohol Cap Used Yes 06/26/25 0557   Dressing Status Clean, dry & intact 06/26/25 0557   Dressing Type Transparent 06/26/25 0557       Arterial Line 06/26/25 Radial (Active)       Introducer 06/26/25 (Active)        Opportunity for questions and clarification was provided.      Patient transported with:  Monitor, O2 @ 15lpm, and Registered Nurse, CRNA, CARL

## 2025-06-26 NOTE — ANESTHESIA PROCEDURE NOTES
Airway  Date/Time: 6/26/2025 7:14 AM  Urgency: elective    Airway not difficult    General Information and Staff    Patient location during procedure: OR  Resident/CRNA: Jan Irvin APRN - CRNA  Performed: resident/CRNA   Performed by: Jan Irvin APRN - CRNA  Authorized by: Cuba Mckinney MD      Indications and Patient Condition  Indications for airway management: anesthesia  Spontaneous Ventilation: absent  Sedation level: deep  Preoxygenated: yes  Patient position: sniffing  MILS not maintained throughout  Mask difficulty assessment: vent by bag mask    Final Airway Details  Final airway type: endotracheal airway      Successful airway: ETT  Cuffed: yes   Successful intubation technique: direct laryngoscopy  Endotracheal tube insertion site: oral  Blade: Thorne  Blade size: #3  ETT size (mm): 8.0  Cormack-Lehane Classification: grade I - full view of glottis  Placement verified by: chest auscultation and capnometry   Inital cuff pressure (cm H2O): 25  Measured from: lips  ETT to lips (cm): 24  Number of attempts at approach: 1  Ventilation between attempts: bag mask  Number of other approaches attempted: 0    no

## 2025-06-26 NOTE — ANESTHESIA PROCEDURE NOTES
Central Venous Line:    A central venous line was placed using ultrasound guidance and surface landmarks, in the OR for the following indication(s): central venous access and CVP monitoring.6/26/2025 7:18 AM6/26/2025 7:28 AM    Sterility preparation included the following: provider used sterile gloves, gown, hat and mask, hand hygiene performed prior to central venous catheter insertion, sterile gel and probe cover used in ultrasound-guided central venous catheter insertion and maximum sterile barriers used during central venous catheter insertion.    The patient was placed in Trendelenburg position.The right internal jugular vein was prepped.    The site was prepped with Chloraprep.  A 7.5 Fr (size), 12 (length), introducer triple lumen was placed.    During the procedure, the following specific steps were taken: target vein identified, needle advanced into vein and blood aspirated and guidewire advanced into vein.    During the procedure the patient experienced: patient tolerated procedure well with no complications and EBL < 5mL.      Outcomes: uncomplicated  Real-time US image taken/store: Yes  Anesthesia type: general..No    Additional notes:  Potential access sites were examined with ultrasound and the acceptable patent access site was selected (site noted above).  The needle path and vein access were visualized in real time using ultrasonography, and an image was recorded for permanent record.  Staffing  Performed: Anesthesiologist   Anesthesiologist: Cuba Mckinney MD  Performed by: Cuba Mckinney MD  Authorized by: Cuba Mckinney MD    Preanesthetic Checklist  Completed: patient identified, IV checked, site marked, risks and benefits discussed, surgical/procedural consents, equipment checked, pre-op evaluation, timeout performed, anesthesia consent given, oxygen available, monitors applied/VS acknowledged, fire risk safety assessment completed and verbalized and blood product R/B/A discussed and

## 2025-06-26 NOTE — PROGRESS NOTES
Respiratory Mechanics completed and are as follows:    Vt: 681 mL  VC: 1543 mL  RSBI: 42.5  NIF: -29     Patient extubated to a HHFNC 40L/40%. Patient isable to communicate and is negative for stridor. Breath sounds are diminished. No complications with extubation.     Tessa Yates RCP

## 2025-06-26 NOTE — PROGRESS NOTES
TIMEOUT performed prior to extubation on [facility] with RT, primary RN, and charge RN present on 6/26/2025 at 1521.     Per anesthesia team on admission, patient's intubation was no acute fracture or dislocation    ABG results as follows:  No results found for: \"IBD\"      The patient is hemodynamically stable and can demonstrate the following on a consistent basis:  follow commands , elevate head off the pillow, nods appropriately to questions.      Dr. Cisneros notified of weaning parameters and order to extubate obtained.     Patient extubated by (RT name) Tessa to (Type of O2 Device) SHANDRA  at (Amount of of O2) 40/40 without complication.

## 2025-06-27 ENCOUNTER — APPOINTMENT (OUTPATIENT)
Dept: GENERAL RADIOLOGY | Age: 80
DRG: 234 | End: 2025-06-27
Attending: INTERNAL MEDICINE
Payer: MEDICARE

## 2025-06-27 PROBLEM — D62 POSTOPERATIVE ANEMIA DUE TO ACUTE BLOOD LOSS: Status: ACTIVE | Noted: 2025-06-27

## 2025-06-27 LAB
ANION GAP SERPL CALC-SCNC: 9 MMOL/L (ref 7–16)
BUN SERPL-MCNC: 15 MG/DL (ref 8–23)
CALCIUM SERPL-MCNC: 8.3 MG/DL (ref 8.8–10.2)
CHLORIDE SERPL-SCNC: 111 MMOL/L (ref 98–107)
CO2 SERPL-SCNC: 22 MMOL/L (ref 20–29)
CREAT SERPL-MCNC: 0.9 MG/DL (ref 0.8–1.3)
EKG DIAGNOSIS: NORMAL
EKG Q-T INTERVAL: 376 MS
EKG QRS DURATION: 86 MS
EKG QTC CALCULATION (BAZETT): 457 MS
EKG R AXIS: 11 DEGREES
EKG T AXIS: 23 DEGREES
EKG VENTRICULAR RATE: 89 BPM
ERYTHROCYTE [DISTWIDTH] IN BLOOD BY AUTOMATED COUNT: 12.7 % (ref 11.9–14.6)
GLUCOSE BLD STRIP.AUTO-MCNC: 100 MG/DL (ref 65–100)
GLUCOSE BLD STRIP.AUTO-MCNC: 103 MG/DL (ref 65–100)
GLUCOSE BLD STRIP.AUTO-MCNC: 105 MG/DL (ref 65–100)
GLUCOSE BLD STRIP.AUTO-MCNC: 119 MG/DL (ref 65–100)
GLUCOSE BLD STRIP.AUTO-MCNC: 138 MG/DL (ref 65–100)
GLUCOSE BLD STRIP.AUTO-MCNC: 172 MG/DL (ref 65–100)
GLUCOSE SERPL-MCNC: 102 MG/DL (ref 70–99)
HCT VFR BLD AUTO: 23.8 % (ref 41.1–50.3)
HCT VFR BLD AUTO: 25.8 % (ref 41.1–50.3)
HGB BLD-MCNC: 7.9 G/DL (ref 13.6–17.2)
HGB BLD-MCNC: 8.5 G/DL (ref 13.6–17.2)
MAGNESIUM SERPL-MCNC: 2.4 MG/DL (ref 1.8–2.4)
MCH RBC QN AUTO: 32.6 PG (ref 26.1–32.9)
MCHC RBC AUTO-ENTMCNC: 33.2 G/DL (ref 31.4–35)
MCV RBC AUTO: 98.3 FL (ref 82–102)
NRBC # BLD: 0 K/UL (ref 0–0.2)
PLATELET # BLD AUTO: 129 K/UL (ref 150–450)
PMV BLD AUTO: 9.4 FL (ref 9.4–12.3)
POTASSIUM SERPL-SCNC: 4 MMOL/L (ref 3.5–5.1)
RBC # BLD AUTO: 2.42 M/UL (ref 4.23–5.6)
SERVICE CMNT-IMP: ABNORMAL
SERVICE CMNT-IMP: NORMAL
SODIUM SERPL-SCNC: 141 MMOL/L (ref 136–145)
WBC # BLD AUTO: 6.1 K/UL (ref 4.3–11.1)

## 2025-06-27 PROCEDURE — 2700000000 HC OXYGEN THERAPY PER DAY

## 2025-06-27 PROCEDURE — 93010 ELECTROCARDIOGRAM REPORT: CPT | Performed by: INTERNAL MEDICINE

## 2025-06-27 PROCEDURE — 80048 BASIC METABOLIC PNL TOTAL CA: CPT

## 2025-06-27 PROCEDURE — 6360000002 HC RX W HCPCS: Performed by: INTERNAL MEDICINE

## 2025-06-27 PROCEDURE — 6370000000 HC RX 637 (ALT 250 FOR IP): Performed by: THORACIC SURGERY (CARDIOTHORACIC VASCULAR SURGERY)

## 2025-06-27 PROCEDURE — 6370000000 HC RX 637 (ALT 250 FOR IP): Performed by: PHYSICIAN ASSISTANT

## 2025-06-27 PROCEDURE — 85027 COMPLETE CBC AUTOMATED: CPT

## 2025-06-27 PROCEDURE — 6360000002 HC RX W HCPCS: Performed by: SURGERY

## 2025-06-27 PROCEDURE — 85014 HEMATOCRIT: CPT

## 2025-06-27 PROCEDURE — 85018 HEMOGLOBIN: CPT

## 2025-06-27 PROCEDURE — 6360000002 HC RX W HCPCS: Performed by: PHYSICIAN ASSISTANT

## 2025-06-27 PROCEDURE — 6360000002 HC RX W HCPCS: Performed by: THORACIC SURGERY (CARDIOTHORACIC VASCULAR SURGERY)

## 2025-06-27 PROCEDURE — P9016 RBC LEUKOCYTES REDUCED: HCPCS

## 2025-06-27 PROCEDURE — 2500000003 HC RX 250 WO HCPCS: Performed by: PHYSICIAN ASSISTANT

## 2025-06-27 PROCEDURE — 99232 SBSQ HOSP IP/OBS MODERATE 35: CPT | Performed by: INTERNAL MEDICINE

## 2025-06-27 PROCEDURE — 2100000001 HC CVICU R&B

## 2025-06-27 PROCEDURE — 93005 ELECTROCARDIOGRAM TRACING: CPT | Performed by: PHYSICIAN ASSISTANT

## 2025-06-27 PROCEDURE — 37799 UNLISTED PX VASCULAR SURGERY: CPT

## 2025-06-27 PROCEDURE — P9045 ALBUMIN (HUMAN), 5%, 250 ML: HCPCS | Performed by: THORACIC SURGERY (CARDIOTHORACIC VASCULAR SURGERY)

## 2025-06-27 PROCEDURE — 36430 TRANSFUSION BLD/BLD COMPNT: CPT

## 2025-06-27 PROCEDURE — 94640 AIRWAY INHALATION TREATMENT: CPT

## 2025-06-27 PROCEDURE — 83735 ASSAY OF MAGNESIUM: CPT

## 2025-06-27 PROCEDURE — 94761 N-INVAS EAR/PLS OXIMETRY MLT: CPT

## 2025-06-27 PROCEDURE — 2580000003 HC RX 258: Performed by: PHYSICIAN ASSISTANT

## 2025-06-27 PROCEDURE — 30243N1 TRANSFUSION OF NONAUTOLOGOUS RED BLOOD CELLS INTO CENTRAL VEIN, PERCUTANEOUS APPROACH: ICD-10-PCS | Performed by: SURGERY

## 2025-06-27 PROCEDURE — 82962 GLUCOSE BLOOD TEST: CPT

## 2025-06-27 PROCEDURE — 71045 X-RAY EXAM CHEST 1 VIEW: CPT

## 2025-06-27 PROCEDURE — P9045 ALBUMIN (HUMAN), 5%, 250 ML: HCPCS | Performed by: SURGERY

## 2025-06-27 PROCEDURE — 36592 COLLECT BLOOD FROM PICC: CPT

## 2025-06-27 PROCEDURE — 6370000000 HC RX 637 (ALT 250 FOR IP): Performed by: INTERNAL MEDICINE

## 2025-06-27 PROCEDURE — 6370000000 HC RX 637 (ALT 250 FOR IP): Performed by: SURGERY

## 2025-06-27 RX ORDER — SODIUM CHLORIDE 9 MG/ML
INJECTION, SOLUTION INTRAVENOUS PRN
Status: DISCONTINUED | OUTPATIENT
Start: 2025-06-27 | End: 2025-06-28

## 2025-06-27 RX ORDER — TAMSULOSIN HYDROCHLORIDE 0.4 MG/1
0.4 CAPSULE ORAL
Status: DISCONTINUED | OUTPATIENT
Start: 2025-06-27 | End: 2025-07-02 | Stop reason: HOSPADM

## 2025-06-27 RX ORDER — ALBUMIN HUMAN 50 G/1000ML
25 SOLUTION INTRAVENOUS ONCE
Status: COMPLETED | OUTPATIENT
Start: 2025-06-27 | End: 2025-06-27

## 2025-06-27 RX ADMIN — ASPIRIN 81 MG: 81 TABLET ORAL at 09:55

## 2025-06-27 RX ADMIN — INSULIN LISPRO 2 UNITS: 100 INJECTION, SOLUTION INTRAVENOUS; SUBCUTANEOUS at 16:40

## 2025-06-27 RX ADMIN — ACETAMINOPHEN 650 MG: 325 TABLET ORAL at 11:32

## 2025-06-27 RX ADMIN — BENZOCAINE AND MENTHOL 1 LOZENGE: 15; 2.6 LOZENGE ORAL at 22:28

## 2025-06-27 RX ADMIN — ALBUMIN (HUMAN) 25 G: 12.5 INJECTION, SOLUTION INTRAVENOUS at 06:17

## 2025-06-27 RX ADMIN — DOCUSATE SODIUM 50 MG AND SENNOSIDES 8.6 MG 1 TABLET: 8.6; 5 TABLET, FILM COATED ORAL at 20:38

## 2025-06-27 RX ADMIN — FAMOTIDINE 20 MG: 20 TABLET, FILM COATED ORAL at 09:55

## 2025-06-27 RX ADMIN — FAMOTIDINE 20 MG: 20 TABLET, FILM COATED ORAL at 20:38

## 2025-06-27 RX ADMIN — Medication 1 AMPULE: at 20:38

## 2025-06-27 RX ADMIN — AMIODARONE HYDROCHLORIDE 200 MG: 200 TABLET ORAL at 09:55

## 2025-06-27 RX ADMIN — DOCUSATE SODIUM 50 MG AND SENNOSIDES 8.6 MG 1 TABLET: 8.6; 5 TABLET, FILM COATED ORAL at 09:55

## 2025-06-27 RX ADMIN — CEFAZOLIN 2000 MG: 10 INJECTION, POWDER, FOR SOLUTION INTRAVENOUS at 18:17

## 2025-06-27 RX ADMIN — ACETAMINOPHEN 650 MG: 325 TABLET ORAL at 16:40

## 2025-06-27 RX ADMIN — PHENYLEPHRINE HYDROCHLORIDE 10 MCG/MIN: 50 INJECTION INTRAVENOUS at 12:48

## 2025-06-27 RX ADMIN — CEFAZOLIN 2000 MG: 10 INJECTION, POWDER, FOR SOLUTION INTRAVENOUS at 03:35

## 2025-06-27 RX ADMIN — TRAMADOL HYDROCHLORIDE 50 MG: 50 TABLET, COATED ORAL at 03:37

## 2025-06-27 RX ADMIN — ACETAMINOPHEN 650 MG: 325 TABLET ORAL at 05:28

## 2025-06-27 RX ADMIN — ALBUMIN (HUMAN) 25 G: 12.5 INJECTION, SOLUTION INTRAVENOUS at 18:17

## 2025-06-27 RX ADMIN — Medication 1 AMPULE: at 09:55

## 2025-06-27 RX ADMIN — GABAPENTIN 100 MG: 100 CAPSULE ORAL at 20:38

## 2025-06-27 RX ADMIN — Medication 6 MG: at 20:39

## 2025-06-27 RX ADMIN — ARFORMOTEROL TARTRATE: 15 SOLUTION RESPIRATORY (INHALATION) at 19:55

## 2025-06-27 RX ADMIN — PHENYLEPHRINE HYDROCHLORIDE 10 MCG/MIN: 50 INJECTION INTRAVENOUS at 08:08

## 2025-06-27 RX ADMIN — ATORVASTATIN CALCIUM 40 MG: 40 TABLET, FILM COATED ORAL at 20:38

## 2025-06-27 RX ADMIN — CEFAZOLIN 2000 MG: 10 INJECTION, POWDER, FOR SOLUTION INTRAVENOUS at 11:32

## 2025-06-27 RX ADMIN — TRAMADOL HYDROCHLORIDE 50 MG: 50 TABLET, COATED ORAL at 15:50

## 2025-06-27 RX ADMIN — KETOROLAC TROMETHAMINE 15 MG: 15 INJECTION, SOLUTION INTRAMUSCULAR; INTRAVENOUS at 00:47

## 2025-06-27 RX ADMIN — POLYETHYLENE GLYCOL 3350 17 G: 17 POWDER, FOR SOLUTION ORAL at 09:55

## 2025-06-27 RX ADMIN — AMIODARONE HYDROCHLORIDE 200 MG: 200 TABLET ORAL at 20:38

## 2025-06-27 RX ADMIN — TRAMADOL HYDROCHLORIDE 50 MG: 50 TABLET, COATED ORAL at 20:38

## 2025-06-27 RX ADMIN — SODIUM CHLORIDE, PRESERVATIVE FREE 10 ML: 5 INJECTION INTRAVENOUS at 09:58

## 2025-06-27 RX ADMIN — SODIUM CHLORIDE, PRESERVATIVE FREE 10 ML: 5 INJECTION INTRAVENOUS at 20:31

## 2025-06-27 RX ADMIN — TAMSULOSIN HYDROCHLORIDE 0.4 MG: 0.4 CAPSULE ORAL at 20:38

## 2025-06-27 RX ADMIN — ARFORMOTEROL TARTRATE: 15 SOLUTION RESPIRATORY (INHALATION) at 07:41

## 2025-06-27 ASSESSMENT — PAIN SCALES - WONG BAKER
WONGBAKER_NUMERICALRESPONSE: NO HURT

## 2025-06-27 ASSESSMENT — PAIN DESCRIPTION - ORIENTATION
ORIENTATION: MID;ANTERIOR
ORIENTATION: MID
ORIENTATION: MID

## 2025-06-27 ASSESSMENT — PAIN SCALES - GENERAL
PAINLEVEL_OUTOF10: 3
PAINLEVEL_OUTOF10: 0
PAINLEVEL_OUTOF10: 3
PAINLEVEL_OUTOF10: 0
PAINLEVEL_OUTOF10: 3
PAINLEVEL_OUTOF10: 0
PAINLEVEL_OUTOF10: 0
PAINLEVEL_OUTOF10: 4
PAINLEVEL_OUTOF10: 3
PAINLEVEL_OUTOF10: 0
PAINLEVEL_OUTOF10: 3
PAINLEVEL_OUTOF10: 3

## 2025-06-27 ASSESSMENT — PAIN DESCRIPTION - DESCRIPTORS
DESCRIPTORS: ACHING
DESCRIPTORS: ACHING;DISCOMFORT
DESCRIPTORS: ACHING;DISCOMFORT
DESCRIPTORS: ACHING;SORE

## 2025-06-27 ASSESSMENT — PAIN DESCRIPTION - LOCATION
LOCATION: INCISION;CHEST
LOCATION: CHEST;INCISION
LOCATION: ABDOMEN;INCISION
LOCATION: CHEST;INCISION

## 2025-06-27 NOTE — CARE COORDINATION
CM reviewed chart.     Patient is currently in CV ICU. PT is on hold at this time. No CM needs voiced or noted.     CM will continue to follow patient for any discharge planning needs.

## 2025-06-27 NOTE — PROGRESS NOTES
Pulmonary CV progress Note: 6/27/2025        Pritesh Waltersbridge                                                     Admission Date: 6/24/2025     The patient's chart is reviewed and the patient is discussed with the staff.    Background: 80 y.o. y/o male  has a past medical history of Allergic rhinitis, Anemia, Arthritis, Asthma, BPH (benign prostatic hyperplasia), Cancer ([facility]), Erectile dysfunction, Hearing loss, History of blood transfusion, Hypercholesterolemia, Hypertension, Lumbar stenosis, Osteoarthritis, Pneumonia, Prolonged emergence from general anesthesia, Prostate cancer ([facility]), Restless legs syndrome, Rosacea, Septicemia ([facility]), Skin cancer, and Vitamin D deficiency. S/P CABG x 4 1 Day Post-Op    Seen in pulmonary office for asthma, managed with Wixela 500.     Subjective:     Pt had cabg x 4 yesterday. Extubated without difficulty. On [place] and getting 1 unit PRBC today.   He states that he has no wheeze, some cough this morning.       Objective:   Blood pressure (!) 82/50, pulse 88, temperature 97 °F (36.1 °C), resp. rate 22, height 1.702 m (5' 7.01\"), weight 89.6 kg (197 lb 8.5 oz), SpO2 99%.   Intake/Output Summary (Last 24 hours) at 6/27/2025 0822  Last data filed at 6/27/2025 0800  Gross per 24 hour   Intake 3826.96 ml   Output 2480 ml   Net 1346.96 ml     Physical Exam:          Constitutional:  Awake, calm  EENMT:  Sclera clear, pupils equal  Respiratory: CTA B, no w/r/r. Chest tube in place without air leak.   Cardiovascular:  RRR with no M,G,R;  Gastrointestinal:  soft; normal bowel sounds  Musculoskeletal:  warm with no cyanosis, no lower extremity edema.   SKIN:  no jaundice or ecchymosis   Neurologic:  moving all extremities, fluent speech  Psychiatric:  calm    CXR:    6/27/25        LINES:     Chest Tube Left Pleural (Active)       Chest Tube Anterior Mediastinal 2 (Active)       Negative Pressure Wound Therapy Sternum (Active)       Urinary Catheter 06/26/25 Juarez (Active)      Introducer 06/26/25 (Active)       Pacing Wires (Active)       CVC  06/26/25 Right Internal jugular (Active)       DRIPS:   Dose (mcg/kg/min) Propofol : *30 mg  Dose (mcg/kg/hr) Dexmedetomidine: 0 mcg/kg/hr (Anesthesia infusion automatically stopped.)  Dose (mcg/min) Norepinephrine: 0 mcg/min  Dose (mcg/kg/min) Phenylephrine : 0.1179 mcg/kg/min  Dose (mg/hr) Nicardipine: *0.3 mg  Dose (units/hr) Insulin : 0 Units/hr     CI:  CO (l/min): 6.3 l/min    Ventilator Settings  Mode FIO2 Rate Tidal Volume Pressure   CPAP/PS    40 %  16 bpm         8     Peak airway pressure:     Minute ventilation:    No results for input(s): \"PH\", \"PCO2\", \"PO2\", \"HCO3\" in the last 72 hours.   Recent Labs     06/25/25  0502 06/26/25  0505 06/26/25 1206 06/26/25 1556 06/26/25 2005 06/27/25  0303   WBC 5.2   < > 11.8* 12.8* 7.4 6.1   HGB 11.5*   < > 8.8* 9.2* 8.2* 7.9*   HCT 34.2*   < > 26.5* 27.4* 24.6* 23.8*      < > 126* 150 127* 129*   INR 1.1  --  1.5  --   --   --     < > = values in this interval not displayed.     Recent Labs     06/26/25  0505 06/26/25  1206 06/26/25 1556 06/26/25 2005 06/27/25  0303    139  --   --  141   K 3.7 4.2 3.9 4.0 4.0    109*  --   --  111*   CO2 23 20  --   --  22   BUN 16 13  --   --  15   CREATININE 0.81 0.86  --   --  0.90   MG 2.0 2.9* 2.3 2.4 2.4     No results for input(s): \"PROCAL\", \"LACTATE\" in the last 72 hours.  05/02/25    ECHO (TTE) COMPLETE (PRN CONTRAST/BUBBLE/STRAIN/3D) 05/05/2025  7:54 PM (Final)    Interpretation Summary    Left Ventricle: Mildly reduced left ventricular systolic function with a visually estimated EF of 45 - 50%. Left ventricle size is normal. Moderately increased wall thickness. Findings consistent with moderate concentric hypertrophy. Mild global hypokinesis present. Grade I diastolic dysfunction. E/E' Ratio (Averaged) is 11.81.    Mitral Valve: Mild to moderate regurgitation with a centrally directed jet.    Left Atrium: Left atrium is

## 2025-06-27 NOTE — PROGRESS NOTES
Today's Date: 6/27/2025  Date of Admission: 6/24/2025    POD 1 Day Post-Op    Chart Reviewed.  Subjective:     POD1 CABG x 4 with LESLIE clip  Patient is sitting up to a chair this AM, no acute distress    Medications Reviewed.    Objective:       Vitals:    06/27/25 0730 06/27/25 0741 06/27/25 0745 06/27/25 0746   BP: (!) 85/47  (!) 80/42 (!) 82/50   Pulse: 82  91 89   Resp: 28  20    Temp:       TempSrc:       SpO2: 98% 97% 98% 98%   Weight:       Height:           Intake and Output  Current Shift: 06/27 0701 - 06/27 1900  In: -   Out: 60 [Urine:40]   Last 3 Shifts: 06/25 1901 - 06/27 0700  In: 4187 [P.O.:360; I.V.:2985.2]  Out: 3860 [Urine:3450]    Physical Exam:  General: Well Developed, Well Nourished, No Acute Distress, Alert & Oriented x 3, Appropriate mood  Neck: supple, no JVD  Heart: normotensive, normal heart rate  Lungs: oxygenating well, normal respiratory effort  Abd: soft, nontender, nondistended  Ext: no amputations  Sternal incision: clean, dry, and intact  Skin: warm and dry    LABS  Data Review:   Recent Labs     06/25/25  0502 06/26/25  0505 06/26/25  1206 06/26/25  1556 06/26/25 2005 06/27/25  0303      < > 139  --   --  141   K 3.9   < > 4.2   < > 4.0 4.0   MG 2.0   < > 2.9*   < > 2.4 2.4   BUN 17   < > 13  --   --  15   WBC 5.2   < > 11.8*   < > 7.4 6.1   HGB 11.5*   < > 8.8*   < > 8.2* 7.9*   HCT 34.2*   < > 26.5*   < > 24.6* 23.8*      < > 126*   < > 127* 129*   INR 1.1  --  1.5  --   --   --     < > = values in this interval not displayed.       No results for input(s): \"TNIPOC\" in the last 72 hours.    Invalid input(s): \"TROIQ\"    Estimated Creatinine Clearance: 70 mL/min (based on SCr of 0.9 mg/dL).      Assessment/Plan:     Active Hospital Problems    Asymptomatic LV dysfunction      Hypertension      Hyperlipidemia      Moderate persistent asthma without complication      CAD in native artery      *S/P CABG x 4      Hypoxemia      Atelectasis      CAD, multiple

## 2025-06-27 NOTE — PLAN OF CARE
Problem: Safety - Adult  Goal: Free from fall injury  6/27/2025 0841 by Hayley Camarena RN  Outcome: Progressing  6/26/2025 2256 by Arabella Moeller RN  Outcome: Progressing     Problem: Discharge Planning  Goal: Discharge to home or other facility with appropriate resources  6/27/2025 0841 by Hayley Camarena RN  Outcome: Progressing  Flowsheets (Taken 6/27/2025 0715)  Discharge to home or other facility with appropriate resources: Identify barriers to discharge with patient and caregiver  6/26/2025 2256 by Arabella Moeller RN  Outcome: Progressing     Problem: Skin/Tissue Integrity  Goal: Skin integrity remains intact  Description: 1.  Monitor for areas of redness and/or skin breakdown  2.  Assess vascular access sites hourly  3.  Every 4-6 hours minimum:  Change oxygen saturation probe site  4.  Every 4-6 hours:  If on nasal continuous positive airway pressure, respiratory therapy assess nares and determine need for appliance change or resting period  6/27/2025 0841 by Hayley Camarena RN  Outcome: Progressing  Flowsheets (Taken 6/27/2025 0715)  Skin Integrity Remains Intact: Monitor for areas of redness and/or skin breakdown  6/26/2025 2256 by Arabella Moeller RN  Outcome: Progressing     Problem: Respiratory - Adult  Goal: Achieves optimal ventilation and oxygenation  6/27/2025 0841 by Hayley Camarena RN  Outcome: Progressing  6/27/2025 0758 by Pat Dominguez RCP  Outcome: Progressing  Flowsheets (Taken 6/27/2025 0715 by Hayley Camarena RN)  Achieves optimal ventilation and oxygenation: Assess for changes in respiratory status

## 2025-06-27 NOTE — PROGRESS NOTES
[facility] CARDIOLOGY PROGRESS NOTE    6/27/2025 7:56 AM    Admit Date: 6/24/2025        Subjective:   Patient postop day #1.  Patient remains mildly hypotensive.  Has been requiring a pacing for hemodynamic support.  He is moderately anemic and scheduled to receive 1 unit of packed red blood cells.    Objective:      Vitals:    06/27/25 0730 06/27/25 0741 06/27/25 0745 06/27/25 0746   BP: (!) 85/47  (!) 80/42 (!) 82/50   Pulse: 82  91 89   Resp: 28  20    Temp:       TempSrc:       SpO2: 98% 97% 98% 98%   Weight:       Height:           Physical Exam:  Neck- supple, no JVD  CV- regular rate and rhythm no MRG  Lung- clear bilaterally  Abd- soft, nontender, nondistended  Ext- no edema, right radial clean, dry, intact  Skin- warm and dry    Data Review:   Pertinent lab and noninvasive imaging over the past 24 hours reviewed and evaluated.    Recent Labs     06/25/25  0502 06/26/25  0505 06/26/25  1206 06/26/25  1556 06/26/25 2005 06/27/25  0303      < > 139  --   --  141   K 3.9   < > 4.2   < > 4.0 4.0   MG 2.0   < > 2.9*   < > 2.4 2.4   BUN 17   < > 13  --   --  15   WBC 5.2   < > 11.8*   < > 7.4 6.1   HGB 11.5*   < > 8.8*   < > 8.2* 7.9*   HCT 34.2*   < > 26.5*   < > 24.6* 23.8*      < > 126*   < > 127* 129*   INR 1.1  --  1.5  --   --   --     < > = values in this interval not displayed.     Lab Results   Component Value Date    LDL 82 05/23/2025     Left heart catheterization 6/24/2025:    Mild to moderately reduced left ventricular systolic function.  LVEF 40-45%.  Normal LVEDP.  Dense inferior akinesis present.  1+ mitral regurgitation present.  No aortic valve gradient.    Severe diffuse 3 vessel coronary calcification present    Severe mid distal LAD stenosis diffuse in nature.  Will require extensive stenting.  Patient appears to have appropriate target distally.    Complex trifurcating obtuse marginal which supplies of aspiratory lateral wall with the 2 larger branches both having severe  proximal disease.  If patient is considered for coronary bypass grafting would recommend grafting both these vessels.    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    Echocardiogram 5/5/2025:  Left Ventricle Mildly reduced left ventricular systolic function with a visually estimated EF of 45 - 50%. Left ventricle size is normal. Moderately increased wall thickness. Findings consistent with moderate concentric hypertrophy. Mild global hypokinesis present. Grade I diastolic dysfunction. E/E' Ratio (Averaged) is 11.81.   Left Atrium Left atrium is moderately dilated. LA Volume Index BP is 46 ml/m2. Normal flow patterns in the pulmonary veins.   Interatrial Septum No interatrial shunt visualized with color Doppler.   Right Ventricle Right ventricle size is normal. RV Basal Dimension is 3.5 cm. RV Mid Dimension is 2.6 cm. Normal systolic function.   Right Atrium Right atrium size is normal.   Aortic Valve Trileaflet valve. No regurgitation. No significant stenosis.   Mitral Valve Valve structure is normal. Mild to moderate regurgitation with a centrally directed jet. No stenosis noted.   Tricuspid Valve Valve structure is normal. Trace regurgitation. No stenosis noted. Normal RVSP. Est. RA Pressure is 3 mmHg.   Pulmonic Valve The pulmonic valve visualization is suboptimal but appears to be functioning normally. Physiologically normal regurgitation. No stenosis noted.   Pulmonary Artery Main pulmonary artery is normal in size.   Aorta Normal sized aortic root.   IVC/Hepatic Veins IVC size is normal.   Pericardium No pericardial effusion.       Assessment and Plan:     Active Hospital Problems    Asymptomatic LV dysfunction-patient is stable this morning.  Remains mildly hypotensive.  Will maximize GDMT postoperatively, convert amlodipine to ARB or Entresto and initiate guideline directed beta-blocker therapy postop when okay with CT surgery.  Will discontinue metoprolol tartrate at  this time.  Consider metoprolol succinate/carvedilol when resuming beta-blocker therapy.      Unstable angina (HCC)-postop day #1 CABG.      Accelerating angina (HCC)-postop day #1 CABG      Dyslipidemia -continue atorvastatin 40 mg daily     SOLEDAD CHAVES MD

## 2025-06-27 NOTE — INTERDISCIPLINARY ROUNDS
Multi-D Rounds/Checklist ([place]):  Lines: can any be removed?: per protocol    Chest Tube Left Pleural (Active)       Chest Tube Anterior Mediastinal 2 (Active)       Negative Pressure Wound Therapy Sternum (Active)       Urinary Catheter 06/26/25 Juarez (Active)     Introducer 06/26/25 (Active)       Pacing Wires (Active)       CVC  06/26/25 Right Internal jugular (Active)     DVT Prophylaxis: Ordered  Vent: N/A  Nutrition Ordered/appropriate: Ordered  Can antibiotics or other drugs be stopped? N/A Yes/No  MRSA swab:   Inpat Anti-Infectives (From admission, onward)       Start     Ordered Stop    06/26/25 1915  ceFAZolin (ANCEF) 2000 mg in sterile water 20 mL IV syringe  2,000 mg,   IntraVENous,   EVERY 8 HOURS        Note to Pharmacy: Default Settings:Auto-dosed by Weight Q8h x 2 doses  Auto-dosed: Pt Wt<119.9kg-2gm, >120kg-3gm    06/26/25 1135 06/28/25 1114                  Consults needed: None  A: Is pain control adequate? (has PRNs? Stop drip?) Yes  B: Sedation break and SBT? N/A  C: Is sedation choice appropriate? N/A  D: Delirium/CAM-ICU? No  E: Mobility goals/appropriateness? Yes  F: Family update and plan? Wife is surrogate decision maker and is being updated daily by primary attending and nursing staff.    TORI Zayas

## 2025-06-27 NOTE — PROGRESS NOTES
Physical Therapy Note:    Attempted to see patient this AM and PM for physical therapy evaluation session. RN advised therapy to hold at this time for hypotension. Will follow and re-attempt as schedule permits/patient available. Thank you,    OSMEL FLORES, PT     Rehab Caseload Tracker

## 2025-06-28 ENCOUNTER — APPOINTMENT (OUTPATIENT)
Dept: GENERAL RADIOLOGY | Age: 80
DRG: 234 | End: 2025-06-28
Attending: INTERNAL MEDICINE
Payer: MEDICARE

## 2025-06-28 LAB
ANION GAP SERPL CALC-SCNC: 10 MMOL/L (ref 7–16)
BUN SERPL-MCNC: 17 MG/DL (ref 8–23)
CALCIUM SERPL-MCNC: 8.5 MG/DL (ref 8.8–10.2)
CHLORIDE SERPL-SCNC: 106 MMOL/L (ref 98–107)
CO2 SERPL-SCNC: 22 MMOL/L (ref 20–29)
CREAT SERPL-MCNC: 0.78 MG/DL (ref 0.8–1.3)
EKG DIAGNOSIS: NORMAL
EKG Q-T INTERVAL: 338 MS
EKG QRS DURATION: 78 MS
EKG QTC CALCULATION (BAZETT): 452 MS
EKG R AXIS: -25 DEGREES
EKG T AXIS: -24 DEGREES
EKG VENTRICULAR RATE: 108 BPM
ERYTHROCYTE [DISTWIDTH] IN BLOOD BY AUTOMATED COUNT: 13.3 % (ref 11.9–14.6)
GLUCOSE BLD STRIP.AUTO-MCNC: 118 MG/DL (ref 65–100)
GLUCOSE BLD STRIP.AUTO-MCNC: 120 MG/DL (ref 65–100)
GLUCOSE BLD STRIP.AUTO-MCNC: 127 MG/DL (ref 65–100)
GLUCOSE BLD STRIP.AUTO-MCNC: 141 MG/DL (ref 65–100)
GLUCOSE SERPL-MCNC: 134 MG/DL (ref 70–99)
HCT VFR BLD AUTO: 25 % (ref 41.1–50.3)
HGB BLD-MCNC: 8.2 G/DL (ref 13.6–17.2)
MAGNESIUM SERPL-MCNC: 2.1 MG/DL (ref 1.8–2.4)
MCH RBC QN AUTO: 31.2 PG (ref 26.1–32.9)
MCHC RBC AUTO-ENTMCNC: 32.8 G/DL (ref 31.4–35)
MCV RBC AUTO: 95.1 FL (ref 82–102)
MM INDURATION, POC: 0 MM (ref 0–5)
NRBC # BLD: 0 K/UL (ref 0–0.2)
PLATELET # BLD AUTO: 122 K/UL (ref 150–450)
PMV BLD AUTO: 10.2 FL (ref 9.4–12.3)
POTASSIUM SERPL-SCNC: 3.9 MMOL/L (ref 3.5–5.1)
PPD, POC: NEGATIVE
RBC # BLD AUTO: 2.63 M/UL (ref 4.23–5.6)
SERVICE CMNT-IMP: ABNORMAL
SODIUM SERPL-SCNC: 138 MMOL/L (ref 136–145)
WBC # BLD AUTO: 7.8 K/UL (ref 4.3–11.1)

## 2025-06-28 PROCEDURE — 6360000002 HC RX W HCPCS: Performed by: PHYSICIAN ASSISTANT

## 2025-06-28 PROCEDURE — 93010 ELECTROCARDIOGRAM REPORT: CPT | Performed by: INTERNAL MEDICINE

## 2025-06-28 PROCEDURE — 94761 N-INVAS EAR/PLS OXIMETRY MLT: CPT

## 2025-06-28 PROCEDURE — 94640 AIRWAY INHALATION TREATMENT: CPT

## 2025-06-28 PROCEDURE — 83735 ASSAY OF MAGNESIUM: CPT

## 2025-06-28 PROCEDURE — 2700000000 HC OXYGEN THERAPY PER DAY

## 2025-06-28 PROCEDURE — 6370000000 HC RX 637 (ALT 250 FOR IP): Performed by: PHYSICIAN ASSISTANT

## 2025-06-28 PROCEDURE — 85027 COMPLETE CBC AUTOMATED: CPT

## 2025-06-28 PROCEDURE — 6360000002 HC RX W HCPCS: Performed by: INTERNAL MEDICINE

## 2025-06-28 PROCEDURE — 93005 ELECTROCARDIOGRAM TRACING: CPT | Performed by: PHYSICIAN ASSISTANT

## 2025-06-28 PROCEDURE — 97161 PT EVAL LOW COMPLEX 20 MIN: CPT

## 2025-06-28 PROCEDURE — 6370000000 HC RX 637 (ALT 250 FOR IP): Performed by: THORACIC SURGERY (CARDIOTHORACIC VASCULAR SURGERY)

## 2025-06-28 PROCEDURE — 71045 X-RAY EXAM CHEST 1 VIEW: CPT

## 2025-06-28 PROCEDURE — 97530 THERAPEUTIC ACTIVITIES: CPT

## 2025-06-28 PROCEDURE — 94760 N-INVAS EAR/PLS OXIMETRY 1: CPT

## 2025-06-28 PROCEDURE — 99232 SBSQ HOSP IP/OBS MODERATE 35: CPT | Performed by: INTERNAL MEDICINE

## 2025-06-28 PROCEDURE — 82962 GLUCOSE BLOOD TEST: CPT

## 2025-06-28 PROCEDURE — 36592 COLLECT BLOOD FROM PICC: CPT

## 2025-06-28 PROCEDURE — 80048 BASIC METABOLIC PNL TOTAL CA: CPT

## 2025-06-28 PROCEDURE — 2500000003 HC RX 250 WO HCPCS: Performed by: PHYSICIAN ASSISTANT

## 2025-06-28 PROCEDURE — 2140000001 HC CVICU INTERMEDIATE R&B

## 2025-06-28 RX ORDER — SODIUM CHLORIDE 0.9 % (FLUSH) 0.9 %
5-40 SYRINGE (ML) INJECTION PRN
Status: DISCONTINUED | OUTPATIENT
Start: 2025-06-28 | End: 2025-07-02 | Stop reason: HOSPADM

## 2025-06-28 RX ORDER — TRAMADOL HYDROCHLORIDE 50 MG/1
50 TABLET ORAL EVERY 6 HOURS PRN
Status: DISCONTINUED | OUTPATIENT
Start: 2025-06-28 | End: 2025-07-02 | Stop reason: HOSPADM

## 2025-06-28 RX ORDER — SODIUM CHLORIDE 0.9 % (FLUSH) 0.9 %
5-40 SYRINGE (ML) INJECTION EVERY 12 HOURS SCHEDULED
Status: DISCONTINUED | OUTPATIENT
Start: 2025-06-28 | End: 2025-07-02 | Stop reason: HOSPADM

## 2025-06-28 RX ORDER — DEXTROSE MONOHYDRATE 100 MG/ML
INJECTION, SOLUTION INTRAVENOUS CONTINUOUS PRN
Status: DISCONTINUED | OUTPATIENT
Start: 2025-06-28 | End: 2025-07-02 | Stop reason: HOSPADM

## 2025-06-28 RX ORDER — ONDANSETRON 4 MG/1
4 TABLET, ORALLY DISINTEGRATING ORAL EVERY 8 HOURS PRN
Status: DISCONTINUED | OUTPATIENT
Start: 2025-06-28 | End: 2025-07-02 | Stop reason: HOSPADM

## 2025-06-28 RX ORDER — POLYETHYLENE GLYCOL 3350 17 G/17G
17 POWDER, FOR SOLUTION ORAL DAILY
Status: DISCONTINUED | OUTPATIENT
Start: 2025-06-28 | End: 2025-07-02 | Stop reason: HOSPADM

## 2025-06-28 RX ORDER — METOPROLOL TARTRATE 25 MG/1
12.5 TABLET, FILM COATED ORAL ONCE
Status: COMPLETED | OUTPATIENT
Start: 2025-06-28 | End: 2025-06-28

## 2025-06-28 RX ORDER — POTASSIUM CHLORIDE 750 MG/1
10 TABLET, EXTENDED RELEASE ORAL DAILY
Status: DISPENSED | OUTPATIENT
Start: 2025-06-28 | End: 2025-07-01

## 2025-06-28 RX ORDER — POTASSIUM CHLORIDE 1500 MG/1
20 TABLET, EXTENDED RELEASE ORAL 2 TIMES DAILY PRN
Status: DISCONTINUED | OUTPATIENT
Start: 2025-06-28 | End: 2025-07-02 | Stop reason: HOSPADM

## 2025-06-28 RX ORDER — LANOLIN ALCOHOL/MO/W.PET/CERES
400 CREAM (GRAM) TOPICAL 4 TIMES DAILY PRN
Status: DISCONTINUED | OUTPATIENT
Start: 2025-06-28 | End: 2025-07-02 | Stop reason: HOSPADM

## 2025-06-28 RX ORDER — FAMOTIDINE 20 MG/1
20 TABLET, FILM COATED ORAL 2 TIMES DAILY
Status: DISCONTINUED | OUTPATIENT
Start: 2025-06-28 | End: 2025-07-02 | Stop reason: HOSPADM

## 2025-06-28 RX ORDER — INSULIN LISPRO 100 [IU]/ML
0-12 INJECTION, SOLUTION INTRAVENOUS; SUBCUTANEOUS
Status: DISCONTINUED | OUTPATIENT
Start: 2025-06-28 | End: 2025-06-29

## 2025-06-28 RX ORDER — FUROSEMIDE 40 MG/1
40 TABLET ORAL DAILY
Status: DISPENSED | OUTPATIENT
Start: 2025-06-28 | End: 2025-07-01

## 2025-06-28 RX ORDER — SENNOSIDES 8.6 MG/1
1 TABLET ORAL 2 TIMES DAILY PRN
Status: DISCONTINUED | OUTPATIENT
Start: 2025-06-28 | End: 2025-06-29

## 2025-06-28 RX ORDER — LANOLIN ALCOHOL/MO/W.PET/CERES
400 CREAM (GRAM) TOPICAL 3 TIMES DAILY PRN
Status: DISCONTINUED | OUTPATIENT
Start: 2025-06-28 | End: 2025-07-02 | Stop reason: HOSPADM

## 2025-06-28 RX ORDER — POTASSIUM CHLORIDE 1500 MG/1
40 TABLET, EXTENDED RELEASE ORAL 2 TIMES DAILY PRN
Status: DISCONTINUED | OUTPATIENT
Start: 2025-06-28 | End: 2025-07-02 | Stop reason: HOSPADM

## 2025-06-28 RX ORDER — INSULIN LISPRO 100 [IU]/ML
0-6 INJECTION, SOLUTION INTRAVENOUS; SUBCUTANEOUS NIGHTLY
Status: DISCONTINUED | OUTPATIENT
Start: 2025-06-28 | End: 2025-06-29

## 2025-06-28 RX ORDER — METOPROLOL TARTRATE 25 MG/1
12.5 TABLET, FILM COATED ORAL 2 TIMES DAILY
Status: DISCONTINUED | OUTPATIENT
Start: 2025-06-28 | End: 2025-06-30

## 2025-06-28 RX ORDER — ONDANSETRON 2 MG/ML
4 INJECTION INTRAMUSCULAR; INTRAVENOUS EVERY 6 HOURS PRN
Status: DISCONTINUED | OUTPATIENT
Start: 2025-06-28 | End: 2025-07-02 | Stop reason: HOSPADM

## 2025-06-28 RX ORDER — PRAMIPEXOLE DIHYDROCHLORIDE 0.5 MG/1
0.5 TABLET ORAL NIGHTLY
Status: DISCONTINUED | OUTPATIENT
Start: 2025-06-28 | End: 2025-07-02 | Stop reason: HOSPADM

## 2025-06-28 RX ORDER — OXYCODONE AND ACETAMINOPHEN 5; 325 MG/1; MG/1
1 TABLET ORAL EVERY 4 HOURS PRN
Status: DISCONTINUED | OUTPATIENT
Start: 2025-06-28 | End: 2025-07-02 | Stop reason: HOSPADM

## 2025-06-28 RX ADMIN — SODIUM CHLORIDE, PRESERVATIVE FREE 10 ML: 5 INJECTION INTRAVENOUS at 20:46

## 2025-06-28 RX ADMIN — METOPROLOL TARTRATE 12.5 MG: 25 TABLET, FILM COATED ORAL at 20:46

## 2025-06-28 RX ADMIN — ACETAMINOPHEN 650 MG: 325 TABLET ORAL at 19:38

## 2025-06-28 RX ADMIN — ACETAMINOPHEN 650 MG: 325 TABLET ORAL at 10:26

## 2025-06-28 RX ADMIN — TAMSULOSIN HYDROCHLORIDE 0.4 MG: 0.4 CAPSULE ORAL at 20:46

## 2025-06-28 RX ADMIN — ATORVASTATIN CALCIUM 40 MG: 40 TABLET, FILM COATED ORAL at 20:46

## 2025-06-28 RX ADMIN — Medication 6 MG: at 20:46

## 2025-06-28 RX ADMIN — IPRATROPIUM BROMIDE AND ALBUTEROL SULFATE 1 DOSE: 2.5; .5 SOLUTION RESPIRATORY (INHALATION) at 03:28

## 2025-06-28 RX ADMIN — CEFAZOLIN 2000 MG: 10 INJECTION, POWDER, FOR SOLUTION INTRAVENOUS at 03:22

## 2025-06-28 RX ADMIN — GABAPENTIN 100 MG: 100 CAPSULE ORAL at 20:46

## 2025-06-28 RX ADMIN — BENZOCAINE AND MENTHOL 1 LOZENGE: 15; 2.6 LOZENGE ORAL at 03:22

## 2025-06-28 RX ADMIN — Medication 1 AMPULE: at 09:03

## 2025-06-28 RX ADMIN — METOPROLOL TARTRATE 12.5 MG: 25 TABLET, FILM COATED ORAL at 06:27

## 2025-06-28 RX ADMIN — POLYETHYLENE GLYCOL 3350 17 G: 17 POWDER, FOR SOLUTION ORAL at 09:03

## 2025-06-28 RX ADMIN — AMIODARONE HYDROCHLORIDE 200 MG: 200 TABLET ORAL at 09:03

## 2025-06-28 RX ADMIN — INSULIN LISPRO 2 UNITS: 100 INJECTION, SOLUTION INTRAVENOUS; SUBCUTANEOUS at 09:03

## 2025-06-28 RX ADMIN — Medication 1 AMPULE: at 20:52

## 2025-06-28 RX ADMIN — ACETAMINOPHEN 650 MG: 325 TABLET ORAL at 06:27

## 2025-06-28 RX ADMIN — ARFORMOTEROL TARTRATE: 15 SOLUTION RESPIRATORY (INHALATION) at 07:45

## 2025-06-28 RX ADMIN — FAMOTIDINE 20 MG: 20 TABLET, FILM COATED ORAL at 09:03

## 2025-06-28 RX ADMIN — TRAMADOL HYDROCHLORIDE 50 MG: 50 TABLET, COATED ORAL at 10:26

## 2025-06-28 RX ADMIN — DOCUSATE SODIUM 50 MG AND SENNOSIDES 8.6 MG 1 TABLET: 8.6; 5 TABLET, FILM COATED ORAL at 09:03

## 2025-06-28 RX ADMIN — FAMOTIDINE 20 MG: 20 TABLET, FILM COATED ORAL at 20:46

## 2025-06-28 RX ADMIN — SODIUM CHLORIDE, PRESERVATIVE FREE 10 ML: 5 INJECTION INTRAVENOUS at 09:05

## 2025-06-28 RX ADMIN — BENZOCAINE AND MENTHOL 1 LOZENGE: 15; 2.6 LOZENGE ORAL at 06:14

## 2025-06-28 RX ADMIN — ASPIRIN 81 MG: 81 TABLET ORAL at 09:03

## 2025-06-28 ASSESSMENT — PAIN DESCRIPTION - ORIENTATION
ORIENTATION: MID;ANTERIOR
ORIENTATION: MID

## 2025-06-28 ASSESSMENT — PAIN DESCRIPTION - LOCATION
LOCATION: CHEST;INCISION;MEDIASTINUM;STERNUM
LOCATION: CHEST;INCISION

## 2025-06-28 ASSESSMENT — PAIN SCALES - GENERAL
PAINLEVEL_OUTOF10: 0
PAINLEVEL_OUTOF10: 3
PAINLEVEL_OUTOF10: 0
PAINLEVEL_OUTOF10: 3

## 2025-06-28 ASSESSMENT — PAIN SCALES - WONG BAKER
WONGBAKER_NUMERICALRESPONSE: NO HURT
WONGBAKER_NUMERICALRESPONSE: NO HURT

## 2025-06-28 ASSESSMENT — PAIN DESCRIPTION - DESCRIPTORS
DESCRIPTORS: ACHING;SORE
DESCRIPTORS: ACHING

## 2025-06-28 NOTE — PROGRESS NOTES
R IJ cordis, hopkins, MCT and PCT removed with no complications.  Petroleum gauze, 4x4 dressing placed over CT sites.  Occlusive dressing placed over cordis site after hemostasis achieved.  VSS.  Pt tolerated well. Care ongoing.

## 2025-06-28 NOTE — PROGRESS NOTES
TRANSFER - OUT REPORT:    Verbal report given to HI Walter on Pritesh Orellana  being transferred to [facility] for routine progression of patient care       Report consisted of patient's Situation, Background, Assessment and   Recommendations(SBAR).     Information from the following report(s) Nurse Handoff Report, Index, Surgery Report, Intake/Output, MAR, Cardiac Rhythm NSR, accel junctional, and Neuro Assessment was reviewed with the receiving nurse.           Lines:   Peripheral IV 06/24/25 Right Antecubital (Active)   Site Assessment Clean, dry & intact 06/28/25 1500   Line Status Flushed;Capped;Normal saline locked 06/28/25 1500   Line Care Connections checked and tightened 06/28/25 1500   Phlebitis Assessment No symptoms 06/28/25 1500   Infiltration Assessment 0 06/28/25 1500   Alcohol Cap Used Yes 06/28/25 1500   Dressing Status Clean, dry & intact 06/28/25 1500   Dressing Type Transparent 06/28/25 1500       Peripheral IV 06/24/25 Distal;Left Cephalic (Active)   Site Assessment Clean, dry & intact 06/28/25 1500   Line Status Flushed;Capped;Normal saline locked 06/28/25 1500   Line Care Connections checked and tightened 06/28/25 1500   Phlebitis Assessment No symptoms 06/28/25 1500   Infiltration Assessment 0 06/28/25 1500   Alcohol Cap Used Yes 06/28/25 1500   Dressing Status Clean, dry & intact 06/28/25 1500   Dressing Type Transparent 06/28/25 1500       Pacing Wires (Active)   Pacing Wire Status Atrial wires isolated & capped;Ventricular wires isolated & capped 06/28/25 1500   Site Assessment Clean, dry & intact 06/28/25 1500   Pacer Wire Care Completed Yes 06/28/25 1500   Dressing Status Clean, dry & intact 06/28/25 1500   Dressing Intervention New;Dressing changed 06/28/25 1124        Opportunity for questions and clarification was provided.      Patient transported with:  Monitor, O2 @ 1.5lpm, and Registered Nurse

## 2025-06-28 NOTE — PLAN OF CARE
Problem: Respiratory - Adult  Goal: Achieves optimal ventilation and oxygenation  6/28/2025 0749 by Pat Dominguez RCP  Outcome: Progressing  6/28/2025 0738 by Hayley Camarena, RN  Outcome: Progressing  Flowsheets (Taken 6/28/2025 0715)  Achieves optimal ventilation and oxygenation: Assess for changes in respiratory status  6/27/2025 2011 by Marti Sevilla, RN  Outcome: Progressing  Flowsheets (Taken 6/27/2025 1915)  Achieves optimal ventilation and oxygenation:   Assess for changes in respiratory status   Assess for changes in mentation and behavior   Position to facilitate oxygenation and minimize respiratory effort   Oxygen supplementation based on oxygen saturation or arterial blood gases

## 2025-06-28 NOTE — PLAN OF CARE
Problem: Safety - Adult  Goal: Free from fall injury  6/27/2025 2011 by Marti Sevilla RN  Outcome: Progressing  6/27/2025 0841 by Hayley Camarena RN  Outcome: Progressing     Problem: Discharge Planning  Goal: Discharge to home or other facility with appropriate resources  6/27/2025 2011 by Marti Sevilla RN  Outcome: Progressing  6/27/2025 0841 by Hayley Camarena RN  Outcome: Progressing  Flowsheets (Taken 6/27/2025 0715)  Discharge to home or other facility with appropriate resources: Identify barriers to discharge with patient and caregiver     Problem: Skin/Tissue Integrity  Goal: Skin integrity remains intact  Description: 1.  Monitor for areas of redness and/or skin breakdown  2.  Assess vascular access sites hourly  3.  Every 4-6 hours minimum:  Change oxygen saturation probe site  4.  Every 4-6 hours:  If on nasal continuous positive airway pressure, respiratory therapy assess nares and determine need for appliance change or resting period  6/27/2025 2011 by Marti Sevilla RN  Outcome: Progressing  Flowsheets (Taken 6/27/2025 1915)  Skin Integrity Remains Intact:   Monitor for areas of redness and/or skin breakdown   Assess vascular access sites hourly  6/27/2025 0841 by Hayley Camarena RN  Outcome: Progressing  Flowsheets (Taken 6/27/2025 0715)  Skin Integrity Remains Intact: Monitor for areas of redness and/or skin breakdown     Problem: Respiratory - Adult  Goal: Achieves optimal ventilation and oxygenation  6/27/2025 2011 by Marti Sevilla RN  Outcome: Progressing  Flowsheets (Taken 6/27/2025 1915)  Achieves optimal ventilation and oxygenation:   Assess for changes in respiratory status   Assess for changes in mentation and behavior   Position to facilitate oxygenation and minimize respiratory effort   Oxygen supplementation based on oxygen saturation or arterial blood gases  6/27/2025 0841 by Hayley Camarena RN  Outcome: Progressing  6/27/2025 0758 by Pat Dominguez RCP  Outcome: Progressing  Flowsheets  patient/family as appropriate  5. Emphasize sustaining relationships within family system and community, or cynthia/spiritual traditions  6. Initiate Spiritual Care, Psychosocial Clinical Specialist consult as needed  Outcome: Progressing     Problem: Decision Making  Goal: Pt/Family able to effectively weigh alternatives and participate in decision making related to treatment and care  Description: INTERVENTIONS:  1. Determine when there are differences between patient's view, family's view, and healthcare provider's view of condition  2. Facilitate patient and family articulation of goals for care  3. Help patient and family identify pros/cons of alternative solutions  4. Provide information as requested by patient/family  5. Respect patient/family right to receive or not to receive information  6. Serve as a liaison between patient and family and health care team  7. Initiate Consults from Ethics, Palliative Care or initiate Family Care Conference as is appropriate  Outcome: Progressing     Problem: Confusion  Goal: Confusion, delirium, dementia, or psychosis is improved or at baseline  Description: INTERVENTIONS:  1. Assess for possible contributors to thought disturbance, including medications, impaired vision or hearing, underlying metabolic abnormalities, dehydration, psychiatric diagnoses, and notify attending LIP  2. [place] high risk fall precautions, as indicated  3. Provide frequent short contacts to provide reality reorientation, refocusing and direction  4. Decrease environmental stimuli, including noise as appropriate  5. Monitor and intervene to maintain adequate nutrition, hydration, elimination, sleep and activity  6. If unable to ensure safety without constant attention obtain sitter and review sitter guidelines with assigned personnel  7. Initiate Psychosocial CNS and Spiritual Care consult, as indicated  Outcome: Progressing     Problem: Behavior  Goal: Pt/Family maintain appropriate behavior

## 2025-06-28 NOTE — PROGRESS NOTES
TRANSFER - IN REPORT:    Verbal report received from Hayley DO on [facility]  being received from CVICU for routine progression of patient care      Report consisted of patient's Situation, Background, Assessment and   Recommendations(SBAR).     Information from the following report(s) Adult Overview, Surgery Report, Intake/Output, MAR, Recent Results, and Cardiac Rhythm lwas reviewed with the receiving nurse.    Opportunity for questions and clarification was provided.

## 2025-06-28 NOTE — PROGRESS NOTES
Progress Note    2025 9:15 AM          POD#   patient is postop day 2 from a four-vessel coronary bypass grafting and left atrial appendage clip.  Postoperative course was little slow in terms of emergence from general anesthesia also patient has had tachycardia with heart rates in the 90s that appear to possibly be junctional versus sinus tach.    Subjective:  Mr. Orellana is somewhat anxious but doing a little better today    OBJECTIVE:    PULSE OXIMETRY RANGE: SpO2  Av.8 %  Min: 90 %  Max: 100 %  SUPPLEMENTAL O2: O2 Flow Rate (L/min): 2 L/min   Vital Signs: BP (!) 107/55   Pulse 90   Temp 97.3 °F (36.3 °C) (Axillary)   Resp 27   Ht 1.702 m (5' 7.01\")   Wt 89.6 kg (197 lb 8.5 oz)   SpO2 97%   BMI 30.93 kg/m²    Temperature Range:   Temp: 97.3 °F (36.3 °C)   Temp  Av.6 °F (36.4 °C)  Min: 97.3 °F (36.3 °C)  Max: 97.9 °F (36.6 °C)      Exam:   General appearance:   Neck:   Lungs: Crackles in both bases but clear with coughing  Sternum: Sternal incision is dry and intact  Heart: Blood pressure is 100/56 heart rate is 98 again normal sinus rhythm versus junctional.  Abdomen: Abdomen soft nontender  Extremities:   Leg Wounds:     Minimal chest tube drainage             Rhythm: Tachycardia 98 sinus versus junctional.    Labs:   ABG:  No results found for: \"PHART\", \"PO2ART\", \"BOU3IDX\", \"V9BJUEKB\", \"ZNP5YWL\", \"THGBART\", \"HFM4HDK\", \"BEART\"  CBC:   Recent Labs     25  2005 25  0303 25  1133 25  0648   WBC 7.4 6.1  --  7.8   HGB 8.2* 7.9* 8.5* 8.2*   HCT 24.6* 23.8* 25.8* 25.0*   MCV 96.5 98.3  --  95.1   * 129*  --  122*     BMP:   Recent Labs     25  1206 25  1556 25  0303 25  0648     --   --  141 138   K 4.2   < > 4.0 4.0 3.9   *  --   --  111* 106   CO2 20  --   --  22 22   BUN 13  --   --  15 17   CREATININE 0.86  --   --  0.90 0.78*    < > = values in this interval not displayed.     PT/INR:   Recent Labs      06/26/25  1206   PROTIME 18.3*   INR 1.5     APTT:   Recent Labs     06/26/25  1206   APTT 37.5*       Chest X-Ray: Chest x-ray shows some bilateral atelectasis and gas in the stomach bubble    CT:  I/O last 3 completed shifts:  In: 2611.4 [P.O.:1020; I.V.:284; Blood:315; IV Piggyback:992.4]  Out: 1665 [Urine:1155; Chest Tube:510]      Air Leak:   I/O last 3 completed shifts:  In: 2611.4 [P.O.:1020; I.V.:284; Blood:315; IV Piggyback:992.4]  Out: 1665 [Urine:1155; Chest Tube:510]    Scheduled Meds:    metoprolol tartrate  12.5 mg Oral BID    tamsulosin  0.4 mg Oral QHS    sodium chloride flush  5-40 mL IntraVENous 2 times per day    aspirin  81 mg Oral Daily    acetaminophen  650 mg Oral Q6H    amiodarone  200 mg Oral BID    polyethylene glycol  17 g Oral Daily    sennosides-docusate sodium  1 tablet Oral BID    famotidine  20 mg Oral BID    Or    famotidine (PEPCID) injection  20 mg IntraVENous BID    insulin lispro  0-6 Units SubCUTAneous TID WC    insulin lispro  0-6 Units SubCUTAneous Nightly    alcohol 62%  1 ampule Nasal Q12H    tuberculin  5 Units IntraDERmal Once    arformoterol 15 mcg-budesonide 0.5 mg neb solution   Nebulization BID RT    atorvastatin  40 mg Oral Nightly    gabapentin  100 mg Oral Nightly       Continuous Infusions:    sodium chloride      phenylephrine Stopped (06/27/25 0384)    nitroGLYCERIN Stopped (06/27/25 0015)    dextrose      sodium chloride           Assessment  Postoperative day #2 from a four-vessel coronary bypass and left atrial appendage clip satisfactory progress overnight hemodynamically stable with underlying tachycardia  2.  3.  4.      Patient Active Problem List   Diagnosis    Moderate persistent asthma without complication    Hyperlipidemia    Hypertension    Intermittent left-sided chest pain    RLS (restless legs syndrome)    Spinal stenosis, lumbar region, with neurogenic claudication    Lumbar facet arthropathy    Spondylolisthesis of lumbar region    Lumbar

## 2025-06-28 NOTE — PROGRESS NOTES
Pulmonary CV progress Note: 6/28/2025        Pritesh Waltersbridge                                                     Admission Date: 6/24/2025     The patient's chart is reviewed and the patient is discussed with the staff.    Background: 80 y.o. y/o male  has a past medical history of Allergic rhinitis, Anemia, Arthritis, Asthma, BPH (benign prostatic hyperplasia), Cancer ([facility]), Erectile dysfunction, Hearing loss, History of blood transfusion, Hypercholesterolemia, Hypertension, Lumbar stenosis, Osteoarthritis, Pneumonia, Prolonged emergence from general anesthesia, Prostate cancer ([facility]), Restless legs syndrome, Rosacea, Septicemia ([facility]), Skin cancer, and Vitamin D deficiency. S/P CABG x 4 2 Days Post-Op    Seen in pulmonary office for asthma, managed with Wixela 500.     Subjective:     6/28  Pt off pressors but with some marginal BP's, 90's this morning. Satting well on 2L o2. Hgb 8.2 today after 1 unit PRBC yesterday.       6/27  Pt had cabg x 4 yesterday. Extubated without difficulty. On [place] and getting 1 unit PRBC today.   He states that he has no wheeze, some cough this morning.       Objective:   Blood pressure (!) 94/53, pulse 85, temperature 97.3 °F (36.3 °C), temperature source Axillary, resp. rate 23, height 1.702 m (5' 7.01\"), weight 89.6 kg (197 lb 8.5 oz), SpO2 98%.   Intake/Output Summary (Last 24 hours) at 6/28/2025 0857  Last data filed at 6/28/2025 0844  Gross per 24 hour   Intake 1851.09 ml   Output 935 ml   Net 916.09 ml     Physical Exam:          Constitutional:  Awake, calm  EENMT:  Sclera clear, pupils equal  Respiratory: Diffuse rhonchi. Mild crackles at bases. Chest tube in place without air leak. .   Cardiovascular:  RRR with no M,G,R;  Gastrointestinal:  soft; normal bowel sounds  Musculoskeletal:  warm with no cyanosis, no lower extremity edema.   SKIN:  no jaundice or ecchymosis   Neurologic:  moving all extremities, fluent speech  Psychiatric:  calm    CXR:     6/28 6/27/25        LINES:     Chest Tube Left Pleural (Active)       Chest Tube Anterior Mediastinal 2 (Active)       Negative Pressure Wound Therapy Sternum (Active)       Urinary Catheter 06/26/25 Juarez (Active)     Introducer 06/26/25 (Active)       Pacing Wires (Active)       CVC  06/26/25 Right Internal jugular (Active)       DRIPS:   Dose (mcg/kg/min) Propofol : *30 mg  Dose (mcg/kg/hr) Dexmedetomidine: 0 mcg/kg/hr (Anesthesia infusion automatically stopped.)  Dose (mcg/min) Norepinephrine: 0 mcg/min  Dose (mcg/kg/min) Phenylephrine : 0 mcg/kg/min  Dose (mg/hr) Nicardipine: *0.3 mg  Dose (units/hr) Insulin : 0 Units/hr     CI:  CO (l/min): 6.3 l/min    Ventilator Settings  Mode FIO2 Rate Tidal Volume Pressure   CPAP/PS    40 %  16 bpm         8     Peak airway pressure:     Minute ventilation:    No results for input(s): \"PH\", \"PCO2\", \"PO2\", \"HCO3\" in the last 72 hours.   Recent Labs     06/26/25  1206 06/26/25  1556 06/26/25 2005 06/27/25 0303 06/27/25  1133 06/28/25  0648   WBC 11.8* 12.8* 7.4 6.1  --  7.8   HGB 8.8* 9.2* 8.2* 7.9* 8.5* 8.2*   HCT 26.5* 27.4* 24.6* 23.8* 25.8* 25.0*   * 150 127* 129*  --  122*   INR 1.5  --   --   --   --   --      Recent Labs     06/26/25  1206 06/26/25  1556 06/26/25 2005 06/27/25  0303 06/28/25  0648     --   --  141 138   K 4.2   < > 4.0 4.0 3.9   *  --   --  111* 106   CO2 20  --   --  22 22   BUN 13  --   --  15 17   CREATININE 0.86  --   --  0.90 0.78*   MG 2.9*   < > 2.4 2.4 2.1    < > = values in this interval not displayed.     No results for input(s): \"PROCAL\", \"LACTATE\" in the last 72 hours.  05/02/25    ECHO (TTE) COMPLETE (PRN CONTRAST/BUBBLE/STRAIN/3D) 05/05/2025  7:54 PM (Final)    Interpretation Summary    Left Ventricle: Mildly reduced left ventricular systolic function with a visually estimated EF of 45 - 50%. Left ventricle size is normal. Moderately increased wall thickness. Findings consistent with moderate concentric

## 2025-06-28 NOTE — PROGRESS NOTES
ACUTE PHYSICAL THERAPY GOALS:   (Developed with and agreed upon by patient and/or caregiver.)  LTG:  (1.)Mr. Orellana will move from supine to sit and sit to supine , scoot up and down, and roll side to side in bed with MODIFIED INDEPENDENCE within 7 treatment day(s).    (2.)Mr. Orellana will transfer from bed to chair and chair to bed with SUPERVISION using the least restrictive device within 7 treatment day(s).    (3.)Mr. Orellana will ambulate with STAND BY ASSIST for 250 feet with the least restrictive device within 7 treatment day(s).  (4.)Mr. Orellana will participate in therapeutic activity/exercises x 25 minutes for increased activity tolerance within 7 treatment days.    ________________________________________________________________________________________________        PHYSICAL THERAPY Initial Assessment and AM  (Link to Caseload Tracking: PT Visit Days : 1  Acknowledge Orders  Time In/Out  PT Charge Capture  Rehab Caseload Tracker    Pritesh Orellana is a 80 y.o. male   PRIMARY DIAGNOSIS: S/P CABG x 4  Accelerating angina (HCC) [I20.0]  Unstable angina (HCC) [I20.0]  CAD in native artery [I25.10]  Procedure(s) (LRB):  CORONARY ARTERY BYPASS GRAFTING (CABG X4) LIMA, LEFT LEG ENDOSCOPIC SAPHENOUS VEIN HARVEST / LEFT ATRIAL APPENDAGE CLIP (N/A)  TRANSESOPHAGEAL ECHOCARDIOGRAM (N/A)  2 Days Post-Op  Reason for Referral: Generalized Muscle Weakness (M62.81)  Difficulty in walking, Not elsewhere classified (R26.2)  Inpatient: Payor: MEDICARE / Plan: MEDICARE PART A AND B / Product Type: *No Product type* /     ASSESSMENT:     REHAB RECOMMENDATIONS:   Recommendation to date pending progress:  Setting:  Short-term Rehab    Equipment:    To Be Determined     ASSESSMENT:  Mr. Orellana was seen POD 2 following CABG x 4 with Dr. Arango.  Pt was sitting in recliner upon arrival and educated on post op sternal precautions.  Mr. Orellana presents to PT with WFL AROM and decreased strength in B

## 2025-06-28 NOTE — PROGRESS NOTES
ACUTE PHYSICAL THERAPY GOALS:   (Developed with and agreed upon by patient and/or caregiver.)  LTG:  (1.)Mr. Orellana will move from supine to sit and sit to supine , scoot up and down, and roll side to side in bed with MODIFIED INDEPENDENCE within 7 treatment day(s).    (2.)Mr. Orellana will transfer from bed to chair and chair to bed with SUPERVISION using the least restrictive device within 7 treatment day(s).    (3.)Mr. Orellana will ambulate with STAND BY ASSIST for 250 feet with the least restrictive device within 7 treatment day(s).  (4.)Mr. Orellana will participate in therapeutic activity/exercises x 25 minutes for increased activity tolerance within 7 treatment days.    PHYSICAL THERAPY: Daily Note PM   (Link to Caseload Tracking: PT Visit Days : 1  Time In/Out PT Charge Capture  Rehab Caseload Tracker  Orders    Pritesh Orellana is a 80 y.o. male   PRIMARY DIAGNOSIS: S/P CABG x 4  Accelerating angina (HCC) [I20.0]  Unstable angina (HCC) [I20.0]  CAD in native artery [I25.10]  Procedure(s) (LRB):  CORONARY ARTERY BYPASS GRAFTING (CABG X4) LIMA, LEFT LEG ENDOSCOPIC SAPHENOUS VEIN HARVEST / LEFT ATRIAL APPENDAGE CLIP (N/A)  TRANSESOPHAGEAL ECHOCARDIOGRAM (N/A)  2 Days Post-Op  Inpatient: Payor: MEDICARE / Plan: MEDICARE PART A AND B / Product Type: *No Product type* /     ASSESSMENT:     REHAB RECOMMENDATIONS:   Recommendation to date pending progress:  Setting:  Short-term Rehab    Equipment:    To Be Determined     ASSESSMENT:  Mr. Orellana was sitting in recliner upon arrival and agreeable to PT.  He performed several STS transfers today with Иван x 2 and improved standing balance.  Pt ambulated in muñiz with CGA-Иван/cardiac walker and close chair follow.  He demonstrated decreased domenico and B LE step length today while ambulating.  Pt also with forward flexed posture today.  He improved in ambulation and mobility this PM.     SUBJECTIVE:   Mr. Orellana states, \"A little wobbly\"      Social/Functional Lives With: Spouse, Son  Type of Home: House  Home Layout: One level  Home Access: Stairs to enter without rails  Entrance Stairs - Number of Steps: 3 steps from garage  Bathroom Shower/Tub: Walk-in shower  Bathroom Toilet: Standard  Bathroom Equipment: Grab bars in shower  Bathroom Accessibility: Accessible  Home Equipment: Cane  Receives Help From: Family  Prior Level of Assist for ADLs: Independent  Prior Level of Assist for Homemaking: Independent  Homemaking Responsibilities: Yes  Prior Level of Assist for Transfers: Independent  Active : Yes  Mode of Transportation: Car  Occupation: Retired  Additional Comments: Lives with wife and son in one story home with 3 MONTY; uses SPC for community ambulation and RW in house  OBJECTIVE:     PAIN: VITALS / O2: PRECAUTION / LINES / DRAINS:   Pre Treatment: 0         Post Treatment: 0 Vitals        Oxygen    Continuous Pulse Oximetry and Telemetry     RESTRICTIONS/PRECAUTIONS:  Restrictions/Precautions  Restrictions/Precautions: Fall Risk  Position Activity Restriction  Sternal Precautions: No Pushing, No Pulling, 5# Lifting Restrictions  Restrictions/Precautions: Fall Risk     MOBILITY: I Mod I S SBA CGA Min Mod Max Total  NT x2 Comments:   Bed Mobility    Rolling [] [] [] [] [] [] [] [] [] [] []    Supine to Sit [] [] [] [] [] [] [] [] [] [] []    Scooting [] [] [] [] [] [] [] [] [] [] []    Sit to Supine [] [] [] [] [] [] [] [] [] [] []    Transfers    Sit to Stand [] [] [] [] [] [x] [] [] [] [] [x]    Bed to Chair [] [] [] [] [x] [x] [] [] [] [] []    Stand to Sit [] [] [] [] [] [x] [] [] [] [] [x]     [] [] [] [] [] [] [] [] [] [] []    I=Independent, Mod I=Modified Independent, S=Supervision, SBA=Standby Assistance, CGA=Contact Guard Assistance,   Min=Minimal Assistance, Mod=Moderate Assistance, Max=Maximal Assistance, Total=Total Assistance, NT=Not Tested    BALANCE: Good Fair+ Fair Fair- Poor NT Comments   Sitting Static [x] [] [] [] []

## 2025-06-28 NOTE — PROGRESS NOTES
[facility] CARDIOLOGY PROGRESS NOTE    6/28/2025 10:12 AM    Admit Date: 6/24/2025        Subjective:   Patient postop day #1.  Patient remains mildly hypotensive.  Has been requiring a pacing for hemodynamic support.  He is moderately anemic and scheduled to receive 1 unit of packed red blood cells.    Objective:      Vitals:    06/28/25 0915 06/28/25 0930 06/28/25 0945 06/28/25 1000   BP:  (!) 143/81 (!) 99/57 (!) 103/58   Pulse: 90 (!) 101  90   Resp: 27 26     Temp:       TempSrc:       SpO2: 97% 95%  92%   Weight:       Height:           Physical Exam:  Neck- supple, no JVD  CV- regular rate and rhythm no MRG  Lung- clear bilaterally  Abd- soft, nontender, nondistended  Ext- no edema, right radial clean, dry, intact  Skin- warm and dry    Data Review:   Pertinent lab and noninvasive imaging over the past 24 hours reviewed and evaluated.    Recent Labs     06/26/25  1206 06/26/25  1556 06/27/25  0303 06/27/25  1133 06/28/25  0648     --  141  --  138   K 4.2   < > 4.0  --  3.9   MG 2.9*   < > 2.4  --  2.1   BUN 13  --  15  --  17   WBC 11.8*   < > 6.1  --  7.8   HGB 8.8*   < > 7.9* 8.5* 8.2*   HCT 26.5*   < > 23.8* 25.8* 25.0*   *   < > 129*  --  122*   INR 1.5  --   --   --   --     < > = values in this interval not displayed.     Lab Results   Component Value Date    LDL 82 05/23/2025     Left heart catheterization 6/24/2025:    Mild to moderately reduced left ventricular systolic function.  LVEF 40-45%.  Normal LVEDP.  Dense inferior akinesis present.  1+ mitral regurgitation present.  No aortic valve gradient.    Severe diffuse 3 vessel coronary calcification present    Severe mid distal LAD stenosis diffuse in nature.  Will require extensive stenting.  Patient appears to have appropriate target distally.    Complex trifurcating obtuse marginal which supplies of aspiratory lateral wall with the 2 larger branches both having severe proximal disease.  If patient is considered for coronary bypass

## 2025-06-28 NOTE — PLAN OF CARE
Problem: Safety - Adult  Goal: Free from fall injury  6/28/2025 0738 by Hayley Camarena RN  Outcome: Progressing  6/27/2025 2011 by Marti Sevilla RN  Outcome: Progressing     Problem: Discharge Planning  Goal: Discharge to home or other facility with appropriate resources  6/28/2025 0738 by Hayley Camarena RN  Outcome: Progressing  Flowsheets (Taken 6/28/2025 0715)  Discharge to home or other facility with appropriate resources: Identify barriers to discharge with patient and caregiver  6/27/2025 2011 by Marti Sevilla RN  Outcome: Progressing     Problem: Skin/Tissue Integrity  Goal: Skin integrity remains intact  Description: 1.  Monitor for areas of redness and/or skin breakdown  2.  Assess vascular access sites hourly  3.  Every 4-6 hours minimum:  Change oxygen saturation probe site  4.  Every 4-6 hours:  If on nasal continuous positive airway pressure, respiratory therapy assess nares and determine need for appliance change or resting period  6/28/2025 0738 by Hayley Camarena RN  Outcome: Progressing  Flowsheets (Taken 6/28/2025 0715)  Skin Integrity Remains Intact: Monitor for areas of redness and/or skin breakdown  6/27/2025 2011 by Marti Sevilla RN  Outcome: Progressing  Flowsheets (Taken 6/27/2025 1915)  Skin Integrity Remains Intact:   Monitor for areas of redness and/or skin breakdown   Assess vascular access sites hourly     Problem: Respiratory - Adult  Goal: Achieves optimal ventilation and oxygenation  6/28/2025 0738 by Hayley Camarena RN  Outcome: Progressing  Flowsheets (Taken 6/28/2025 0715)  Achieves optimal ventilation and oxygenation: Assess for changes in respiratory status  6/27/2025 2011 by Marti Sevilla RN  Outcome: Progressing  Flowsheets (Taken 6/27/2025 1915)  Achieves optimal ventilation and oxygenation:   Assess for changes in respiratory status   Assess for changes in mentation and behavior   Position to facilitate oxygenation and minimize respiratory effort   Oxygen supplementation  6/28/2025 0715)  Skin Integrity Remains Intact: Monitor for areas of redness and/or skin breakdown  6/27/2025 2011 by Marti Sevilla RN  Outcome: Progressing  Flowsheets (Taken 6/27/2025 1915)  Skin Integrity Remains Intact:   Monitor for areas of redness and/or skin breakdown   Assess vascular access sites hourly  Goal: Incisions, wounds, or drain sites healing without S/S of infection  6/28/2025 0738 by Hayley Camarena RN  Outcome: Progressing  Flowsheets (Taken 6/28/2025 0715)  Incisions, Wounds, or Drain Sites Healing Without Sign and Symptoms of Infection: Initiate isolation precautions as appropriate  6/27/2025 2011 by Marti Sevilla RN  Outcome: Progressing  Goal: Oral mucous membranes remain intact  6/28/2025 0738 by Hayley Camarena RN  Outcome: Progressing  Flowsheets (Taken 6/28/2025 0715)  Oral Mucous Membranes Remain Intact: Assess oral mucosa and hygiene practices  6/27/2025 2011 by Marti Sevilla RN  Outcome: Progressing     Problem: Musculoskeletal - Adult  Goal: Return mobility to safest level of function  6/28/2025 0738 by Hayley Camarena RN  Outcome: Progressing  Flowsheets (Taken 6/28/2025 0715)  Return Mobility to Safest Level of Function: Assess patient stability and activity tolerance for standing, transferring and ambulating with or without assistive devices  6/27/2025 2011 by Marti Sevilla RN  Outcome: Progressing  Flowsheets (Taken 6/27/2025 1915)  Return Mobility to Safest Level of Function:   Assess patient stability and activity tolerance for standing, transferring and ambulating with or without assistive devices   Assist with transfers and ambulation using safe patient handling equipment as needed   Ensure adequate protection for wounds/incisions during mobilization   Obtain physical therapy/occupational therapy consults as needed  Goal: Maintain proper alignment of affected body part  6/28/2025 0738 by Hayley Camarena RN  Outcome: Progressing  Flowsheets (Taken 6/28/2025 0715)  Maintain proper  patient and family identify pros/cons of alternative solutions  4. Provide information as requested by patient/family  5. Respect patient/family right to receive or not to receive information  6. Serve as a liaison between patient and family and health care team  7. Initiate Consults from Ethics, Palliative Care or initiate Family Care Conference as is appropriate  6/28/2025 0738 by Hayley Camarena, RN  Outcome: Progressing  Flowsheets (Taken 6/28/2025 0715)  Patient/family able to effectively weigh alternatives and participate in decision making related to treatment and care: Determine when there are differences between patient's view, family's view, and healthcare provider's view of condition  6/27/2025 2011 by Marti Sevilla RN  Outcome: Progressing     Problem: Confusion  Goal: Confusion, delirium, dementia, or psychosis is improved or at baseline  Description: INTERVENTIONS:  1. Assess for possible contributors to thought disturbance, including medications, impaired vision or hearing, underlying metabolic abnormalities, dehydration, psychiatric diagnoses, and notify attending LIP  2. [place] high risk fall precautions, as indicated  3. Provide frequent short contacts to provide reality reorientation, refocusing and direction  4. Decrease environmental stimuli, including noise as appropriate  5. Monitor and intervene to maintain adequate nutrition, hydration, elimination, sleep and activity  6. If unable to ensure safety without constant attention obtain sitter and review sitter guidelines with assigned personnel  7. Initiate Psychosocial CNS and Spiritual Care consult, as indicated  6/28/2025 0738 by Hayley Camarena, RN  Outcome: Progressing  Flowsheets (Taken 6/28/2025 0715)  Effect of thought disturbance (confusion, delirium, dementia, or psychosis) are managed with adequate functional status: Assess for contributors to thought disturbance, including medications, impaired vision or hearing, underlying metabolic

## 2025-06-29 ENCOUNTER — APPOINTMENT (OUTPATIENT)
Dept: GENERAL RADIOLOGY | Age: 80
DRG: 234 | End: 2025-06-29
Attending: INTERNAL MEDICINE
Payer: MEDICARE

## 2025-06-29 PROBLEM — J90 PLEURAL EFFUSION: Status: ACTIVE | Noted: 2025-06-29

## 2025-06-29 LAB
ABO + RH BLD: NORMAL
ANION GAP SERPL CALC-SCNC: 11 MMOL/L (ref 7–16)
BLD PROD TYP BPU: NORMAL
BLOOD BANK BLOOD PRODUCT EXPIRATION DATE: NORMAL
BLOOD BANK DISPENSE STATUS: NORMAL
BLOOD BANK ISBT PRODUCT BLOOD TYPE: 6200
BLOOD BANK PRODUCT CODE: NORMAL
BLOOD BANK UNIT TYPE AND RH: NORMAL
BLOOD GROUP ANTIBODIES SERPL: NORMAL
BPU ID: NORMAL
BUN SERPL-MCNC: 21 MG/DL (ref 8–23)
CALCIUM SERPL-MCNC: 8.6 MG/DL (ref 8.8–10.2)
CHLORIDE SERPL-SCNC: 103 MMOL/L (ref 98–107)
CO2 SERPL-SCNC: 21 MMOL/L (ref 20–29)
CREAT SERPL-MCNC: 0.89 MG/DL (ref 0.8–1.3)
CROSSMATCH RESULT: NORMAL
ERYTHROCYTE [DISTWIDTH] IN BLOOD BY AUTOMATED COUNT: 13.2 % (ref 11.9–14.6)
GLUCOSE BLD STRIP.AUTO-MCNC: 104 MG/DL (ref 65–100)
GLUCOSE BLD STRIP.AUTO-MCNC: 132 MG/DL (ref 65–100)
GLUCOSE SERPL-MCNC: 103 MG/DL (ref 70–99)
HCT VFR BLD AUTO: 25.1 % (ref 41.1–50.3)
HGB BLD-MCNC: 8.2 G/DL (ref 13.6–17.2)
MAGNESIUM SERPL-MCNC: 2.2 MG/DL (ref 1.8–2.4)
MCH RBC QN AUTO: 31.7 PG (ref 26.1–32.9)
MCHC RBC AUTO-ENTMCNC: 32.7 G/DL (ref 31.4–35)
MCV RBC AUTO: 96.9 FL (ref 82–102)
NRBC # BLD: 0 K/UL (ref 0–0.2)
PLATELET # BLD AUTO: 135 K/UL (ref 150–450)
PMV BLD AUTO: 9.9 FL (ref 9.4–12.3)
POTASSIUM SERPL-SCNC: 4.1 MMOL/L (ref 3.5–5.1)
RBC # BLD AUTO: 2.59 M/UL (ref 4.23–5.6)
SERVICE CMNT-IMP: ABNORMAL
SERVICE CMNT-IMP: ABNORMAL
SODIUM SERPL-SCNC: 135 MMOL/L (ref 136–145)
SPECIMEN EXP DATE BLD: NORMAL
UNIT DIVISION: 0
UNIT ISSUE DATE/TIME: NORMAL
WBC # BLD AUTO: 8.1 K/UL (ref 4.3–11.1)

## 2025-06-29 PROCEDURE — 82962 GLUCOSE BLOOD TEST: CPT

## 2025-06-29 PROCEDURE — 85027 COMPLETE CBC AUTOMATED: CPT

## 2025-06-29 PROCEDURE — 80048 BASIC METABOLIC PNL TOTAL CA: CPT

## 2025-06-29 PROCEDURE — 99232 SBSQ HOSP IP/OBS MODERATE 35: CPT | Performed by: INTERNAL MEDICINE

## 2025-06-29 PROCEDURE — 2500000003 HC RX 250 WO HCPCS: Performed by: PHYSICIAN ASSISTANT

## 2025-06-29 PROCEDURE — 2700000000 HC OXYGEN THERAPY PER DAY

## 2025-06-29 PROCEDURE — 94640 AIRWAY INHALATION TREATMENT: CPT

## 2025-06-29 PROCEDURE — 71045 X-RAY EXAM CHEST 1 VIEW: CPT

## 2025-06-29 PROCEDURE — 36415 COLL VENOUS BLD VENIPUNCTURE: CPT

## 2025-06-29 PROCEDURE — 6370000000 HC RX 637 (ALT 250 FOR IP): Performed by: PHYSICIAN ASSISTANT

## 2025-06-29 PROCEDURE — 2140000001 HC CVICU INTERMEDIATE R&B

## 2025-06-29 PROCEDURE — 6370000000 HC RX 637 (ALT 250 FOR IP): Performed by: THORACIC SURGERY (CARDIOTHORACIC VASCULAR SURGERY)

## 2025-06-29 PROCEDURE — 6360000002 HC RX W HCPCS: Performed by: PHYSICIAN ASSISTANT

## 2025-06-29 PROCEDURE — 6360000002 HC RX W HCPCS: Performed by: STUDENT IN AN ORGANIZED HEALTH CARE EDUCATION/TRAINING PROGRAM

## 2025-06-29 PROCEDURE — 83735 ASSAY OF MAGNESIUM: CPT

## 2025-06-29 PROCEDURE — 97530 THERAPEUTIC ACTIVITIES: CPT

## 2025-06-29 PROCEDURE — 94760 N-INVAS EAR/PLS OXIMETRY 1: CPT

## 2025-06-29 RX ORDER — SENNOSIDES 8.6 MG/1
1 TABLET ORAL 2 TIMES DAILY
Status: DISCONTINUED | OUTPATIENT
Start: 2025-06-29 | End: 2025-07-02 | Stop reason: HOSPADM

## 2025-06-29 RX ORDER — FUROSEMIDE 10 MG/ML
40 INJECTION INTRAMUSCULAR; INTRAVENOUS ONCE
Status: COMPLETED | OUTPATIENT
Start: 2025-06-29 | End: 2025-06-29

## 2025-06-29 RX ADMIN — AMIODARONE HYDROCHLORIDE 200 MG: 200 TABLET ORAL at 10:12

## 2025-06-29 RX ADMIN — ASPIRIN 81 MG: 81 TABLET ORAL at 10:12

## 2025-06-29 RX ADMIN — AMIODARONE HYDROCHLORIDE 200 MG: 200 TABLET ORAL at 20:09

## 2025-06-29 RX ADMIN — SENNOSIDES 8.6 MG: 8.6 TABLET, FILM COATED ORAL at 10:12

## 2025-06-29 RX ADMIN — POLYETHYLENE GLYCOL 3350 17 G: 17 POWDER, FOR SOLUTION ORAL at 10:09

## 2025-06-29 RX ADMIN — Medication 1 AMPULE: at 20:09

## 2025-06-29 RX ADMIN — ATORVASTATIN CALCIUM 40 MG: 40 TABLET, FILM COATED ORAL at 20:09

## 2025-06-29 RX ADMIN — ACETAMINOPHEN 650 MG: 325 TABLET ORAL at 13:22

## 2025-06-29 RX ADMIN — FAMOTIDINE 20 MG: 20 TABLET, FILM COATED ORAL at 20:09

## 2025-06-29 RX ADMIN — FUROSEMIDE 40 MG: 40 TABLET ORAL at 10:12

## 2025-06-29 RX ADMIN — FUROSEMIDE 20 MG: 10 INJECTION, SOLUTION INTRAMUSCULAR; INTRAVENOUS at 15:52

## 2025-06-29 RX ADMIN — TAMSULOSIN HYDROCHLORIDE 0.4 MG: 0.4 CAPSULE ORAL at 20:09

## 2025-06-29 RX ADMIN — ARFORMOTEROL TARTRATE: 15 SOLUTION RESPIRATORY (INHALATION) at 07:53

## 2025-06-29 RX ADMIN — FAMOTIDINE 20 MG: 20 TABLET, FILM COATED ORAL at 10:12

## 2025-06-29 RX ADMIN — METOPROLOL TARTRATE 12.5 MG: 25 TABLET, FILM COATED ORAL at 10:09

## 2025-06-29 RX ADMIN — GABAPENTIN 100 MG: 100 CAPSULE ORAL at 20:09

## 2025-06-29 RX ADMIN — ACETAMINOPHEN 650 MG: 325 TABLET ORAL at 06:04

## 2025-06-29 RX ADMIN — SODIUM CHLORIDE, PRESERVATIVE FREE 10 ML: 5 INJECTION INTRAVENOUS at 20:09

## 2025-06-29 RX ADMIN — POTASSIUM CHLORIDE 10 MEQ: 750 TABLET, EXTENDED RELEASE ORAL at 10:10

## 2025-06-29 RX ADMIN — Medication 1 AMPULE: at 10:12

## 2025-06-29 RX ADMIN — ARFORMOTEROL TARTRATE: 15 SOLUTION RESPIRATORY (INHALATION) at 19:13

## 2025-06-29 RX ADMIN — SODIUM CHLORIDE, PRESERVATIVE FREE 10 ML: 5 INJECTION INTRAVENOUS at 10:12

## 2025-06-29 RX ADMIN — METOPROLOL TARTRATE 12.5 MG: 25 TABLET, FILM COATED ORAL at 20:09

## 2025-06-29 RX ADMIN — PRAMIPEXOLE DIHYDROCHLORIDE 0.5 MG: 0.5 TABLET ORAL at 20:10

## 2025-06-29 ASSESSMENT — PAIN SCALES - GENERAL
PAINLEVEL_OUTOF10: 0

## 2025-06-29 ASSESSMENT — PAIN SCALES - WONG BAKER: WONGBAKER_NUMERICALRESPONSE: NO HURT

## 2025-06-29 NOTE — PROGRESS NOTES
Pritesh Polk [place]  Admission Date: 6/24/2025         Daily Progress Note: 6/29/2025    Attending Attestation- Pulmonary & Critical Care    In this split/shared evaluation I performed reviewed the patients's H&P, available images, labs, cultures., discussed case in detail with NPP, performed a medically appropriate history and exam, counseled and educated the patient and/or family member, ordered and/or reviewed medications, tests or procedures, documented information in EMR, independently interpreted images, and coordinated care. which accounted for 18 minutes clinical time.     Impression: Pleasant 80yoM s/p CABG x 4.  Has anemia, effusions, mild thrombocytopenia.    Recommendations:  Will give lasix this afternoon.  ? If bloody effusion since anemia has been a concern.  Will get PA/LAT cxr tomorrow to assess effusions further.  May require thoracentesis.    Lacy Matthews MD  _____________________________________________________________________________________________      Background: 80 y.o male with PMH of asthma (patient of PP managed with Wixela 500), HTN, prostate CA, RLS who is s/p CABG x4 on 6/26. Required 1 unit PRBC post op.    Subjective:     On 1 lpm with sat >95%. No complaints other than feeling tired. Pulling 1L on IS independently.     Current Facility-Administered Medications   Medication Dose Route Frequency    senna (SENOKOT) tablet 8.6 mg  1 tablet Oral BID    oxyCODONE-acetaminophen (PERCOCET) 5-325 MG per tablet 1 tablet  1 tablet Oral Q4H PRN    traMADol (ULTRAM) tablet 50 mg  50 mg Oral Q6H PRN    pramipexole (MIRAPEX) tablet 0.5 mg  0.5 mg Oral Nightly    arformoterol 15 mcg-budesonide 1 mg neb solution   Nebulization BID RT    sodium chloride flush 0.9 % injection 5-40 mL  5-40 mL IntraVENous 2 times per day    sodium chloride flush 0.9 % injection 5-40 mL  5-40 mL IntraVENous PRN    ondansetron (ZOFRAN-ODT) disintegrating tablet 4 mg  4 mg Oral Q8H PRN    Or     hours.  ECHO: 05/02/25    ECHO (TTE) COMPLETE (PRN CONTRAST/BUBBLE/STRAIN/3D) 05/05/2025  7:54 PM (Final)    Interpretation Summary    Left Ventricle: Mildly reduced left ventricular systolic function with a visually estimated EF of 45 - 50%. Left ventricle size is normal. Moderately increased wall thickness. Findings consistent with moderate concentric hypertrophy. Mild global hypokinesis present. Grade I diastolic dysfunction. E/E' Ratio (Averaged) is 11.81.    Mitral Valve: Mild to moderate regurgitation with a centrally directed jet.    Left Atrium: Left atrium is moderately dilated. LA Volume Index BP is 46 ml/m2.    Signed by: Steven Hernandez MD on 5/5/2025  7:54 PM    Assessment and Plan:  (Medical Decision Making)   Impression: 80 y.o male s/p CABG X4 on 6/26    Principal Problem:    S/P CABG x 4  Plan: wound vac in place to mid-sternal incision, continue ambulation, IS practice, cough/deep breathe; pain management per CV surgery    Active Problems:    Moderate persistent asthma without complication  Plan: no wheezing; continue nebs to replace Wixela     Hypoxemia  Plan: on 1 lpm with sat 96%; could likely be weaned off     Atelectasis  Plan: bilateral pleural effusions with atelectasis on CXR post chest tube removal. Getting 3 days of PO lasix 40mg- give a one time dose of 40mg IV lasix this afternoon and recheck PA/lat tomorrow; continue IS practice, pulling about 1L independently. Continue PT and persistent ambulation. Could consider pleural US after diuresis     Postoperative anemia due to acute blood loss  Plan: received 1 unit PRBC; Hgb stable in 8's      More than 50% of the time documented was spent in face-to-face contact with the patient and in the care of the patient on the floor/unit where the patient is located.    In this split/shared evaluation I performed performed a medically appropriate history and exam, counseled and educated the patient and/or family member, documented information

## 2025-06-29 NOTE — PROGRESS NOTES
Spoke with MD Mohan about patient's heart rhythm, accelerated junctional vs sinus tachy. MD advised to give metoprolol tonight and place amiodarone on hold, and have cardiology look at him again in the morning. Orders read back and verified. Care ongoing.

## 2025-06-29 NOTE — PLAN OF CARE
Problem: Safety - Adult  Goal: Free from fall injury  Outcome: Progressing     Problem: Discharge Planning  Goal: Discharge to home or other facility with appropriate resources  Outcome: Progressing     Problem: Skin/Tissue Integrity  Goal: Skin integrity remains intact  Description: 1.  Monitor for areas of redness and/or skin breakdown  2.  Assess vascular access sites hourly  3.  Every 4-6 hours minimum:  Change oxygen saturation probe site  4.  Every 4-6 hours:  If on nasal continuous positive airway pressure, respiratory therapy assess nares and determine need for appliance change or resting period  Outcome: Progressing     Problem: Respiratory - Adult  Goal: Achieves optimal ventilation and oxygenation  6/29/2025 1455 by Hayley Camarena RN  Outcome: Progressing  6/29/2025 0758 by Pat Dominguez RCP  Outcome: Progressing     Problem: Neurosensory - Adult  Goal: Achieves stable or improved neurological status  Outcome: Progressing  Goal: Absence of seizures  Outcome: Progressing  Goal: Achieves maximal functionality and self care  Outcome: Progressing     Problem: Cardiovascular - Adult  Goal: Maintains optimal cardiac output and hemodynamic stability  Outcome: Progressing  Goal: Absence of cardiac dysrhythmias or at baseline  Outcome: Progressing     Problem: Skin/Tissue Integrity - Adult  Goal: Skin integrity remains intact  Description: 1.  Monitor for areas of redness and/or skin breakdown  2.  Assess vascular access sites hourly  3.  Every 4-6 hours minimum:  Change oxygen saturation probe site  4.  Every 4-6 hours:  If on nasal continuous positive airway pressure, respiratory therapy assess nares and determine need for appliance change or resting period  Outcome: Progressing  Goal: Incisions, wounds, or drain sites healing without S/S of infection  Outcome: Progressing  Goal: Oral mucous membranes remain intact  Outcome: Progressing     Problem: Musculoskeletal - Adult  Goal: Return mobility to safest

## 2025-06-29 NOTE — PROGRESS NOTES
ACUTE PHYSICAL THERAPY GOALS:   (Developed with and agreed upon by patient and/or caregiver.)  LTG:  (1.)Mr. Orellana will move from supine to sit and sit to supine , scoot up and down, and roll side to side in bed with MODIFIED INDEPENDENCE within 7 treatment day(s).    (2.)Mr. Orellana will transfer from bed to chair and chair to bed with SUPERVISION using the least restrictive device within 7 treatment day(s).    (3.)Mr. Orellana will ambulate with STAND BY ASSIST for 250 feet with the least restrictive device within 7 treatment day(s).  (4.)Mr. Orellana will participate in therapeutic activity/exercises x 25 minutes for increased activity tolerance within 7 treatment days.    PHYSICAL THERAPY: Daily Note PM   (Link to Caseload Tracking: PT Visit Days : 2  Time In/Out PT Charge Capture  Rehab Caseload Tracker  Orders    Pritesh Orellana is a 80 y.o. male   PRIMARY DIAGNOSIS: S/P CABG x 4  Accelerating angina (HCC) [I20.0]  Unstable angina (HCC) [I20.0]  CAD in native artery [I25.10]  Procedure(s) (LRB):  CORONARY ARTERY BYPASS GRAFTING (CABG X4) LIMA, LEFT LEG ENDOSCOPIC SAPHENOUS VEIN HARVEST / LEFT ATRIAL APPENDAGE CLIP (N/A)  TRANSESOPHAGEAL ECHOCARDIOGRAM (N/A)  3 Days Post-Op  Inpatient: Payor: MEDICARE / Plan: MEDICARE PART A AND B / Product Type: *No Product type* /     ASSESSMENT:     REHAB RECOMMENDATIONS:   Recommendation to date pending progress:  Setting:  Short-term Rehab    Equipment:    To Be Determined     ASSESSMENT:  Mr. Orellana was sitting in recliner upon arrival and agreeable to PT.  He performed STS transfer today with min-modA/RW.  Pt ambulated in muñiz with CGA-Иван/RW and decreased domenico.  Pt noted to have decreased B LE step length this PM.  He also appeared to fatigue quicker with walking this PM.  Pt took several standing rest breaks.  Pt transferred back to recliner at end of session with CGA.  No progress this PM.     SUBJECTIVE:   Mr. Orellana states, \"All  [x] [] [] [] [] []    Sitting Dynamic [] [x] [] [] [] []              Standing Static [] [] [x] [] [] []    Standing Dynamic [] [] [] [x] [] []      GAIT: I Mod I S SBA CGA Min Mod Max Total  NT x2 Comments:   Level of Assistance [] [] [] [] [x] [x] [] [] [] [] []    Distance   110 feet    DME Rolling Walker    Gait Quality Decreased domenico , Decreased step length, Trunk flexion, and Trunk sway increased    Weightbearing Status      Stairs      I=Independent, Mod I=Modified Independent, S=Supervision, SBA=Standby Assistance, CGA=Contact Guard Assistance,   Min=Minimal Assistance, Mod=Moderate Assistance, Max=Maximal Assistance, Total=Total Assistance, NT=Not Tested    PLAN:   FREQUENCY AND DURATION: BID for duration of hospital stay or until stated goals are met, whichever comes first.    TREATMENT:   TREATMENT:   Therapeutic Activity (12 Minutes): Therapeutic activity included Scooting, Transfer Training, Ambulation on level ground, Sitting balance , and Standing balance to improve functional Activity tolerance, Balance, Mobility, and Strength.    TREATMENT GRID:     Date:  6/29/25 Date:   Date:     ACTIVITY/EXERCISE AM PM AM PM AM PM   APs 1 x 15 B        LAQ 1 x 15 B        Seated marching 1 x 15 B        Shoulder shrugs 1 x 5 B                                   B = bilateral; AA = active assistive; A = active; P = passive    AFTER TREATMENT PRECAUTIONS: Bed/Chair Locked, Call light within reach, Chair, Needs within reach, RN notified, and Visitors at bedside    INTERDISCIPLINARY COLLABORATION:  RN/ PCT and PT/ PTA    EDUCATION:    Educated patient and/or family/caregiver on the following: Sternal Precautions , Bracing with cardiac pillow, Assistive device indications/utilization/safety, and Fall prevention strategies    TIME IN/OUT:  Time In: 1345  Time Out: 1359  Minutes: 14    Maddi Maharaj, PT

## 2025-06-29 NOTE — PROGRESS NOTES
Progress Note    2025 8:29 AM          POD#   3.  This 80-year-old gentleman is postop day 3 from a coronary artery bypass grafting x 4 and left atrial appendage clip.  Postoperative course patient has been relatively slow in the postoperative period and difficult to ambulate but hemodynamically stable.    Subjective:  Mr. Orellana is doing well without significant complaints    OBJECTIVE:    PULSE OXIMETRY RANGE: SpO2  Av.8 %  Min: 92 %  Max: 100 %  SUPPLEMENTAL O2: O2 Flow Rate (L/min): 1.5 L/min   Vital Signs: /68   Pulse 75   Temp 98.2 °F (36.8 °C) (Oral)   Resp 14   Ht 1.702 m (5' 7.01\")   Wt 89.6 kg (197 lb 8.5 oz)   SpO2 97%   BMI 30.93 kg/m²    Temperature Range:   Temp: 98.2 °F (36.8 °C)   Temp  Av.9 °F (36.6 °C)  Min: 97.3 °F (36.3 °C)  Max: 98.4 °F (36.9 °C)      Exam:   General appearance:   Neck:   Lungs: Lungs there are some scattered crackles and rhonchi  Sternum: Sternal incision is dry and intact  Heart: Blood pressure is 115/68 heart rate is 75 small P waves difficult to tell if it is a junctional or sinus rhythm.  Abdomen: Abdomen is soft and nontender  Extremities:   Leg Wounds:                  Rhythm: Junctional versus sinus    Labs:   ABG:  No results found for: \"PHART\", \"PO2ART\", \"XRB8RPK\", \"V9JHYOXP\", \"BCN7SIV\", \"THGBART\", \"IYL6ABR\", \"BEART\"  CBC:   Recent Labs     25  0303 25  1133 25  0648 25  0349   WBC 6.1  --  7.8 8.1   HGB 7.9* 8.5* 8.2* 8.2*   HCT 23.8* 25.8* 25.0* 25.1*   MCV 98.3  --  95.1 96.9   *  --  122* 135*     BMP:   Recent Labs     25  0303 25  0648 25  0349    138 135*   K 4.0 3.9 4.1   * 106 103   CO2 22 22 21   BUN 15 17 21   CREATININE 0.90 0.78* 0.89     PT/INR:   Recent Labs     25  1206   PROTIME 18.3*   INR 1.5     APTT:   Recent Labs     25  1206   APTT 37.5*       Chest X-Ray: Chest x-ray report normal postoperative course.  No pneumothorax seen heart size

## 2025-06-29 NOTE — PROGRESS NOTES
[facility] CARDIOLOGY PROGRESS NOTE    6/29/2025 11:37 AM    Admit Date: 6/24/2025        Subjective:   Patient postop day #2.  No overnight events.  Working with PT this morning.  Objective:      Vitals:    06/29/25 0240 06/29/25 0700 06/29/25 0753 06/29/25 1121   BP: 122/73 115/68  90/65   Pulse: 92 75  81   Resp: 17 18 14 18   Temp: 98.4 °F (36.9 °C) 98.2 °F (36.8 °C)  98.1 °F (36.7 °C)   TempSrc: Temporal Oral  Temporal   SpO2: 98% 98% 97% 96%   Weight:       Height:           Physical Exam:  Neck- supple, no JVD  CV- regular rate and rhythm no MRG  Lung- clear bilaterally  Abd- soft, nontender, nondistended  Ext- no edema, right radial clean, dry, intact  Skin- warm and dry    Data Review:   Pertinent lab and noninvasive imaging over the past 24 hours reviewed and evaluated.    Recent Labs     06/26/25  1206 06/26/25  1556 06/28/25  0648 06/29/25  0349      < > 138 135*   K 4.2   < > 3.9 4.1   MG 2.9*   < > 2.1 2.2   BUN 13   < > 17 21   WBC 11.8*   < > 7.8 8.1   HGB 8.8*   < > 8.2* 8.2*   HCT 26.5*   < > 25.0* 25.1*   *   < > 122* 135*   INR 1.5  --   --   --     < > = values in this interval not displayed.     Lab Results   Component Value Date    LDL 82 05/23/2025     Left heart catheterization 6/24/2025:    Mild to moderately reduced left ventricular systolic function.  LVEF 40-45%.  Normal LVEDP.  Dense inferior akinesis present.  1+ mitral regurgitation present.  No aortic valve gradient.    Severe diffuse 3 vessel coronary calcification present    Severe mid distal LAD stenosis diffuse in nature.  Will require extensive stenting.  Patient appears to have appropriate target distally.    Complex trifurcating obtuse marginal which supplies of aspiratory lateral wall with the 2 larger branches both having severe proximal disease.  If patient is considered for coronary bypass grafting would recommend grafting both these vessels.    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    Echocardiogram 5/5/2025:  Left Ventricle Mildly reduced left ventricular systolic function with a visually estimated EF of 45 - 50%. Left ventricle size is normal. Moderately increased wall thickness. Findings consistent with moderate concentric hypertrophy. Mild global hypokinesis present. Grade I diastolic dysfunction. E/E' Ratio (Averaged) is 11.81.   Left Atrium Left atrium is moderately dilated. LA Volume Index BP is 46 ml/m2. Normal flow patterns in the pulmonary veins.   Interatrial Septum No interatrial shunt visualized with color Doppler.   Right Ventricle Right ventricle size is normal. RV Basal Dimension is 3.5 cm. RV Mid Dimension is 2.6 cm. Normal systolic function.   Right Atrium Right atrium size is normal.   Aortic Valve Trileaflet valve. No regurgitation. No significant stenosis.   Mitral Valve Valve structure is normal. Mild to moderate regurgitation with a centrally directed jet. No stenosis noted.   Tricuspid Valve Valve structure is normal. Trace regurgitation. No stenosis noted. Normal RVSP. Est. RA Pressure is 3 mmHg.   Pulmonic Valve The pulmonic valve visualization is suboptimal but appears to be functioning normally. Physiologically normal regurgitation. No stenosis noted.   Pulmonary Artery Main pulmonary artery is normal in size.   Aorta Normal sized aortic root.   IVC/Hepatic Veins IVC size is normal.   Pericardium No pericardial effusion.       Assessment and Plan:     Active Hospital Problems  Status post CABG-routine postoperative care    Ischemic cardiomyopathy-remains hypotensive, resume goal-directed medical therapy as blood pressure tolerates.     Dyslipidemia -continue atorvastatin 40 mg daily     Dmitriy Garrett III, MD

## 2025-06-30 ENCOUNTER — APPOINTMENT (OUTPATIENT)
Dept: GENERAL RADIOLOGY | Age: 80
DRG: 234 | End: 2025-06-30
Attending: INTERNAL MEDICINE
Payer: MEDICARE

## 2025-06-30 PROBLEM — I51.9 ASYMPTOMATIC LV DYSFUNCTION: Status: RESOLVED | Noted: 2025-06-25 | Resolved: 2025-06-30

## 2025-06-30 LAB
ERYTHROCYTE [DISTWIDTH] IN BLOOD BY AUTOMATED COUNT: 13 % (ref 11.9–14.6)
HCT VFR BLD AUTO: 24.8 % (ref 41.1–50.3)
HGB BLD-MCNC: 8.3 G/DL (ref 13.6–17.2)
MAGNESIUM SERPL-MCNC: 1.9 MG/DL (ref 1.8–2.4)
MCH RBC QN AUTO: 31.9 PG (ref 26.1–32.9)
MCHC RBC AUTO-ENTMCNC: 33.5 G/DL (ref 31.4–35)
MCV RBC AUTO: 95.4 FL (ref 82–102)
NRBC # BLD: 0 K/UL (ref 0–0.2)
PLATELET # BLD AUTO: 188 K/UL (ref 150–450)
PMV BLD AUTO: 9.7 FL (ref 9.4–12.3)
POTASSIUM SERPL-SCNC: 3.7 MMOL/L (ref 3.5–5.1)
RBC # BLD AUTO: 2.6 M/UL (ref 4.23–5.6)
WBC # BLD AUTO: 6.2 K/UL (ref 4.3–11.1)

## 2025-06-30 PROCEDURE — 6370000000 HC RX 637 (ALT 250 FOR IP): Performed by: PHYSICIAN ASSISTANT

## 2025-06-30 PROCEDURE — 6370000000 HC RX 637 (ALT 250 FOR IP): Performed by: THORACIC SURGERY (CARDIOTHORACIC VASCULAR SURGERY)

## 2025-06-30 PROCEDURE — 97530 THERAPEUTIC ACTIVITIES: CPT

## 2025-06-30 PROCEDURE — 36415 COLL VENOUS BLD VENIPUNCTURE: CPT

## 2025-06-30 PROCEDURE — 85027 COMPLETE CBC AUTOMATED: CPT

## 2025-06-30 PROCEDURE — 6370000000 HC RX 637 (ALT 250 FOR IP): Performed by: STUDENT IN AN ORGANIZED HEALTH CARE EDUCATION/TRAINING PROGRAM

## 2025-06-30 PROCEDURE — 94640 AIRWAY INHALATION TREATMENT: CPT

## 2025-06-30 PROCEDURE — 83735 ASSAY OF MAGNESIUM: CPT

## 2025-06-30 PROCEDURE — 71046 X-RAY EXAM CHEST 2 VIEWS: CPT

## 2025-06-30 PROCEDURE — 2140000001 HC CVICU INTERMEDIATE R&B

## 2025-06-30 PROCEDURE — 99232 SBSQ HOSP IP/OBS MODERATE 35: CPT | Performed by: INTERNAL MEDICINE

## 2025-06-30 PROCEDURE — 84132 ASSAY OF SERUM POTASSIUM: CPT

## 2025-06-30 PROCEDURE — 6360000002 HC RX W HCPCS: Performed by: PHYSICIAN ASSISTANT

## 2025-06-30 PROCEDURE — 2500000003 HC RX 250 WO HCPCS: Performed by: PHYSICIAN ASSISTANT

## 2025-06-30 PROCEDURE — 6370000000 HC RX 637 (ALT 250 FOR IP): Performed by: INTERNAL MEDICINE

## 2025-06-30 PROCEDURE — 94760 N-INVAS EAR/PLS OXIMETRY 1: CPT

## 2025-06-30 RX ORDER — METOPROLOL SUCCINATE 25 MG/1
25 TABLET, EXTENDED RELEASE ORAL DAILY
Status: DISCONTINUED | OUTPATIENT
Start: 2025-06-30 | End: 2025-07-02 | Stop reason: HOSPADM

## 2025-06-30 RX ORDER — GUAIFENESIN 600 MG/1
600 TABLET, EXTENDED RELEASE ORAL 2 TIMES DAILY
Status: DISCONTINUED | OUTPATIENT
Start: 2025-06-30 | End: 2025-07-02 | Stop reason: HOSPADM

## 2025-06-30 RX ADMIN — POTASSIUM CHLORIDE 10 MEQ: 750 TABLET, EXTENDED RELEASE ORAL at 10:00

## 2025-06-30 RX ADMIN — ACETAMINOPHEN 650 MG: 325 TABLET ORAL at 19:39

## 2025-06-30 RX ADMIN — ASPIRIN 81 MG: 81 TABLET ORAL at 09:59

## 2025-06-30 RX ADMIN — ARFORMOTEROL TARTRATE: 15 SOLUTION RESPIRATORY (INHALATION) at 08:01

## 2025-06-30 RX ADMIN — SODIUM CHLORIDE, PRESERVATIVE FREE 10 ML: 5 INJECTION INTRAVENOUS at 11:08

## 2025-06-30 RX ADMIN — SENNOSIDES 8.6 MG: 8.6 TABLET, FILM COATED ORAL at 21:34

## 2025-06-30 RX ADMIN — METOPROLOL SUCCINATE 25 MG: 25 TABLET, EXTENDED RELEASE ORAL at 11:06

## 2025-06-30 RX ADMIN — ACETAMINOPHEN 650 MG: 325 TABLET ORAL at 06:27

## 2025-06-30 RX ADMIN — GABAPENTIN 100 MG: 100 CAPSULE ORAL at 21:34

## 2025-06-30 RX ADMIN — TAMSULOSIN HYDROCHLORIDE 0.4 MG: 0.4 CAPSULE ORAL at 21:34

## 2025-06-30 RX ADMIN — Medication 1 AMPULE: at 09:58

## 2025-06-30 RX ADMIN — SENNOSIDES 8.6 MG: 8.6 TABLET, FILM COATED ORAL at 09:58

## 2025-06-30 RX ADMIN — GUAIFENESIN 600 MG: 600 TABLET ORAL at 09:59

## 2025-06-30 RX ADMIN — AMIODARONE HYDROCHLORIDE 200 MG: 200 TABLET ORAL at 21:35

## 2025-06-30 RX ADMIN — AMIODARONE HYDROCHLORIDE 200 MG: 200 TABLET ORAL at 09:59

## 2025-06-30 RX ADMIN — POTASSIUM CHLORIDE 20 MEQ: 1500 TABLET, EXTENDED RELEASE ORAL at 11:06

## 2025-06-30 RX ADMIN — GUAIFENESIN 600 MG: 600 TABLET ORAL at 21:34

## 2025-06-30 RX ADMIN — FUROSEMIDE 40 MG: 40 TABLET ORAL at 11:06

## 2025-06-30 RX ADMIN — ATORVASTATIN CALCIUM 40 MG: 40 TABLET, FILM COATED ORAL at 21:34

## 2025-06-30 RX ADMIN — Medication 1 AMPULE: at 21:34

## 2025-06-30 RX ADMIN — PRAMIPEXOLE DIHYDROCHLORIDE 0.5 MG: 0.5 TABLET ORAL at 23:27

## 2025-06-30 RX ADMIN — FAMOTIDINE 20 MG: 20 TABLET, FILM COATED ORAL at 21:34

## 2025-06-30 RX ADMIN — FAMOTIDINE 20 MG: 20 TABLET, FILM COATED ORAL at 09:58

## 2025-06-30 RX ADMIN — ARFORMOTEROL TARTRATE: 15 SOLUTION RESPIRATORY (INHALATION) at 19:56

## 2025-06-30 RX ADMIN — POLYETHYLENE GLYCOL 3350 17 G: 17 POWDER, FOR SOLUTION ORAL at 09:58

## 2025-06-30 RX ADMIN — ACETAMINOPHEN 650 MG: 325 TABLET ORAL at 11:43

## 2025-06-30 RX ADMIN — ACETAMINOPHEN 650 MG: 325 TABLET ORAL at 23:27

## 2025-06-30 ASSESSMENT — PAIN SCALES - GENERAL
PAINLEVEL_OUTOF10: 0
PAINLEVEL_OUTOF10: 0

## 2025-06-30 NOTE — PROGRESS NOTES
ACUTE PHYSICAL THERAPY GOALS:   (Developed with and agreed upon by patient and/or caregiver.)  LTG:  (1.)Mr. Orellana will move from supine to sit and sit to supine , scoot up and down, and roll side to side in bed with MODIFIED INDEPENDENCE within 7 treatment day(s).    (2.)Mr. Orellana will transfer from bed to chair and chair to bed with SUPERVISION using the least restrictive device within 7 treatment day(s).    (3.)Mr. Orellana will ambulate with STAND BY ASSIST for 250 feet with the least restrictive device within 7 treatment day(s).  (4.)Mr. Orellana will participate in therapeutic activity/exercises x 25 minutes for increased activity tolerance within 7 treatment days.    PHYSICAL THERAPY: Daily Note PM   (Link to Caseload Tracking: PT Visit Days : 3  Time In/Out PT Charge Capture  Rehab Caseload Tracker  Orders    Pritesh Orellana is a 80 y.o. male   PRIMARY DIAGNOSIS: S/P CABG x 4  Accelerating angina (HCC) [I20.0]  Unstable angina (HCC) [I20.0]  CAD in native artery [I25.10]  Procedure(s) (LRB):  CORONARY ARTERY BYPASS GRAFTING (CABG X4) LIMA, LEFT LEG ENDOSCOPIC SAPHENOUS VEIN HARVEST / LEFT ATRIAL APPENDAGE CLIP (N/A)  TRANSESOPHAGEAL ECHOCARDIOGRAM (N/A)  4 Days Post-Op  Inpatient: Payor: MEDICARE / Plan: MEDICARE PART A AND B / Product Type: *No Product type* /     ASSESSMENT:     REHAB RECOMMENDATIONS:   Recommendation to date pending progress:  Setting:  Short-term Rehab    Equipment:    To Be Determined     ASSESSMENT:  Mr. Orellana was up in chair on contact and agreeable to work with therapy. The PT session this AM focused on therapeutic activities including ambulation, sit to stand transfer, and toilet transfers. Pt needed minimal assistance to stand and CGA for amb with RW and cues for posture.On return to room after amb, pt requested to use the BSC.  Pt did better today showing progress toward goals demonstrated by increase in Amb distance and requiring less assistance.

## 2025-06-30 NOTE — PROGRESS NOTES
ACUTE PHYSICAL THERAPY GOALS:   (Developed with and agreed upon by patient and/or caregiver.)  LTG:  (1.)Mr. Orellana will move from supine to sit and sit to supine , scoot up and down, and roll side to side in bed with MODIFIED INDEPENDENCE within 7 treatment day(s).    (2.)Mr. Orellana will transfer from bed to chair and chair to bed with SUPERVISION using the least restrictive device within 7 treatment day(s).    (3.)Mr. Orellana will ambulate with STAND BY ASSIST for 250 feet with the least restrictive device within 7 treatment day(s).  (4.)Mr. Orellana will participate in therapeutic activity/exercises x 25 minutes for increased activity tolerance within 7 treatment days.    PHYSICAL THERAPY: Daily Note AM   (Link to Caseload Tracking: PT Visit Days : 3  Time In/Out PT Charge Capture  Rehab Caseload Tracker  Orders    Pritesh Orellana is a 80 y.o. male   PRIMARY DIAGNOSIS: S/P CABG x 4  Accelerating angina (HCC) [I20.0]  Unstable angina (HCC) [I20.0]  CAD in native artery [I25.10]  Procedure(s) (LRB):  CORONARY ARTERY BYPASS GRAFTING (CABG X4) LIMA, LEFT LEG ENDOSCOPIC SAPHENOUS VEIN HARVEST / LEFT ATRIAL APPENDAGE CLIP (N/A)  TRANSESOPHAGEAL ECHOCARDIOGRAM (N/A)  4 Days Post-Op  Inpatient: Payor: MEDICARE / Plan: MEDICARE PART A AND B / Product Type: *No Product type* /     ASSESSMENT:     REHAB RECOMMENDATIONS:   Recommendation to date pending progress:  Setting:  Short-term Rehab    Equipment:    To Be Determined     ASSESSMENT:  Mr. Orellana was up in chair on contact and agreeable to work with therapy. The PT session today focused on therapeutic activities including ambulation, sit to stand transfer, and therapeutic exercise. Pt needed minimal assistance to stand and CGA for amb with RW and cues for posture. Pt did better today showing progress toward goals demonstrated by increase in Amb distance and requiring less assistance.  Pt still functioning below their baseline with deficits  1028  Minutes: 23    JULIANA HE, PTA

## 2025-06-30 NOTE — CARE COORDINATION
CM reviewed chart and spoke to PA regarding case.     [facility] referral sent by PA. Awaiting response at this time.

## 2025-06-30 NOTE — PROGRESS NOTES
Today's Date: 6/30/2025  Date of Admission: 6/24/2025    POD 4 Days Post-Op    Chart Reviewed.  Subjective:     Patient feels better, he feels a little stronger today. Appetite fair.     Medications Reviewed.    Objective:       Vitals:    06/29/25 2336 06/30/25 0258 06/30/25 0715 06/30/25 0803   BP: 108/61 (!) 94/55  118/71   Pulse: 89 85  86   Resp: 20 18 18 24   Temp: 98.6 °F (37 °C) 99 °F (37.2 °C) 98.4 °F (36.9 °C) 98.2 °F (36.8 °C)   TempSrc: Oral Oral Oral Oral   SpO2: 95% 95%  95%   Weight:       Height:           Intake and Output  Current Shift: No intake/output data recorded.   Last 3 Shifts: 06/28 1901 - 06/30 0700  In: 250 [P.O.:250]  Out: 1975 [Urine:1975]    Physical Exam:  General: Well Developed, Well Nourished, No Acute Distress, Alert & Oriented x 3, Appropriate mood  Neck: supple, no JVD  Heart: S1S2 with RRR without murmurs or gallops  Lungs: Clear throughout auscultation bilaterally without adventitious sounds  Abd: soft, nontender, nondistended, with good bowel sounds  Ext: no edema bilaterally  Sternal incision: wound vac in place   Skin: warm and dry    LABS  Data Review:   Recent Labs     06/28/25  0648 06/29/25  0349 06/30/25  0404    135*  --    K 3.9 4.1 3.7   MG 2.1 2.2 1.9   BUN 17 21  --    WBC 7.8 8.1 6.2   HGB 8.2* 8.2* 8.3*   HCT 25.0* 25.1* 24.8*   * 135* 188       Estimated Creatinine Clearance: 71 mL/min (based on SCr of 0.89 mg/dL).      Assessment/Plan:       *S/P CABG x 4  On ASA, BB, statin, no ACE-I/ARB due to low BP, stable on room air, pacing wires removed, continue PT, OT consulted     Active Hospital Problems    Hypertension  BP has been low post op, continue low dose BB as tolerated       Hyperlipidemia  Continue statin       Moderate persistent asthma without complication  No wheezing on exam       Pleural effusion  Trace on repeat chest xray       Postoperative anemia due to acute blood loss  Hgb/Hct stable       Hypoxemia  Resolved

## 2025-06-30 NOTE — PLAN OF CARE
Problem: Safety - Adult  Goal: Free from fall injury  6/29/2025 2100 by Carmen Salmeron RN  Outcome: Progressing  Flowsheets (Taken 6/29/2025 1930)  Free From Fall Injury: Instruct family/caregiver on patient safety  6/29/2025 1455 by Hayley Camarena RN  Outcome: Progressing     Problem: Discharge Planning  Goal: Discharge to home or other facility with appropriate resources  6/29/2025 2100 by Carmen Salmeron RN  Outcome: Progressing  Flowsheets (Taken 6/29/2025 1930)  Discharge to home or other facility with appropriate resources: Identify barriers to discharge with patient and caregiver  6/29/2025 1455 by Hayley Camarena RN  Outcome: Progressing     Problem: Skin/Tissue Integrity  Goal: Skin integrity remains intact  Description: 1.  Monitor for areas of redness and/or skin breakdown  2.  Assess vascular access sites hourly  3.  Every 4-6 hours minimum:  Change oxygen saturation probe site  4.  Every 4-6 hours:  If on nasal continuous positive airway pressure, respiratory therapy assess nares and determine need for appliance change or resting period  6/29/2025 2100 by Carmen Salmeron RN  Outcome: Progressing  Flowsheets (Taken 6/29/2025 1930)  Skin Integrity Remains Intact:   Monitor for areas of redness and/or skin breakdown   Turn and reposition as indicated  6/29/2025 1455 by Hayley Camarena RN  Outcome: Progressing  Flowsheets (Taken 6/29/2025 0715 by Saba Mcduffie, RN)  Skin Integrity Remains Intact: Monitor for areas of redness and/or skin breakdown     Problem: Respiratory - Adult  Goal: Achieves optimal ventilation and oxygenation  6/29/2025 2100 by Carmen Salmeron RN  Outcome: Progressing  6/29/2025 1455 by Hayley Camarena RN  Outcome: Progressing  6/29/2025 0758 by Pat Dominguez RCP  Outcome: Progressing  Flowsheets (Taken 6/29/2025 0715 by Saba Mcduffie, RN)  Achieves optimal ventilation and oxygenation:   Assess for changes in respiratory status   Assess for changes in mentation and

## 2025-06-30 NOTE — PROGRESS NOTES
[facility] CARDIOLOGY PROGRESS NOTE           6/30/2025 7:58 AM    Admit Date: 6/24/2025      Subjective:   Patient denies any active complaints.  Back from having chest x-ray earlier today.  Results pending.  Appears to be in sinus rhythm.  Hemodynamically stable.  On low-dose Lopressor 12.5 mg twice daily.    ROS:  Cardiovascular:  As noted above    Objective:      Vitals:    06/29/25 2301 06/29/25 2336 06/30/25 0258 06/30/25 0715   BP: 103/64 108/61 (!) 94/55    Pulse: 84 89 85    Resp: 19 20 18 18   Temp: 99.2 °F (37.3 °C) 98.6 °F (37 °C) 99 °F (37.2 °C) 98.4 °F (36.9 °C)   TempSrc: Temporal Oral Oral Oral   SpO2: 93% 95% 95%    Weight:       Height:           Physical Exam:  General-elderly white male in no acute distress  Neck- supple, no JVD  CV- regular rate and rhythm no MRG  Lung- clear bilaterally  Abd- soft, nontender, nondistended  Ext- no edema bilaterally.  Skin- warm and dry      Data Review:   Recent Labs     06/30/25  0404 06/29/25  0349 06/28/25  0648   WBC 6.2 8.1 7.8   HGB 8.3* 8.2* 8.2*   HCT 24.8* 25.1* 25.0*   MCV 95.4 96.9 95.1    135* 122*       Recent Labs     06/30/25  0404 06/29/25  0349 06/26/25  0505 06/25/25  0502   NA  --  135*   < > 136   K 3.7 4.1   < > 3.9   CL  --  103   < > 102   CO2  --  21   < > 26   BUN  --  21   < > 17   CREATININE  --  0.89   < > 0.76*   GLUCOSE  --  103*   < > 88   CALCIUM  --  8.6*   < > 9.0   BILITOT  --   --   --  0.3   ALKPHOS  --   --   --  44   AST  --   --   --  26   ALT  --   --   --  24   LABGLOM  --  87   < > >90   GLOB  --   --   --  2.8    < > = values in this interval not displayed.       No results for input(s): \"CKTOTAL\", \"CKMB\", \"CKMBINDEX\", \"DDIMER\", \"TROPONINI\" in the last 720 hours.    Echo (5/5/25):    Left Ventricle: Mildly reduced left ventricular systolic function with a visually estimated EF of 45 - 50%. Left ventricle size is normal. Moderately increased wall thickness. Findings consistent with moderate

## 2025-06-30 NOTE — PROGRESS NOTES
Pritesh Polk Sells  Admission Date: 6/24/2025         Daily Progress Note: 6/30/2025      Background: 80 y.o male with PMH of asthma (patient of [facility] managed with Wixela 500), HTN, prostate CA, RLS who is s/p CABG x4 on 6/26. Required 1 unit PRBC post op.    Subjective:     Now on room air. States he feels like he is a little congested. Is coughing and has sputum production but he is unsure the appearance of secretions.     Current Facility-Administered Medications   Medication Dose Route Frequency    metoprolol succinate (TOPROL XL) extended release tablet 25 mg  25 mg Oral Daily    senna (SENOKOT) tablet 8.6 mg  1 tablet Oral BID    oxyCODONE-acetaminophen (PERCOCET) 5-325 MG per tablet 1 tablet  1 tablet Oral Q4H PRN    traMADol (ULTRAM) tablet 50 mg  50 mg Oral Q6H PRN    pramipexole (MIRAPEX) tablet 0.5 mg  0.5 mg Oral Nightly    arformoterol 15 mcg-budesonide 1 mg neb solution   Nebulization BID RT    sodium chloride flush 0.9 % injection 5-40 mL  5-40 mL IntraVENous 2 times per day    sodium chloride flush 0.9 % injection 5-40 mL  5-40 mL IntraVENous PRN    ondansetron (ZOFRAN-ODT) disintegrating tablet 4 mg  4 mg Oral Q8H PRN    Or    ondansetron (ZOFRAN) injection 4 mg  4 mg IntraVENous Q6H PRN    furosemide (LASIX) tablet 40 mg  40 mg Oral Daily    potassium chloride (KLOR-CON M) extended release tablet 10 mEq  10 mEq Oral Daily    polyethylene glycol (GLYCOLAX) packet 17 g  17 g Oral Daily    magnesium hydroxide (MILK OF MAGNESIA) 400 MG/5ML suspension 30 mL  30 mL Oral Daily PRN    famotidine (PEPCID) tablet 20 mg  20 mg Oral BID    Or    famotidine (PEPCID) 20 MG/2ML 20 mg in sodium chloride (PF) 0.9 % 10 mL injection  20 mg IntraVENous BID    glucose chewable tablet 16 g  4 tablet Oral PRN    dextrose bolus 10% 125 mL  125 mL IntraVENous PRN    Or    dextrose bolus 10% 250 mL  250 mL IntraVENous PRN    dextrose 10 % infusion   IntraVENous Continuous PRN    magnesium oxide (MAG-OX)  independent interpretation of the patient's images.  CXR:   6/30 PA/LAT    6/29 6/28      LAB:  Recent Labs     06/28/25  0648 06/29/25  0349 06/30/25  0404   WBC 7.8 8.1 6.2   HGB 8.2* 8.2* 8.3*   HCT 25.0* 25.1* 24.8*   * 135* 188     Recent Labs     06/28/25  0648 06/29/25  0349 06/30/25  0404    135*  --    K 3.9 4.1 3.7    103  --    CO2 22 21  --    BUN 17 21  --    CREATININE 0.78* 0.89  --    MG 2.1 2.2 1.9     No results for input(s): \"TROPHS\", \"NTPROBNP\", \"CRP\", \"ESR\" in the last 72 hours.  Recent Labs     06/28/25  0648 06/29/25  0349   GLUCOSE 134* 103*      Microbiology:   No results for input(s): \"CULTURE\" in the last 72 hours.  No results for input(s): \"COVID19\" in the last 72 hours.  ECHO: 05/02/25    ECHO (TTE) COMPLETE (PRN CONTRAST/BUBBLE/STRAIN/3D) 05/05/2025  7:54 PM (Final)    Interpretation Summary    Left Ventricle: Mildly reduced left ventricular systolic function with a visually estimated EF of 45 - 50%. Left ventricle size is normal. Moderately increased wall thickness. Findings consistent with moderate concentric hypertrophy. Mild global hypokinesis present. Grade I diastolic dysfunction. E/E' Ratio (Averaged) is 11.81.    Mitral Valve: Mild to moderate regurgitation with a centrally directed jet.    Left Atrium: Left atrium is moderately dilated. LA Volume Index BP is 46 ml/m2.    Signed by: Steven Hernandez MD on 5/5/2025  7:54 PM    Assessment and Plan:  (Medical Decision Making)   Impression: 80 y.o male s/p CABG X4 on 6/26    Principal Problem:    S/P CABG x 4  Plan: wound vac in place to mid-sternal incision, continue ambulation, IS practice, cough/deep breathe; pain management per CV surgery    Active Problems:    Moderate persistent asthma without complication  Plan: no wheezing; continue nebs to replace Wixela; add mucinex to thin secretions     Hypoxemia  Plan: weaned to RA sat >95%    Atelectasis  Plan: bilateral pleural effusions with atelectasis

## 2025-06-30 NOTE — PLAN OF CARE
Problem: Respiratory - Adult  Goal: Achieves optimal ventilation and oxygenation  6/30/2025 0804 by Flores Rojas, RCKALINA  Outcome: Progressing  Flowsheets (Taken 6/30/2025 0804)  Achieves optimal ventilation and oxygenation:   Assess for changes in mentation and behavior   Oxygen supplementation based on oxygen saturation or arterial blood gases   Encourage broncho-pulmonary hygiene including cough, deep breathe, incentive spirometry   Assess and instruct to report shortness of breath or any respiratory difficulty   Respiratory therapy support as indicated   Assess for changes in respiratory status

## 2025-07-01 ENCOUNTER — APPOINTMENT (OUTPATIENT)
Dept: GENERAL RADIOLOGY | Age: 80
DRG: 234 | End: 2025-07-01
Attending: INTERNAL MEDICINE
Payer: MEDICARE

## 2025-07-01 LAB
ERYTHROCYTE [DISTWIDTH] IN BLOOD BY AUTOMATED COUNT: 12.9 % (ref 11.9–14.6)
HCT VFR BLD AUTO: 23.1 % (ref 41.1–50.3)
HGB BLD-MCNC: 7.8 G/DL (ref 13.6–17.2)
MAGNESIUM SERPL-MCNC: 1.9 MG/DL (ref 1.8–2.4)
MCH RBC QN AUTO: 32.1 PG (ref 26.1–32.9)
MCHC RBC AUTO-ENTMCNC: 33.8 G/DL (ref 31.4–35)
MCV RBC AUTO: 95.1 FL (ref 82–102)
NRBC # BLD: 0 K/UL (ref 0–0.2)
PLATELET # BLD AUTO: 195 K/UL (ref 150–450)
PMV BLD AUTO: 9.3 FL (ref 9.4–12.3)
POTASSIUM SERPL-SCNC: 3.7 MMOL/L (ref 3.5–5.1)
RBC # BLD AUTO: 2.43 M/UL (ref 4.23–5.6)
WBC # BLD AUTO: 5.7 K/UL (ref 4.3–11.1)

## 2025-07-01 PROCEDURE — 2500000003 HC RX 250 WO HCPCS: Performed by: PHYSICIAN ASSISTANT

## 2025-07-01 PROCEDURE — 6360000002 HC RX W HCPCS: Performed by: PHYSICIAN ASSISTANT

## 2025-07-01 PROCEDURE — 97535 SELF CARE MNGMENT TRAINING: CPT

## 2025-07-01 PROCEDURE — 83735 ASSAY OF MAGNESIUM: CPT

## 2025-07-01 PROCEDURE — 85027 COMPLETE CBC AUTOMATED: CPT

## 2025-07-01 PROCEDURE — 6370000000 HC RX 637 (ALT 250 FOR IP): Performed by: PHYSICIAN ASSISTANT

## 2025-07-01 PROCEDURE — 86923 COMPATIBILITY TEST ELECTRIC: CPT

## 2025-07-01 PROCEDURE — 94760 N-INVAS EAR/PLS OXIMETRY 1: CPT

## 2025-07-01 PROCEDURE — 94640 AIRWAY INHALATION TREATMENT: CPT

## 2025-07-01 PROCEDURE — 6370000000 HC RX 637 (ALT 250 FOR IP): Performed by: THORACIC SURGERY (CARDIOTHORACIC VASCULAR SURGERY)

## 2025-07-01 PROCEDURE — 97530 THERAPEUTIC ACTIVITIES: CPT

## 2025-07-01 PROCEDURE — 97165 OT EVAL LOW COMPLEX 30 MIN: CPT

## 2025-07-01 PROCEDURE — 86850 RBC ANTIBODY SCREEN: CPT

## 2025-07-01 PROCEDURE — 2140000001 HC CVICU INTERMEDIATE R&B

## 2025-07-01 PROCEDURE — 36415 COLL VENOUS BLD VENIPUNCTURE: CPT

## 2025-07-01 PROCEDURE — 84132 ASSAY OF SERUM POTASSIUM: CPT

## 2025-07-01 PROCEDURE — 86900 BLOOD TYPING SEROLOGIC ABO: CPT

## 2025-07-01 PROCEDURE — 71045 X-RAY EXAM CHEST 1 VIEW: CPT

## 2025-07-01 PROCEDURE — 97110 THERAPEUTIC EXERCISES: CPT

## 2025-07-01 PROCEDURE — 99232 SBSQ HOSP IP/OBS MODERATE 35: CPT | Performed by: INTERNAL MEDICINE

## 2025-07-01 PROCEDURE — 86901 BLOOD TYPING SEROLOGIC RH(D): CPT

## 2025-07-01 PROCEDURE — 6370000000 HC RX 637 (ALT 250 FOR IP): Performed by: INTERNAL MEDICINE

## 2025-07-01 PROCEDURE — 6370000000 HC RX 637 (ALT 250 FOR IP): Performed by: STUDENT IN AN ORGANIZED HEALTH CARE EDUCATION/TRAINING PROGRAM

## 2025-07-01 RX ORDER — ACETAMINOPHEN 325 MG/1
650 TABLET ORAL 4 TIMES DAILY
Status: DISCONTINUED | OUTPATIENT
Start: 2025-07-01 | End: 2025-07-02 | Stop reason: HOSPADM

## 2025-07-01 RX ADMIN — PRAMIPEXOLE DIHYDROCHLORIDE 0.5 MG: 0.5 TABLET ORAL at 19:54

## 2025-07-01 RX ADMIN — Medication 1 AMPULE: at 09:52

## 2025-07-01 RX ADMIN — AMIODARONE HYDROCHLORIDE 200 MG: 200 TABLET ORAL at 09:51

## 2025-07-01 RX ADMIN — POTASSIUM CHLORIDE 20 MEQ: 1500 TABLET, EXTENDED RELEASE ORAL at 17:55

## 2025-07-01 RX ADMIN — ACETAMINOPHEN 650 MG: 325 TABLET ORAL at 19:54

## 2025-07-01 RX ADMIN — POTASSIUM CHLORIDE 20 MEQ: 1500 TABLET, EXTENDED RELEASE ORAL at 17:54

## 2025-07-01 RX ADMIN — GUAIFENESIN 600 MG: 600 TABLET ORAL at 19:55

## 2025-07-01 RX ADMIN — ACETAMINOPHEN 650 MG: 325 TABLET ORAL at 17:54

## 2025-07-01 RX ADMIN — FAMOTIDINE 20 MG: 20 TABLET, FILM COATED ORAL at 09:51

## 2025-07-01 RX ADMIN — Medication 1 AMPULE: at 19:55

## 2025-07-01 RX ADMIN — TAMSULOSIN HYDROCHLORIDE 0.4 MG: 0.4 CAPSULE ORAL at 19:55

## 2025-07-01 RX ADMIN — ATORVASTATIN CALCIUM 40 MG: 40 TABLET, FILM COATED ORAL at 19:54

## 2025-07-01 RX ADMIN — ACETAMINOPHEN 650 MG: 325 TABLET ORAL at 14:18

## 2025-07-01 RX ADMIN — AMIODARONE HYDROCHLORIDE 200 MG: 200 TABLET ORAL at 19:54

## 2025-07-01 RX ADMIN — SODIUM CHLORIDE, PRESERVATIVE FREE 10 ML: 5 INJECTION INTRAVENOUS at 10:01

## 2025-07-01 RX ADMIN — ARFORMOTEROL TARTRATE: 15 SOLUTION RESPIRATORY (INHALATION) at 10:05

## 2025-07-01 RX ADMIN — GABAPENTIN 100 MG: 100 CAPSULE ORAL at 19:54

## 2025-07-01 RX ADMIN — SODIUM CHLORIDE, PRESERVATIVE FREE 10 ML: 5 INJECTION INTRAVENOUS at 19:57

## 2025-07-01 RX ADMIN — ARFORMOTEROL TARTRATE: 15 SOLUTION RESPIRATORY (INHALATION) at 19:27

## 2025-07-01 RX ADMIN — Medication 6 MG: at 19:54

## 2025-07-01 RX ADMIN — GUAIFENESIN 600 MG: 600 TABLET ORAL at 09:51

## 2025-07-01 RX ADMIN — ASPIRIN 81 MG: 81 TABLET ORAL at 09:50

## 2025-07-01 RX ADMIN — FAMOTIDINE 20 MG: 20 TABLET, FILM COATED ORAL at 19:54

## 2025-07-01 RX ADMIN — METOPROLOL SUCCINATE 25 MG: 25 TABLET, EXTENDED RELEASE ORAL at 09:51

## 2025-07-01 RX ADMIN — ACETAMINOPHEN 650 MG: 325 TABLET ORAL at 07:04

## 2025-07-01 NOTE — THERAPY EVALUATION
ACUTE OCCUPATIONAL THERAPY GOALS:   (Developed with and agreed upon by patient and/or caregiver.)  1. Patient will complete lower body bathing and dressing with STAND BY ASSIST and adaptive equipment as needed.     2. Patient will complete toileting with STAND BY ASSIST.  3. Patient will complete grooming ADL standing edge of sink with SUPERVISION.  4. Patient will tolerate 25 minutes of OT treatment with 1-2 rest breaks to increase activity tolerance for ADLs.   5. Patient will complete functional transfers with STAND BY ASSIST and adaptive equipment as needed.   6. Patient will adhere to sternal precautions during functional task with 100% accuracy and no cueing from therapist.     Timeframe: 7 visits      OCCUPATIONAL THERAPY Initial Assessment, Daily Note, and AM       OT Visit Days: 1  Acknowledge Orders  Time  OT Charge Capture  Rehab Caseload Tracker      Pritesh Orellana is a 80 y.o. male   PRIMARY DIAGNOSIS: S/P CABG x 4  Accelerating angina (HCC) [I20.0]  Unstable angina (HCC) [I20.0]  CAD in native artery [I25.10]  Procedure(s) (LRB):  CORONARY ARTERY BYPASS GRAFTING (CABG X4) LIMA, LEFT LEG ENDOSCOPIC SAPHENOUS VEIN HARVEST / LEFT ATRIAL APPENDAGE CLIP (N/A)  TRANSESOPHAGEAL ECHOCARDIOGRAM (N/A)  5 Days Post-Op  Reason for Referral: Generalized Muscle Weakness (M62.81)  Other lack of cordination (R27.8)  Difficulty in walking, Not elsewhere classified (R26.2)  Inpatient: Payor: MEDICARE / Plan: MEDICARE PART A AND B / Product Type: *No Product type* /     ASSESSMENT:     REHAB RECOMMENDATIONS:   Recommendation to date pending progress:  Setting:  Continued occupational therapy recommended at discharge    Equipment:    To Be Determined     ASSESSMENT:  Mr. Orellana is a 81 y/o female presents POD 5 CABG x 4. At baseline pt is independent in ADLs and short distance mobility, modified independent for community distances using cane. Today, pt demonstrates impairments in strength, balance,

## 2025-07-01 NOTE — PROGRESS NOTES
Cardiac Rehab: Spoke with patient regarding referral to cardiac rehab. Patient meets admission criteria based on CABGx4 (6/26/25).  Written information about Cardiac Rehab given and reviewed with patient. Discussed lifestyle modifications to promote cardiac wellness. Patient indicated that he wants to participate in the cardiac rehab program and his orientation appt has been scheduled for the [facility] Cardiac Rehab program. His Cardiologist is Dr. Vargas.      Thank you,  Cleo Freeman, RN, BSN  Cardiopulmonary Rehabilitation Nurse Liaison  [facility]

## 2025-07-01 NOTE — PROGRESS NOTES
ACUTE PHYSICAL THERAPY GOALS:   (Developed with and agreed upon by patient and/or caregiver.)  LTG:  (1.)Mr. Orellana will move from supine to sit and sit to supine , scoot up and down, and roll side to side in bed with MODIFIED INDEPENDENCE within 7 treatment day(s).    (2.)Mr. Orellana will transfer from bed to chair and chair to bed with SUPERVISION using the least restrictive device within 7 treatment day(s).    (3.)Mr. Orellana will ambulate with STAND BY ASSIST for 250 feet with the least restrictive device within 7 treatment day(s).  (4.)Mr. Orellana will participate in therapeutic activity/exercises x 25 minutes for increased activity tolerance within 7 treatment days.    PHYSICAL THERAPY: Daily Note AM   (Link to Caseload Tracking: PT Visit Days : 4  Time In/Out PT Charge Capture  Rehab Caseload Tracker  Orders    Pritesh Orellana is a 80 y.o. male   PRIMARY DIAGNOSIS: S/P CABG x 4  Accelerating angina (HCC) [I20.0]  Unstable angina (HCC) [I20.0]  CAD in native artery [I25.10]  Procedure(s) (LRB):  CORONARY ARTERY BYPASS GRAFTING (CABG X4) LIMA, LEFT LEG ENDOSCOPIC SAPHENOUS VEIN HARVEST / LEFT ATRIAL APPENDAGE CLIP (N/A)  TRANSESOPHAGEAL ECHOCARDIOGRAM (N/A)  5 Days Post-Op  Inpatient: Payor: MEDICARE / Plan: MEDICARE PART A AND B / Product Type: *No Product type* /     ASSESSMENT:     REHAB RECOMMENDATIONS:   Recommendation to date pending progress:  Setting:  Short-term Rehab    Equipment:    To Be Determined     ASSESSMENT:  Mr. Orellana is sitting in the recliner and agreeable to therapy.   Wife and daughter at bedside.   Min assist to scoot to the edge of the chair.  Sit to stand with min assist to the walker using the momentum method, balance fair, gait training with rolling walker x 150 feet with slow domenico and several brief standing rest breaks.  Patient is returned to the recliner and after a rest break performs exercises, tolerated well.  Good session with progress

## 2025-07-01 NOTE — PROGRESS NOTES
[facility] PROGRESS NOTE           7/1/2025 7:48 AM    Admit Date: 6/24/2025      Subjective:   Patient denies any active complaints.  Sternotomy pain well controlled.  BP low but stable.     ROS:  Cardiovascular:  As noted above    Objective:      Vitals:    06/30/25 2325 07/01/25 0341 07/01/25 0415 07/01/25 0700   BP: (!) 107/57 (!) 97/59  120/63   Pulse: 93 83     Resp: 16 19  18   Temp: 98.5 °F (36.9 °C) 97.7 °F (36.5 °C)  98 °F (36.7 °C)   TempSrc: Oral Oral  Oral   SpO2: 96% 93%  95%   Weight:   91.4 kg (201 lb 8 oz)    Height:           Physical Exam:  General-elderly white male in no acute distress  Neck- supple, no JVD  CV- regular rate and rhythm no MRG  Lung- clear bilaterally  Abd- soft, nontender, nondistended  Ext- no edema bilaterally.  Skin- warm and dry      Data Review:   Recent Labs     07/01/25  0355 06/30/25  0404 06/29/25  0349   WBC 5.7 6.2 8.1   HGB 7.8* 8.3* 8.2*   HCT 23.1* 24.8* 25.1*   MCV 95.1 95.4 96.9    188 135*       Recent Labs     07/01/25  0355 06/30/25  0404 06/29/25  0349 06/26/25  0505 06/25/25  0502   NA  --   --  135*   < > 136   K 3.7   < > 4.1   < > 3.9   CL  --   --  103   < > 102   CO2  --   --  21   < > 26   BUN  --   --  21   < > 17   CREATININE  --   --  0.89   < > 0.76*   GLUCOSE  --   --  103*   < > 88   CALCIUM  --   --  8.6*   < > 9.0   BILITOT  --   --   --   --  0.3   ALKPHOS  --   --   --   --  44   AST  --   --   --   --  26   ALT  --   --   --   --  24   LABGLOM  --   --  87   < > >90   GLOB  --   --   --   --  2.8    < > = values in this interval not displayed.       No results for input(s): \"CKTOTAL\", \"CKMB\", \"CKMBINDEX\", \"DDIMER\", \"TROPONINI\" in the last 720 hours.    Echo (5/5/25):    Left Ventricle: Mildly reduced left ventricular systolic function with a visually estimated EF of 45 - 50%. Left ventricle size is normal. Moderately increased wall thickness. Findings consistent with moderate concentric hypertrophy. Mild

## 2025-07-01 NOTE — CARE COORDINATION
CM reviewed chart.     Referral status for IR is still pending at this time. CM called [facility]ana[facility] to try to expedite evaluation for patient.     CM will continue to follow for discharge planning needs.

## 2025-07-01 NOTE — CARE COORDINATION
CM spoke to Jhoana at [facility] who stated patient can be accepted to [facility] tomorrow.

## 2025-07-01 NOTE — PLAN OF CARE
Problem: Respiratory - Adult  Goal: Achieves optimal ventilation and oxygenation  7/1/2025 1006 by Stephanie Palma RCP  Outcome: Progressing  7/1/2025 0041 by Teressa Ashton RN  Outcome: Progressing  Flowsheets (Taken 6/30/2025 1937)  Achieves optimal ventilation and oxygenation: Assess for changes in respiratory status

## 2025-07-01 NOTE — PLAN OF CARE
Problem: Safety - Adult  Goal: Free from fall injury  7/1/2025 0041 by Teressa Ashton RN  Outcome: Progressing  Flowsheets (Taken 7/1/2025 0004)  Free From Fall Injury: Instruct family/caregiver on patient safety  6/30/2025 1201 by Raffaele White, RN  Outcome: Progressing     Problem: Discharge Planning  Goal: Discharge to home or other facility with appropriate resources  7/1/2025 0041 by Teressa Ashton RN  Outcome: Progressing  Flowsheets (Taken 6/30/2025 1937)  Discharge to home or other facility with appropriate resources:   Identify barriers to discharge with patient and caregiver   Arrange for needed discharge resources and transportation as appropriate  6/30/2025 1201 by Raffaele White, RN  Outcome: Progressing  Flowsheets (Taken 6/30/2025 0815)  Discharge to home or other facility with appropriate resources: Identify barriers to discharge with patient and caregiver     Problem: Skin/Tissue Integrity  Goal: Skin integrity remains intact  Description: 1.  Monitor for areas of redness and/or skin breakdown  2.  Assess vascular access sites hourly  3.  Every 4-6 hours minimum:  Change oxygen saturation probe site  4.  Every 4-6 hours:  If on nasal continuous positive airway pressure, respiratory therapy assess nares and determine need for appliance change or resting period  7/1/2025 0041 by Teressa Ashton RN  Outcome: Progressing  Flowsheets  Taken 7/1/2025 0004  Skin Integrity Remains Intact: Monitor for areas of redness and/or skin breakdown  Taken 6/30/2025 1937  Skin Integrity Remains Intact: Monitor for areas of redness and/or skin breakdown  6/30/2025 1201 by Raffaele White, RN  Outcome: Progressing  Flowsheets (Taken 6/30/2025 0815)  Skin Integrity Remains Intact: Monitor for areas of redness and/or skin breakdown     Problem: Respiratory - Adult  Goal: Achieves optimal ventilation and oxygenation  7/1/2025 0041 by Teressa Ashton RN  Outcome:  behavior and adheres to behavioral management agreement, if implemented: Assess patient/family’s coping skills and  non-compliant behavior (including use of illegal substances)     Problem: Depression/Self Harm  Goal: Effect of psychiatric condition will be minimized and patient will be protected from self harm  Description: INTERVENTIONS:  1. Assess impact of patient's symptoms on level of functioning, self care needs and offer support as indicated  2. Assess patient/family knowledge of depression, impact on illness and need for teaching  3. Provide emotional support, presence and reassurance  4. Assess for possible suicidal thoughts or ideation. If patient expresses suicidal thoughts or statements do not leave alone, initiate Suicide Precautions, move to a room close to the nursing station and obtain sitter  5. Initiate consults as appropriate with Mental Health Professional, Spiritual Care, Psychosocial CNS, and consider a recommendation to the [facility] for a Psychiatric Consultation  7/1/2025 0041 by Teressa Ashton, RN  Outcome: Progressing  6/30/2025 1201 by Raffaele White, RN  Outcome: Progressing  Flowsheets (Taken 6/30/2025 0815)  Effect of psychiatric condition will be minimized and patient will be protected from self harm: Assess impact of patient’s symptoms on level of functioning, self care needs and offer support as indicated

## 2025-07-02 ENCOUNTER — APPOINTMENT (OUTPATIENT)
Dept: GENERAL RADIOLOGY | Age: 80
DRG: 234 | End: 2025-07-02
Attending: INTERNAL MEDICINE
Payer: MEDICARE

## 2025-07-02 ENCOUNTER — HOSPITAL ENCOUNTER (INPATIENT)
Age: 80
DRG: 949 | End: 2025-07-02
Attending: STUDENT IN AN ORGANIZED HEALTH CARE EDUCATION/TRAINING PROGRAM | Admitting: STUDENT IN AN ORGANIZED HEALTH CARE EDUCATION/TRAINING PROGRAM
Payer: MEDICARE

## 2025-07-02 VITALS
WEIGHT: 202.16 LBS | HEART RATE: 80 BPM | SYSTOLIC BLOOD PRESSURE: 127 MMHG | DIASTOLIC BLOOD PRESSURE: 74 MMHG | RESPIRATION RATE: 18 BRPM | OXYGEN SATURATION: 96 % | BODY MASS INDEX: 31.73 KG/M2 | HEIGHT: 67 IN | TEMPERATURE: 97.7 F

## 2025-07-02 DIAGNOSIS — Z95.1 S/P CABG X 4: Primary | ICD-10-CM

## 2025-07-02 PROBLEM — R26.9 GAIT ABNORMALITY: Status: ACTIVE | Noted: 2025-07-02

## 2025-07-02 PROBLEM — I50.23 ACUTE ON CHRONIC HEART FAILURE WITH MILDLY REDUCED EJECTION FRACTION (HFMREF, 41-49%) (HCC): Status: ACTIVE | Noted: 2025-07-02

## 2025-07-02 PROBLEM — Z51.89 ENCOUNTER FOR OTHER SPECIFIED AFTERCARE: Status: RESOLVED | Noted: 2025-07-02 | Resolved: 2025-07-02

## 2025-07-02 PROBLEM — Z78.9 DECREASED ACTIVITIES OF DAILY LIVING (ADL): Status: ACTIVE | Noted: 2025-07-02

## 2025-07-02 PROBLEM — Z51.89 ENCOUNTER FOR OTHER SPECIFIED AFTERCARE: Status: ACTIVE | Noted: 2025-07-02

## 2025-07-02 PROBLEM — I20.0 ACCELERATING ANGINA (HCC): Status: RESOLVED | Noted: 2025-06-19 | Resolved: 2025-07-02

## 2025-07-02 LAB
ERYTHROCYTE [DISTWIDTH] IN BLOOD BY AUTOMATED COUNT: 12.9 % (ref 11.9–14.6)
HCT VFR BLD AUTO: 23 % (ref 41.1–50.3)
HGB BLD-MCNC: 7.6 G/DL (ref 13.6–17.2)
HISTORY CHECK: NORMAL
MAGNESIUM SERPL-MCNC: 1.9 MG/DL (ref 1.8–2.4)
MCH RBC QN AUTO: 31.3 PG (ref 26.1–32.9)
MCHC RBC AUTO-ENTMCNC: 33 G/DL (ref 31.4–35)
MCV RBC AUTO: 94.7 FL (ref 82–102)
NRBC # BLD: 0 K/UL (ref 0–0.2)
PLATELET # BLD AUTO: 235 K/UL (ref 150–450)
PMV BLD AUTO: 9.3 FL (ref 9.4–12.3)
POTASSIUM SERPL-SCNC: 4.2 MMOL/L (ref 3.5–5.1)
RBC # BLD AUTO: 2.43 M/UL (ref 4.23–5.6)
WBC # BLD AUTO: 6.5 K/UL (ref 4.3–11.1)

## 2025-07-02 PROCEDURE — P9016 RBC LEUKOCYTES REDUCED: HCPCS

## 2025-07-02 PROCEDURE — 99222 1ST HOSP IP/OBS MODERATE 55: CPT | Performed by: STUDENT IN AN ORGANIZED HEALTH CARE EDUCATION/TRAINING PROGRAM

## 2025-07-02 PROCEDURE — 36430 TRANSFUSION BLD/BLD COMPNT: CPT

## 2025-07-02 PROCEDURE — 97530 THERAPEUTIC ACTIVITIES: CPT

## 2025-07-02 PROCEDURE — 2500000003 HC RX 250 WO HCPCS: Performed by: STUDENT IN AN ORGANIZED HEALTH CARE EDUCATION/TRAINING PROGRAM

## 2025-07-02 PROCEDURE — 94760 N-INVAS EAR/PLS OXIMETRY 1: CPT

## 2025-07-02 PROCEDURE — 85027 COMPLETE CBC AUTOMATED: CPT

## 2025-07-02 PROCEDURE — 2500000003 HC RX 250 WO HCPCS: Performed by: PHYSICIAN ASSISTANT

## 2025-07-02 PROCEDURE — 36415 COLL VENOUS BLD VENIPUNCTURE: CPT

## 2025-07-02 PROCEDURE — 6370000000 HC RX 637 (ALT 250 FOR IP): Performed by: THORACIC SURGERY (CARDIOTHORACIC VASCULAR SURGERY)

## 2025-07-02 PROCEDURE — 6360000002 HC RX W HCPCS: Performed by: STUDENT IN AN ORGANIZED HEALTH CARE EDUCATION/TRAINING PROGRAM

## 2025-07-02 PROCEDURE — 84132 ASSAY OF SERUM POTASSIUM: CPT

## 2025-07-02 PROCEDURE — 6370000000 HC RX 637 (ALT 250 FOR IP): Performed by: STUDENT IN AN ORGANIZED HEALTH CARE EDUCATION/TRAINING PROGRAM

## 2025-07-02 PROCEDURE — 94640 AIRWAY INHALATION TREATMENT: CPT

## 2025-07-02 PROCEDURE — 6370000000 HC RX 637 (ALT 250 FOR IP): Performed by: INTERNAL MEDICINE

## 2025-07-02 PROCEDURE — 71045 X-RAY EXAM CHEST 1 VIEW: CPT

## 2025-07-02 PROCEDURE — 6370000000 HC RX 637 (ALT 250 FOR IP): Performed by: PHYSICIAN ASSISTANT

## 2025-07-02 PROCEDURE — 99232 SBSQ HOSP IP/OBS MODERATE 35: CPT | Performed by: INTERNAL MEDICINE

## 2025-07-02 PROCEDURE — 6360000002 HC RX W HCPCS: Performed by: PHYSICIAN ASSISTANT

## 2025-07-02 PROCEDURE — 1180000000 HC REHAB R&B

## 2025-07-02 PROCEDURE — 83735 ASSAY OF MAGNESIUM: CPT

## 2025-07-02 RX ORDER — SODIUM CHLORIDE 0.9 % (FLUSH) 0.9 %
5-40 SYRINGE (ML) INJECTION PRN
Status: CANCELLED | OUTPATIENT
Start: 2025-07-02

## 2025-07-02 RX ORDER — POLYETHYLENE GLYCOL 3350 17 G/17G
17 POWDER, FOR SOLUTION ORAL DAILY PRN
Status: ACTIVE | OUTPATIENT
Start: 2025-07-02

## 2025-07-02 RX ORDER — SODIUM PHOSPHATE, DIBASIC AND SODIUM PHOSPHATE, MONOBASIC 7; 19 G/230ML; G/230ML
1 ENEMA RECTAL DAILY PRN
Status: DISPENSED | OUTPATIENT
Start: 2025-07-02

## 2025-07-02 RX ORDER — TRAMADOL HYDROCHLORIDE 50 MG/1
50 TABLET ORAL EVERY 6 HOURS PRN
Status: CANCELLED | OUTPATIENT
Start: 2025-07-02

## 2025-07-02 RX ORDER — CALCIUM CARBONATE 500 MG/1
500 TABLET, CHEWABLE ORAL 3 TIMES DAILY PRN
Status: ACTIVE | OUTPATIENT
Start: 2025-07-02

## 2025-07-02 RX ORDER — LANOLIN ALCOHOL/MO/W.PET/CERES
400 CREAM (GRAM) TOPICAL 3 TIMES DAILY PRN
Status: CANCELLED | OUTPATIENT
Start: 2025-07-02

## 2025-07-02 RX ORDER — METOPROLOL SUCCINATE 25 MG/1
25 TABLET, EXTENDED RELEASE ORAL DAILY
Status: CANCELLED | OUTPATIENT
Start: 2025-07-03

## 2025-07-02 RX ORDER — SODIUM CHLORIDE 0.9 % (FLUSH) 0.9 %
5-40 SYRINGE (ML) INJECTION EVERY 12 HOURS SCHEDULED
Status: CANCELLED | OUTPATIENT
Start: 2025-07-02

## 2025-07-02 RX ORDER — OXYCODONE AND ACETAMINOPHEN 5; 325 MG/1; MG/1
1 TABLET ORAL EVERY 4 HOURS PRN
Refills: 0 | Status: DISPENSED | OUTPATIENT
Start: 2025-07-02

## 2025-07-02 RX ORDER — AMIODARONE HYDROCHLORIDE 200 MG/1
200 TABLET ORAL 2 TIMES DAILY
Status: DISCONTINUED | OUTPATIENT
Start: 2025-07-02 | End: 2025-07-02

## 2025-07-02 RX ORDER — ACETAMINOPHEN 325 MG/1
650 TABLET ORAL EVERY 4 HOURS PRN
Status: CANCELLED | OUTPATIENT
Start: 2025-07-02

## 2025-07-02 RX ORDER — PRAMIPEXOLE DIHYDROCHLORIDE 0.5 MG/1
0.5 TABLET ORAL NIGHTLY
Status: DISPENSED | OUTPATIENT
Start: 2025-07-02

## 2025-07-02 RX ORDER — MENTHOL/CAMPHOR/ALLANTOIN/PHE 0.6-0.5-1%
OINTMENT(EA) TOPICAL PRN
Status: ACTIVE | OUTPATIENT
Start: 2025-07-02

## 2025-07-02 RX ORDER — TAMSULOSIN HYDROCHLORIDE 0.4 MG/1
0.4 CAPSULE ORAL
Status: CANCELLED | OUTPATIENT
Start: 2025-07-02

## 2025-07-02 RX ORDER — BISACODYL 10 MG
10 SUPPOSITORY, RECTAL RECTAL DAILY PRN
Status: CANCELLED | OUTPATIENT
Start: 2025-07-02

## 2025-07-02 RX ORDER — ACETAMINOPHEN 325 MG/1
650 TABLET ORAL EVERY 4 HOURS PRN
Status: ACTIVE | OUTPATIENT
Start: 2025-07-02

## 2025-07-02 RX ORDER — PRAMIPEXOLE DIHYDROCHLORIDE 0.5 MG/1
0.5 TABLET ORAL NIGHTLY
Status: CANCELLED | OUTPATIENT
Start: 2025-07-02

## 2025-07-02 RX ORDER — CALCIUM CARBONATE 500 MG/1
500 TABLET, CHEWABLE ORAL 3 TIMES DAILY PRN
Status: CANCELLED | OUTPATIENT
Start: 2025-07-02

## 2025-07-02 RX ORDER — LANOLIN ALCOHOL/MO/W.PET/CERES
400 CREAM (GRAM) TOPICAL 3 TIMES DAILY PRN
Status: ACTIVE | OUTPATIENT
Start: 2025-07-02

## 2025-07-02 RX ORDER — ASPIRIN 81 MG/1
81 TABLET ORAL DAILY
Status: DISPENSED | OUTPATIENT
Start: 2025-07-03

## 2025-07-02 RX ORDER — ONDANSETRON 4 MG/1
4 TABLET, ORALLY DISINTEGRATING ORAL EVERY 8 HOURS PRN
Status: CANCELLED | OUTPATIENT
Start: 2025-07-02

## 2025-07-02 RX ORDER — MINERAL OIL/HYDROPHIL PETROLAT
OINTMENT (GRAM) TOPICAL 2 TIMES DAILY PRN
Status: CANCELLED | OUTPATIENT
Start: 2025-07-02

## 2025-07-02 RX ORDER — ASPIRIN 81 MG/1
81 TABLET ORAL DAILY
Status: CANCELLED | OUTPATIENT
Start: 2025-07-03

## 2025-07-02 RX ORDER — TAMSULOSIN HYDROCHLORIDE 0.4 MG/1
0.4 CAPSULE ORAL
Status: DISPENSED | OUTPATIENT
Start: 2025-07-02

## 2025-07-02 RX ORDER — SODIUM CHLORIDE 0.9 % (FLUSH) 0.9 %
5-40 SYRINGE (ML) INJECTION EVERY 12 HOURS SCHEDULED
Status: ACTIVE | OUTPATIENT
Start: 2025-07-02

## 2025-07-02 RX ORDER — SODIUM PHOSPHATE, DIBASIC AND SODIUM PHOSPHATE, MONOBASIC 7; 19 G/230ML; G/230ML
1 ENEMA RECTAL DAILY PRN
Status: CANCELLED | OUTPATIENT
Start: 2025-07-02

## 2025-07-02 RX ORDER — ASPIRIN 81 MG/1
81 TABLET ORAL DAILY
Status: ON HOLD | COMMUNITY
Start: 2025-07-02

## 2025-07-02 RX ORDER — IPRATROPIUM BROMIDE AND ALBUTEROL SULFATE 2.5; .5 MG/3ML; MG/3ML
1 SOLUTION RESPIRATORY (INHALATION) EVERY 6 HOURS PRN
Status: CANCELLED | OUTPATIENT
Start: 2025-07-02

## 2025-07-02 RX ORDER — MENTHOL/CAMPHOR/ALLANTOIN/PHE 0.6-0.5-1%
OINTMENT(EA) TOPICAL PRN
Status: CANCELLED | OUTPATIENT
Start: 2025-07-02

## 2025-07-02 RX ORDER — ACETAMINOPHEN 325 MG/1
650 TABLET ORAL 4 TIMES DAILY
Status: DISPENSED | OUTPATIENT
Start: 2025-07-02

## 2025-07-02 RX ORDER — ONDANSETRON 4 MG/1
4 TABLET, ORALLY DISINTEGRATING ORAL EVERY 8 HOURS PRN
Status: ACTIVE | OUTPATIENT
Start: 2025-07-02

## 2025-07-02 RX ORDER — IPRATROPIUM BROMIDE AND ALBUTEROL SULFATE 2.5; .5 MG/3ML; MG/3ML
1 SOLUTION RESPIRATORY (INHALATION) EVERY 6 HOURS PRN
Status: ACTIVE | OUTPATIENT
Start: 2025-07-02

## 2025-07-02 RX ORDER — GUAIFENESIN 600 MG/1
600 TABLET, EXTENDED RELEASE ORAL 2 TIMES DAILY
Status: DISPENSED | OUTPATIENT
Start: 2025-07-02

## 2025-07-02 RX ORDER — GABAPENTIN 100 MG/1
100 CAPSULE ORAL NIGHTLY
Status: CANCELLED | OUTPATIENT
Start: 2025-07-02

## 2025-07-02 RX ORDER — ATORVASTATIN CALCIUM 40 MG/1
40 TABLET, FILM COATED ORAL NIGHTLY
Status: DISPENSED | OUTPATIENT
Start: 2025-07-02

## 2025-07-02 RX ORDER — SODIUM CHLORIDE 0.9 % (FLUSH) 0.9 %
5-40 SYRINGE (ML) INJECTION PRN
Status: ACTIVE | OUTPATIENT
Start: 2025-07-02

## 2025-07-02 RX ORDER — SENNA AND DOCUSATE SODIUM 50; 8.6 MG/1; MG/1
1 TABLET, FILM COATED ORAL
Status: DISPENSED | OUTPATIENT
Start: 2025-07-02

## 2025-07-02 RX ORDER — GABAPENTIN 100 MG/1
100 CAPSULE ORAL NIGHTLY
Status: DISPENSED | OUTPATIENT
Start: 2025-07-02

## 2025-07-02 RX ORDER — METOPROLOL SUCCINATE 25 MG/1
25 TABLET, EXTENDED RELEASE ORAL DAILY
Qty: 30 TABLET | Refills: 6 | Status: ON HOLD | OUTPATIENT
Start: 2025-07-02

## 2025-07-02 RX ORDER — BISACODYL 10 MG
10 SUPPOSITORY, RECTAL RECTAL DAILY PRN
Status: ACTIVE | OUTPATIENT
Start: 2025-07-02

## 2025-07-02 RX ORDER — MIDAZOLAM HYDROCHLORIDE 2 MG/2ML
2 INJECTION, SOLUTION INTRAMUSCULAR; INTRAVENOUS ONCE
Status: COMPLETED | OUTPATIENT
Start: 2025-06-24 | End: 2025-06-24

## 2025-07-02 RX ORDER — CARBOXYMETHYLCELLULOSE SODIUM 10 MG/ML
1 GEL OPHTHALMIC 3 TIMES DAILY PRN
Status: CANCELLED | OUTPATIENT
Start: 2025-07-02

## 2025-07-02 RX ORDER — CARBOXYMETHYLCELLULOSE SODIUM 10 MG/ML
1 GEL OPHTHALMIC 3 TIMES DAILY PRN
Status: DISPENSED | OUTPATIENT
Start: 2025-07-02

## 2025-07-02 RX ORDER — SENNA AND DOCUSATE SODIUM 50; 8.6 MG/1; MG/1
1 TABLET, FILM COATED ORAL
Status: CANCELLED | OUTPATIENT
Start: 2025-07-02

## 2025-07-02 RX ORDER — ACETAMINOPHEN 325 MG/1
650 TABLET ORAL 4 TIMES DAILY
Status: CANCELLED | OUTPATIENT
Start: 2025-07-02

## 2025-07-02 RX ORDER — AMIODARONE HYDROCHLORIDE 200 MG/1
200 TABLET ORAL 2 TIMES DAILY
Status: CANCELLED | OUTPATIENT
Start: 2025-07-02

## 2025-07-02 RX ORDER — ATORVASTATIN CALCIUM 40 MG/1
40 TABLET, FILM COATED ORAL DAILY
Qty: 90 TABLET | Refills: 3 | Status: ON HOLD
Start: 2025-07-02

## 2025-07-02 RX ORDER — OXYCODONE AND ACETAMINOPHEN 5; 325 MG/1; MG/1
1 TABLET ORAL EVERY 4 HOURS PRN
Refills: 0 | Status: CANCELLED | OUTPATIENT
Start: 2025-07-02

## 2025-07-02 RX ORDER — GUAIFENESIN 600 MG/1
600 TABLET, EXTENDED RELEASE ORAL 2 TIMES DAILY
Status: CANCELLED | OUTPATIENT
Start: 2025-07-02

## 2025-07-02 RX ORDER — TRAMADOL HYDROCHLORIDE 50 MG/1
50 TABLET ORAL EVERY 6 HOURS PRN
Status: ACTIVE | OUTPATIENT
Start: 2025-07-02

## 2025-07-02 RX ORDER — HYDRALAZINE HYDROCHLORIDE 10 MG/1
10 TABLET, FILM COATED ORAL 3 TIMES DAILY PRN
Status: CANCELLED | OUTPATIENT
Start: 2025-07-02

## 2025-07-02 RX ORDER — POLYETHYLENE GLYCOL 3350 17 G/17G
17 POWDER, FOR SOLUTION ORAL DAILY PRN
Status: CANCELLED | OUTPATIENT
Start: 2025-07-02

## 2025-07-02 RX ORDER — ACETAMINOPHEN 325 MG/1
650 TABLET ORAL 3 TIMES DAILY
Status: CANCELLED | OUTPATIENT
Start: 2025-07-02

## 2025-07-02 RX ORDER — HYDRALAZINE HYDROCHLORIDE 10 MG/1
10 TABLET, FILM COATED ORAL 3 TIMES DAILY PRN
Status: ACTIVE | OUTPATIENT
Start: 2025-07-02

## 2025-07-02 RX ORDER — ATORVASTATIN CALCIUM 40 MG/1
40 TABLET, FILM COATED ORAL NIGHTLY
Status: CANCELLED | OUTPATIENT
Start: 2025-07-02

## 2025-07-02 RX ORDER — METOPROLOL SUCCINATE 25 MG/1
25 TABLET, EXTENDED RELEASE ORAL DAILY
Status: DISPENSED | OUTPATIENT
Start: 2025-07-03

## 2025-07-02 RX ORDER — MINERAL OIL/HYDROPHIL PETROLAT
OINTMENT (GRAM) TOPICAL 2 TIMES DAILY PRN
Status: DISPENSED | OUTPATIENT
Start: 2025-07-02

## 2025-07-02 RX ADMIN — ATORVASTATIN CALCIUM 40 MG: 40 TABLET, FILM COATED ORAL at 20:34

## 2025-07-02 RX ADMIN — ACETAMINOPHEN 650 MG: 500 TABLET, FILM COATED ORAL at 17:06

## 2025-07-02 RX ADMIN — OXYCODONE AND ACETAMINOPHEN 1 TABLET: 5; 325 TABLET ORAL at 20:39

## 2025-07-02 RX ADMIN — FAMOTIDINE 20 MG: 20 TABLET, FILM COATED ORAL at 10:07

## 2025-07-02 RX ADMIN — ARFORMOTEROL TARTRATE: 15 SOLUTION RESPIRATORY (INHALATION) at 08:40

## 2025-07-02 RX ADMIN — SENNOSIDES 8.6 MG: 8.6 TABLET, FILM COATED ORAL at 10:09

## 2025-07-02 RX ADMIN — METOPROLOL SUCCINATE 25 MG: 25 TABLET, EXTENDED RELEASE ORAL at 10:07

## 2025-07-02 RX ADMIN — SODIUM CHLORIDE, PRESERVATIVE FREE 10 ML: 5 INJECTION INTRAVENOUS at 10:09

## 2025-07-02 RX ADMIN — ACETAMINOPHEN 650 MG: 325 TABLET ORAL at 10:07

## 2025-07-02 RX ADMIN — TAMSULOSIN HYDROCHLORIDE 0.4 MG: 0.4 CAPSULE ORAL at 20:34

## 2025-07-02 RX ADMIN — SODIUM CHLORIDE, PRESERVATIVE FREE 10 ML: 5 INJECTION INTRAVENOUS at 20:35

## 2025-07-02 RX ADMIN — ARFORMOTEROL TARTRATE: 15 SOLUTION RESPIRATORY (INHALATION) at 18:30

## 2025-07-02 RX ADMIN — GABAPENTIN 100 MG: 100 CAPSULE ORAL at 20:34

## 2025-07-02 RX ADMIN — SENNOSIDES AND DOCUSATE SODIUM 1 TABLET: 50; 8.6 TABLET ORAL at 20:35

## 2025-07-02 RX ADMIN — AMIODARONE HYDROCHLORIDE 200 MG: 200 TABLET ORAL at 10:07

## 2025-07-02 RX ADMIN — TRAMADOL HYDROCHLORIDE 50 MG: 50 TABLET, COATED ORAL at 06:20

## 2025-07-02 RX ADMIN — Medication 1 AMPULE: at 10:09

## 2025-07-02 RX ADMIN — ACETAMINOPHEN 650 MG: 325 TABLET ORAL at 13:54

## 2025-07-02 RX ADMIN — ACETAMINOPHEN 650 MG: 500 TABLET, FILM COATED ORAL at 20:34

## 2025-07-02 RX ADMIN — GUAIFENESIN 600 MG: 600 TABLET, EXTENDED RELEASE ORAL at 20:34

## 2025-07-02 RX ADMIN — ASPIRIN 81 MG: 81 TABLET ORAL at 10:07

## 2025-07-02 RX ADMIN — PRAMIPEXOLE DIHYDROCHLORIDE 0.5 MG: 0.5 TABLET ORAL at 20:35

## 2025-07-02 ASSESSMENT — PAIN DESCRIPTION - PAIN TYPE
TYPE: SURGICAL PAIN
TYPE: SURGICAL PAIN

## 2025-07-02 ASSESSMENT — PAIN DESCRIPTION - FREQUENCY
FREQUENCY: INTERMITTENT
FREQUENCY: INTERMITTENT

## 2025-07-02 ASSESSMENT — PAIN DESCRIPTION - DESCRIPTORS
DESCRIPTORS: ACHING;SORE
DESCRIPTORS: ACHING
DESCRIPTORS: SORE
DESCRIPTORS: ACHING;SORE

## 2025-07-02 ASSESSMENT — PAIN SCALES - GENERAL
PAINLEVEL_OUTOF10: 2
PAINLEVEL_OUTOF10: 0
PAINLEVEL_OUTOF10: 4
PAINLEVEL_OUTOF10: 4
PAINLEVEL_OUTOF10: 3
PAINLEVEL_OUTOF10: 1
PAINLEVEL_OUTOF10: 7

## 2025-07-02 ASSESSMENT — PAIN DESCRIPTION - ONSET
ONSET: ON-GOING
ONSET: ON-GOING

## 2025-07-02 ASSESSMENT — PAIN DESCRIPTION - LOCATION
LOCATION: CHEST
LOCATION: CHEST;NECK

## 2025-07-02 ASSESSMENT — PAIN DESCRIPTION - ORIENTATION
ORIENTATION: ANTERIOR;POSTERIOR
ORIENTATION: ANTERIOR

## 2025-07-02 ASSESSMENT — PAIN - FUNCTIONAL ASSESSMENT
PAIN_FUNCTIONAL_ASSESSMENT: ACTIVITIES ARE NOT PREVENTED
PAIN_FUNCTIONAL_ASSESSMENT: ACTIVITIES ARE NOT PREVENTED

## 2025-07-02 NOTE — PROGRESS NOTES
[facility] CARDIOLOGY PROGRESS NOTE           7/2/2025 7:56 AM    Admit Date: 6/24/2025      Subjective:   Patient denies any active complaints.  Sternotomy pain well controlled.  BP stable.  Plan for rehab potentially later today.    ROS:  Cardiovascular:  As noted above    Objective:      Vitals:    07/01/25 2319 07/02/25 0355 07/02/25 0600 07/02/25 0714   BP: (!) 113/58 127/75  (!) 103/59   Pulse:    82   Resp: 18 17 18   Temp: 98.1 °F (36.7 °C) 97.8 °F (36.6 °C)  98.2 °F (36.8 °C)   TempSrc: Oral Oral  Oral   SpO2: 95% 95%  94%   Weight:   91.7 kg (202 lb 2.6 oz)    Height:           Physical Exam:  General-elderly white male in no acute distress  Neck- supple, no JVD  CV- regular rate and rhythm no MRG  Lung- clear bilaterally  Abd- soft, nontender, nondistended  Ext- no edema bilaterally.  Skin- warm and dry      Data Review:   Recent Labs     07/01/25  0355 06/30/25  0404 06/29/25  0349   WBC 5.7 6.2 8.1   HGB 7.8* 8.3* 8.2*   HCT 23.1* 24.8* 25.1*   MCV 95.1 95.4 96.9    188 135*       Recent Labs     07/01/25  0355 06/30/25  0404 06/29/25  0349 06/26/25  0505 06/25/25  0502   NA  --   --  135*   < > 136   K 3.7   < > 4.1   < > 3.9   CL  --   --  103   < > 102   CO2  --   --  21   < > 26   BUN  --   --  21   < > 17   CREATININE  --   --  0.89   < > 0.76*   GLUCOSE  --   --  103*   < > 88   CALCIUM  --   --  8.6*   < > 9.0   BILITOT  --   --   --   --  0.3   ALKPHOS  --   --   --   --  44   AST  --   --   --   --  26   ALT  --   --   --   --  24   LABGLOM  --   --  87   < > >90   GLOB  --   --   --   --  2.8    < > = values in this interval not displayed.       No results for input(s): \"CKTOTAL\", \"CKMB\", \"CKMBINDEX\", \"DDIMER\", \"TROPONINI\" in the last 720 hours.    Echo (5/5/25):    Left Ventricle: Mildly reduced left ventricular systolic function with a visually estimated EF of 45 - 50%. Left ventricle size is normal. Moderately increased wall thickness. Findings consistent with moderate

## 2025-07-02 NOTE — PROGRESS NOTES
Physician Progress Note      PATIENT:               NEREIDA GA  CSN #:                  [id number]  :                       1945  ADMIT DATE:       2025 11:54 AM  DISCH DATE:  RESPONDING  PROVIDER #:        Pat VALLE          QUERY TEXT:    BPH is documented in the medical record in medical history on CTS consult note   and subsequent documentation. Please specify the associated urinary   conditions:    The clinical indicators include:  BPH documented in CTS consult note and patient takes daily Flomax    Risk Factors: age 80, prostate cancer  Treatment: Flomax  Options provided:  -- BPH with partial/complete urinary obstruction  -- BPH with obstruction, currently asymptomatic due to home medication   treatment  -- BPH with urinary retention without obstruction  -- Other - I will add my own diagnosis  -- Disagree - Not applicable / Not valid  -- Disagree - Clinically unable to determine / Unknown  -- Refer to Clinical Documentation Reviewer    PROVIDER RESPONSE TEXT:    Provider disagreed with this query.  not applicable to this admission    Query created by: Pat Osullivan on 2025 11:29 AM      Electronically signed by:  Pat VALLE 2025 11:02 AM

## 2025-07-02 NOTE — CARE COORDINATION
Patient has discharge orders to go to [facility] today. Patient is aware and in agreement with this discharge plan.     No CM needs voiced or noted.     ASSESSMENT NOTE    Attending Physician: Salvatore Arango MD  Admit Problem: Accelerating angina (HCC) [I20.0]  Unstable angina (HCC) [I20.0]  CAD in native artery [I25.10]  Date/Time of Admission: 6/24/2025 11:54 AM  Problem List:  Patient Active Problem List   Diagnosis    Moderate persistent asthma without complication    Hyperlipidemia    Hypertension    Intermittent left-sided chest pain    RLS (restless legs syndrome)    Spinal stenosis, lumbar region, with neurogenic claudication    Lumbar facet arthropathy    Spondylolisthesis of lumbar region    Lumbar radiculopathy    S/P lumbar fusion    Hammer toe of left foot    Valgus deformity of both great toes    Vitamin D deficiency    Rosacea    BPH (benign prostatic hyperplasia)    History of basal cell cancer    Elevated prostate specific antigen (PSA)    Malignant neoplasm of prostate (HCC)    General weakness    Colon abnormality    Bilateral foot pain    Unstable angina (HCC)    Atherosclerotic heart disease of native coronary artery with unstable angina pectoris (HCC)    Dyspnea on exertion    CAD, multiple vessel    CAD in native artery    S/P CABG x 4    Hypoxemia    Atelectasis    Postoperative anemia due to acute blood loss    Pleural effusion    Gait abnormality    Decreased activities of daily living (ADL)       Service Assessment  Patient Orientation Alert and Oriented   Cognition Alert   History Provided By Patient   Primary Caregiver Self   Accompanied By/Relationship     Support Systems Spouse/Significant Other, Children   Patient's Healthcare Decision Maker is: Legal Next of Kin   PCP Verified by CM Yes (confirmed PCP is Dr. Natasha Rodriguez)   Last Visit to PCP Within last 3 months (last PCP visit was one month ago)   Prior Functional Level Independent in ADLs/IADLs   Current Functional Level Other (see  comment) (TBD by clinicals)   Can patient return to prior living arrangement Yes   Ability to make needs known: Good   Family able to assist with home care needs: Yes   Would you like for me to discuss the discharge plan with any other family members/significant others, and if so, who? Yes   Financial Resources Medicare, Other (Comment) (Medicare Part A and B and [employer])   Community Resources None   CM/SW Referral Other (see comment) (discharge planning)     Social/Functional History  Lives With Spouse, Son   Type of Home House   Home Layout One level   Home Access Stairs to enter without rails   Entrance Stairs - Number of Steps 3 steps from garage   Entrance Stairs - Rails     Bathroom Shower/Tub Walk-in shower   Bathroom Toilet Standard   Bathroom Equipment Grab bars in shower   Bathroom Accessibility Accessible   Home Equipment Cane   Receives Help From Family   ADL Assistance Independent   Bath     Dressing     Grooming     Feeding     Toileting     Homemaking Assistance Independent   Meal Prep     Laundry     Vacuuming     Cleaning     Gardening     Yard Work     Driving     Shopping          Other (Comment)     Homemaking Responsibilities Yes   Meal Prep Responsibility     Laundry Responsibility     Cleaning Responsibility     Bill Paying/Finance Responsibility     Shopping Responsibility     Dependent Care Responsibility     Health Care Management     Other (Comment)     Ambulation Assistance Independent   Transfer Assistance Independent   Active  Yes   Patient's  Info     Mode of Transportation Car   Education     Occupation Retired   Type of Occupation       Discharge Planning   Type of Residence House   Living Arrangements Spouse/Significant Other, Children   Support Systems Spouse/Significant Other, Children   Current Services Prior To Admission Durable Medical Equipment   Potential Assistance Needed Other (Comment) (TBD by clinicals)   DME Cane   DME     DME

## 2025-07-02 NOTE — CARE COORDINATION
CM spoke to Jhoana at [facility] who confirmed patient can go to [facility] today. Interdisciplinary team made aware.

## 2025-07-02 NOTE — PROGRESS NOTES
Today's Date: 7/2/2025  Date of Admission: 6/24/2025    POD 6 Days Post-Op    Chart Reviewed.  Subjective:     Pt feels weak. Appetite fair.     Medications Reviewed.    Objective:       Vitals:    07/02/25 0355 07/02/25 0600 07/02/25 0714 07/02/25 0842   BP: 127/75  (!) 103/59    Pulse:   82    Resp: 17  18    Temp: 97.8 °F (36.6 °C)  98.2 °F (36.8 °C)    TempSrc: Oral  Oral    SpO2: 95%  94% 98%   Weight:  91.7 kg (202 lb 2.6 oz)     Height:           Intake and Output  Current Shift: 07/02 0701 - 07/02 1900  In: 260 [P.O.:260]  Out: -    Last 3 Shifts: 06/30 1901 - 07/02 0700  In: 620 [P.O.:620]  Out: 900 [Urine:900]    Physical Exam:  General: Well Developed, Well Nourished, No Acute Distress, Alert & Oriented x 3, Appropriate mood  Neck: supple, no JVD  Heart: S1S2 with RRR without murmurs or gallops  Lungs: Clear throughout auscultation bilaterally without adventitious sounds  Abd: soft, nontender, nondistended, with good bowel sounds  Ext: no edema bilaterally  Skin: warm and dry    LABS  Data Review:   Recent Labs     06/30/25  0404 07/01/25  0355   K 3.7 3.7   MG 1.9 1.9   WBC 6.2 5.7   HGB 8.3* 7.8*   HCT 24.8* 23.1*    195     Estimated Creatinine Clearance: 71 mL/min (based on SCr of 0.89 mg/dL).      Assessment/Plan:     *S/P CABG x 4  On ASA, BB, statin, no ACE-I/ARB due to low BP, stable on room air, pacing wires removed, continue PT, OT consult pending      Active Hospital Problems    Hypertension  BP has been low post op, continue low dose BB as tolerated        Hyperlipidemia  Continue statin        Moderate persistent asthma without complication  No wheezing on exam        Pleural effusion  Trace on repeat chest xray        Postoperative anemia due to acute blood loss  Hgb/Hct stable, monitor        Hypoxemia  Resolved        Atelectasis  IS        CAD, multiple vessel  S/p CABG, continue medical therapy        Unstable angina (HCC)  S/p CABG        Accelerating angina (HCC)  S/p

## 2025-07-02 NOTE — PROGRESS NOTES
ACUTE PHYSICAL THERAPY GOALS:   (Developed with and agreed upon by patient and/or caregiver.)  LTG:  (1.)Mr. Orellana will move from supine to sit and sit to supine , scoot up and down, and roll side to side in bed with MODIFIED INDEPENDENCE within 7 treatment day(s).    (2.)Mr. Orellana will transfer from bed to chair and chair to bed with SUPERVISION using the least restrictive device within 7 treatment day(s).    (3.)Mr. Orellana will ambulate with STAND BY ASSIST for 250 feet with the least restrictive device within 7 treatment day(s).  (4.)Mr. Orellana will participate in therapeutic activity/exercises x 25 minutes for increased activity tolerance within 7 treatment days.    PHYSICAL THERAPY: Daily Note AM   (Link to Caseload Tracking: PT Visit Days : 5  Time In/Out PT Charge Capture  Rehab Caseload Tracker  Orders    Pritesh Orellana is a 80 y.o. male   PRIMARY DIAGNOSIS: S/P CABG x 4  Accelerating angina (HCC) [I20.0]  Unstable angina (HCC) [I20.0]  CAD in native artery [I25.10]  Procedure(s) (LRB):  CORONARY ARTERY BYPASS GRAFTING (CABG X4) LIMA, LEFT LEG ENDOSCOPIC SAPHENOUS VEIN HARVEST / LEFT ATRIAL APPENDAGE CLIP (N/A)  TRANSESOPHAGEAL ECHOCARDIOGRAM (N/A)  6 Days Post-Op  Inpatient: Payor: MEDICARE / Plan: MEDICARE PART A AND B / Product Type: *No Product type* /     ASSESSMENT:     REHAB RECOMMENDATIONS:   Recommendation to date pending progress:  Setting:  Short-term Rehab    Equipment:    To Be Determined     ASSESSMENT:  Mr. Orellana is sitting in the recliner and agreeable to therapy. States that he does not feel as well today.    Min assist to scoot to the edge of the chair.  Sit to stand with min assist to the walker using the momentum method, balance fair, gait training with rolling walker x 125 feet with slow domenico and several brief standing rest breaks.  Good session with progress demonstrated.  Continue PT efforts. [facility] at d/c is recommend as the patient is functioning  process    TIME IN/OUT:  Time In: 1046  Time Out: 1114  Minutes: 28    Dania Day-Omayra, PTA

## 2025-07-02 NOTE — DISCHARGE SUMMARY
Discharge Summary     Patient ID:  Pritesh Orellana  [id number]  80 y.o.  1945    Admit date: 6/24/2025    Discharge date:  7/2/2025    Admitting Physician: Salvatore Arango MD     Discharge Physician: TORI Campbell/Dr. Baker    Admission Diagnoses: Accelerating angina (HCC) [I20.0]  Unstable angina (HCC) [I20.0]  CAD in native artery [I25.10]    Discharge Diagnoses:   Patient Active Problem List    Diagnosis Date Noted    Intermittent left-sided chest pain 02/24/2023    RLS (restless legs syndrome) 02/24/2023    Moderate persistent asthma without complication 12/10/2020    Hyperlipidemia 12/10/2020    Hypertension 12/10/2020    Pleural effusion 06/29/2025    Postoperative anemia due to acute blood loss 06/27/2025    CAD in native artery 06/26/2025    S/P CABG x 4 06/26/2025    Hypoxemia 06/26/2025    Atelectasis 06/26/2025    CAD, multiple vessel 06/25/2025    Unstable angina (HCC) 06/24/2025    Atherosclerotic heart disease of native coronary artery with unstable angina pectoris (HCC) 06/24/2025    Dyspnea on exertion 06/24/2025    Accelerating angina (HCC) 06/19/2025    Bilateral foot pain 12/18/2024    General weakness 10/22/2024    Colon abnormality 10/22/2024    Malignant neoplasm of prostate (HCC) 07/19/2024    Elevated prostate specific antigen (PSA) 06/12/2024    History of basal cell cancer 04/04/2024    Hammer toe of left foot 01/28/2024    Valgus deformity of both great toes 01/28/2024    S/P lumbar fusion 10/02/2023    Spinal stenosis, lumbar region, with neurogenic claudication 09/05/2023    Lumbar facet arthropathy 09/05/2023    Spondylolisthesis of lumbar region 09/05/2023    Lumbar radiculopathy 09/05/2023    Vitamin D deficiency 12/10/2020    Rosacea 12/10/2020    BPH (benign prostatic hyperplasia) 12/10/2020       Procedures this admission:  Coronary Artery Bypass Graft Surgery, clipping of the left atrial appendage  Consults: CT Surgery,

## 2025-07-02 NOTE — CONSULTS
[facility]  Inpatient Rehab Consult        Admission Date: 6/24/2025  Primary Care Provider: Natasha Rodriguez MD  Specialty Group / Referring Service: Cardiothoracic surgeon      Chief Complaint : Lethargy  Admitting Diagnosis:   Accelerating angina (HCC) [I20.0]  Unstable angina (HCC) [I20.0]  CAD in native artery [I25.10]    Principal Problem:    S/P CABG x 4  Active Problems:    Moderate persistent asthma without complication    Hyperlipidemia    Hypertension    Accelerating angina (HCC)    Unstable angina (HCC)    CAD, multiple vessel    Hypoxemia    Atelectasis    Postoperative anemia due to acute blood loss    Pleural effusion  Resolved Problems:    Asymptomatic LV dysfunction    Shortness of breath      Acute Rehab Diagnoses:  Encounter for rehabilitation [Z51.89]   Abnormality of gait and mobility [R26.9]  Decreased independence for activities of daily living (ADL) [Z78.9]  Physical debility / deconditioning [R53.81]      Medical Dx:  Past Medical History:   Diagnosis Date    Allergic rhinitis     Anemia 2024    Arthritis     Asthma     inhaler daily    BPH (benign prostatic hyperplasia)     Cancer (HCC) 2017    Top of right ear    Erectile dysfunction     Hearing loss     History of blood transfusion 2012    Hypercholesterolemia     Hypertension     medication    Lumbar stenosis     Osteoarthritis     Pneumonia 10/21/2024    Prolonged emergence from general anesthesia 2012    with bilateral knee replacement    Prostate cancer (HCC) 07/2024    radiation    Restless legs syndrome     Rosacea     Septicemia ([facility]) 10/21/2024    Skin cancer 2019    BCC  top of right ear    Vitamin D deficiency        Date of Evaluation: July 2, 2025    Subjective       HPI: Pritesh Orellana is a 80 y.o. male patient who presented to [facility] on 6/24/2025 secondary to elective surgery.  Past medical history of asthma, hypertension, prostate cancer and restless leg syndrome who  dysfunction    Shortness of breath            Current Risks:     **Risk for Delirium given prolonged hospital stay.   **Risks for falls given recent immobility, weakness and fatigue      Fall precautions in place. Chair and bed alarms are set daily and nightly to discourage patient from getting up unattended. Discussed strategy to reduce the risk of falls which include understanding patient’s known fall risk factors and management of the risk factors identified, such as modification of home environment, avoiding or decreasing psychoactive medications. Sitting for 3-5 minutes prior to standing for blood pressure acclimation and avoiding orthostasis. Encouraged continuing exercise therapies which were/will be taught and implemented at [facility], particularly balance, strength, and gait training.  Avoid if possible the following medications which can contribute to delirium: benzodiazepines, antihistamines, and anticholinergics  Avoid sleep disruption by rescheduling early morning blood draws, avoiding middle of the night vital signs or disturbances,  minimizing noise.   Keep  lights on and blinds open during the day; lights and TV off at night; minimize tethers including urinary catheters; use glasses, hearing aids, and dentures as appropriate.  Avoid long periods of sleep during the day. Brief naps can be helpful but long periods of sleep can disrupt the sleep wake cycle for this patient.  Reorientation and visual cues for orientation should be tried  Maintain hydration and bowel function      Disposition: Patient is expected to return home with spouse / family supervision and continued rehabilitation with home health therapy.     Patient exhibits the ability to tolerate active participation in at least three hours of therapy services per day, five days a week to include Physical Therapy and Occupational Therapy in addition to SLP if indicated. Furthermore, underlying medical problems present potential risk for

## 2025-07-02 NOTE — PRE-CERTIFICATION NOTE
[facility]  Pre-admission Assessment    Facility Information: [facility]  Patient Name: Nereida Orellana        MRN: [id number]    : 1945 (80 y.o.)  Gender: male   Ethnicity:Not of , /a, or [place] origin  Race:White    COVERAGE INFORMATION  Active Insurance as of 2025       Primary Coverage       Payor Plan Insurance Group Employer/Plan Group    MEDICARE MEDICARE PART A AND B        Payor Address Payor Phone Number Payor Fax Number Effective Dates    [address] [address] [phone]  2010 - None Entered    [facility] [id number]         Subscriber Name Subscriber Birth Date Member ID       NEREIDA ORELLANA 1945 [id number]               Secondary Coverage       Payor Plan Insurance Group Employer/Plan Group    [employer]       Payor Plan Address Payor Plan Phone Number Payor Plan Fax Number Effective Dates    [address]   2022 - None Entered    [facility] [id number]         Subscriber Name Subscriber Birth Date Member ID       NEREIDA ORELLANA 1945 [id number]                   Update Due:     PHYSICIAN/REFERRAL INFORMATION  Attending Physician: Salvatore Arango MD   Admitted From: [facility]  Date of Admission to the Hospital: 2025 11:54 AM  Date Patient Eligible for Admission: 2025    PRIOR LIVING SITUATION/LEVEL OF FUNCTION:  Social/Functional History  Lives With: Spouse, Son  Type of Home: House  Home Layout: One level  Home Access: Stairs to enter without rails  Entrance Stairs - Number of Steps: 3 steps from garage  Bathroom Shower/Tub: Walk-in shower  Bathroom Toilet: Standard  Bathroom Equipment: Grab bars in shower  Bathroom Accessibility: Accessible  Home Equipment: Cane  Receives Help From: Family  Prior Level of Assist for ADLs: Independent  Prior Level of Assist for Homemaking: Independent  Homemaking Responsibilities: Yes  Prior Level of Assist  nutrition and hydration management, ongoing assessment of safety, pain management, patient and family education, prevention of secondary complications, skin integrity management, precautions training as applicable, cognitive and communication management as applicable.   Physiatrist: Dr. Romano    PRECAUTIONS:   Restrictions/Precautions: Fall Risk   Sternal Precautions: No Pushing, No Pulling, 5# Lifting Restrictions  Sternal  Isolation Precautions: None       CURRENT FUNCTIONAL STATUS:  Upper Extremity ADL’s: 03, Partial/Moderate Assistance  Lower Extremity ADL’s: 02, Substantial/Maximum Assistance  Bed, Chair, Wheelchair Transfers: 03, Partial/Moderate Assistance  Toilet Transfers: 03, Partial/Moderate Assistance  Locomotion: 04, Supervision or Touching Assistance  Bladder Continence: 0, Always continent  Bowel Continence: 0, Always continent  Patient fatigues quickly needs cues for energy conservation  REHABILITATION GOALS AND PLAN:  Expected Level of Improvement For Safe Discharge: Patient will be mod I with house hold ambulation and be able to perform self care  Required Therapy:   Physical Therapy: > 90 minutes per day, > 5 days per week for duration of rehab stay  Occupational Therapy: > 90 minutes per day, > 5 days per week for duration of rehab stay  Speech Therapy: Per doctor's order   Anticipated Duration of Inpatient Rehab Stay: 2 weeks    Expected Discharge Destination: Home with Supervision  Expected Services Upon Discharge: Home Health: PT, OT, and Nursing    Acute Inpatient Rehabilitation Disclosure Statement provided to patient. Patient verbalized understanding. yes    I have reviewed and concur with the findings and results of the pre-admission screening assessment completed by the Inpatient Rehabilitation Admissions Coordinator.

## 2025-07-03 ENCOUNTER — CARE COORDINATION (OUTPATIENT)
Dept: CARE COORDINATION | Facility: CLINIC | Age: 80
End: 2025-07-03

## 2025-07-03 LAB
ABO + RH BLD: NORMAL
ANION GAP SERPL CALC-SCNC: 11 MMOL/L (ref 7–16)
BASOPHILS # BLD: 0.02 K/UL (ref 0–0.2)
BASOPHILS NFR BLD: 0.3 % (ref 0–2)
BLD PROD TYP BPU: NORMAL
BLOOD BANK BLOOD PRODUCT EXPIRATION DATE: NORMAL
BLOOD BANK DISPENSE STATUS: NORMAL
BLOOD BANK ISBT PRODUCT BLOOD TYPE: 600
BLOOD BANK PRODUCT CODE: NORMAL
BLOOD BANK UNIT TYPE AND RH: NORMAL
BLOOD GROUP ANTIBODIES SERPL: NORMAL
BPU ID: NORMAL
BUN SERPL-MCNC: 19 MG/DL (ref 8–23)
CALCIUM SERPL-MCNC: 8.5 MG/DL (ref 8.8–10.2)
CHLORIDE SERPL-SCNC: 103 MMOL/L (ref 98–107)
CO2 SERPL-SCNC: 22 MMOL/L (ref 20–29)
CREAT SERPL-MCNC: 0.84 MG/DL (ref 0.8–1.3)
CROSSMATCH RESULT: NORMAL
DIFFERENTIAL METHOD BLD: ABNORMAL
EOSINOPHIL # BLD: 0.48 K/UL (ref 0–0.8)
EOSINOPHIL NFR BLD: 8.1 % (ref 0.5–7.8)
ERYTHROCYTE [DISTWIDTH] IN BLOOD BY AUTOMATED COUNT: 13.5 % (ref 11.9–14.6)
GLUCOSE SERPL-MCNC: 91 MG/DL (ref 70–99)
HCT VFR BLD AUTO: 26.1 % (ref 41.1–50.3)
HGB BLD-MCNC: 8.6 G/DL (ref 13.6–17.2)
IMM GRANULOCYTES # BLD AUTO: 0.02 K/UL (ref 0–0.5)
IMM GRANULOCYTES NFR BLD AUTO: 0.3 % (ref 0–5)
LYMPHOCYTES # BLD: 1.07 K/UL (ref 0.5–4.6)
LYMPHOCYTES NFR BLD: 18 % (ref 13–44)
MAGNESIUM SERPL-MCNC: 2.1 MG/DL (ref 1.8–2.4)
MCH RBC QN AUTO: 31.2 PG (ref 26.1–32.9)
MCHC RBC AUTO-ENTMCNC: 33 G/DL (ref 31.4–35)
MCV RBC AUTO: 94.6 FL (ref 82–102)
MONOCYTES # BLD: 0.7 K/UL (ref 0.1–1.3)
MONOCYTES NFR BLD: 11.8 % (ref 4–12)
NEUTS SEG # BLD: 3.64 K/UL (ref 1.7–8.2)
NEUTS SEG NFR BLD: 61.5 % (ref 43–78)
NRBC # BLD: 0 K/UL (ref 0–0.2)
PLATELET # BLD AUTO: 249 K/UL (ref 150–450)
PMV BLD AUTO: 9.1 FL (ref 9.4–12.3)
POTASSIUM SERPL-SCNC: 4.2 MMOL/L (ref 3.5–5.1)
RBC # BLD AUTO: 2.76 M/UL (ref 4.23–5.6)
SODIUM SERPL-SCNC: 136 MMOL/L (ref 136–145)
SPECIMEN EXP DATE BLD: NORMAL
UNIT DIVISION: 0
UNIT ISSUE DATE/TIME: NORMAL
WBC # BLD AUTO: 5.9 K/UL (ref 4.3–11.1)

## 2025-07-03 PROCEDURE — 6370000000 HC RX 637 (ALT 250 FOR IP): Performed by: STUDENT IN AN ORGANIZED HEALTH CARE EDUCATION/TRAINING PROGRAM

## 2025-07-03 PROCEDURE — 85025 COMPLETE CBC W/AUTO DIFF WBC: CPT

## 2025-07-03 PROCEDURE — 97165 OT EVAL LOW COMPLEX 30 MIN: CPT

## 2025-07-03 PROCEDURE — 6360000002 HC RX W HCPCS: Performed by: STUDENT IN AN ORGANIZED HEALTH CARE EDUCATION/TRAINING PROGRAM

## 2025-07-03 PROCEDURE — 83735 ASSAY OF MAGNESIUM: CPT

## 2025-07-03 PROCEDURE — 97116 GAIT TRAINING THERAPY: CPT

## 2025-07-03 PROCEDURE — 94640 AIRWAY INHALATION TREATMENT: CPT

## 2025-07-03 PROCEDURE — 80048 BASIC METABOLIC PNL TOTAL CA: CPT

## 2025-07-03 PROCEDURE — 97530 THERAPEUTIC ACTIVITIES: CPT

## 2025-07-03 PROCEDURE — 94760 N-INVAS EAR/PLS OXIMETRY 1: CPT

## 2025-07-03 PROCEDURE — 97161 PT EVAL LOW COMPLEX 20 MIN: CPT

## 2025-07-03 PROCEDURE — 97150 GROUP THERAPEUTIC PROCEDURES: CPT

## 2025-07-03 PROCEDURE — 2500000003 HC RX 250 WO HCPCS: Performed by: STUDENT IN AN ORGANIZED HEALTH CARE EDUCATION/TRAINING PROGRAM

## 2025-07-03 PROCEDURE — 97535 SELF CARE MNGMENT TRAINING: CPT

## 2025-07-03 PROCEDURE — 1180000000 HC REHAB R&B

## 2025-07-03 PROCEDURE — 97110 THERAPEUTIC EXERCISES: CPT

## 2025-07-03 RX ADMIN — GABAPENTIN 100 MG: 100 CAPSULE ORAL at 21:39

## 2025-07-03 RX ADMIN — OXYCODONE AND ACETAMINOPHEN 1 TABLET: 5; 325 TABLET ORAL at 05:36

## 2025-07-03 RX ADMIN — SODIUM CHLORIDE, PRESERVATIVE FREE 10 ML: 5 INJECTION INTRAVENOUS at 21:52

## 2025-07-03 RX ADMIN — ARFORMOTEROL TARTRATE: 15 SOLUTION RESPIRATORY (INHALATION) at 05:38

## 2025-07-03 RX ADMIN — OXYCODONE AND ACETAMINOPHEN 1 TABLET: 5; 325 TABLET ORAL at 21:51

## 2025-07-03 RX ADMIN — METOPROLOL SUCCINATE 25 MG: 25 TABLET, EXTENDED RELEASE ORAL at 08:13

## 2025-07-03 RX ADMIN — ACETAMINOPHEN 650 MG: 500 TABLET, FILM COATED ORAL at 08:13

## 2025-07-03 RX ADMIN — TAMSULOSIN HYDROCHLORIDE 0.4 MG: 0.4 CAPSULE ORAL at 21:38

## 2025-07-03 RX ADMIN — SODIUM CHLORIDE, PRESERVATIVE FREE 10 ML: 5 INJECTION INTRAVENOUS at 10:02

## 2025-07-03 RX ADMIN — OXYCODONE AND ACETAMINOPHEN 1 TABLET: 5; 325 TABLET ORAL at 10:19

## 2025-07-03 RX ADMIN — SENNOSIDES AND DOCUSATE SODIUM 1 TABLET: 50; 8.6 TABLET ORAL at 21:38

## 2025-07-03 RX ADMIN — ATORVASTATIN CALCIUM 40 MG: 40 TABLET, FILM COATED ORAL at 21:39

## 2025-07-03 RX ADMIN — PRAMIPEXOLE DIHYDROCHLORIDE 0.5 MG: 0.5 TABLET ORAL at 21:39

## 2025-07-03 RX ADMIN — ARFORMOTEROL TARTRATE: 15 SOLUTION RESPIRATORY (INHALATION) at 17:48

## 2025-07-03 RX ADMIN — ACETAMINOPHEN 650 MG: 500 TABLET, FILM COATED ORAL at 16:08

## 2025-07-03 RX ADMIN — ASPIRIN 81 MG: 81 TABLET ORAL at 08:12

## 2025-07-03 RX ADMIN — GUAIFENESIN 600 MG: 600 TABLET, EXTENDED RELEASE ORAL at 21:39

## 2025-07-03 RX ADMIN — ACETAMINOPHEN 650 MG: 500 TABLET, FILM COATED ORAL at 21:39

## 2025-07-03 RX ADMIN — GUAIFENESIN 600 MG: 600 TABLET, EXTENDED RELEASE ORAL at 08:13

## 2025-07-03 ASSESSMENT — PAIN DESCRIPTION - ORIENTATION
ORIENTATION: MID;ANTERIOR
ORIENTATION: MID
ORIENTATION: MID

## 2025-07-03 ASSESSMENT — PAIN SCALES - GENERAL
PAINLEVEL_OUTOF10: 7
PAINLEVEL_OUTOF10: 0
PAINLEVEL_OUTOF10: 2
PAINLEVEL_OUTOF10: 7
PAINLEVEL_OUTOF10: 7

## 2025-07-03 ASSESSMENT — PAIN DESCRIPTION - PAIN TYPE
TYPE: SURGICAL PAIN
TYPE: SURGICAL PAIN

## 2025-07-03 ASSESSMENT — PAIN SCALES - WONG BAKER: WONGBAKER_NUMERICALRESPONSE: HURTS A LITTLE BIT

## 2025-07-03 ASSESSMENT — PAIN DESCRIPTION - LOCATION
LOCATION: CHEST;STERNUM
LOCATION: CHEST
LOCATION: STERNUM

## 2025-07-03 ASSESSMENT — PAIN DESCRIPTION - DESCRIPTORS
DESCRIPTORS: ACHING;DISCOMFORT
DESCRIPTORS: ACHING;SORE
DESCRIPTORS: ACHING;DISCOMFORT

## 2025-07-03 ASSESSMENT — PAIN DESCRIPTION - ONSET: ONSET: ON-GOING

## 2025-07-03 ASSESSMENT — PAIN DESCRIPTION - FREQUENCY: FREQUENCY: INTERMITTENT

## 2025-07-03 NOTE — CARE COORDINATION
Transition of care outreach postponed for 7 days due to patient's discharge to [facility] IR[facility] inpatient rehab facility.

## 2025-07-03 NOTE — PLAN OF CARE
Problem: Discharge Planning  Goal: Discharge to home or other facility with appropriate resources  7/3/2025 1158 by Estephanie Recio RN  Outcome: Progressing  7/2/2025 2234 by Preet Sneed RN  Outcome: Progressing     Problem: Safety - Adult  Goal: Free from fall injury  7/3/2025 1158 by Estephanie Recio RN  Outcome: Progressing  7/2/2025 2234 by Preet Sneed RN  Outcome: Progressing     Problem: Cardiovascular - Adult  Goal: Maintains optimal cardiac output and hemodynamic stability  7/3/2025 1158 by Estephanie Recio RN  Outcome: Progressing  Flowsheets  Taken 7/3/2025 0810  Maintains optimal cardiac output and hemodynamic stability: Monitor blood pressure and heart rate  Taken 7/3/2025 0745  Maintains optimal cardiac output and hemodynamic stability: Monitor blood pressure and heart rate  7/2/2025 2234 by Preet Sneed RN  Outcome: Progressing  Goal: Absence of cardiac dysrhythmias or at baseline  7/3/2025 1158 by Estephanie Recio RN  Outcome: Progressing  Flowsheets  Taken 7/3/2025 0810  Absence of cardiac dysrhythmias or at baseline: Monitor cardiac rate and rhythm  Taken 7/3/2025 0745  Absence of cardiac dysrhythmias or at baseline: Monitor cardiac rate and rhythm  7/2/2025 2234 by Preet Sneed RN  Outcome: Progressing     Problem: Skin/Tissue Integrity - Adult  Goal: Skin integrity remains intact  7/3/2025 1158 by Estephanie Recio RN  Outcome: Progressing  7/2/2025 2234 by Preet Sneed RN  Outcome: Progressing  Goal: Incisions, wounds, or drain sites healing without S/S of infection  7/3/2025 1158 by Estephanie Recio RN  Outcome: Progressing  7/2/2025 2234 by Preet Sneed RN  Outcome: Progressing  Goal: Oral mucous membranes remain intact  7/3/2025 1158 by Estephanie Recio RN  Outcome: Progressing  7/2/2025 2234 by Preet Sneed RN  Outcome: Progressing     Problem: Musculoskeletal - Adult  Goal: Return mobility to safest level of function  7/3/2025 1158

## 2025-07-03 NOTE — PROGRESS NOTES
PHYSICAL THERAPY DAILY NOTE    PT Individual Minutes  Time In: 1010  Time Out: 1030  Minutes: 20     PT Concurrent Minutes  Time In: 1000  Time Out: 1010  Minutes: 10           Total Treatment Time:  30 Minutes    Pt. Seen for: AM, Balance Training, Gait Training, Patient Education, Transfer Training, and Other     Subjective: Patient reporting he feels OK. Reports his LLE is really sore because of donor sites. Reports difficulty putting weight down on LLE when walking         Objective:  Restrictions/Precautions: Fall Risk, Bed Alarm, Other (Comment), Surgical protocol (posey alarm)  Activity Level: Up as Tolerated, Up with Assist  Required Braces or Orthoses?: No              Sternal Precautions: No Pushing, No Pulling, 5# Lifting Restrictions  Other Position/Activity Restrictions: Body mechanics for sternal precautions, LE positioning for edema         GROSS ASSESSMENT   Patient resting in recliner at beginning of treatment        COGNITION Daily Assessment    Overall Orientation Status: Within Normal Limits   Overall Cognitive Status: WNL        BED/MAT MOBILITY Daily Assessment     Bridging: Partial/Moderate assistance  Rolling to Left: Partial/Moderate assistance  Rolling to Right: Partial/Moderate assistance  Supine to Sit: Partial/Moderate assistance  Sit to Supine: Partial/Moderate assistance  Scooting: Partial/Moderate assistance  Bed Mobility Comments: NT        TRANSFERS Daily Assessment    Sit to Stand: Substantial/Maximal assistance (with RW)  Stand to Sit: Partial/Moderate assistance (with RW)  Stand Pivot Transfers: Partial/Moderate assistance (with RW)  Car Transfer: Unable to assess  Comment: Increased time and effort to complete with altered body mechanics due to whole body edema and limited use of UE's due to sternal precautions          GAIT Daily Assessment    Surface: Level tile  Device: Rolling Walker  Other Apparatus:  (gait belt)  Assistance: Minimal assistance  Quality of Gait: slow cont

## 2025-07-03 NOTE — ACP (ADVANCE CARE PLANNING)
Advance Care Planning     Advance Care Planning Activator (Inpatient)  Conversation Note      Health Care Decision Maker: HCPOA on file and verified correct with patient     Current Designated Health Care Decision Maker:     Primary Decision Maker: Radha Orellana - Spouse - [phone]    Secondary Decision Maker: Rupali Orellana - Child      Care Preferences Full Code per MD order

## 2025-07-03 NOTE — H&P
also noted to have pleural effusion, required diuresis.  Furosemide discontinued on 7/1. Due to ongoing functional deficits, PM&R consulted to evaluate patient's rehabilitation and ongoing medical needs to determine best suited placement for patient once discharged from acute care. Patient still shows significant functional deficits. We continue to recommend inpatient hospital rehabilitation as reasonable and necessary due to ongoing acute medical issues which have not changed since initial pre-admission evaluation. Preadmission screening reviewed, approved the patient for admission to the IRF. Attending service cleared patient for transfer to rehab today. Patient continues to have ongoing medical issues outlined above requiring continued physician medical supervision. Due to ongoing functional deficits, patient will benefit from continued intensive therapies.     Patient seen this afternoon sitting up straight in chair.  He was only able to recall some of the sternal precautions.  We discussed laboratory results.  We discussed ongoing edema, may benefit from ongoing Lasix.  Goals and expectations of inpatient rehabilitation were discussed, all questions and concerns were addressed with the patient at this time.    Current Level of Function: Minimal assistance for bed mobility and transfers, ambulate 150 feet with minimal assistance and rolling walker.  Contact-guard assistance required for grooming      Prior Home Situation: Lives in a one-story home, with the spouse      Previous Level of Function / Work / Activity: Independent with all ADLs and ambulation,       PATIENT'S STATED REHABILITATION GOALS: Improve edema, strength    BARRIERS TO GOAL ATTAINMENT: fatigue, decreased strength  ACTIVITY LIMITATIONS: Affecting gait and household activities.  PARTICIPATION RESTRICTIONS: Affecting community & socioeconomic participation.  ESTIMATED LENGTH OF STAY: 7-10 days   WILLINGNESS/ABILITY OF PATIENT TO  endplate of T12 appears to  be chronic.    The conus medullaris terminates at the normal level.    Visualized thoracic spine: Small posterior disc bulges are seen at T11/12 and  T12/L1 without significant spinal canal stenosis.    L1-L2: Moderate posterior disc bulge. Mild ligamentum flavum thickening. Mild  encroachment on the thecal sac which measures 10 mm in AP dimension. No  significant neuroforaminal stenosis.    L2-L3: Moderate posterior disc bulge and moderate ligamentum flavum thickening.  Minimal facet arthropathy. Mild to moderate spinal canal stenosis with AP  diameter of the thecal sac measuring 9.1 mm.    L3-L4: Small posterior disc bulge. Moderate ligamentum flavum thickening and  facet arthropathy. Moderate spinal canal stenosis with AP diameter of the thecal  sac measuring 8 mm. Mild to moderate bilateral neuroforaminal stenosis.    L4-L5: Minimal posterior disc bulging. The facet joints are obscured by spinal  hardware artifact. There there is slight encroachment on the thecal sac and left  lateral recess. The neuroforamen are difficult to evaluate due to artifact    L5-S1: Small central disc protrusion. Moderate ligamentum flavum thickening and  moderate to severe facet arthropathy. No significant spinal canal stenosis.  Severe right and mild left neuroforaminal stenosis    Impression  Postoperative bilateral pedicle screw fusion at L4/5.    Advanced lumbar spondylosis with multilevel spinal canal and neuroforaminal  stenoses as detailed above.            Electronically signed by Dario Delacruz MD           Condition on Admission: Good/Fair        Assessment and Plan:     CBC BMP and magnesium reviewed    //Acute on chronic anemia  - Recent CABG surgery contributing to worsening anemia  - Received 1 unit of PRBC 7/2  - Hemoglobin 8.6 on 7/30  - Continue monitoring hemoglobin during of stay    //Generalized edema  - Secondary to recent CABG surgery  - Lasix discontinued 7/2  - May benefit from  home environment.  - Patient and family/caregivers also require education in post-hospital precautions and home exercise routine, adaptive techniques and deficit compensation strategies, strengthening and conditioning, equipment prescription and instructions in use.  to be assigned to patient's care to help facilitate all disposition needs and equipment requests provided by treating team members.  - RT-maintain O2 saturations greater than 92% during physical and endurance activities. Will consult RT if concerns with maintaining O2 saturations  - Diet- ADULT ORAL NUTRITION SUPPLEMENT; HS Snack; Snack; as needed night time snack  ADULT DIET; Regular; Low Fat/Low Chol/High Fiber/2 gm Na     - DME: TBD  - Family Training: TBD  - Dispo: Home with home health services                 Active medical conditions requiring ongoing and daily monitoring:  //Hypotension  -Status post surgery.  Currently on aspirin low-dose beta-blocker.  No ACE inhibitor or ARB secondary to low BP  Vitals:    07/03/25 1019   BP:    Pulse:    Resp: 12   Temp:    SpO2:        //Pleural effusion  - Required IV and p.o. Lasix.  Lasix discontinued 7/1    //BPH  - Continue nightly Flomax    //Insomnia  - Continue nightly gabapentin    //Asthma  -Continue inhalers/nebulizer treatments during rehab stay    //Hyperlipidemia  -Noted.  Continue atorvastatin      //Atelectasis  //Hypoxemia  -No longer on supplemental O2  - Continue to encourage incentive spirometer use    //CAD  //History of unstable angina  -Noted    //Obese   -Complicating all aspects of medical care and contributing to functional decline  Body mass index is 31.64 kg/m².                      CODE STATUS: Full Code      DVT prophylaxis: Mobilization with PT and OT.  SCDs     Labs: CBC, BMP to be ordered on admission to rehab unit and will repeat as needed.                 Will require continued close monitoring of the following systems:  -CV: Monitor blood pressure and heart

## 2025-07-03 NOTE — PROGRESS NOTES
[facility] OCCUPATIONAL THERAPY DAILY NOTE  OT Individual Minutes  Time In: 1030  Time Out: 1116  Minutes: 46               Subjective: Pt agreeable to treatment. \"That was a good challenge\"  Pain: No pain expressed.  Interdisciplinary Communication: Collaborated with PT regarding handoff communication.   Precautions: Falls, [place] Alarm, and Sternal Precautions, [place] with hearing aides  Lines:N/A  O2 Device: RA    FUNCTIONAL MOBILITY:       Bed Mobility NT    Sit to Stand Nida MIN A from w/c level (4x). Education on hand placement for spinal precautions.   Transfer  NT  Transfer Type: NA  Equipment: NA    Ambulation CGA  Equipment: RW CGA with RW for household distance functional ambulation      - Activity Tolerance - Balance - Energy Conservation/Pacing  - Strengthening   Pt engaged in functional ambulation task to address standing balance/tolerance, ROM, coordination, and activity tolerance for integration into I/ADLs. Pt practiced retrieving bean bags from various locations and heights via functional ambulation with reacher as needed. Addressing navigation of small spaces, activity tolerance, and RW management. Pt demonstrated good balance and coordination throughout. No LOBs noted. Completed with slow pacing and w/c follow for safety. Pt reported 7/10 challenge level.      - Activity Tolerance - Balance - Strengthening   Pt engaged in table top activity addressing balance, standing/activity tolerance, precaution management, and strength. Positioned in standing at table top, pt instructed to retrieve specific colored cone to R or L of body. Pt instructed to turn fully with RW to face cone, retrieve, and bring back to table top. Completed 2x ~3 minutes each prior to requiring seated rest break. Pt reported 7/10 challenge level.      Education   Adaptive ADL Techniques, AE/DME Training, Benefits of OT, Energy Conservation, Pacing, Functional Transfer Training, and Safety Awareness, sternal precaution management.

## 2025-07-03 NOTE — PROGRESS NOTES
PHYSICAL THERAPY EXAMINATION    PT Individual Minutes  Time In: 0830  Time Out: 0915  Minutes: 45                   Total Treatment Time:  45 Minutes         HPI:  (per MD report): \"Pritesh Orellana is a 80 y.o. male patient who presented to [facility] on 6/24/2025 secondary to elective surgery.  Past medical history of asthma, hypertension, prostate cancer and restless leg syndrome who underwent CABG x 4 on 6/26 with Dr. Arango.  Postoperative complication of acute blood loss anemia, requiring 1 unit of blood transfusion.  Who was also noted to have pleural effusion, required diuresis.  Furosemide discontinued on 7/1. Due to ongoing functional deficits, PM&R consulted to evaluate patient's rehabilitation and ongoing medical needs to determine best suited placement for patient once discharged from acute care. \"     Past Medical History:   Past Medical History:   Diagnosis Date    Allergic rhinitis     Anemia 2024    Arthritis     Asthma     inhaler daily    BPH (benign prostatic hyperplasia)     Cancer ([facility]) 2017    Top of right ear    Erectile dysfunction     Hearing loss     History of blood transfusion 2012    Hypercholesterolemia     Hypertension     medication    Lumbar stenosis     Osteoarthritis     Pneumonia 10/21/2024    Prolonged emergence from general anesthesia 2012    with bilateral knee replacement    Prostate cancer (HCC) 07/2024    radiation    Restless legs syndrome     Rosacea     Septicemia ([facility]) 10/21/2024    Skin cancer 2019    BCC  top of right ear    Vitamin D deficiency        Pt's Goal:  Patient reporting he wants his left leg to stop hurting and be able to walk on his own      Precautions:    Restrictions/Precautions: Fall Risk, Bed Alarm, Other (Comment), Surgical protocol (posey alarm)  Activity Level: Up as Tolerated, Up with Assist  Required Braces or Orthoses?: No           Sternal Precautions: No Pushing, No Pulling, 5# Lifting Restrictions  Other Position/Activity

## 2025-07-03 NOTE — PROGRESS NOTES
PHYSICAL THERAPY DAILY NOTE    PT Individual Minutes  Time In: 1306  Time Out: 1347  Minutes: 41     PT Concurrent Minutes  Time In: 1000  Time Out: 1010  Minutes: 10           Total Treatment Time:  41 Minutes    Pt. Seen for: PM, Gait Training, Patient Education, Therapeutic Exercise, Transfer Training, and Other     Subjective: Patient reporting he feels OK. Reports her left leg is very sore from donor sites. Reports difficulty standing on LLE due to pain         Objective:  Restrictions/Precautions: Fall Risk, Bed Alarm, Other (Comment), Surgical protocol (posey alarm)  Activity Level: Up as Tolerated, Up with Assist  Required Braces or Orthoses?: No              Sternal Precautions: No Pushing, No Pulling, 5# Lifting Restrictions  Other Position/Activity Restrictions: Body mechanics for sternal precautions, LE positioning for edema         GROSS ASSESSMENT   Patient resting in recliner at beginning of treatment        COGNITION Daily Assessment    Overall Orientation Status: Within Normal Limits   Overall Cognitive Status: WNL        BED/MAT MOBILITY Daily Assessment     Bridging: Partial/Moderate assistance  Rolling to Left: Partial/Moderate assistance  Rolling to Right: Partial/Moderate assistance  Supine to Sit: Partial/Moderate assistance  Sit to Supine: Partial/Moderate assistance  Scooting: Partial/Moderate assistance  Bed Mobility Comments: Increased time and effort to complete with altered body mechanics due to whole body edema and limited use of UE's due to sternal precautions        TRANSFERS Daily Assessment    Sit to Stand: Partial/Moderate assistance (with RW)  Stand to Sit: Partial/Moderate assistance (with RW)  Stand Pivot Transfers: Partial/Moderate assistance (with RW)  Car Transfer: Unable to assess  Comment: Increased time and effort to complete with altered body mechanics due to whole body edema and limited use of UE's due to sternal precautions          GAIT Daily Assessment    Surface:  Level tile  Device: Rolling Walker  Other Apparatus: Wheelchair follow (gait belt)  Assistance: Minimal assistance  Quality of Gait: slow cont partial step through gait with flexed posture. Tendency towards foot flat initial contact  Gait Deviations: Decreased step height;Slow Lynn;Decreased head and trunk rotation;Decreased step length  Distance: 100ft  Comments: Improved gait distance, cues for upright posture with less weight bearing through UE's on RW  More Ambulation?: No              STEPS/STAIRS Daily Assessment    Stairs?: No              BALANCE Daily Assessment    Static Sitting: Good:  Pt. able to maintain balance w/o UE support;  exhibits some postural sway  Dynamic Sitting: Fair - accepts minimal challenge;  can maintain balance while turning head/trunk  Static Standing: Fair:  Pt. requires UE support; with RW and CGA to min assist  Dynamic Standing: Poor - unable to accept challenge or move without LOB        WHEELCHAIR MOBILITY Daily Assessment    Wheelchair Size: 18x16  Wheelchair Type: Standard  Wheelchair Cushion: Standard  Pressure Relief Type:  (sit<>stand with RW)  Level of Assistance for Pressure Relief Activities: Substantial/Maximal assistance  Wheelchair Parts Management: No  Propulsion: No                     LOWER EXTREMITY EXERCISES   SUPINE EXERCISES for LLE Sets Reps Comments   Ankle Pumps 3 10  Min assist   Quad Sets 3 10 Verbal cues   Heel Slides 3 10 Min to mod  assist   Hip Abduction 3 10 Min assist with skate   Short Arc Quad 3 10 Min assist    Straight Leg Raise 3 10 Mod assist   Increased time and effort to complete with multiple and frequent rest breaks. Cues for correct form         Pain level: 2 to 5 out of 10  Pain Location:  LLE donor sites, sternal incision  Pain Interventions: Medication per order, rest, positioning,relaxation, body mechanics      Vital Signs:  /74   Pulse 81   Temp 97.9 °F (36.6 °C) (Oral)   Resp 16   Ht 1.702 m (5' 7\")   Wt 91.6 kg (202 lb)

## 2025-07-03 NOTE — THERAPY EVALUATION
[facility] OCCUPATIONAL THERAPY INITIAL EVALUATION  OT Individual Minutes  Time In: 0700  Time Out: 0800  Minutes: 60               HPI (per MD report): \"Pritesh Orellana is a 80 y.o. male patient who presented to [facility] on 6/24/2025 secondary to elective surgery.  Past medical history of asthma, hypertension, prostate cancer and restless leg syndrome who underwent CABG x 4 on 6/26 with Dr. Arango.  Postoperative complication of acute blood loss anemia, requiring 1 unit of blood transfusion.  Who was also noted to have pleural effusion, required diuresis.  Furosemide discontinued on 7/1. Due to ongoing functional deficits, PM&R consulted to evaluate patient's rehabilitation and ongoing medical needs to determine best suited placement for patient once discharged from acute care. \"  Past Medical History:   Diagnosis Date    Allergic rhinitis     Anemia 2024    Arthritis     Asthma     inhaler daily    BPH (benign prostatic hyperplasia)     Cancer ([facility]) 2017    Top of right ear    Erectile dysfunction     Hearing loss     History of blood transfusion 2012    Hypercholesterolemia     Hypertension     medication    Lumbar stenosis     Osteoarthritis     Pneumonia 10/21/2024    Prolonged emergence from general anesthesia 2012    with bilateral knee replacement    Prostate cancer ([facility]) 07/2024    radiation    Restless legs syndrome     Rosacea     Septicemia ([facility]) 10/21/2024    Skin cancer 2019    BCC  top of right ear    Vitamin D deficiency        Patient's Goal: \"To get better and stronger\"  Pain: No pain expressed.  Precautions: Falls, [place] Alarm, and Sternal Precautions  Prior Level of Function: Independent  Lines:IV  O2 Device: RA    LIVING SITUATION:    Environment   Living Alone No   Support System Spouse/Significant Other    Home Set Up 1 Level Home   Home Entrance 3 Step(s) to Enter with hand rail and GB, 3 steps into den with bilateral hand rails   Bathroom Setup  Walk-In Shower, Shower Chair,      Hearing/Speech/Vision:   Expression of Ideas and Wants: Without difficulty  Understanding Verbal and Non-Verbal Content: Understands  Ability to Hear: Adequate  Ability to See in Adequate Light: Adequate     Vision/Perception: No changes from pre-morbid where pt wears glasses for reading.    Problem List: GMC, FMC, Activity Tolerance, Safety Awareness, Strength, Sitting Balance, and Standing Balance   Functional Limitations: ADL, IADL, Functional Transfers, and Functional Mobility   Session: Pt agreeable to OT evaluation. Pt completed the following with details recorded above: MMT/ROM, ADLs, BIMS, and informal OT interview.   Interdisciplinary Communication: Collaborated with PT regarding patient's current level of function, plan of care, and safety measures.   Patient/Family Education: Patient educated On the role of OT, On POC, and On IRC expectations.    GOALS:  Short Term Goals:  Time Frame for Short Term Goals: 7 days   STG 1: Patient will dress UB with Supervision or Touching Assistance using AE/DME PRN.  STG 2: Patient will dress LB with Partial/Moderate Assistance using AE/DME PRN.  STG 3: Patient will don footwear with Partial/Moderate Assistance using AE/DME PRN.  STG 4: Patient will bathe with Partial/Moderate Assistance using AE/DME PRN.  STG 5: Patient will toilet with Partial/Moderate Assistance using AE/DME PRN.    Long Term Goals:  Time Frame for Long Term Goals: 10 days   LTG 1: Patient will dress UB with [place] using AE/DME PRN.   LTG 2: Patient will dress LB with Supervision or Touching Assistance using AE/DME PRN.   LTG 3: Patient will don footwear with Setup Assistance using AE/DME PRN.   LTG 4: Patient will bathe with Supervision or Touching Assistance using AE/DME PRN.   LTG 5: Patient will toilet with Supervision or Touching Assistance using AE/DME PRN.   LTG 6: Patient/caregiver will verbalize understanding of OT recommendations regarding ADLs, functional transfers, home safety,  AE/DME, energy conservation, safety awareness, activity tolerance, and follow-up therapy to increase safety with functional tasks upon discharge.    MD order for OT received and chart reviewed. Patient will benefit from skilled OT services to address deficits to maximize functional performance with self-care tasks and functional mobility. Treatment interventions may include Co-Treatment PT/OT necessary due to patient's decreased overall endurance/tolerance levels, as well as need for high level skilled assistance to complete functional transfers/mobility and functional tasks, Self Care and/or Home Management Training , Therapeutic Activity , Therapeutic Exercise , and Family education and/or Caregiver training. Patient to be seen at least 5 times per week for at least 1.5 hours of OT per day as appropriate. Patient ended session seated in recliner with call bell and needs within reach, with chair/bed alarm activated, and with legs elevated.    Viji Conteh, OT   7/3/2025

## 2025-07-04 ENCOUNTER — APPOINTMENT (OUTPATIENT)
Dept: ULTRASOUND IMAGING | Age: 80
DRG: 949 | End: 2025-07-04
Attending: STUDENT IN AN ORGANIZED HEALTH CARE EDUCATION/TRAINING PROGRAM
Payer: MEDICARE

## 2025-07-04 PROBLEM — N50.89 SCROTAL EDEMA: Status: ACTIVE | Noted: 2025-07-03

## 2025-07-04 PROBLEM — L03.116 CELLULITIS OF LEFT ANTERIOR LOWER LEG: Status: ACTIVE | Noted: 2025-07-04

## 2025-07-04 PROBLEM — R60.1 GENERALIZED EDEMA: Status: ACTIVE | Noted: 2025-07-04

## 2025-07-04 PROCEDURE — 94760 N-INVAS EAR/PLS OXIMETRY 1: CPT

## 2025-07-04 PROCEDURE — 97116 GAIT TRAINING THERAPY: CPT

## 2025-07-04 PROCEDURE — 6370000000 HC RX 637 (ALT 250 FOR IP): Performed by: STUDENT IN AN ORGANIZED HEALTH CARE EDUCATION/TRAINING PROGRAM

## 2025-07-04 PROCEDURE — 94640 AIRWAY INHALATION TREATMENT: CPT

## 2025-07-04 PROCEDURE — 1180000000 HC REHAB R&B

## 2025-07-04 PROCEDURE — 2500000003 HC RX 250 WO HCPCS: Performed by: STUDENT IN AN ORGANIZED HEALTH CARE EDUCATION/TRAINING PROGRAM

## 2025-07-04 PROCEDURE — 6360000002 HC RX W HCPCS: Performed by: STUDENT IN AN ORGANIZED HEALTH CARE EDUCATION/TRAINING PROGRAM

## 2025-07-04 PROCEDURE — 93926 LOWER EXTREMITY STUDY: CPT

## 2025-07-04 PROCEDURE — APPSS15 APP SPLIT SHARED TIME 0-15 MINUTES: Performed by: PHYSICIAN ASSISTANT

## 2025-07-04 PROCEDURE — 97530 THERAPEUTIC ACTIVITIES: CPT

## 2025-07-04 PROCEDURE — 93971 EXTREMITY STUDY: CPT

## 2025-07-04 PROCEDURE — 97535 SELF CARE MNGMENT TRAINING: CPT

## 2025-07-04 PROCEDURE — 97110 THERAPEUTIC EXERCISES: CPT

## 2025-07-04 RX ORDER — FUROSEMIDE 40 MG/1
40 TABLET ORAL DAILY
Status: DISPENSED | OUTPATIENT
Start: 2025-07-04

## 2025-07-04 RX ORDER — DOXYCYCLINE 100 MG/1
100 CAPSULE ORAL EVERY 12 HOURS SCHEDULED
Status: DISPENSED | OUTPATIENT
Start: 2025-07-04 | End: 2025-07-09

## 2025-07-04 RX ADMIN — SENNOSIDES AND DOCUSATE SODIUM 1 TABLET: 50; 8.6 TABLET ORAL at 21:38

## 2025-07-04 RX ADMIN — GUAIFENESIN 600 MG: 600 TABLET, EXTENDED RELEASE ORAL at 08:21

## 2025-07-04 RX ADMIN — ACETAMINOPHEN 650 MG: 500 TABLET, FILM COATED ORAL at 13:11

## 2025-07-04 RX ADMIN — ASPIRIN 81 MG: 81 TABLET ORAL at 08:21

## 2025-07-04 RX ADMIN — TAMSULOSIN HYDROCHLORIDE 0.4 MG: 0.4 CAPSULE ORAL at 21:31

## 2025-07-04 RX ADMIN — SODIUM CHLORIDE, PRESERVATIVE FREE 10 ML: 5 INJECTION INTRAVENOUS at 08:22

## 2025-07-04 RX ADMIN — OXYCODONE AND ACETAMINOPHEN 1 TABLET: 5; 325 TABLET ORAL at 21:31

## 2025-07-04 RX ADMIN — GUAIFENESIN 600 MG: 600 TABLET, EXTENDED RELEASE ORAL at 21:31

## 2025-07-04 RX ADMIN — SODIUM CHLORIDE, PRESERVATIVE FREE 10 ML: 5 INJECTION INTRAVENOUS at 21:46

## 2025-07-04 RX ADMIN — PRAMIPEXOLE DIHYDROCHLORIDE 0.5 MG: 0.5 TABLET ORAL at 21:31

## 2025-07-04 RX ADMIN — ACETAMINOPHEN 650 MG: 500 TABLET, FILM COATED ORAL at 08:20

## 2025-07-04 RX ADMIN — FUROSEMIDE 40 MG: 40 TABLET ORAL at 10:11

## 2025-07-04 RX ADMIN — METOPROLOL SUCCINATE 25 MG: 25 TABLET, EXTENDED RELEASE ORAL at 08:22

## 2025-07-04 RX ADMIN — GABAPENTIN 100 MG: 100 CAPSULE ORAL at 21:31

## 2025-07-04 RX ADMIN — DOXYCYCLINE HYCLATE 100 MG: 100 CAPSULE ORAL at 10:11

## 2025-07-04 RX ADMIN — ARFORMOTEROL TARTRATE: 15 SOLUTION RESPIRATORY (INHALATION) at 06:24

## 2025-07-04 RX ADMIN — OXYCODONE AND ACETAMINOPHEN 1 TABLET: 5; 325 TABLET ORAL at 08:21

## 2025-07-04 RX ADMIN — ATORVASTATIN CALCIUM 40 MG: 40 TABLET, FILM COATED ORAL at 21:31

## 2025-07-04 RX ADMIN — ACETAMINOPHEN 650 MG: 500 TABLET, FILM COATED ORAL at 21:30

## 2025-07-04 RX ADMIN — DOXYCYCLINE HYCLATE 100 MG: 100 CAPSULE ORAL at 21:31

## 2025-07-04 ASSESSMENT — PAIN DESCRIPTION - LOCATION
LOCATION: LEG

## 2025-07-04 ASSESSMENT — PAIN - FUNCTIONAL ASSESSMENT
PAIN_FUNCTIONAL_ASSESSMENT: PREVENTS OR INTERFERES SOME ACTIVE ACTIVITIES AND ADLS
PAIN_FUNCTIONAL_ASSESSMENT: ACTIVITIES ARE NOT PREVENTED

## 2025-07-04 ASSESSMENT — PAIN SCALES - GENERAL
PAINLEVEL_OUTOF10: 7
PAINLEVEL_OUTOF10: 9
PAINLEVEL_OUTOF10: 0
PAINLEVEL_OUTOF10: 1
PAINLEVEL_OUTOF10: 3
PAINLEVEL_OUTOF10: 1

## 2025-07-04 ASSESSMENT — PAIN DESCRIPTION - ORIENTATION
ORIENTATION: LEFT
ORIENTATION: LEFT;LOWER
ORIENTATION: LEFT

## 2025-07-04 ASSESSMENT — PAIN DESCRIPTION - DESCRIPTORS
DESCRIPTORS: ACHING
DESCRIPTORS: ACHING;TENDER
DESCRIPTORS: ACHING

## 2025-07-04 NOTE — PROGRESS NOTES
Progress Note    2025 10:01 AM          POD#   9.  This 80-year-old gentleman is currently postop day 9 from a four-vessel coronary bypass grafting and left atrial appendage clip by Dr. Arango.  Postoperative course was somewhat slow patient did require blood transfusion.  I was asked to see him today because of some pain discomfort down on the left shin patient is currently in the rehab unit.    Subjective:  Mr. Orellana is complaining of pain and tenderness down the left shin    OBJECTIVE:    PULSE OXIMETRY RANGE: SpO2  Av.4 %  Min: 84 %  Max: 97 %  SUPPLEMENTAL O2:     Vital Signs: /63   Pulse 88   Temp 98.6 °F (37 °C) (Oral)   Resp 18   Ht 1.702 m (5' 7\")   Wt 92.3 kg (203 lb 7.8 oz)   SpO2 93%   BMI 31.87 kg/m²    Temperature Range:   Temp: 98.6 °F (37 °C)   Temp  Av.2 °F (36.8 °C)  Min: 97.9 °F (36.6 °C)  Max: 98.6 °F (37 °C)      Exam:   General appearance:   Neck:   Lungs:   Sternum: Sternal incision is dry and intact  Heart: Blood pressure is 125/63  Abdomen:   Extremities:   Leg Wounds:   Patient has fairly extensive bruising in the left calf in addition he has got some redness on the anterior aspect of the left lower leg which is somewhat tender.  There is no evidence of arterial compromise good circulation good sensation in the toes but the bruising in the calf is fairly extensive.  Homans' sign is negative slight swelling of both lower extremities               Rhythm:     Labs:   ABG:  No results found for: \"PHART\", \"PO2ART\", \"JUH8QFR\", \"Z5IRKRKD\", \"PIA2AEO\", \"THGBART\", \"RUT8VTQ\", \"BEART\"  CBC:   Recent Labs     2545 25   WBC 6.5 5.9   HGB 7.6* 8.6*   HCT 23.0* 26.1*   MCV 94.7 94.6    249     BMP:   Recent Labs     2545 25   NA  --  136   K 4.2 4.2   CL  --  103   CO2  --  22   BUN  --  19   CREATININE  --  0.84     PT/INR: No results for input(s): \"PROTIME\", \"INR\" in the last 72 hours.  APTT: No results for input(s):

## 2025-07-04 NOTE — PROGRESS NOTES
PHYSICAL THERAPY DAILY NOTE    PT Individual Minutes  Time In: 0825  Time Out: 0915  Minutes: 50                 Total Treatment Time:  50 Minutes    Pt. Seen for: AM, Balance Training, Gait Training, Patient Education, Therapeutic Exercise, and Transfer Training     Subjective: Patient agreeable to treatment today. States having pain to the LLE area. Rates pain at 3/10 at rest and 10/10 with activities. Last treatment was good. No new complaints at this time.          Objective:  Restrictions/Precautions: Fall Risk, Bed Alarm, Surgical protocol  Activity Level: Up as Tolerated, Up with Assist  Required Braces or Orthoses?: No              Sternal Precautions: No Pushing, No Pulling, 5# Lifting Restrictions  Other Position/Activity Restrictions: Body mechanics for sternal precautions, LE positioning for edema         GROSS ASSESSMENT   NT        COGNITION Daily Assessment    Overall Orientation Status: Within Normal Limits  Orientation Level: Oriented X4   Overall Cognitive Status: WNL        BED/MAT MOBILITY Daily Assessment     Bed Mobility Comments: Patient up sitting in recliner.        TRANSFERS Daily Assessment   Additional time and verbal cues required. Sit to Stand: Partial/Moderate assistance  Stand to Sit: Partial/Moderate assistance  Comment: Patient states moderate to severe pain LLE          GAIT Daily Assessment   Distance self-limited by severe pain complaints. Surface: Level tile  Device: Rolling Walker  Assistance: Partial/Moderate assistance  Quality of Gait: Unable to complete transfer from recliner to  due to pain.  Gait Deviations: Decreased step height;Slow Lynn;Decreased head and trunk rotation;Decreased step length  Distance: 3 (ft)  Comments: Severe pain limiting gait training  More Ambulation?: No              STEPS/STAIRS Daily Assessment   NT Stairs?: No              BALANCE Daily Assessment   Supported standing balance. Static Sitting: Normal:  Pt. able to maintain balance w/o UE

## 2025-07-04 NOTE — PLAN OF CARE
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 *       Medical/Functional Prognosis: Good     Anticipated functional outcomes/goals and interventions: Performs mobility and self care activities at the highest level of function for planned discharge setting.      Patient's/family's anticipated outcomes/personal goals: to improve strength, endurance, and independence      Physical therapy functional outcome/goal:  Bed mobility  Bridging: Partial/Moderate assistance  Rolling to Left: Partial/Moderate assistance  Rolling to Right: Partial/Moderate assistance  Supine to Sit: Partial/Moderate assistance  Sit to Supine: Partial/Moderate assistance  Scooting: Partial/Moderate assistance  Bed Mobility Comments: Patient up sitting in recliner.   Transfers  Sit to Stand: Partial/Moderate assistance  Stand to Sit: Partial/Moderate assistance  Stand Pivot Transfers: Partial/Moderate assistance (with RW)  Car Transfer: Unable to assess  Comment: Patient states moderate to severe pain LLE   Ambulation  Surface: Level tile  Device: Rolling Walker  Other Apparatus: Wheelchair follow (gait belt)  Assistance: Partial/Moderate assistance  Quality of Gait: Unable to complete transfer from recliner to  due to pain.  Gait Deviations: Decreased step height, Slow Lynn, Decreased head and trunk rotation, Decreased step length  Distance: 3 (ft)  Comments: Severe pain limiting gait training  More Ambulation?: No               Wheelchair Activities  Wheelchair Size: 18x16  Wheelchair Type:

## 2025-07-04 NOTE — PROGRESS NOTES
[facility] OCCUPATIONAL THERAPY DAILY NOTE  OT Individual Minutes  Time In: 0700  Time Out: 0745  Minutes: 45               Subjective: Pt agreeable to treatment. \"My leg hurts percocet level, if that tells you anything\"  Pain: Patient reports 10/10 pain; RN notified and assessed.  Interdisciplinary Communication: Collaborated with PT and RN regarding pt status and pt performance.   Precautions: Falls, [place] Alarm, and Sternal Precautions, [place] with hearing aides   Lines:N/A  O2 Device: RA      FUNCTIONAL MOBILITY:       Bed Mobility NT    Sit to Stand Nida MIN A from bedside recliner with momentum strategy and assistance from ischial tuberosity.   Transfer  NT  Transfer Type: NA  Equipment: NA    Ambulation NT  Equipment: NA      ACTIVITIES OF DAILY LIVING:       Shower/Bathe Partial/moderate assistance S/U for sponge bath UB seated in bedside recliner; MIN A for posterior darshan-care in standing with RW   Upper Body  Dressing Supervision or touching assistance SBA with VC for donning/doffing overhead shirt with sternal precaution management.   Lower Body Dressing Substantial/maximal assistance MAX A for threading/unthreading BLE; MOD A for managing up/down over hips in standing with RW   Donning/[place] Footwear Dependent Dependent donning/doffing socks and ace wrapping to LLE.   Education Adaptive ADL Techniques, AE/DME Training, Benefits of OT, Energy Conservation, Pacing, Functional Transfer Training, and Safety Awareness     Assessment: Patient presented seated in bedside recliner with c/o of 10/10 pain to LLE. Upon observation from this therapist, pt noted to have increased swelling, increased point tenderness, and increased erythema. RN entered room and observed/assessed. RN to discuss with MD regarding possible need for doppler. Pt demonstrated good participation in OT treatment. Pt continues to benefit from skilled OT services to address remaining deficits and progress toward baseline level of independence and

## 2025-07-04 NOTE — PLAN OF CARE
Problem: Discharge Planning  Goal: Discharge to home or other facility with appropriate resources  Outcome: Progressing     Problem: Safety - Adult  Goal: Free from fall injury  Outcome: Progressing     Problem: Cardiovascular - Adult  Goal: Maintains optimal cardiac output and hemodynamic stability  Outcome: Progressing  Goal: Absence of cardiac dysrhythmias or at baseline  Outcome: Progressing     Problem: Skin/Tissue Integrity - Adult  Goal: Skin integrity remains intact  Outcome: Progressing  Goal: Incisions, wounds, or drain sites healing without S/S of infection  Outcome: Progressing  Goal: Oral mucous membranes remain intact  Outcome: Progressing     Problem: Musculoskeletal - Adult  Goal: Return mobility to safest level of function  Outcome: Progressing  Goal: Maintain proper alignment of affected body part  Outcome: Progressing  Goal: Return ADL status to a safe level of function  Outcome: Progressing     Problem: Hematologic - Adult  Goal: Maintains hematologic stability  Outcome: Progressing

## 2025-07-04 NOTE — PROGRESS NOTES
PHYSICAL THERAPY DAILY NOTE    PT Individual Minutes  Time In: 1300  Time Out: 1344  Minutes: 44                 Total Treatment Time:  44 Minutes    Pt. Seen for: PM, Balance Training, Gait Training, Patient Education, Therapeutic Exercise, Transfer Training, and Other     Subjective: Patient reporting his LLE has been hurting a lot more today. Reports MD ordered a US of his leg. Reports he already had pain medication and a breathing treatment         Objective:  Restrictions/Precautions: Fall Risk, Bed Alarm, Other (Comment), Surgical protocol (posey alarm)  Activity Level: Up as Tolerated, Up with Assist  Required Braces or Orthoses?: No              Sternal Precautions: No Pushing, No Pulling, 5# Lifting Restrictions  Other Position/Activity Restrictions: Body mechanics for sternal precautions, LE positioning for edema         GROSS ASSESSMENT   Patient resting in recliner at beginning of treatment. Requesting to go to the bathroom        COGNITION Daily Assessment    Overall Orientation Status: Within Normal Limits   Overall Cognitive Status: WNL        BED/MAT MOBILITY Daily Assessment     Bed Mobility Comments: NT        TRANSFERS Daily Assessment    Sit to Stand: Partial/Moderate assistance (with RW)  Stand to Sit: Partial/Moderate assistance (with RW)  Stand Pivot Transfers: Partial/Moderate assistance (with RW)  Car Transfer: Unable to assess  Comment: Increased time and effort to complete with altered body mechanics due to whole body edema and limited use of UE's due to sternal precautions          GAIT Daily Assessment    Surface: Level tile  Device: Rolling Walker  Other Apparatus:  (gait belt)  Assistance: Minimal assistance  Quality of Gait: slow cont partial step through gait  with flexed posture with antalgic LLE stance phase  Gait Deviations: Decreased step height;Slow Lynn;Decreased head and trunk rotation;Decreased step length  Distance: 10ft x 3  More Ambulation?: No              STEPS/STAIRS  Daily Assessment    Stairs?: No              BALANCE Daily Assessment    Static Sitting: Good:  Pt. able to maintain balance w/o UE support;  exhibits some postural sway  Dynamic Sitting: Fair - accepts minimal challenge;  can maintain balance while turning head/trunk  Static Standing: Fair:  Pt. requires UE support;  may need occasional min A  Dynamic Standing: Poor - unable to accept challenge or move without LOB        WHEELCHAIR MOBILITY Daily Assessment    Wheelchair Size: 18x16  Wheelchair Type: Standard  Wheelchair Cushion: Standard  Pressure Relief Type:  (sit<>stand with RW)  Level of Assistance for Pressure Relief Activities: Partial/Moderate assistance  Wheelchair Parts Management: No  Propulsion: No                     LOWER EXTREMITY EXERCISES   SEATED EXERCISES for RLE Sets Reps Comments   Ankle PF 3 10 independent   Ankle DF 3 10 independent   Long Arc Quads 3 10 Set up assist   Hip Abduction with red Theraband 3 10 Set up assist   Hamstring Curls with red Theraband 3 10 Min assist   Hip Extension with red Theraband 3 10 Min assist   Increased time and effort to complete with multiple and frequent rest breaks. Cues for correct form  LLE exercises        Pain level: 2 to 6 out of 10  Pain Location:  LLE  Pain Interventions: Medication per order, rest, positioning,relaxation, deferred LLE exercises, limiting gait distance.      Vital Signs:  BP (!) 141/71   Pulse 85   Temp 98.1 °F (36.7 °C) (Oral)   Resp 18   Ht 1.702 m (5' 7\")   Wt 93.4 kg (205 lb 12.8 oz)   SpO2 97%   BMI 32.23 kg/m²       Education:    Education Given To: Patient  Education Provided: Safety;Transfer Training;Energy Conservation;Fall Prevention Strategies;Precautions;Mobility Training  Education Method: Demonstration;Verbal  Barriers to Learning: None  Education Outcome: Verbalized understanding;Demonstrated understanding;Continued education needed     Interdisciplinary Communication: NA    Pt. Left in recliner call bell in

## 2025-07-04 NOTE — PROGRESS NOTES
Pt attempted for scheduled OT therapy session @ 1430 this PM. Pt presented seated in bedside recliner. Requested in room seated tasks secondary to increased pain in LLE. Therapist exited room to obtain education supplies including \"Energy Conservation Strategy\" handout, \"Move in a Tube\" cardiac handout, and Dong Exertion Scale handout, and BUE >5lb strengthening task items. When therapist returned to pt room, pt had been transferred back to bed and unscheduled doppler ultrasound had begun. Therapist re-attempted at 1500 with ultrasound still being completed.

## 2025-07-04 NOTE — PROGRESS NOTES
PHYSICAL THERAPY DAILY NOTE    PT Individual Minutes  Time In: 0825  Time Out: 0915  Minutes: 50                 Total Treatment Time:  50 Minutes    Pt. Seen for: AM, Gait Training, Patient Education, Therapeutic Exercise, and Transfer Training     Subjective: Patient agreeable to treatment today. States having pain to the LLE area. Rates pain at 3/10 at rest and 10/10 with activities. Last treatment was better. No new complaints at this time.         Objective:  Restrictions/Precautions: Fall Risk, Bed Alarm, Surgical protocol  Activity Level: Up as Tolerated, Up with Assist  Required Braces or Orthoses?: No              Sternal Precautions: No Pushing, No Pulling, 5# Lifting Restrictions  Other Position/Activity Restrictions: Body mechanics for sternal precautions, LE positioning for edema         GROSS ASSESSMENT   NT        COGNITION Daily Assessment    Overall Orientation Status: Within Normal Limits  Orientation Level: Oriented X4   Overall Cognitive Status: WNL        BED/MAT MOBILITY Daily Assessment   NT  Bed Mobility Comments: Patient up sitting in recliner.        TRANSFERS Daily Assessment   Verbal cues and additional time needed to perform. Pain limiting function. Sit to Stand: Partial/Moderate assistance  Stand to Sit: Partial/Moderate assistance  Comment: Patient states moderate to severe pain LLE          GAIT Daily Assessment   Distance limited from last visit due to pain complaints Surface: Level tile  Device: Rolling Walker  Assistance: Partial/Moderate assistance  Quality of Gait: Unable to complete transfer from recliner to  due to pain.  Gait Deviations: Decreased step height;Slow Lynn;Decreased head and trunk rotation;Decreased step length  Distance: 3 (ft)  Comments: Severe pain limiting gait training  More Ambulation?: No              STEPS/STAIRS Daily Assessment   NT Stairs?: No              BALANCE Daily Assessment   Standing activities with RW support. Static Sitting: Normal:  Pt.  able to maintain balance w/o UE support  Dynamic Sitting: Good - accepts moderate challenge;  can maintain balance while picking object off the floor  Static Standing: Fair:  Pt. requires UE support;  may need occasional min A with RW support.  Dynamic Standing: Fair - accepts minimal challenge;  can maintain balance while turning head/trunk       WHEELCHAIR MOBILITY Daily Assessment   NT                       LOWER EXTREMITY EXERCISES   Patient performed seated lower extremity strengthening exercises: heel raises, GQ, QS, AP with manual resistance, LAQ, hip squeezes for adduction with manual resistance, hamstring curls with manual resistance. 1 set x 15 reps.       Pt. Left in recliner call bell in reach needs in reach         Assessment: Patient up sitting in recliner and alert upon entering room for treatment session. Patient education during treatment session consisted of explanations and demonstrations. The patient required min to mod assistance to complete sit to stand. The patient ambulated 3-5 feet with min to mod assistance, and RW. Distance limited by pain. Several verbal cues were required to remind the patient proper STS, walker management and sequencing. Gait deviations include forward leaning, unsteady, slow domenico, small step length, shuffle gait pattern.  Gait and standing limited by pain.  Patient displays fair safety awareness during functional transfers and gait.  Patient presents with decrease LE strength, decrease coordination, decrease balance, and endurance which limits the patient’s independence and safety with functional bed mobility, transfers, and gait.          Plan of Care: The patient has shown the ability to tolerate and benefit from physical therapy daily in a comprehensive inpatient rehabilitation program.  Continue intensive Physical Therapy  to address bed mobility, transfers, ambulation, strengthening, balance, and endurance. Continue with plan of care and focus on goals.

## 2025-07-04 NOTE — PROGRESS NOTES
Inpatient [place] [address]  Tel: [phone]     Physical Medicine and Rehabilitation Progress Note      Pritesh Orellana  Admit Date: 7/2/2025  Admit Diagnosis: Acute on chronic heart failure with mildly reduced ejection fraction (HFmrEF, 41-49%) ([facility]) [I50.23]    Subjective     Patient seen and evaluated this morning during PT in room. Patient with significant increase in L LE pain, new tender patch of erythema at leg concerning for cellulitis. Discussed with CT Surgeon on-call, Roderick Mohan MD, who evaluated patient and concurs with suspicion, also endorsed L LE arterial and venous duplex. Start empiric doxycyline 100mg; Dr. Mohan graciously agreed to monitor over weekend. All questions and concerns were addressed at this time.    Objective:     Current Facility-Administered Medications   Medication Dose Route Frequency    furosemide (LASIX) tablet 40 mg  40 mg Oral Daily    doxycycline hyclate (VIBRAMYCIN) capsule 100 mg  100 mg Oral 2 times per day    acetaminophen (TYLENOL) tablet 650 mg  650 mg Oral 4x Daily    aspirin EC tablet 81 mg  81 mg Oral Daily    atorvastatin (LIPITOR) tablet 40 mg  40 mg Oral Nightly    gabapentin (NEURONTIN) capsule 100 mg  100 mg Oral Nightly    glucose chewable tablet 16 g  4 tablet Oral PRN    guaiFENesin (MUCINEX) extended release tablet 600 mg  600 mg Oral BID    ipratropium 0.5 mg-albuterol 2.5 mg (DUONEB) nebulizer solution 1 Dose  1 Dose Nebulization Q6H PRN    magnesium oxide (MAG-OX) tablet 400 mg  400 mg Oral TID PRN    melatonin tablet 6 mg  6 mg Oral Nightly PRN    metoprolol succinate (TOPROL XL) extended release tablet 25 mg  25 mg Oral Daily    oxyCODONE-acetaminophen (PERCOCET) 5-325 MG per tablet 1 tablet  1 tablet Oral Q4H PRN    pramipexole (MIRAPEX) tablet 0.5 mg  0.5 mg Oral Nightly    sodium chloride flush 0.9 % injection 5-40 mL  5-40 mL IntraVENous 2 times per day    sodium chloride flush  0.9 % injection 5-40 mL  5-40 mL IntraVENous PRN    tamsulosin (FLOMAX) capsule 0.4 mg  0.4 mg Oral QHS    traMADol (ULTRAM) tablet 50 mg  50 mg Oral Q6H PRN    acetaminophen (TYLENOL) tablet 650 mg  650 mg Oral Q4H PRN    polyethylene glycol (GLYCOLAX) packet 17 g  17 g Oral Daily PRN    sennosides-docusate sodium (SENOKOT-S) 8.6-50 MG tablet 1 tablet  1 tablet Oral QHS    sodium phosphate (FLEET) rectal enema 1 enema  1 enema Rectal Daily PRN    bisacodyl (DULCOLAX) suppository 10 mg  10 mg Rectal Daily PRN    calcium carbonate (TUMS) chewable tablet 500 mg  500 mg Oral TID PRN    hydrALAZINE (APRESOLINE) tablet 10 mg  10 mg Oral TID PRN    ondansetron (ZOFRAN-ODT) disintegrating tablet 4 mg  4 mg Oral Q8H PRN    carboxymethylcellulose (THERATEARS) 1 % ophthalmic gel 1 drop  1 drop Both Eyes TID PRN    Benzocaine-Menthol (CEPACOL SORE THROAT) 15-2.6 MG lozenge 1 lozenge  1 lozenge Oral Q2H PRN    medicated lip ointment (BLISTEX)   Topical PRN    mineral oil-hydrophilic petrolatum (AQUAPHOR) ointment   Topical BID PRN    arformoterol 15 mcg-budesonide 1 mg neb solution   Nebulization BID RT     Allergies   Allergen Reactions    Mixed Ragweed      Other reaction(s): itchy eyes    Peanut Butter Flavoring Agent (Non-Screening)      Other reaction(s): diarrhea    Chocolate Rash     Other reaction(s): diarrhea         Visit Vitals  /63   Pulse 88   Temp 98.6 °F (37 °C) (Oral)   Resp 18   Ht 1.702 m (5' 7\")   Wt 92.3 kg (203 lb 7.8 oz)   SpO2 93%   BMI 31.87 kg/m²          Review of Systems:  General: Denies fatigue and lethargy  Neuro: Denies dizziness or headaches  HEENT: Denies changes in vision or recent head trauma  CV: Denies chest pain or palpitations  Pulmonary: Denies cough or shortness of breath  : Denies dysuria or hematuria   GI: Denies constipation or diarrhea  Psych: Denies visual or auditory hallucinations      Physical Exam:   GENERAL: Alert, NAD. Overweight. Pleasant.  HEENT: EOMI, NC/AT.  HEART:    -Pain: The patient will be monitored closely for signs and symptoms of pain. Pain regimen will be adjusted as needed.  -Cognitive function/mental status: Has the potential for residual deficits in orientation, processing, attention, thought organization, problem solving, reasoning, word retrieval, and memory given recent injury. Therefore, patient may require continued routine follow up primarily by Speech Language Pathology in addition to Occupational Therapy to address potential underlying deficits and working on higher level-cognitive function with compensatory strategies as indicated.  -Deep venous thromboembolism: patient is at increased risk for thromboembolic event given recent injury, new mobility limitations and current hospitalization. Therefore, maintain on mechanical DVT prophylaxis and encourage progressive out of mobility while continuing routine monitoring for potential thromboembolic events.  -Bowel management: increased potential for recurrent constipation given mobility limitations, current hospitalization, and medication use. Will need to continue routine monitoring of bowel function with appropriate adjustment in bowel regimen as indicated to ensure adequate bowel management.  Last BM (including prior to admit): 07/01/25  -Bladder management: Monitor urinary output and urinary function/dysfunction. Does have the potential for underlying bladder dysfunction given immobility. Therefore, requires continued routine monitoring with appropriate bladder management as indicated to avoid urinary tract complications.          Principal Problem:    Acute on chronic heart failure with mildly reduced ejection fraction (HFmrEF, 41-49%) ([facility])  Active Problems:    Moderate persistent asthma without complication    Hypertension    S/P lumbar fusion    BPH (benign prostatic hyperplasia)    S/P CABG x 4    Postoperative anemia due to acute blood loss    Pleural effusion    Gait abnormality    Decreased

## 2025-07-05 PROCEDURE — 2500000003 HC RX 250 WO HCPCS: Performed by: STUDENT IN AN ORGANIZED HEALTH CARE EDUCATION/TRAINING PROGRAM

## 2025-07-05 PROCEDURE — 6370000000 HC RX 637 (ALT 250 FOR IP): Performed by: STUDENT IN AN ORGANIZED HEALTH CARE EDUCATION/TRAINING PROGRAM

## 2025-07-05 PROCEDURE — 97110 THERAPEUTIC EXERCISES: CPT

## 2025-07-05 PROCEDURE — 97150 GROUP THERAPEUTIC PROCEDURES: CPT

## 2025-07-05 PROCEDURE — 6360000002 HC RX W HCPCS: Performed by: STUDENT IN AN ORGANIZED HEALTH CARE EDUCATION/TRAINING PROGRAM

## 2025-07-05 PROCEDURE — 94760 N-INVAS EAR/PLS OXIMETRY 1: CPT

## 2025-07-05 PROCEDURE — 94640 AIRWAY INHALATION TREATMENT: CPT

## 2025-07-05 PROCEDURE — 1180000000 HC REHAB R&B

## 2025-07-05 PROCEDURE — 97116 GAIT TRAINING THERAPY: CPT

## 2025-07-05 RX ADMIN — SENNOSIDES AND DOCUSATE SODIUM 1 TABLET: 50; 8.6 TABLET ORAL at 20:49

## 2025-07-05 RX ADMIN — DOXYCYCLINE HYCLATE 100 MG: 100 CAPSULE ORAL at 20:49

## 2025-07-05 RX ADMIN — OXYCODONE AND ACETAMINOPHEN 1 TABLET: 5; 325 TABLET ORAL at 03:41

## 2025-07-05 RX ADMIN — ARFORMOTEROL TARTRATE: 15 SOLUTION RESPIRATORY (INHALATION) at 16:43

## 2025-07-05 RX ADMIN — GUAIFENESIN 600 MG: 600 TABLET, EXTENDED RELEASE ORAL at 20:49

## 2025-07-05 RX ADMIN — ACETAMINOPHEN 650 MG: 325 TABLET ORAL at 09:05

## 2025-07-05 RX ADMIN — ATORVASTATIN CALCIUM 40 MG: 40 TABLET, FILM COATED ORAL at 20:49

## 2025-07-05 RX ADMIN — TAMSULOSIN HYDROCHLORIDE 0.4 MG: 0.4 CAPSULE ORAL at 20:49

## 2025-07-05 RX ADMIN — ACETAMINOPHEN 650 MG: 500 TABLET, FILM COATED ORAL at 12:40

## 2025-07-05 RX ADMIN — ARFORMOTEROL TARTRATE: 15 SOLUTION RESPIRATORY (INHALATION) at 05:24

## 2025-07-05 RX ADMIN — METOPROLOL SUCCINATE 25 MG: 25 TABLET, EXTENDED RELEASE ORAL at 09:06

## 2025-07-05 RX ADMIN — ASPIRIN 81 MG: 81 TABLET ORAL at 09:07

## 2025-07-05 RX ADMIN — GUAIFENESIN 600 MG: 600 TABLET, EXTENDED RELEASE ORAL at 09:07

## 2025-07-05 RX ADMIN — PRAMIPEXOLE DIHYDROCHLORIDE 0.5 MG: 0.5 TABLET ORAL at 20:49

## 2025-07-05 RX ADMIN — FUROSEMIDE 40 MG: 40 TABLET ORAL at 09:06

## 2025-07-05 RX ADMIN — ACETAMINOPHEN 650 MG: 500 TABLET, FILM COATED ORAL at 18:28

## 2025-07-05 RX ADMIN — DOXYCYCLINE HYCLATE 100 MG: 100 CAPSULE ORAL at 09:07

## 2025-07-05 RX ADMIN — OXYCODONE AND ACETAMINOPHEN 1 TABLET: 5; 325 TABLET ORAL at 20:49

## 2025-07-05 RX ADMIN — GABAPENTIN 100 MG: 100 CAPSULE ORAL at 20:49

## 2025-07-05 RX ADMIN — OXYCODONE AND ACETAMINOPHEN 1 TABLET: 5; 325 TABLET ORAL at 09:10

## 2025-07-05 RX ADMIN — SODIUM CHLORIDE, PRESERVATIVE FREE 10 ML: 5 INJECTION INTRAVENOUS at 20:51

## 2025-07-05 RX ADMIN — SODIUM CHLORIDE, PRESERVATIVE FREE 10 ML: 5 INJECTION INTRAVENOUS at 09:07

## 2025-07-05 ASSESSMENT — PAIN DESCRIPTION - DESCRIPTORS
DESCRIPTORS: ACHING
DESCRIPTORS: ACHING;TENDER
DESCRIPTORS: ACHING;TENDER
DESCRIPTORS: ACHING

## 2025-07-05 ASSESSMENT — PAIN SCALES - GENERAL
PAINLEVEL_OUTOF10: 3
PAINLEVEL_OUTOF10: 0
PAINLEVEL_OUTOF10: 7
PAINLEVEL_OUTOF10: 0
PAINLEVEL_OUTOF10: 4
PAINLEVEL_OUTOF10: 7
PAINLEVEL_OUTOF10: 7

## 2025-07-05 ASSESSMENT — PAIN DESCRIPTION - ORIENTATION
ORIENTATION: LEFT

## 2025-07-05 ASSESSMENT — PAIN DESCRIPTION - LOCATION
LOCATION: LEG

## 2025-07-05 ASSESSMENT — PAIN - FUNCTIONAL ASSESSMENT
PAIN_FUNCTIONAL_ASSESSMENT: PREVENTS OR INTERFERES WITH MANY ACTIVE NOT PASSIVE ACTIVITIES
PAIN_FUNCTIONAL_ASSESSMENT: ACTIVITIES ARE NOT PREVENTED

## 2025-07-05 NOTE — PROGRESS NOTES
intake/output data recorded.    Scheduled Meds:    furosemide  40 mg Oral Daily    doxycycline hyclate  100 mg Oral 2 times per day    acetaminophen  650 mg Oral 4x Daily    aspirin  81 mg Oral Daily    atorvastatin  40 mg Oral Nightly    gabapentin  100 mg Oral Nightly    guaiFENesin  600 mg Oral BID    metoprolol succinate  25 mg Oral Daily    pramipexole  0.5 mg Oral Nightly    sodium chloride flush  5-40 mL IntraVENous 2 times per day    tamsulosin  0.4 mg Oral QHS    sennosides-docusate sodium  1 tablet Oral QHS    arformoterol 15 mcg-budesonide 1 mg neb solution   Nebulization BID RT       Continuous Infusions:       Assessment  80-year-old gentleman postop day 10 from coronary bypass grafting and left atrial appendage clip.  2.  3.  4.      Patient Active Problem List   Diagnosis    Moderate persistent asthma without complication    Hyperlipidemia    Hypertension    Intermittent left-sided chest pain    RLS (restless legs syndrome)    Spinal stenosis, lumbar region, with neurogenic claudication    Lumbar facet arthropathy    Spondylolisthesis of lumbar region    Lumbar radiculopathy    S/P lumbar fusion    Hammer toe of left foot    Valgus deformity of both great toes    Vitamin D deficiency    Rosacea    BPH (benign prostatic hyperplasia)    History of basal cell cancer    Elevated prostate specific antigen (PSA)    Malignant neoplasm of prostate (HCC)    General weakness    Colon abnormality    Bilateral foot pain    Unstable angina (HCC)    Atherosclerotic heart disease of native coronary artery with unstable angina pectoris (HCC)    Dyspnea on exertion    CAD, multiple vessel    CAD in native artery    S/P CABG x 4    Hypoxemia    Atelectasis    Postoperative anemia due to acute blood loss    Pleural effusion    Gait abnormality    Decreased activities of daily living (ADL)    Acute on chronic heart failure with mildly reduced ejection fraction (HFmrEF, 41-49%) ([facility])    Scrotal edema    Cellulitis of left  anterior lower leg    Generalized edema       Plan   1.  Continue antibiotics per physical medicine and keep the left leg elevated please notify me any deterioration or any concerns.  2.  3.  4.          MD DEBBIE QUINONES MD

## 2025-07-05 NOTE — PROGRESS NOTES
PHYSICAL THERAPY DAILY NOTE    PT Individual Minutes  Time In: 1024  Time Out: 1054  Minutes: 30     PT Concurrent Minutes  Time In: 0956  Time Out: 1024  Minutes: 28           Total Treatment Time:  58 Minutes    Pt. Seen for: AM, Gait Training, Patient Education, Therapeutic Exercise, Transfer Training, and Other     Subjective: Patient reporting he feels better today. Reports he can walk better today with less pain.          Objective:  Restrictions/Precautions: Fall Risk, Bed Alarm, Other (Comment), Surgical protocol (posey alarm)  Activity Level: Up as Tolerated, Up with Assist  Required Braces or Orthoses?: No              Sternal Precautions: No Pushing, No Pulling, 5# Lifting Restrictions  Other Position/Activity Restrictions: Body mechanics for sternal precautions, LE positioning for edema         GROSS ASSESSMENT   Patient resting in w/c at beginning of treatment        COGNITION Daily Assessment    Overall Orientation Status: Within Normal Limits   Overall Cognitive Status: WNL        BED/MAT MOBILITY Daily Assessment     Bridging: Partial/Moderate assistance  Rolling to Left: Partial/Moderate assistance  Rolling to Right: Partial/Moderate assistance  Supine to Sit: Partial/Moderate assistance  Sit to Supine: Partial/Moderate assistance  Scooting: Minimal assistance  Bed Mobility Comments: Increased time and effort to complete with altered body mechanics due to core and LE edema, LLE pain with limited ROM and sternal precautions        TRANSFERS Daily Assessment    Sit to Stand: Minimal Assistance  Stand to Sit: Minimal Assistance  Stand Pivot Transfers: Minimal Assistance (with RW)  Comment: Increased time and effort to complete with cues for body mechanics          GAIT Daily Assessment    Surface: Level tile  Device: Rolling Walker  Assistance: Minimal assistance  Quality of Gait: slow cont partial step through gait  with flexed posture with antalgic LLE stance phase  Gait Deviations: Decreased step

## 2025-07-05 NOTE — PLAN OF CARE
Problem: Respiratory - Adult  Goal: Achieves optimal ventilation and oxygenation  Outcome: Progressing  Flowsheets (Taken 7/5/2025 0735)  Achieves optimal ventilation and oxygenation:   Assess for changes in respiratory status   Position to facilitate oxygenation and minimize respiratory effort   Respiratory therapy support as indicated   Assess for changes in mentation and behavior   Oxygen supplementation based on oxygen saturation or arterial blood gases   Encourage broncho-pulmonary hygiene including cough, deep breathe, incentive spirometry   Assess and instruct to report shortness of breath or any respiratory difficulty

## 2025-07-05 NOTE — PLAN OF CARE
Problem: Discharge Planning  Goal: Discharge to home or other facility with appropriate resources  Outcome: Progressing     Problem: Safety - Adult  Goal: Free from fall injury  Outcome: Progressing     Problem: Cardiovascular - Adult  Goal: Maintains optimal cardiac output and hemodynamic stability  Outcome: Progressing  Goal: Absence of cardiac dysrhythmias or at baseline  Outcome: Progressing     Problem: Skin/Tissue Integrity - Adult  Goal: Skin integrity remains intact  Outcome: Progressing  Goal: Incisions, wounds, or drain sites healing without S/S of infection  Outcome: Progressing  Goal: Oral mucous membranes remain intact  Outcome: Progressing     Problem: Musculoskeletal - Adult  Goal: Return mobility to safest level of function  Outcome: Progressing  Goal: Maintain proper alignment of affected body part  Outcome: Progressing  Goal: Return ADL status to a safe level of function  Outcome: Progressing     Problem: Hematologic - Adult  Goal: Maintains hematologic stability  Outcome: Progressing     Problem: Respiratory - Adult  Goal: Achieves optimal ventilation and oxygenation  7/5/2025 1830 by Akua Quan RN  Outcome: Progressing  7/5/2025 1645 by Godfrey Day RCKALINA  Outcome: Progressing  Flowsheets (Taken 7/5/2025 1645)  Achieves optimal ventilation and oxygenation:   Assess for changes in respiratory status   Position to facilitate oxygenation and minimize respiratory effort   Respiratory therapy support as indicated   Assess for changes in mentation and behavior   Oxygen supplementation based on oxygen saturation or arterial blood gases   Encourage broncho-pulmonary hygiene including cough, deep breathe, incentive spirometry   Assess and instruct to report shortness of breath or any respiratory difficulty

## 2025-07-05 NOTE — PROGRESS NOTES
End of Shift Note      Acute Onset Mental Status change? No    Does the patient have Inattention? Behavior not present    Does the patient have Disorganized Thinking? Behavior not present    Does the patient have Altered Level of Consciousness? Behavior not present    Is the patient impulsive? No    Expression of Ideas and Wants  Does the patient express ideas and wants without difficulty? Yes    Does the patient understand verbal and non-verbal content without cues or repetition? Yes    Self-Care  Eating:  Did the patient eat by mouth? Yes; The patient did NOT require assistance.    Oral Hygiene:  Did the patient use suitable items to clean teeth or gums? Yes;     Toileting Hygiene:  Did the patient void or have a bowel movement? no    The patient uses a urinal;     No intake/output data recorded. No intake/output data recorded.    Shower/Bathe Self:  Did the patient have a shower or bath? no    Upper Body Dressing:  Did the patient dress or undress above the waist? no    Lower Body Dressing:  Did the patient dress or undress below the waist? no    Mobility  Chair/bed-to-chair Transfer:  Did the patient transfer to and from a bed to a chair or wheelchair? no    Toilet Transfer:    Did the patient use a standard toilet with or without a raised seat or bedside commode? no    Bladder   Does the patient urinate? Yes  Does the patient have a catheter or ostomy? No  If no ostomy or catheter, when was the patient's last incontinent episode, if any? The patient is always continent.  Does the patient have stress incontinence? No    Bowel  Date of last BM 7/4/25     Does the patient have an ostomy? No  If no ostomy, when was the patient's last incontinent episode, if any? The patient is always continent.    Does the patient have pain that affects any of the following: Sleep, Therapy Activities, or Day-to-Day Activities? Sleep, Yes, Occasionally, Therapy Activities, Yes, Frequently, and Day-to-Day Activities, Yes,

## 2025-07-06 VITALS
TEMPERATURE: 98.4 F | HEART RATE: 87 BPM | DIASTOLIC BLOOD PRESSURE: 73 MMHG | BODY MASS INDEX: 31.92 KG/M2 | WEIGHT: 203.4 LBS | HEIGHT: 67 IN | SYSTOLIC BLOOD PRESSURE: 131 MMHG | OXYGEN SATURATION: 97 % | RESPIRATION RATE: 18 BRPM

## 2025-07-06 PROCEDURE — 94640 AIRWAY INHALATION TREATMENT: CPT

## 2025-07-06 PROCEDURE — 6360000002 HC RX W HCPCS: Performed by: STUDENT IN AN ORGANIZED HEALTH CARE EDUCATION/TRAINING PROGRAM

## 2025-07-06 PROCEDURE — 2500000003 HC RX 250 WO HCPCS: Performed by: STUDENT IN AN ORGANIZED HEALTH CARE EDUCATION/TRAINING PROGRAM

## 2025-07-06 PROCEDURE — 1180000000 HC REHAB R&B

## 2025-07-06 PROCEDURE — 6370000000 HC RX 637 (ALT 250 FOR IP): Performed by: STUDENT IN AN ORGANIZED HEALTH CARE EDUCATION/TRAINING PROGRAM

## 2025-07-06 PROCEDURE — 97150 GROUP THERAPEUTIC PROCEDURES: CPT

## 2025-07-06 RX ADMIN — METOPROLOL SUCCINATE 25 MG: 25 TABLET, EXTENDED RELEASE ORAL at 08:22

## 2025-07-06 RX ADMIN — ACETAMINOPHEN 650 MG: 500 TABLET, FILM COATED ORAL at 16:33

## 2025-07-06 RX ADMIN — ACETAMINOPHEN 650 MG: 500 TABLET, FILM COATED ORAL at 12:29

## 2025-07-06 RX ADMIN — OXYCODONE AND ACETAMINOPHEN 1 TABLET: 5; 325 TABLET ORAL at 21:09

## 2025-07-06 RX ADMIN — ASPIRIN 81 MG: 81 TABLET ORAL at 08:22

## 2025-07-06 RX ADMIN — ACETAMINOPHEN 650 MG: 500 TABLET, FILM COATED ORAL at 08:22

## 2025-07-06 RX ADMIN — TAMSULOSIN HYDROCHLORIDE 0.4 MG: 0.4 CAPSULE ORAL at 21:09

## 2025-07-06 RX ADMIN — DOXYCYCLINE HYCLATE 100 MG: 100 CAPSULE ORAL at 21:09

## 2025-07-06 RX ADMIN — OXYCODONE AND ACETAMINOPHEN 1 TABLET: 5; 325 TABLET ORAL at 02:51

## 2025-07-06 RX ADMIN — DOXYCYCLINE HYCLATE 100 MG: 100 CAPSULE ORAL at 08:22

## 2025-07-06 RX ADMIN — SENNOSIDES AND DOCUSATE SODIUM 1 TABLET: 50; 8.6 TABLET ORAL at 21:09

## 2025-07-06 RX ADMIN — FUROSEMIDE 40 MG: 40 TABLET ORAL at 08:22

## 2025-07-06 RX ADMIN — GABAPENTIN 100 MG: 100 CAPSULE ORAL at 21:09

## 2025-07-06 RX ADMIN — SODIUM CHLORIDE, PRESERVATIVE FREE 10 ML: 5 INJECTION INTRAVENOUS at 21:12

## 2025-07-06 RX ADMIN — ATORVASTATIN CALCIUM 40 MG: 40 TABLET, FILM COATED ORAL at 21:09

## 2025-07-06 RX ADMIN — SODIUM CHLORIDE, PRESERVATIVE FREE 10 ML: 5 INJECTION INTRAVENOUS at 08:25

## 2025-07-06 RX ADMIN — ARFORMOTEROL TARTRATE: 15 SOLUTION RESPIRATORY (INHALATION) at 16:11

## 2025-07-06 RX ADMIN — ACETAMINOPHEN 650 MG: 500 TABLET, FILM COATED ORAL at 21:09

## 2025-07-06 RX ADMIN — ARFORMOTEROL TARTRATE: 15 SOLUTION RESPIRATORY (INHALATION) at 05:29

## 2025-07-06 RX ADMIN — GUAIFENESIN 600 MG: 600 TABLET, EXTENDED RELEASE ORAL at 08:22

## 2025-07-06 RX ADMIN — PRAMIPEXOLE DIHYDROCHLORIDE 0.5 MG: 0.5 TABLET ORAL at 21:09

## 2025-07-06 RX ADMIN — GUAIFENESIN 600 MG: 600 TABLET, EXTENDED RELEASE ORAL at 21:09

## 2025-07-06 ASSESSMENT — PAIN DESCRIPTION - ORIENTATION
ORIENTATION: LEFT

## 2025-07-06 ASSESSMENT — PAIN DESCRIPTION - LOCATION
LOCATION: LEG

## 2025-07-06 ASSESSMENT — PAIN - FUNCTIONAL ASSESSMENT: PAIN_FUNCTIONAL_ASSESSMENT: ACTIVITIES ARE NOT PREVENTED

## 2025-07-06 ASSESSMENT — PAIN SCALES - GENERAL
PAINLEVEL_OUTOF10: 7
PAINLEVEL_OUTOF10: 0
PAINLEVEL_OUTOF10: 2
PAINLEVEL_OUTOF10: 7
PAINLEVEL_OUTOF10: 1
PAINLEVEL_OUTOF10: 0
PAINLEVEL_OUTOF10: 3

## 2025-07-06 ASSESSMENT — PAIN DESCRIPTION - DESCRIPTORS
DESCRIPTORS: ACHING
DESCRIPTORS: ACHING
DESCRIPTORS: ACHING;TENDER

## 2025-07-06 NOTE — PROGRESS NOTES
PHYSICAL THERAPY DAILY NOTE    PT Individual Minutes  Time In: 0907  Time Out: 0923  Minutes: 16     PT Concurrent Minutes  Time In: 0923  Time Out: 0949  Minutes: 26  Time in : 851  Time Out: 907  Minutes: 16           Total Treatment Time:  58 Minutes    Pt. Seen for: AM, Gait Training, Patient Education, Therapeutic Exercise, Transfer Training, and Other     Subjective: Patient reporting his left leg is sore but overall feels better. Reports it feels good to get up and move. Reports he has been going to the bathroom a lot. Reports less swelling in his legs         Objective:  Restrictions/Precautions: Fall Risk, Bed Alarm, Other (Comment), Surgical protocol (posey alarm)  Activity Level: Up as Tolerated, Up with Assist  Required Braces or Orthoses?: No              Sternal Precautions: No Pushing, No Pulling, 5# Lifting Restrictions  Other Position/Activity Restrictions: Body mechanics for sternal precautions, LE positioning for edema         GROSS ASSESSMENT   Patient resting in recliner at beginning of treatment        COGNITION Daily Assessment    Overall Orientation Status: Within Normal Limits   Overall Cognitive Status: WNL        BED/MAT MOBILITY Daily Assessment     Bridging: Partial/Moderate assistance  Rolling to Left: Partial/Moderate assistance  Rolling to Right: Partial/Moderate assistance  Supine to Sit: Partial/Moderate assistance  Sit to Supine: Partial/Moderate assistance  Scooting: Minimal assistance  Bed Mobility Comments: Increased time and effort to complete with altered body mechanics due to core and LE edema, LLE pain with limited ROM and sternal precautions        TRANSFERS Daily Assessment    Sit to Stand: Minimal Assistance  Stand to Sit: Minimal Assistance  Stand Pivot Transfers: Contact guard assistance (with RW, bed w/c, recliner and commode transfers)  Comment: Increased time and effort to complete with cues for body mechanics          GAIT Daily Assessment    Surface: Level

## 2025-07-06 NOTE — PLAN OF CARE
Problem: Discharge Planning  Goal: Discharge to home or other facility with appropriate resources  Outcome: Progressing     Problem: Safety - Adult  Goal: Free from fall injury  7/6/2025 0957 by Akua Quan, RN  Outcome: Progressing  7/5/2025 2015 by Samara Elizalde, RN  Outcome: Progressing     Problem: Cardiovascular - Adult  Goal: Maintains optimal cardiac output and hemodynamic stability  Outcome: Progressing  Goal: Absence of cardiac dysrhythmias or at baseline  Outcome: Progressing     Problem: Skin/Tissue Integrity - Adult  Goal: Skin integrity remains intact  Outcome: Progressing  Goal: Incisions, wounds, or drain sites healing without S/S of infection  Outcome: Progressing  Goal: Oral mucous membranes remain intact  Outcome: Progressing     Problem: Musculoskeletal - Adult  Goal: Return mobility to safest level of function  Outcome: Progressing  Goal: Maintain proper alignment of affected body part  Outcome: Progressing  Goal: Return ADL status to a safe level of function  Outcome: Progressing     Problem: Hematologic - Adult  Goal: Maintains hematologic stability  Outcome: Progressing     Problem: Respiratory - Adult  Goal: Achieves optimal ventilation and oxygenation  Outcome: Progressing

## 2025-07-06 NOTE — PROGRESS NOTES
Patient is postop day 11 from four-vessel coronary bypass grafting was up on the rehabilitation floor noted to have some cellulitis on the left shin and extensive bruising.    Ultrasound of the left leg showed no evidence of DVT.  Patient was started on doxycycline.    The patient says the leg feels much better less painful I am still some swelling and bruising but the redness and cellulitis appear to be improving on antibiotics.  Please call any questions or concerns.

## 2025-07-07 LAB
BASOPHILS # BLD: 0.03 K/UL (ref 0–0.2)
BASOPHILS NFR BLD: 0.3 % (ref 0–2)
DIFFERENTIAL METHOD BLD: ABNORMAL
EOSINOPHIL # BLD: 0.47 K/UL (ref 0–0.8)
EOSINOPHIL NFR BLD: 5.4 % (ref 0.5–7.8)
ERYTHROCYTE [DISTWIDTH] IN BLOOD BY AUTOMATED COUNT: 13.2 % (ref 11.9–14.6)
HCT VFR BLD AUTO: 28 % (ref 41.1–50.3)
HGB BLD-MCNC: 9 G/DL (ref 13.6–17.2)
IMM GRANULOCYTES # BLD AUTO: 0.05 K/UL (ref 0–0.5)
IMM GRANULOCYTES NFR BLD AUTO: 0.6 % (ref 0–5)
LYMPHOCYTES # BLD: 1.08 K/UL (ref 0.5–4.6)
LYMPHOCYTES NFR BLD: 12.4 % (ref 13–44)
MCH RBC QN AUTO: 31.3 PG (ref 26.1–32.9)
MCHC RBC AUTO-ENTMCNC: 32.1 G/DL (ref 31.4–35)
MCV RBC AUTO: 97.2 FL (ref 82–102)
MONOCYTES # BLD: 0.69 K/UL (ref 0.1–1.3)
MONOCYTES NFR BLD: 7.9 % (ref 4–12)
NEUTS SEG # BLD: 6.42 K/UL (ref 1.7–8.2)
NEUTS SEG NFR BLD: 73.4 % (ref 43–78)
NRBC # BLD: 0 K/UL (ref 0–0.2)
PLATELET # BLD AUTO: 382 K/UL (ref 150–450)
PMV BLD AUTO: 8.7 FL (ref 9.4–12.3)
RBC # BLD AUTO: 2.88 M/UL (ref 4.23–5.6)
WBC # BLD AUTO: 8.7 K/UL (ref 4.3–11.1)

## 2025-07-07 PROCEDURE — 97535 SELF CARE MNGMENT TRAINING: CPT

## 2025-07-07 PROCEDURE — 97530 THERAPEUTIC ACTIVITIES: CPT

## 2025-07-07 PROCEDURE — 97110 THERAPEUTIC EXERCISES: CPT

## 2025-07-07 PROCEDURE — 1180000000 HC REHAB R&B

## 2025-07-07 PROCEDURE — 6370000000 HC RX 637 (ALT 250 FOR IP): Performed by: STUDENT IN AN ORGANIZED HEALTH CARE EDUCATION/TRAINING PROGRAM

## 2025-07-07 PROCEDURE — 97150 GROUP THERAPEUTIC PROCEDURES: CPT

## 2025-07-07 PROCEDURE — 94760 N-INVAS EAR/PLS OXIMETRY 1: CPT

## 2025-07-07 PROCEDURE — 2500000003 HC RX 250 WO HCPCS: Performed by: STUDENT IN AN ORGANIZED HEALTH CARE EDUCATION/TRAINING PROGRAM

## 2025-07-07 PROCEDURE — 36415 COLL VENOUS BLD VENIPUNCTURE: CPT

## 2025-07-07 PROCEDURE — 94640 AIRWAY INHALATION TREATMENT: CPT

## 2025-07-07 PROCEDURE — 6360000002 HC RX W HCPCS: Performed by: STUDENT IN AN ORGANIZED HEALTH CARE EDUCATION/TRAINING PROGRAM

## 2025-07-07 PROCEDURE — 97116 GAIT TRAINING THERAPY: CPT

## 2025-07-07 PROCEDURE — 85025 COMPLETE CBC W/AUTO DIFF WBC: CPT

## 2025-07-07 RX ADMIN — GUAIFENESIN 600 MG: 600 TABLET, EXTENDED RELEASE ORAL at 21:03

## 2025-07-07 RX ADMIN — ARFORMOTEROL TARTRATE: 15 SOLUTION RESPIRATORY (INHALATION) at 06:10

## 2025-07-07 RX ADMIN — GABAPENTIN 100 MG: 100 CAPSULE ORAL at 21:03

## 2025-07-07 RX ADMIN — ARFORMOTEROL TARTRATE: 15 SOLUTION RESPIRATORY (INHALATION) at 18:09

## 2025-07-07 RX ADMIN — PRAMIPEXOLE DIHYDROCHLORIDE 0.5 MG: 0.5 TABLET ORAL at 21:04

## 2025-07-07 RX ADMIN — SODIUM CHLORIDE, PRESERVATIVE FREE 10 ML: 5 INJECTION INTRAVENOUS at 09:35

## 2025-07-07 RX ADMIN — TAMSULOSIN HYDROCHLORIDE 0.4 MG: 0.4 CAPSULE ORAL at 21:03

## 2025-07-07 RX ADMIN — ASPIRIN 81 MG: 81 TABLET ORAL at 08:21

## 2025-07-07 RX ADMIN — METOPROLOL SUCCINATE 25 MG: 25 TABLET, EXTENDED RELEASE ORAL at 08:21

## 2025-07-07 RX ADMIN — GUAIFENESIN 600 MG: 600 TABLET, EXTENDED RELEASE ORAL at 08:21

## 2025-07-07 RX ADMIN — SODIUM CHLORIDE, PRESERVATIVE FREE 10 ML: 5 INJECTION INTRAVENOUS at 21:04

## 2025-07-07 RX ADMIN — ATORVASTATIN CALCIUM 40 MG: 40 TABLET, FILM COATED ORAL at 21:04

## 2025-07-07 RX ADMIN — OXYCODONE AND ACETAMINOPHEN 1 TABLET: 5; 325 TABLET ORAL at 08:24

## 2025-07-07 RX ADMIN — DOXYCYCLINE HYCLATE 100 MG: 100 CAPSULE ORAL at 21:03

## 2025-07-07 RX ADMIN — ACETAMINOPHEN 650 MG: 500 TABLET, FILM COATED ORAL at 21:03

## 2025-07-07 RX ADMIN — ACETAMINOPHEN 650 MG: 500 TABLET, FILM COATED ORAL at 14:29

## 2025-07-07 RX ADMIN — OXYCODONE AND ACETAMINOPHEN 1 TABLET: 5; 325 TABLET ORAL at 04:33

## 2025-07-07 RX ADMIN — FUROSEMIDE 40 MG: 40 TABLET ORAL at 08:21

## 2025-07-07 RX ADMIN — OXYCODONE AND ACETAMINOPHEN 1 TABLET: 5; 325 TABLET ORAL at 16:26

## 2025-07-07 RX ADMIN — DOXYCYCLINE HYCLATE 100 MG: 100 CAPSULE ORAL at 08:19

## 2025-07-07 ASSESSMENT — PAIN DESCRIPTION - DESCRIPTORS
DESCRIPTORS: ACHING
DESCRIPTORS: SORE
DESCRIPTORS: ACHING
DESCRIPTORS: ACHING

## 2025-07-07 ASSESSMENT — PAIN DESCRIPTION - LOCATION
LOCATION: CHEST
LOCATION: LEG

## 2025-07-07 ASSESSMENT — PAIN DESCRIPTION - ORIENTATION
ORIENTATION: LEFT
ORIENTATION: LEFT
ORIENTATION: MID
ORIENTATION: LEFT

## 2025-07-07 ASSESSMENT — PAIN SCALES - GENERAL
PAINLEVEL_OUTOF10: 3
PAINLEVEL_OUTOF10: 2
PAINLEVEL_OUTOF10: 2
PAINLEVEL_OUTOF10: 6
PAINLEVEL_OUTOF10: 7
PAINLEVEL_OUTOF10: 0
PAINLEVEL_OUTOF10: 6
PAINLEVEL_OUTOF10: 7
PAINLEVEL_OUTOF10: 4
PAINLEVEL_OUTOF10: 3

## 2025-07-07 ASSESSMENT — PAIN - FUNCTIONAL ASSESSMENT
PAIN_FUNCTIONAL_ASSESSMENT: PREVENTS OR INTERFERES SOME ACTIVE ACTIVITIES AND ADLS
PAIN_FUNCTIONAL_ASSESSMENT: PREVENTS OR INTERFERES SOME ACTIVE ACTIVITIES AND ADLS
PAIN_FUNCTIONAL_ASSESSMENT: ACTIVITIES ARE NOT PREVENTED

## 2025-07-07 NOTE — PROGRESS NOTES
[facility] OCCUPATIONAL THERAPY DAILY NOTE  OT Individual Minutes  Time In: 1035  Time Out: 1120  Minutes: 45               Subjective: Pt agreeable to treatment. \"I'm ready.\"  Pain: No pain expressed.  Interdisciplinary Communication: Collaborated with PT regarding pt status, pt performance, and handoff communication.   Precautions: Falls, [place] Alarm, and Sternal Precautions, [place] with hearing aides   Lines:IV  O2 Device: RA    FUNCTIONAL MOBILITY:       Bed Mobility NT    Sit to Stand CGA    Transfer  CGA  Transfer Type: SPT  Equipment: RW    Ambulation CGA  Equipment: RW       - Activity Tolerance - Balance - Coordination - Energy Conservation/Pacing    Pt completed therapeutic activities to challenge static balance, dynamic balance, standing tolerance, and activity tolerance in preparation for safe return to max (I) with I/ADLs. Pt tolerated activities well with no c/o pain. . Rest breaks utilized as needed throughout..   Pt completed simulated navigation and retrieval activity with RW. Pt navigated by turning R/L, taking 3 steps forward to collect cones placed at waist height, 3 steps back to place cone at midline at waist height using R/LUE. Pt completed navigation and collection x12 cones. Pt required 1 rest break, no LOB noted. Pt completed with 1-3 verbal cues rquired for directions.      - Self-Care   Pt completed self-care seated at commode with RW and grab bars to challenge R/LUE function, functional mobility and (I) with ADLs in preparation for safe return to max (I) with ADLs. Pt tolerated self-care well with no c/o pain. Rest breaks utilized as needed throughout..  Pt completed:  Toileting; void and BM at commode with SBA/CGA for toilet transfer and clothing management before and after.     Education   Adaptive ADL Techniques, AE/DME Training, Benefits of OT, Energy Conservation, Pacing, Functional Transfer Training, and Safety Awareness     Assessment: Patient tolerated session very well overall.Pt verbally  expressed feeling \"comfortable and confident\" throughout navigating/cone activity. Pt demonstrated good participation in OT treatment. Pt continues to benefit from skilled OT services to address remaining deficits and progress toward baseline level of independence and safety. Patient ended session seated in recliner with call bell and needs within reach and with chair/bed alarm activated.   Plan: Continue OT POC.     Violet Pastrana   7/7/2025

## 2025-07-07 NOTE — PROGRESS NOTES
PHYSICAL THERAPY DAILY NOTE    PT Individual Minutes  Time In: 0931  Time Out: 0959  Minutes: 28     PT Concurrent Minutes  Time In: 0919  Time Out: 0931  Minutes: 12             Total Treatment Time:  40 Minutes    Pt. Seen for: AM, Gait Training, Patient Education, Therapeutic Exercise, Transfer Training, and Other     Subjective: Patient reporting he has a rollator at home and prefers to use a rollator. Reports his LLE sore but feels better than it did last week         Objective:  Restrictions/Precautions: Fall Risk, Bed Alarm, Other (Comment), Surgical protocol (posey alarm)  Activity Level: Up as Tolerated, Up with Assist  Required Braces or Orthoses?: No              Sternal Precautions: No Pushing, No Pulling, 5# Lifting Restrictions  Other Position/Activity Restrictions: Body mechanics for sternal precautions, LE positioning for edema         GROSS ASSESSMENT   Patient resting in recliner at beginning of treatment        COGNITION Daily Assessment    Overall Orientation Status: Within Normal Limits   Overall Cognitive Status: WNL        BED/MAT MOBILITY Daily Assessment     Bed Mobility Comments: NT        TRANSFERS Daily Assessment    Sit to Stand: Minimal Assistance  Stand to Sit: Minimal Assistance  Stand Pivot Transfers: Contact guard assistance (with RW, bed w/c, recliner and commode transfers)  Comment: Increased time and effort to complete with cues for body mechanics          GAIT Daily Assessment    Surface: Level tile  Device: Rolling Walker;Rollator  Assistance: Contact guard assistance  Quality of Gait: gait training with cues for upright posture with improved step length and step clearance  Gait Deviations: Decreased step height;Slow Lynn;Decreased head and trunk rotation;Decreased step length  Distance: 75 ft x 1 with RW, 75 ft x 2 and 120 ft x 1 with rollator  Comments: Increased gait distance with improved gait pattern. Improved tolerance to weight bearing on LLE. Able to progress to gait

## 2025-07-07 NOTE — PROGRESS NOTES
PHYSICAL THERAPY DAILY NOTE    PT Individual Minutes  Time In: 1346  Time Out: 1430  Minutes: 44                  Total Treatment Time:  44 Minutes    Pt. Seen for: PM, Gait Training, Patient Education, Therapeutic Exercise, Transfer Training, and Other     Subjective: Patient reporting steps with handrails to get into his home. Reports hand rail is on the left.         Objective:  Restrictions/Precautions: Fall Risk, Bed Alarm, Other (Comment), Surgical protocol (posey alarm)  Activity Level: Up as Tolerated, Up with Assist  Required Braces or Orthoses?: No              Sternal Precautions: No Pushing, No Pulling, 5# Lifting Restrictions  Other Position/Activity Restrictions: Body mechanics for sternal precautions, LE positioning for edema         GROSS ASSESSMENT   Patient resting in w/c at beginning of treatment        COGNITION Daily Assessment    Overall Orientation Status: Within Normal Limits   Overall Cognitive Status: WNL        BED/MAT MOBILITY Daily Assessment     Bed Mobility Comments: NT        TRANSFERS Daily Assessment    Sit to Stand: Stand by assistance (with RW)  Stand to Sit: Stand by assistance (with RW)  Stand Pivot Transfers: Contact guard assistance (with RW)  Car Transfer: Minimal Assistance (SPT with RW)  Comment: Increased time and effort to complete with cues for body mechanics          GAIT Daily Assessment    Surface: Level tile  Device: Rolling Walker  Assistance: Stand by assistance  Quality of Gait: gait training with cues for upright posture with improved step length and step clearance  Gait Deviations: Decreased step height;Slow Lynn;Decreased head and trunk rotation;Decreased step length  Distance: 110 ft x 1  Comments: Increased gait distance with improved gait pattern. Improved tolerance to weight bearing on LLE. Able to progress to gait with rollator  More Ambulation?: Yes   Ambulation 2  Surface - 2: ramp  Device 2: Rolling Walker  Assistance 2: Contact guard

## 2025-07-07 NOTE — PROGRESS NOTES
End of Shift Note      Acute Onset Mental Status change? No    Does the patient have Inattention? Behavior not present    Does the patient have Disorganized Thinking? Behavior not present    Does the patient have Altered Level of Consciousness? Behavior not present    Is the patient impulsive? No    Expression of Ideas and Wants  Does the patient express ideas and wants without difficulty? Yes    Does the patient understand verbal and non-verbal content without cues or repetition? Yes    Self-Care  Eating:  Did the patient eat by mouth? no    Oral Hygiene:  Did the patient use suitable items to clean teeth or gums? Yes;     Toileting Hygiene:  Did the patient void or have a bowel movement? Yes; Requires supervision for safety, steadying, or contact guard assistance.    The patient uses a toilet;  and urinal;     In: 560 [P.O.:560]  Out: 1050  In: 560   Out: 1050 [Urine:1050]    Shower/Bathe Self:  Did the patient have a shower or bath? no    Upper Body Dressing:  Did the patient dress or undress above the waist? no    Lower Body Dressing:  Did the patient dress or undress below the waist? no    Mobility  Chair/bed-to-chair Transfer:  Did the patient transfer to and from a bed to a chair or wheelchair? Yes; Requires supervision for safety, steadying, or contact guard assistance.    Toilet Transfer:    Did the patient use a standard toilet with or without a raised seat or bedside commode? Yes; Requires supervision for safety, steadying, or contact guard assistance.    Bladder   Does the patient urinate? Yes  Does the patient have a catheter or ostomy? No  If no ostomy or catheter, when was the patient's last incontinent episode, if any? The patient is always continent.  Does the patient have stress incontinence? No    Bowel  Date of last BM 7/6     Does the patient have an ostomy? No  If no ostomy, when was the patient's last incontinent episode, if any? The patient is always continent.    Does the patient have pain

## 2025-07-07 NOTE — PLAN OF CARE
Problem: Discharge Planning  Goal: Discharge to home or other facility with appropriate resources  Outcome: Progressing     Problem: Safety - Adult  Goal: Free from fall injury  7/7/2025 1025 by Akua Quan, RN  Outcome: Progressing  7/6/2025 2227 by Samara Elizalde, RN  Outcome: Progressing     Problem: Cardiovascular - Adult  Goal: Maintains optimal cardiac output and hemodynamic stability  Outcome: Progressing  Goal: Absence of cardiac dysrhythmias or at baseline  Outcome: Progressing     Problem: Skin/Tissue Integrity - Adult  Goal: Skin integrity remains intact  Outcome: Progressing  Goal: Incisions, wounds, or drain sites healing without S/S of infection  Outcome: Progressing  Goal: Oral mucous membranes remain intact  Outcome: Progressing     Problem: Musculoskeletal - Adult  Goal: Return mobility to safest level of function  Outcome: Progressing  Goal: Maintain proper alignment of affected body part  Outcome: Progressing  Goal: Return ADL status to a safe level of function  Outcome: Progressing     Problem: Hematologic - Adult  Goal: Maintains hematologic stability  Outcome: Progressing     Problem: Respiratory - Adult  Goal: Achieves optimal ventilation and oxygenation  Outcome: Progressing

## 2025-07-07 NOTE — PROGRESS NOTES
[facility] OCCUPATIONAL THERAPY DAILY NOTE  OT Individual Minutes  Time In: 0826  Time Out: 0919  Minutes: 53               Subjective: Pt agreeable to treatment. \"I don't need to take a shower or change, just brush my teeth.\"  Pain: Patient reports 4-5/10 pain. RN notified.  Interdisciplinary Communication: Collaborated with PT and RN regarding pt status, pt performance, and handoff communication.   Precautions: Falls, [place] Alarm, and Sternal Precautions, [place] with hearing aides   Lines: IV  O2 Device: RA      FUNCTIONAL MOBILITY:       Bed Mobility Nida    Sit to Stand CGA and Nida    Transfer  CGA and Nida  Transfer Type: SPT  Equipment: RW    Ambulation NT  Equipment: NA      ACTIVITIES OF DAILY LIVING:       Oral Hygiene Setup or clean-up assistance S/U oral care products seated at sink in w/c   Putting On/Taking Off Footwear  Partial/moderate assistance   Mod A don socks and tennis shoes seated in w/c. Pt able to cross R leg over L, but not L secondary to swelling.    Education Adaptive ADL Techniques, AE/DME Training, Benefits of OT, Energy Conservation, Pacing, Functional Transfer Training, Precautions, and Safety Awareness      - Activity Tolerance - Energy Conservation/Pacing  - Range of Motion   Pt completed therapeutic exercise seated to challenge BUE AROM/STR/coordination, ROM and activity tolerance in preparation for safe return to max (I) with I/ADLs. Pt tolerated exercises well with no c/o pain. Rest breaks utilized as needed throughout. All exercises, no weight and following precautions.   Elbow Flexion \"Airplane Abduction\" 2x10 reps  Shoulder Abduction \"Chicken Wings\" 2x10 reps  Internal Rotation \"Superman\" 2x10 reps  Shoulder Flexion \"Forward Punches\" 2x10 reps     Assessment: Patient tolerated session well overall. Pt LLE \"feels better today,\" pt socks and tennis shoes donned before physical therapy. PT notified and informed to monitor comfort/swelling of pt LLE throughout session. Pt demonstrated good

## 2025-07-07 NOTE — PROGRESS NOTES
PHYSICAL THERAPY DAILY NOTE    PT Individual Minutes  Time In: 1532  Time Out: 1602  Minutes: 30                 Total Treatment Time:  30 Minutes    Pt. Seen for: PM and Gait Training     Subjective: Patient had no complaints.         Objective:  Restrictions/Precautions: Fall Risk, General Precautions, Bed Alarm, Other (Comment)  Activity Level: Up as Tolerated, Up with Assist  Required Braces or Orthoses?: No              Sternal Precautions: No Pushing, No Pulling, 5# Lifting Restrictions  Other Position/Activity Restrictions: Body mechanics for sternal precautions, LE positioning for edema         GROSS ASSESSMENT           COGNITION Daily Assessment    Overall Orientation Status: Within Normal Limits   Overall Cognitive Status: WNL        BED/MAT MOBILITY Daily Assessment              TRANSFERS Daily Assessment    Sit to Stand: Supervision  Stand to Sit: Supervision  Stand Pivot Transfers: Supervision          GAIT Daily Assessment    Surface: Level tile  Device: Rolling Walker  Assistance: Supervision  Distance: 120 x 2  More Ambulation?: No              STEPS/STAIRS Daily Assessment    Stairs?: Yes   Curbs: 6\"  Device: Rolling walker  Assistance: Stand by assistance          BALANCE Daily Assessment    Static Sitting:   Dynamic Sitting:   Static Standing:   Dynamic Standing:        WHEELCHAIR MOBILITY Daily Assessment                          LOWER EXTREMITY EXERCISES        Pain level: no pain noted  Pain Location:    Pain Interventions:     Vital Signs:  BP (!) 143/75   Pulse 82   Temp 97.9 °F (36.6 °C) (Oral)   Resp 18   Ht 1.702 m (5' 7\")   Wt 91.6 kg (202 lb)   SpO2 99%   BMI 31.64 kg/m²       Education:          Interdisciplinary Communication:     Pt. Left in recliner call bell in reach needs in reach bed/chair alarm activated         Assessment: Patient making good progress.         Plan of Care: Continue with plan of care    Jerri Weathers PTA  7/7/2025

## 2025-07-07 NOTE — PROGRESS NOTES
[facility] OCCUPATIONAL THERAPY DAILY NOTE  OT Individual Minutes  Time In: 1300  Time Out: 1345  Minutes: 45               Subjective: Pt agreeable to treatment. \"That was fun\"  Pain: No pain expressed.  Interdisciplinary Communication: Collaborated with PT regarding pt status and pt performance.   Precautions: Falls, Sternal Precautions, and MADHAVI hose  Lines:IV  O2 Device: RA    FUNCTIONAL MOBILITY:       Bed Mobility NT    Sit to Stand CGA CGA for continuous STS transitions from w/c level    Transfer  NT  Transfer Type: NA  Equipment: NA    Ambulation NT  Equipment: NA      Dependent for donning MADHAVI hose to BLE with pt seated in w/c prior to gym tasks.      - Activity Tolerance - Balance - Coordination - Strengthening   Pt engaged in extended game of corn hole while in standing to address standing balance/tolerance, ROM, coordination, and activity tolerance for integration into I/ADLs. Pt practiced retrieving bean bags and throwing them at board placed ~8 feet away. Pt demonstrated good balance and coordination throughout. To grade task up, pt engaged in STS transition between each throw to increased activity tolerance, strength, and body mechanics of standing with sternal precautions. Completed 5 rounds with 6 tosses each round. Extended rest breaks given between each round.      Education   Adaptive ADL Techniques, AE/DME Training, Benefits of OT, Energy Conservation, Pacing, Functional Transfer Training, and Safety Awareness, \"Move in a Tube\" concept, and SILVER exertion scale.      Assessment: Patient presented walking out of bathroom with PCA. Pt demonstrated good participation in OT treatment. Improved functional mobility and endurance noted. Pt continues to benefit from skilled OT services to address remaining deficits and progress toward baseline level of independence and safety. Patient ended session seated in w/c with PT.     Plan: Continue OT POC.     Viji Conteh OT   7/7/2025

## 2025-07-07 NOTE — PROGRESS NOTES
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  Patient was able to ambulate to the bathroom, moving left lower extremity with much more ease.  Erythema appears to be improving.  Continue with doxycycline.  Discussed Doppler ultrasound results.  CBC reviewed.  Agreeable to utilize MADHAVI hoses to help with swelling.  All questions and concerns were addressed at this time.    Objective:     Current Facility-Administered Medications   Medication Dose Route Frequency    furosemide (LASIX) tablet 40 mg  40 mg Oral Daily    doxycycline hyclate (VIBRAMYCIN) capsule 100 mg  100 mg Oral 2 times per day    acetaminophen (TYLENOL) tablet 650 mg  650 mg Oral 4x Daily    aspirin EC tablet 81 mg  81 mg Oral Daily    atorvastatin (LIPITOR) tablet 40 mg  40 mg Oral Nightly    gabapentin (NEURONTIN) capsule 100 mg  100 mg Oral Nightly    glucose chewable tablet 16 g  4 tablet Oral PRN    guaiFENesin (MUCINEX) extended release tablet 600 mg  600 mg Oral BID    ipratropium 0.5 mg-albuterol 2.5 mg (DUONEB) nebulizer solution 1 Dose  1 Dose Nebulization Q6H PRN    magnesium oxide (MAG-OX) tablet 400 mg  400 mg Oral TID PRN    melatonin tablet 6 mg  6 mg Oral Nightly PRN    metoprolol succinate (TOPROL XL) extended release tablet 25 mg  25 mg Oral Daily    oxyCODONE-acetaminophen (PERCOCET) 5-325 MG per tablet 1 tablet  1 tablet Oral Q4H PRN    pramipexole (MIRAPEX) tablet 0.5 mg  0.5 mg Oral Nightly    sodium chloride flush 0.9 % injection 5-40 mL  5-40 mL IntraVENous 2 times per day    sodium chloride flush 0.9 % injection 5-40 mL  5-40 mL IntraVENous PRN    tamsulosin (FLOMAX)

## 2025-07-08 PROCEDURE — 6370000000 HC RX 637 (ALT 250 FOR IP): Performed by: STUDENT IN AN ORGANIZED HEALTH CARE EDUCATION/TRAINING PROGRAM

## 2025-07-08 PROCEDURE — 94640 AIRWAY INHALATION TREATMENT: CPT

## 2025-07-08 PROCEDURE — 97116 GAIT TRAINING THERAPY: CPT

## 2025-07-08 PROCEDURE — 97530 THERAPEUTIC ACTIVITIES: CPT

## 2025-07-08 PROCEDURE — 94760 N-INVAS EAR/PLS OXIMETRY 1: CPT

## 2025-07-08 PROCEDURE — 94761 N-INVAS EAR/PLS OXIMETRY MLT: CPT

## 2025-07-08 PROCEDURE — 2500000003 HC RX 250 WO HCPCS: Performed by: STUDENT IN AN ORGANIZED HEALTH CARE EDUCATION/TRAINING PROGRAM

## 2025-07-08 PROCEDURE — 1180000000 HC REHAB R&B

## 2025-07-08 PROCEDURE — 97750 PHYSICAL PERFORMANCE TEST: CPT

## 2025-07-08 PROCEDURE — 6360000002 HC RX W HCPCS: Performed by: STUDENT IN AN ORGANIZED HEALTH CARE EDUCATION/TRAINING PROGRAM

## 2025-07-08 RX ORDER — DOXYCYCLINE 100 MG/1
100 CAPSULE ORAL EVERY 12 HOURS SCHEDULED
Status: COMPLETED | OUTPATIENT
Start: 2025-07-08 | End: 2025-07-10

## 2025-07-08 RX ADMIN — ASPIRIN 81 MG: 81 TABLET ORAL at 07:39

## 2025-07-08 RX ADMIN — DOXYCYCLINE HYCLATE 100 MG: 100 CAPSULE ORAL at 20:58

## 2025-07-08 RX ADMIN — ARFORMOTEROL TARTRATE: 15 SOLUTION RESPIRATORY (INHALATION) at 16:12

## 2025-07-08 RX ADMIN — OXYCODONE AND ACETAMINOPHEN 1 TABLET: 5; 325 TABLET ORAL at 05:28

## 2025-07-08 RX ADMIN — ACETAMINOPHEN 650 MG: 500 TABLET, FILM COATED ORAL at 12:27

## 2025-07-08 RX ADMIN — METOPROLOL SUCCINATE 25 MG: 25 TABLET, EXTENDED RELEASE ORAL at 07:40

## 2025-07-08 RX ADMIN — ARFORMOTEROL TARTRATE: 15 SOLUTION RESPIRATORY (INHALATION) at 06:01

## 2025-07-08 RX ADMIN — FUROSEMIDE 40 MG: 40 TABLET ORAL at 07:40

## 2025-07-08 RX ADMIN — PRAMIPEXOLE DIHYDROCHLORIDE 0.5 MG: 0.5 TABLET ORAL at 20:58

## 2025-07-08 RX ADMIN — GABAPENTIN 100 MG: 100 CAPSULE ORAL at 20:58

## 2025-07-08 RX ADMIN — GUAIFENESIN 600 MG: 600 TABLET, EXTENDED RELEASE ORAL at 07:40

## 2025-07-08 RX ADMIN — TAMSULOSIN HYDROCHLORIDE 0.4 MG: 0.4 CAPSULE ORAL at 20:58

## 2025-07-08 RX ADMIN — ACETAMINOPHEN 650 MG: 500 TABLET, FILM COATED ORAL at 18:31

## 2025-07-08 RX ADMIN — GUAIFENESIN 600 MG: 600 TABLET, EXTENDED RELEASE ORAL at 20:58

## 2025-07-08 RX ADMIN — SODIUM CHLORIDE, PRESERVATIVE FREE 10 ML: 5 INJECTION INTRAVENOUS at 07:46

## 2025-07-08 RX ADMIN — ACETAMINOPHEN 650 MG: 325 TABLET ORAL at 07:39

## 2025-07-08 RX ADMIN — SENNOSIDES AND DOCUSATE SODIUM 1 TABLET: 50; 8.6 TABLET ORAL at 20:58

## 2025-07-08 RX ADMIN — ACETAMINOPHEN 650 MG: 500 TABLET, FILM COATED ORAL at 20:58

## 2025-07-08 RX ADMIN — DOXYCYCLINE HYCLATE 100 MG: 100 CAPSULE ORAL at 07:40

## 2025-07-08 RX ADMIN — Medication 6 MG: at 21:05

## 2025-07-08 RX ADMIN — ATORVASTATIN CALCIUM 40 MG: 40 TABLET, FILM COATED ORAL at 20:58

## 2025-07-08 ASSESSMENT — PAIN SCALES - GENERAL
PAINLEVEL_OUTOF10: 7
PAINLEVEL_OUTOF10: 0

## 2025-07-08 ASSESSMENT — PAIN DESCRIPTION - DESCRIPTORS: DESCRIPTORS: ACHING

## 2025-07-08 ASSESSMENT — PAIN DESCRIPTION - ORIENTATION: ORIENTATION: MID

## 2025-07-08 ASSESSMENT — PAIN DESCRIPTION - LOCATION: LOCATION: CHEST;INCISION

## 2025-07-08 NOTE — PLAN OF CARE
Problem: Discharge Planning  Goal: Discharge to home or other facility with appropriate resources  7/7/2025 2303 by Perla Castro RN  Outcome: Progressing  Flowsheets (Taken 7/7/2025 1945)  Discharge to home or other facility with appropriate resources:   Identify barriers to discharge with patient and caregiver   Arrange for needed discharge resources and transportation as appropriate   Identify discharge learning needs (meds, wound care, etc)  7/7/2025 1025 by Akua Quan RN  Outcome: Progressing     Problem: Safety - Adult  Goal: Free from fall injury  7/7/2025 2303 by Perla Castro RN  Outcome: Progressing  7/7/2025 1025 by Akua Quan RN  Outcome: Progressing     Problem: Cardiovascular - Adult  Goal: Maintains optimal cardiac output and hemodynamic stability  7/7/2025 2303 by Perla Castro RN  Outcome: Progressing  7/7/2025 1025 by Akua Quan RN  Outcome: Progressing  Goal: Absence of cardiac dysrhythmias or at baseline  7/7/2025 2303 by Perla Castro RN  Outcome: Progressing  7/7/2025 1025 by Akua Quan RN  Outcome: Progressing     Problem: Skin/Tissue Integrity - Adult  Goal: Skin integrity remains intact  7/7/2025 2303 by Perla Castro RN  Outcome: Progressing  Flowsheets (Taken 7/7/2025 1945)  Skin Integrity Remains Intact: Monitor for areas of redness and/or skin breakdown  7/7/2025 1025 by Akua Quan RN  Outcome: Progressing  Goal: Incisions, wounds, or drain sites healing without S/S of infection  7/7/2025 2303 by Perla Castro RN  Outcome: Progressing  Flowsheets (Taken 7/7/2025 1945)  Incisions, Wounds, or Drain Sites Healing Without Sign and Symptoms of Infection: ADMISSION and DAILY: Assess and document risk factors for pressure ulcer development  7/7/2025 1025 by Akua Quan RN  Outcome: Progressing  Goal: Oral mucous membranes remain intact  7/7/2025 2303 by Perla Castro RN  Outcome: Progressing  Flowsheets (Taken 7/7/2025 1945)  Oral Mucous Membranes Remain Intact:   Assess oral mucosa and

## 2025-07-08 NOTE — PROGRESS NOTES
Psychiatric OCCUPATIONAL THERAPY DAILY NOTE  OT Individual Minutes  Time In: 0747  Time Out: 0849  Minutes: 62               Subjective: Pt agreeable to treatment. \"I need to use the bathroom before the shower.\"  Pain: No pain expressed.  Interdisciplinary Communication: Collaborated with PT and RN regarding pt status, pt performance, and handoff communication.   Precautions: Falls, [place] Alarm, and Sternal Precautions, [place] with hearing aides   Lines:IV  O2 Device: RA      FUNCTIONAL MOBILITY:       Bed Mobility Nida Assist bringing legs off EOB   Sit to Stand CGA    Transfer  SBA and CGA  Transfer Type: SPT  Equipment: RW    Ambulation NT  Equipment: NA      ACTIVITIES OF DAILY LIVING:       Shower/Bathe Partial/moderate assistance Min A bathing LB seated at TTB; SBA bathing UB and darshan area seated/standing at TTB with HHSH, and grab bars   Upper Body  Dressing Setup or clean-up assistance S/U doff/don t-shirt seated in w/c   Lower Body Dressing Supervision or touching assistance SBA-CGA doff/don pants seated/standing at w/c and TTB   Donning/[place] Footwear Substantial/maximal assistance Max A don tennis shoes seated in w/c; Min A doff/don socks seated in w/c   Toileting Hygiene Partial/moderate assistance Min A wiping posterior darshan area standing at commode wtih grab bars; CGA manageing clothes before and after   Toilet Transfer Supervision or touching assistance SBA-CGA SPT from w/c to commode with grab bars   Education Adaptive ADL Techniques, AE/DME Training, Benefits of OT, Energy Conservation, Pacing, Functional Transfer Training, and Safety Awareness     Assessment: Patient tolerated session well overall. Pt needing to void upon entry before shower. Pt demonstrated good participation in OT treatment. Pt continues to benefit from skilled OT services to address remaining deficits and progress toward baseline level of independence and safety. Patient ended session seated in w/c with PT.   Plan: Continue OT POC.      Violet Pastrana   7/8/2025

## 2025-07-08 NOTE — PROGRESS NOTES
PHYSICAL THERAPY DAILY NOTE    PT Individual Minutes  Time In: 1347  Time Out: 1433  Minutes: 46                 Total Treatment Time:  46 Minutes    Pt. Seen for: PM, Gait Training, Patient Education, Transfer Training, and Other     Subjective: Patient reporting he feels OK. Reports he is going home at the end of the week         Objective:  Restrictions/Precautions: Fall Risk, Bed Alarm, Other (Comment), Surgical protocol (posey alarm)  Activity Level: Up as Tolerated, Up with Assist  Required Braces or Orthoses?: No              Sternal Precautions: No Pushing, No Pulling, 5# Lifting Restrictions  Other Position/Activity Restrictions: Body mechanics for sternal precautions, LE positioning for edema         GROSS ASSESSMENT   Patient resting in w/c at beginning of treatment        COGNITION Daily Assessment    Overall Orientation Status: Within Normal Limits   Overall Cognitive Status: WNL        BED/MAT MOBILITY Daily Assessment     Bed Mobility Comments: NT        TRANSFERS Daily Assessment    Sit to Stand: Supervision  Stand to Sit: Supervision  Stand Pivot Transfers: Supervision  Car Transfer: Stand by assistance (SPT with rollator using UE's to assist LLE into and out of car)  Comment: Increased time and effort to complete with cues for body mechanics abd manging rollator brakes          GAIT Daily Assessment    Surface: Level tile  Device: Rollator  Assistance: Supervision  Quality of Gait: gait training with cues for upright posture with improved step length and step clearance  Gait Deviations: Decreased step height;Slow [place];Decreased head and trunk rotation;Decreased step length  Distance: 150 ft x 2  20 ft x 2 in figure 8 pattern around 3 bolsters spaced 5 ft apart. 20 ft x 2 ambulating through 3 simulated 24 inch wide doorways at 90 degree angles to each other  More Ambulation?: Yes   Ambulation 2  Surface - 2: ramp  Device 2: Rollator  Assistance 2: Supervision  Quality of Gait 2: gait training

## 2025-07-08 NOTE — PROGRESS NOTES
PHYSICAL THERAPY DAILY NOTE    PT Individual Minutes  Time In: 0849  Time Out: 0922  Minutes: 33                 Total Treatment Time:  33 Minutes    Pt. Seen for: AM, Gait Training, Therapeutic Exercise, and Transfer Training     Subjective: Patient had no complaints.         Objective:  Restrictions/Precautions: General Precautions, Fall Risk, Bed Alarm  Activity Level: Up as Tolerated, Up with Assist  Required Braces or Orthoses?: No              Sternal Precautions: No Pushing, No Pulling, 5# Lifting Restrictions  Other Position/Activity Restrictions: Body mechanics for sternal precautions, LE positioning for edema         GROSS ASSESSMENT           COGNITION Daily Assessment    Overall Orientation Status: Within Normal Limits   Overall Cognitive Status: WNL        BED/MAT MOBILITY Daily Assessment     Supine to Sit: Unable to assess  Sit to Supine: Unable to assess        TRANSFERS Daily Assessment    Sit to Stand: Stand by assistance  Stand to Sit: Stand by assistance  Stand Pivot Transfers: Stand by assistance          GAIT Daily Assessment    Surface: Level tile  Device: Rollator  Assistance: Supervision  Distance: 200 x 2  100 x 1  More Ambulation?: Yes   Ambulation 2  Surface - 2: ramp  Device 2: Rollator  Assistance 2: Supervision          STEPS/STAIRS Daily Assessment    Stairs?: No              BALANCE Daily Assessment    Static Sitting: Good:  Pt. able to maintain balance w/o UE support;  exhibits some postural sway  Dynamic Sitting: Good - accepts moderate challenge;  can maintain balance while picking object off the floor  Static Standing: Good:  Pt. able to maintain balance w/o UE support;  exhibits some postural sway  Dynamic Standing: Good - accepts moderate challenge;  can maintain balance while picking object off the floor       WHEELCHAIR MOBILITY Daily Assessment                          LOWER EXTREMITY EXERCISES        Pain level: no pain noted  Pain Location:    Pain Interventions:     Vital

## 2025-07-08 NOTE — PROGRESS NOTES
[place] [address]  Tel: [phone]     Physical Medicine and Rehabilitation Progress Note      Pritesh Orellana  Admit Date: 7/2/2025  Admit Diagnosis: Acute on chronic heart failure with mildly reduced ejection fraction (HFmrEF, 41-49%) ([facility]) [I50.23]    Subjective     Patient seen this morning sitting up straight in wheelchair.  We discussed left lower extremity pain significantly improved.  Agreeable to wear MADHAVI hoses.  Discontinue all IVs as he is no longer requiring IV access  Cellulitis erythema significantly improved  Team care conference held and attended  All questions and concerns were addressed at this time.    Objective:     Current Facility-Administered Medications   Medication Dose Route Frequency    furosemide (LASIX) tablet 40 mg  40 mg Oral Daily    doxycycline hyclate (VIBRAMYCIN) capsule 100 mg  100 mg Oral 2 times per day    acetaminophen (TYLENOL) tablet 650 mg  650 mg Oral 4x Daily    aspirin EC tablet 81 mg  81 mg Oral Daily    atorvastatin (LIPITOR) tablet 40 mg  40 mg Oral Nightly    gabapentin (NEURONTIN) capsule 100 mg  100 mg Oral Nightly    glucose chewable tablet 16 g  4 tablet Oral PRN    guaiFENesin (MUCINEX) extended release tablet 600 mg  600 mg Oral BID    ipratropium 0.5 mg-albuterol 2.5 mg (DUONEB) nebulizer solution 1 Dose  1 Dose Nebulization Q6H PRN    magnesium oxide (MAG-OX) tablet 400 mg  400 mg Oral TID PRN    melatonin tablet 6 mg  6 mg Oral Nightly PRN    metoprolol succinate (TOPROL XL) extended release tablet 25 mg  25 mg Oral Daily    oxyCODONE-acetaminophen (PERCOCET) 5-325 MG per tablet 1 tablet  1 tablet Oral Q4H PRN    pramipexole (MIRAPEX) tablet 0.5 mg  0.5 mg Oral Nightly    sodium chloride flush 0.9 % injection 5-40 mL  5-40 mL IntraVENous PRN    tamsulosin (FLOMAX) capsule 0.4 mg  0.4 mg Oral QHS    traMADol (ULTRAM) tablet 50 mg  50 mg Oral Q6H PRN    acetaminophen (TYLENOL) tablet  pRBCs 7/2  -7/3 Hgb 8.6  -7/7 hemoglobin 9.0  -Continue monitoring Hgb during IRF stay       //Generalized edema  -Secondary to recent CABG surgery  -Lasix discontinued 7/1  -May benefit from ongoing Lasix to help with volume overload s/p surgery  -Obtain daily weights  Weight change: 2.177 kg (4 lb 12.8 oz)  -7/4 restart furosemide 40mg daily, apply TubiGrip to L LE, elevate scrotum when sitting or in bed  -Venous and arterial Doppler ultrasound of the left lower extremity within normal limits  -7/7 improvement noted in bilateral lower extremities.  - 7/8 continued improvement in edema.  Continue Lasix           //Gait and Self-care deficits secondary to acute on chronic heart failure  //Status post CABG x 4v  //Decreased independence with ADLs  //Acute pain   //Gait instability  - Continue inpatient rehabilitation due to ongoing functional deficits below baseline.   - Anticipated LOS 7-10 days  - Patient exhibits the ability to tolerate active participation in at least three hours of therapy services per day, five days a week to include Physical Therapy and Occupational Therapy in addition to SLP if indicated. Furthermore, underlying medical problems present potential risk for decompensation and therefore requires continued close physician monitoring with management as indicated in addition to 24-hour rehabilitation nursing to manage and progress plan of care; this can be accomplished within the acute rehabilitation setting while addressing functional needs.  - Patient requires continued PT for ambulatory/mobility dysfunction due to status post CABG x 4.  Currently requires ongoing intensive Physical Therapy for 2-3 times a week, 1 to 2 hours daily, to address basic mobility including bed mobility, transfers, and out of bed mobility/ambulation with appropriate assistive device as indicated; stretching/ROM exercises; strengthening; static and dynamic balance; endurance; and modalities as needed including for pain

## 2025-07-08 NOTE — PROGRESS NOTES
[facility] OCCUPATIONAL THERAPY DAILY NOTE  OT Individual Minutes  Time In: 1300  Time Out: 1346  Minutes: 46               Subjective: Pt agreeable to treatment. \"I think I'm doing good too!\"  Pain: No pain expressed.  Interdisciplinary Communication: Collaborated with PT, RN, and MD regarding pt status, pt performance, and handoff communication.   Precautions: Falls, [place] Alarm, and Sternal Precautions, [place] with hearing aides   Lines:N/A and IV taken out during session from RN.  O2 Device: RA    FUNCTIONAL MOBILITY:       Bed Mobility NT    Sit to Stand SBA    Transfer  SBA  Transfer Type: SPT  Equipment: RW    Ambulation SBA  Equipment: RW       - Activity Tolerance - Balance - Coordination - Energy Conservation/Pacing    Pt completed therapeutic activities to challenge FM/GM coordination, static balance, dynamic balance, standing tolerance, and activity tolerance in preparation for safe return to max (I) with I/ADLs. Pt tolerated activities well with no c/o pain. . Rest breaks utilized as needed throughout..   Pt completed weighted bean bag toss standing with RW and gait belt for safety. Pt navigated 5 ft to the R/L to retrieved weighted bean bag then back stepped 5 ft to starting point. Pt stepped with L foot and tossed underhand with R/L hand to target ~6-8ft away at ground level level. Pt retrieved and tossed 12 bean bags from R and 12 from L, throwing all with R hand. No LOB. Standing and seated rest breaks taken as needed. Pt required 1-3 verbal cues for taking seated rest breaks.      Education   Adaptive ADL Techniques, AE/DME Training, Benefits of OT, Energy Conservation, Pacing, and Functional Transfer Training     Assessment: Patient tolerated session well overall. Upon arrival, per pt, team care conference suggested to start wearing MADHAVI hose again. MADHAVI hose donned at beginning of session and confirmed with MD during session. RN presented during session. Pt demonstrated good participation in OT treatment. Pt

## 2025-07-08 NOTE — PLAN OF CARE
Problem: Respiratory - Adult  Goal: Achieves optimal ventilation and oxygenation  Outcome: Progressing  Flowsheets (Taken 7/8/2025 1616)  Achieves optimal ventilation and oxygenation:   Assess for changes in respiratory status   Respiratory therapy support as indicated   Assess and instruct to report shortness of breath or any respiratory difficulty   Encourage broncho-pulmonary hygiene including cough, deep breathe, incentive spirometry   Assess for changes in mentation and behavior

## 2025-07-08 NOTE — PATIENT CARE CONFERENCE
[facility] [place] [place]  INPATIENT REHABILITATION  TEAM CONFERENCE NOTE    Conference Date: 2025  Admit Date: 2025  3:24 PM  Patient Name: Pritesh Orellana    MRN: [id number]    : 1945 (80 y.o.)  Rehabilitation Admitting Diagnosis: Acute on chronic heart failure with mildly reduced ejection fraction (HFmrEF, 41-49%) ([facility]) [I50.23]           Team Members Present at Conference:  : Gayle Oropeza CM  Nurse:Maddi Clark RN  Occupational Therapist:Delaney Paulino OTR/L  Physical Therapist: Salvatore Godfrey PT  Speech Therapist: N/A    Patient and wife Inessa present for conference.  ---------------------------------------------------------------------------------------------------    Case Management:  Patient's PLOF: Independent with IADLs, Independent with ADLs, and Independent with mobility  Patient's Prior Living Situation: Lives with spouse/significant other  Baseline Supervision/Assistance: None  Current issues/needs regarding patient and family discharge status: None anticipated       ---------------------------------------------------------------------------------------------------    Functional Assessment:  Most Recent Mobility Scores Per Physical Therapy:   Bed/Mat Mobility Bridging: Partial/Moderate assistance  Rolling to Left: Partial/Moderate assistance  Rolling to Right: Partial/Moderate assistance  Supine to Sit: Partial/Moderate assistance  Sit to Supine: Partial/Moderate assistance  Scooting: Minimal assistance  Bed Mobility Comments: NT   Transfers Sit to Stand: Supervision  Stand to Sit: Supervision  Stand Pivot Transfers: Supervision  Car Transfer: Minimal Assistance (SPT with RW)  Comment: Increased time and effort to complete with cues for body mechanics    Gait Surface: Level tile  Device: Rolling Walker  Assistance: Supervision  Quality of Gait: gait training with cues for upright posture with improved step length and step clearance  Gait  recurrent constipation given mobility limitations, current hospitalization, and medication use. Will need to continue routine monitoring of bowel function with appropriate adjustment in bowel regimen as indicated to ensure adequate bowel management; Bladder management: Monitor urinary output and urinary function/dysfunction. Does have the potential for underlying bladder dysfunction given immobility. Therefore, requires continued routine monitoring with appropriate bladder management as indicated to avoid urinary tract complications.      No results for input(s): \"POCGLU\" in the last 72 hours.  No results found for: \"LDLDIRECT\"        ---------------------------------------------------------------------------------------------------    Discharge Plan:  Family Training and Education: yes - family training 7/10/2025  Education Topics: ADL training using AE/DME prn, mobility training using AE/DME prn, safety , and sternal precautions, level of assist recommended for discharge.  Estimated Discharge Date: 7/12/2025  Discharge Destination: Home with home health  Services at Discharge: Home Health: Physical Therapy, Occupational Therapy, and Nursing  Equipment used/available prior to admission: rollator, cane, walk in shower , and shower chair, grab bars  Equipment recommended for discharge: To be determined closer to pt d/c - likely no needs   Barriers to the achievement of predicted outcomes: None anticipated       ---------------------------------------------------------------------------------------------------        Scribed by: ARACELI AKINS OT on 7/8/2025 at 9:40 AM    I approve the established interdisciplinary plan of care as documented within the medical record of Pritesh Orellana. I was present and led the interdisciplinary care conference.    Dr. Ronna Romano D.O.

## 2025-07-09 PROBLEM — L03.116 CELLULITIS OF LEFT ANTERIOR LOWER LEG: Status: RESOLVED | Noted: 2025-07-04 | Resolved: 2025-07-09

## 2025-07-09 LAB
ANION GAP SERPL CALC-SCNC: 10 MMOL/L (ref 7–16)
BASOPHILS # BLD: 0.03 K/UL (ref 0–0.2)
BASOPHILS NFR BLD: 0.5 % (ref 0–2)
BUN SERPL-MCNC: 15 MG/DL (ref 8–23)
CALCIUM SERPL-MCNC: 8.5 MG/DL (ref 8.8–10.2)
CHLORIDE SERPL-SCNC: 100 MMOL/L (ref 98–107)
CO2 SERPL-SCNC: 25 MMOL/L (ref 20–29)
CREAT SERPL-MCNC: 0.82 MG/DL (ref 0.8–1.3)
DIFFERENTIAL METHOD BLD: ABNORMAL
EOSINOPHIL # BLD: 0.42 K/UL (ref 0–0.8)
EOSINOPHIL NFR BLD: 6.9 % (ref 0.5–7.8)
ERYTHROCYTE [DISTWIDTH] IN BLOOD BY AUTOMATED COUNT: 13.2 % (ref 11.9–14.6)
GLUCOSE SERPL-MCNC: 98 MG/DL (ref 70–99)
HCT VFR BLD AUTO: 25.4 % (ref 41.1–50.3)
HGB BLD-MCNC: 8.2 G/DL (ref 13.6–17.2)
IMM GRANULOCYTES # BLD AUTO: 0.03 K/UL (ref 0–0.5)
IMM GRANULOCYTES NFR BLD AUTO: 0.5 % (ref 0–5)
LYMPHOCYTES # BLD: 1.18 K/UL (ref 0.5–4.6)
LYMPHOCYTES NFR BLD: 19.3 % (ref 13–44)
MAGNESIUM SERPL-MCNC: 1.7 MG/DL (ref 1.8–2.4)
MCH RBC QN AUTO: 31.1 PG (ref 26.1–32.9)
MCHC RBC AUTO-ENTMCNC: 32.3 G/DL (ref 31.4–35)
MCV RBC AUTO: 96.2 FL (ref 82–102)
MONOCYTES # BLD: 0.53 K/UL (ref 0.1–1.3)
MONOCYTES NFR BLD: 8.7 % (ref 4–12)
NEUTS SEG # BLD: 3.91 K/UL (ref 1.7–8.2)
NEUTS SEG NFR BLD: 64.1 % (ref 43–78)
NRBC # BLD: 0 K/UL (ref 0–0.2)
PLATELET # BLD AUTO: 345 K/UL (ref 150–450)
PMV BLD AUTO: 8.8 FL (ref 9.4–12.3)
POTASSIUM SERPL-SCNC: 3.6 MMOL/L (ref 3.5–5.1)
RBC # BLD AUTO: 2.64 M/UL (ref 4.23–5.6)
SODIUM SERPL-SCNC: 135 MMOL/L (ref 136–145)
WBC # BLD AUTO: 6.1 K/UL (ref 4.3–11.1)

## 2025-07-09 PROCEDURE — 97530 THERAPEUTIC ACTIVITIES: CPT

## 2025-07-09 PROCEDURE — 80048 BASIC METABOLIC PNL TOTAL CA: CPT

## 2025-07-09 PROCEDURE — 85025 COMPLETE CBC W/AUTO DIFF WBC: CPT

## 2025-07-09 PROCEDURE — 94761 N-INVAS EAR/PLS OXIMETRY MLT: CPT

## 2025-07-09 PROCEDURE — 83735 ASSAY OF MAGNESIUM: CPT

## 2025-07-09 PROCEDURE — 94640 AIRWAY INHALATION TREATMENT: CPT

## 2025-07-09 PROCEDURE — 6370000000 HC RX 637 (ALT 250 FOR IP): Performed by: STUDENT IN AN ORGANIZED HEALTH CARE EDUCATION/TRAINING PROGRAM

## 2025-07-09 PROCEDURE — 97116 GAIT TRAINING THERAPY: CPT

## 2025-07-09 PROCEDURE — 97535 SELF CARE MNGMENT TRAINING: CPT

## 2025-07-09 PROCEDURE — 6360000002 HC RX W HCPCS: Performed by: STUDENT IN AN ORGANIZED HEALTH CARE EDUCATION/TRAINING PROGRAM

## 2025-07-09 PROCEDURE — APPSS15 APP SPLIT SHARED TIME 0-15 MINUTES: Performed by: PHYSICIAN ASSISTANT

## 2025-07-09 PROCEDURE — 36415 COLL VENOUS BLD VENIPUNCTURE: CPT

## 2025-07-09 PROCEDURE — 97110 THERAPEUTIC EXERCISES: CPT

## 2025-07-09 PROCEDURE — 1180000000 HC REHAB R&B

## 2025-07-09 RX ADMIN — ACETAMINOPHEN 650 MG: 500 TABLET, FILM COATED ORAL at 07:45

## 2025-07-09 RX ADMIN — DOXYCYCLINE HYCLATE 100 MG: 100 CAPSULE ORAL at 20:23

## 2025-07-09 RX ADMIN — ASPIRIN 81 MG: 81 TABLET ORAL at 07:45

## 2025-07-09 RX ADMIN — ARFORMOTEROL TARTRATE: 15 SOLUTION RESPIRATORY (INHALATION) at 05:38

## 2025-07-09 RX ADMIN — METOPROLOL SUCCINATE 25 MG: 25 TABLET, EXTENDED RELEASE ORAL at 07:45

## 2025-07-09 RX ADMIN — PRAMIPEXOLE DIHYDROCHLORIDE 0.5 MG: 0.5 TABLET ORAL at 20:23

## 2025-07-09 RX ADMIN — GUAIFENESIN 600 MG: 600 TABLET, EXTENDED RELEASE ORAL at 20:22

## 2025-07-09 RX ADMIN — ARFORMOTEROL TARTRATE: 15 SOLUTION RESPIRATORY (INHALATION) at 17:40

## 2025-07-09 RX ADMIN — ATORVASTATIN CALCIUM 40 MG: 40 TABLET, FILM COATED ORAL at 20:23

## 2025-07-09 RX ADMIN — TAMSULOSIN HYDROCHLORIDE 0.4 MG: 0.4 CAPSULE ORAL at 20:23

## 2025-07-09 RX ADMIN — SENNOSIDES AND DOCUSATE SODIUM 1 TABLET: 50; 8.6 TABLET ORAL at 20:23

## 2025-07-09 RX ADMIN — ACETAMINOPHEN 650 MG: 500 TABLET, FILM COATED ORAL at 18:30

## 2025-07-09 RX ADMIN — GABAPENTIN 100 MG: 100 CAPSULE ORAL at 20:23

## 2025-07-09 RX ADMIN — FUROSEMIDE 40 MG: 40 TABLET ORAL at 07:45

## 2025-07-09 RX ADMIN — DOXYCYCLINE HYCLATE 100 MG: 100 CAPSULE ORAL at 07:44

## 2025-07-09 RX ADMIN — ACETAMINOPHEN 650 MG: 500 TABLET, FILM COATED ORAL at 13:05

## 2025-07-09 RX ADMIN — Medication 6 MG: at 20:23

## 2025-07-09 RX ADMIN — OXYCODONE AND ACETAMINOPHEN 1 TABLET: 5; 325 TABLET ORAL at 02:35

## 2025-07-09 RX ADMIN — GUAIFENESIN 600 MG: 600 TABLET, EXTENDED RELEASE ORAL at 07:45

## 2025-07-09 RX ADMIN — ACETAMINOPHEN 650 MG: 500 TABLET, FILM COATED ORAL at 20:23

## 2025-07-09 ASSESSMENT — PAIN DESCRIPTION - LOCATION: LOCATION: CHEST;INCISION

## 2025-07-09 ASSESSMENT — PAIN SCALES - GENERAL
PAINLEVEL_OUTOF10: 0
PAINLEVEL_OUTOF10: 0
PAINLEVEL_OUTOF10: 7
PAINLEVEL_OUTOF10: 0

## 2025-07-09 ASSESSMENT — PAIN DESCRIPTION - ORIENTATION: ORIENTATION: MID;ANTERIOR

## 2025-07-09 ASSESSMENT — PAIN DESCRIPTION - DESCRIPTORS: DESCRIPTORS: ACHING

## 2025-07-09 NOTE — PLAN OF CARE
Problem: Discharge Planning  Goal: Discharge to home or other facility with appropriate resources  Outcome: Progressing  Flowsheets (Taken 7/8/2025 1940)  Discharge to home or other facility with appropriate resources:   Identify barriers to discharge with patient and caregiver   Arrange for needed discharge resources and transportation as appropriate   Identify discharge learning needs (meds, wound care, etc)     Problem: Safety - Adult  Goal: Free from fall injury  Outcome: Progressing     Problem: Cardiovascular - Adult  Goal: Maintains optimal cardiac output and hemodynamic stability  Outcome: Progressing  Goal: Absence of cardiac dysrhythmias or at baseline  Outcome: Progressing     Problem: Skin/Tissue Integrity - Adult  Goal: Skin integrity remains intact  Outcome: Progressing  Flowsheets (Taken 7/8/2025 1940)  Skin Integrity Remains Intact: Monitor for areas of redness and/or skin breakdown  Goal: Incisions, wounds, or drain sites healing without S/S of infection  Outcome: Progressing  Flowsheets (Taken 7/8/2025 1940)  Incisions, Wounds, or Drain Sites Healing Without Sign and Symptoms of Infection: ADMISSION and DAILY: Assess and document risk factors for pressure ulcer development  Goal: Oral mucous membranes remain intact  Outcome: Progressing  Flowsheets (Taken 7/8/2025 1940)  Oral Mucous Membranes Remain Intact:   Assess oral mucosa and hygiene practices   Implement preventative oral hygiene regimen     Problem: Musculoskeletal - Adult  Goal: Return mobility to safest level of function  Outcome: Progressing  Flowsheets (Taken 7/8/2025 1940)  Return Mobility to Safest Level of Function:   Assess patient stability and activity tolerance for standing, transferring and ambulating with or without assistive devices   Assist with transfers and ambulation using safe patient handling equipment as needed   Ensure adequate protection for wounds/incisions during mobilization  Goal: Maintain proper alignment of  affected body part  Outcome: Progressing  Flowsheets (Taken 7/8/2025 1940)  Maintain proper alignment of affected body part:   Support and protect limb and body alignment per provider's orders   Instruct and reinforce with patient and family use of appropriate assistive device and precautions (e.g. spinal or hip dislocation precautions)  Goal: Return ADL status to a safe level of function  Outcome: Progressing  Flowsheets (Taken 7/8/2025 1940)  Return ADL Status to a Safe Level of Function:   Administer medication as ordered   Assess activities of daily living deficits and provide assistive devices as needed     Problem: Hematologic - Adult  Goal: Maintains hematologic stability  Outcome: Progressing  Flowsheets (Taken 7/8/2025 1940)  Maintains hematologic stability:   Assess for signs and symptoms of bleeding or hemorrhage   Monitor labs for bleeding or clotting disorders     Problem: Respiratory - Adult  Goal: Achieves optimal ventilation and oxygenation  7/8/2025 2255 by Perla Castro RN  Outcome: Progressing  7/8/2025 1616 by Maryana Hi RCP  Outcome: Progressing  Flowsheets (Taken 7/8/2025 1616)  Achieves optimal ventilation and oxygenation:   Assess for changes in respiratory status   Respiratory therapy support as indicated   Assess and instruct to report shortness of breath or any respiratory difficulty   Encourage broncho-pulmonary hygiene including cough, deep breathe, incentive spirometry   Assess for changes in mentation and behavior

## 2025-07-09 NOTE — PROGRESS NOTES
[facility] OCCUPATIONAL THERAPY DAILY NOTE  OT Individual Minutes  Time In: 0920  Time Out: 1002  Minutes: 42               Subjective: Pt agreeable to treatment. \"I will.\"  Pain: No pain expressed.  Interdisciplinary Communication: Collaborated with PT and RN regarding pt status, pt performance, and handoff communication.   Precautions: Falls, [place] Alarm, and Sternal Precautions, [place] with hearing aides   Lines:N/A  O2 Device: RA      FUNCTIONAL MOBILITY:       Bed Mobility NT    Sit to Stand SBA    Transfer  SBA  Transfer Type: SPT  Equipment: RW    Ambulation SBA  Equipment:       ACTIVITIES OF DAILY LIVING:       Oral Hygiene Supervision or touching assistance SBA for brushing teeth standing at sink side with RW   Shower/Bathe Supervision or touching assistance SBA UB/LB bathing seated and standing at recliner and RW   Upper Body  Dressing Setup or clean-up assistance S/U t shirt seated in recliner   Lower Body Dressing Partial/moderate assistance Min A threading pants through feet secondary to swelling and  socks; SBA for pulling pants up standing at RW   Education Adaptive ADL Techniques, AE/DME Training, Benefits of OT, Energy Conservation, Pacing, Functional Transfer Training, and Safety Awareness      - Strengthening   Pt completed therapeutic exercise seated to challenge BUE AROM/STR/coordination, strength and activity tolerance in preparation for safe return to max (I) with I/ADLs. Pt tolerated exercises well with no c/o pain. Rest breaks utilized as needed throughout..  Arm bike; resistance 2-5W; 185 rotations; 3 min forwards, 3 min backwards       Assessment: Patient tolerated session well overall. Pt demonstrated navigating very safety in room with RW. Pt demonstrated good participation in OT treatment. Pt continues to benefit from skilled OT services to address remaining deficits and progress toward baseline level of independence and safety. Patient ended session seated in recliner with call bell and  needs within reach and with chair/bed alarm activated.   Plan: Continue OT POC.     Violet Pastrana   7/9/2025

## 2025-07-09 NOTE — PROGRESS NOTES
PHYSICAL THERAPY DAILY NOTE    PT Individual Minutes  Time In: 1030  Time Out: 1115  Minutes: 45                 Total Treatment Time:  45 Minutes    Pt. Seen for: AM, Gait Training, Patient Education, Transfer Training, and Other     Subjective: Patient reporting he feels OK. Reports his left leg does not hurt as much. Reports no increase in pain with compression socks on LE's         Objective:  Restrictions/Precautions: Fall Risk, Bed Alarm, Other (Comment), Surgical protocol (posey alarm)  Activity Level: Up as Tolerated, Up with Assist  Required Braces or Orthoses?: No              Sternal Precautions: No Pushing, No Pulling, 5# Lifting Restrictions  Other Position/Activity Restrictions: Body mechanics for sternal precautions, LE positioning for edema         GROSS ASSESSMENT   Patient resting in recliner. Took LE measurements and fitted with tubigrip stockinette for feet and lower legs up to knee        COGNITION Daily Assessment    Overall Orientation Status: Within Normal Limits   Overall Cognitive Status: WNL        BED/MAT MOBILITY Daily Assessment     Bed Mobility Comments: NT        TRANSFERS Daily Assessment    Sit to Stand: Supervision  Stand to Sit: Supervision  Stand Pivot Transfers: Supervision  Car Transfer: Stand by assistance (SPT with rollator using UE's to assist LLE into and out of car)  Comment: Increased time and effort to complete with cues for body mechanics abd manging rollator brakes          GAIT Daily Assessment    Surface: Level tile  Device: Rollator  Assistance: Supervision  Quality of Gait: gait training with cues for upright posture with improved step length and step clearance  Gait Deviations: Decreased step height;Slow Lynn;Decreased head and trunk rotation;Decreased step length  Distance: 200ft x 1, 150 ft x 2  More Ambulation?: Yes   Ambulation 2  Surface - 2: ramp  Device 2: Rollator  Assistance 2: Supervision  Quality of Gait 2: gait training with cues for upright posture  with improved step length and step clearance  Gait Deviations: Decreased step height;Slow Lynn;Decreased step length;Decreased head and trunk rotation  Distance: up/down 20 ft ramp  Comments: No loss of balance  Ambulation 3  Surface - 3: uneven (curved pathway with slate, pebble and brick surfaces)  Assistance 3: Stand by assistance;Unable to assess  Quality of Gait 3: gait training with cues for upright posture with improved step length and step clearance  Gait Deviations: Decreased step height;Slow Lynn;Decreased step length;Decreased head and trunk rotation  Distance: 20 ft  Comments: no loss of balance       STEPS/STAIRS Daily Assessment    Stairs?: No              BALANCE Daily Assessment    Static Sitting: Normal:  Pt. able to maintain balance w/o UE support  Dynamic Sitting: Good - accepts moderate challenge;  can maintain balance while picking object off the floor  Static Standing: Good:  Pt. able to maintain balance w/o UE support;  exhibits some postural sway  Dynamic Standing: Fair - accepts minimal challenge;  can maintain balance while turning head/trunk       WHEELCHAIR MOBILITY Daily Assessment          NA                LOWER EXTREMITY EXERCISES        Pain level: 1 to 4 out of 10  Pain Location:  sternal incision and LLE donor sites  Pain Interventions: Medication per order, rest, positioning,relaxation, body mechanics      Vital Signs:  /69   Pulse 81   Temp 99.1 °F (37.3 °C) (Oral)   Resp 16   Ht 1.702 m (5' 7\")   Wt 93.2 kg (205 lb 8 oz)   SpO2 95%   BMI 32.19 kg/m²   02 sat 97% and HR 84 at end of treatment    Education:    Education Given To: Patient  Education Provided: Safety;Transfer Training;Fall Prevention Strategies;Precautions;Mobility Training  Education Method: Demonstration;Verbal  Barriers to Learning: None  Education Outcome: Verbalized understanding;Demonstrated understanding;Continued education needed     Interdisciplinary Communication: spoke with RN regarding

## 2025-07-09 NOTE — PROGRESS NOTES
[place] [address]  Tel: [phone]     Physical Medicine and Rehabilitation Progress Note      Pritesh Orellana  Admit Date: 7/2/2025  Admit Diagnosis: Acute on chronic heart failure with mildly reduced ejection fraction (HFmrEF, 41-49%) ([facility]) [I50.23]    Subjective     Patient seen today during break in therapy. Patient admits to anxiety about appearance of L LE, specifically ongoing edema and ecchymoses. L LE examined in its entirety and presents with normal post-op changes. Reassurance given. AM labs reviewed. All questions and concerns were addressed at this time.    Objective:     Current Facility-Administered Medications   Medication Dose Route Frequency    doxycycline hyclate (VIBRAMYCIN) capsule 100 mg  100 mg Oral 2 times per day    furosemide (LASIX) tablet 40 mg  40 mg Oral Daily    acetaminophen (TYLENOL) tablet 650 mg  650 mg Oral 4x Daily    aspirin EC tablet 81 mg  81 mg Oral Daily    atorvastatin (LIPITOR) tablet 40 mg  40 mg Oral Nightly    gabapentin (NEURONTIN) capsule 100 mg  100 mg Oral Nightly    glucose chewable tablet 16 g  4 tablet Oral PRN    guaiFENesin (MUCINEX) extended release tablet 600 mg  600 mg Oral BID    ipratropium 0.5 mg-albuterol 2.5 mg (DUONEB) nebulizer solution 1 Dose  1 Dose Nebulization Q6H PRN    magnesium oxide (MAG-OX) tablet 400 mg  400 mg Oral TID PRN    melatonin tablet 6 mg  6 mg Oral Nightly PRN    metoprolol succinate (TOPROL XL) extended release tablet 25 mg  25 mg Oral Daily    oxyCODONE-acetaminophen (PERCOCET) 5-325 MG per tablet 1 tablet  1 tablet Oral Q4H PRN    pramipexole (MIRAPEX) tablet 0.5 mg  0.5 mg Oral Nightly    sodium chloride flush 0.9 % injection 5-40 mL  5-40 mL IntraVENous PRN    tamsulosin (FLOMAX) capsule 0.4 mg  0.4 mg Oral QHS    traMADol (ULTRAM) tablet 50 mg  50 mg Oral Q6H PRN    acetaminophen (TYLENOL) tablet 650 mg  650 mg Oral Q4H PRN    polyethylene glycol  given recent immobility, weakness and fatigue                Fall precautions in place. Chair and bed alarms are set daily and nightly to discourage patient from getting up unattended. Discussed strategy to reduce the risk of falls which include understanding patient’s known fall risk factors and management of the risk factors identified, such as modification of home environment, avoiding or decreasing psychoactive medications. Sitting for 3-5 minutes prior to standing for blood pressure acclimation and avoiding orthostasis. Encouraged continuing exercise therapies which were/will be taught and implemented at [facility], particularly balance, strength, and gait training.  Avoid if possible the following medications which can contribute to delirium: benzodiazepines, antihistamines, and anticholinergics  Avoid sleep disruption by rescheduling early morning blood draws, avoiding middle of the night vital signs or disturbances, minimizing noise.   Keep lights on and blinds open during the day; lights and TV off at night; minimize tethers including urinary catheters; use glasses, hearing aids, and dentures as appropriate.  Avoid long periods of sleep during the day. Brief naps can be helpful but long periods of sleep can disrupt the sleep wake cycle for this patient.  Reorientation and visual cues for orientation should be tried  Maintain hydration and bowel function        Disposition: 7/11        Outpatient Provider Follow-up Appointments:    No follow-up provider specified.                 In this split / shared evaluation, I performed a medically appropriate history and exam, counseled and educated the patient and/or family member, documented information in EMR and coordinated care, which accounted for 12 minutes of clinical time.     Marylee Jackson, PA-C  Physician Assistant with [facility]  July 9, 2025

## 2025-07-09 NOTE — PROGRESS NOTES
[facility] OCCUPATIONAL THERAPY DAILY NOTE  OT Individual Minutes  Time In: 1259  Time Out: 1347  Minutes: 48               Subjective: Pt agreeable to treatment. \"Sounds good.\"  Pain: No pain expressed.  Interdisciplinary Communication: Collaborated with PT and MD regarding pt status, pt performance, and handoff communication.   Precautions: Falls, [place] Alarm, and Sternal Precautions, [place] with hearing aides   Lines:N/A  O2 Device: RA    FUNCTIONAL MOBILITY:       Bed Mobility NT    Sit to Stand Supervision and SBA    Transfer  SBA  Transfer Type: SPT  Equipment: RW    Ambulation SBA  Equipment: Rollator       - Activity Tolerance - Balance - Coordination - Energy Conservation/Pacing    Pt completed therapeutic activities to challenge static balance, dynamic balance, standing tolerance, activity tolerance, and Visual scanning R/L in preparation for safe return to max (I) with I/ADLs. Pt tolerated activities well with no c/o pain. . Rest breaks utilized as needed throughout..   Pt completed simulated shopping/market activity standing/navigating at Rollator. Pt navigated around unit (from gym, to parallel bars, to nursing station, and back to gym) with Rollator and collected items, items placed in shopping basket on Rollator seat using RUE. Pt collected ~22-24 items. Once all items retrieved pt returned to gym, unloaded items onto table with R/L hand(s). Pt then calculated required amount of cash for 12 items in his head and wrote the amount on paper. Pt was 0.25$ off from actual amount. Pt completed within ~10-12 minutes and required 1-3 verbal/visual cues for visual scanning.       - Coordination - Energy Conservation/Pacing  - Strengthening   Pt completed therapeutic activities to challenge FM/GM coordination, FM coordination/dexterity, in-hand translation, activity tolerance, and problem solving in preparation for safe return to max (I) with I/ADLs. Pt tolerated activities well with no c/o pain. . Rest breaks utilized

## 2025-07-09 NOTE — PROGRESS NOTES
PHYSICAL THERAPY DAILY NOTE    PT Individual Minutes  Time In: 1347  Time Out: 1433  Minutes: 46                 Total Treatment Time:  46 Minutes    Pt. Seen for: PM, Gait Training, Patient Education, Therapeutic Exercise, Transfer Training, and Other     Subjective: Patient reporting he feels OK. Reports his chest incision is sore.          Objective:  Restrictions/Precautions: Fall Risk, Bed Alarm, Other (Comment), Surgical protocol (posey alarm)  Activity Level: Up as Tolerated, Up with Assist  Required Braces or Orthoses?: No              Sternal Precautions: No Pushing, No Pulling, 5# Lifting Restrictions  Other Position/Activity Restrictions: Body mechanics for sternal precautions, LE positioning for edema         GROSS ASSESSMENT   Patient resting in w/c at beginning of treatment        COGNITION Daily Assessment    Overall Orientation Status: Within Normal Limits   Overall Cognitive Status: WNL        BED/MAT MOBILITY Daily Assessment     Bed Mobility Comments: NT        TRANSFERS Daily Assessment    Sit to Stand: Supervision  Stand to Sit: Supervision  Stand Pivot Transfers: Supervision  Car Transfer: Stand by assistance (SPT with rollator using UE's to assist LLE into and out of car)  Comment: Increased time and effort to complete with cues for body mechanics and manging rollator brakes          GAIT Daily Assessment    Surface: Level tile  Device: Rollator  Assistance: Supervision  Quality of Gait: gait training with cues for upright posture with improved step length and step clearance  Gait Deviations: Decreased step height;Slow Lynn;Decreased head and trunk rotation;Decreased step length  Distance: 200ft x 1  More Ambulation?: No            STEPS/STAIRS Daily Assessment    Stairs?: No              BALANCE Daily Assessment    Static Sitting: Normal:  Pt. able to maintain balance w/o UE support  Dynamic Sitting: Good - accepts moderate challenge;  can maintain balance while picking object off the

## 2025-07-10 ENCOUNTER — CARE COORDINATION (OUTPATIENT)
Dept: CARE COORDINATION | Facility: CLINIC | Age: 80
End: 2025-07-10

## 2025-07-10 PROCEDURE — 94640 AIRWAY INHALATION TREATMENT: CPT

## 2025-07-10 PROCEDURE — 97535 SELF CARE MNGMENT TRAINING: CPT

## 2025-07-10 PROCEDURE — 97150 GROUP THERAPEUTIC PROCEDURES: CPT

## 2025-07-10 PROCEDURE — 97116 GAIT TRAINING THERAPY: CPT

## 2025-07-10 PROCEDURE — 6360000002 HC RX W HCPCS: Performed by: STUDENT IN AN ORGANIZED HEALTH CARE EDUCATION/TRAINING PROGRAM

## 2025-07-10 PROCEDURE — 97530 THERAPEUTIC ACTIVITIES: CPT

## 2025-07-10 PROCEDURE — 6370000000 HC RX 637 (ALT 250 FOR IP): Performed by: STUDENT IN AN ORGANIZED HEALTH CARE EDUCATION/TRAINING PROGRAM

## 2025-07-10 PROCEDURE — 97110 THERAPEUTIC EXERCISES: CPT

## 2025-07-10 PROCEDURE — 1180000000 HC REHAB R&B

## 2025-07-10 PROCEDURE — 94760 N-INVAS EAR/PLS OXIMETRY 1: CPT

## 2025-07-10 PROCEDURE — APPSS15 APP SPLIT SHARED TIME 0-15 MINUTES: Performed by: PHYSICIAN ASSISTANT

## 2025-07-10 RX ADMIN — Medication 6 MG: at 21:13

## 2025-07-10 RX ADMIN — GUAIFENESIN 600 MG: 600 TABLET, EXTENDED RELEASE ORAL at 08:13

## 2025-07-10 RX ADMIN — ACETAMINOPHEN 650 MG: 500 TABLET, FILM COATED ORAL at 17:54

## 2025-07-10 RX ADMIN — OXYCODONE AND ACETAMINOPHEN 1 TABLET: 5; 325 TABLET ORAL at 21:09

## 2025-07-10 RX ADMIN — GABAPENTIN 100 MG: 100 CAPSULE ORAL at 21:02

## 2025-07-10 RX ADMIN — OXYCODONE AND ACETAMINOPHEN 1 TABLET: 5; 325 TABLET ORAL at 03:18

## 2025-07-10 RX ADMIN — ARFORMOTEROL TARTRATE: 15 SOLUTION RESPIRATORY (INHALATION) at 16:23

## 2025-07-10 RX ADMIN — ACETAMINOPHEN 650 MG: 500 TABLET, FILM COATED ORAL at 08:13

## 2025-07-10 RX ADMIN — TAMSULOSIN HYDROCHLORIDE 0.4 MG: 0.4 CAPSULE ORAL at 21:02

## 2025-07-10 RX ADMIN — PRAMIPEXOLE DIHYDROCHLORIDE 0.5 MG: 0.5 TABLET ORAL at 21:02

## 2025-07-10 RX ADMIN — ARFORMOTEROL TARTRATE: 15 SOLUTION RESPIRATORY (INHALATION) at 05:49

## 2025-07-10 RX ADMIN — ACETAMINOPHEN 650 MG: 500 TABLET, FILM COATED ORAL at 12:12

## 2025-07-10 RX ADMIN — SENNOSIDES AND DOCUSATE SODIUM 1 TABLET: 50; 8.6 TABLET ORAL at 21:03

## 2025-07-10 RX ADMIN — ASPIRIN 81 MG: 81 TABLET ORAL at 08:13

## 2025-07-10 RX ADMIN — ATORVASTATIN CALCIUM 40 MG: 40 TABLET, FILM COATED ORAL at 21:02

## 2025-07-10 RX ADMIN — DOXYCYCLINE HYCLATE 100 MG: 100 CAPSULE ORAL at 08:13

## 2025-07-10 ASSESSMENT — PAIN - FUNCTIONAL ASSESSMENT: PAIN_FUNCTIONAL_ASSESSMENT: ACTIVITIES ARE NOT PREVENTED

## 2025-07-10 ASSESSMENT — PAIN DESCRIPTION - LOCATION
LOCATION: CHEST;INCISION
LOCATION: CHEST

## 2025-07-10 ASSESSMENT — PAIN DESCRIPTION - DESCRIPTORS
DESCRIPTORS: ACHING
DESCRIPTORS: ACHING

## 2025-07-10 ASSESSMENT — PAIN SCALES - GENERAL
PAINLEVEL_OUTOF10: 0
PAINLEVEL_OUTOF10: 7
PAINLEVEL_OUTOF10: 3
PAINLEVEL_OUTOF10: 5

## 2025-07-10 ASSESSMENT — PAIN DESCRIPTION - ORIENTATION
ORIENTATION: MID;ANTERIOR
ORIENTATION: MID

## 2025-07-10 ASSESSMENT — PAIN SCALES - WONG BAKER: WONGBAKER_NUMERICALRESPONSE: NO HURT

## 2025-07-10 NOTE — PROGRESS NOTES
BP this AM 88/55 and HR 88 while pt sitting in recliner. Pt denies any symptoms of dizziness or lightheadedness. Pt has tubi  on. Legs elevated in recliner and BP rechecked 92/55. Dr. Romano notified.

## 2025-07-10 NOTE — PROGRESS NOTES
[facility] OCCUPATIONAL THERAPY DAILY NOTE  OT Individual Minutes  Time In: 7001  Time Out: 746  Minutes: 45               Subjective: Pt agreeable to treatment. \"I got it.\"  Pain: No pain expressed.  Interdisciplinary Communication: Collaborated with PT and RN regarding pt status, pt performance, and handoff communication.   Precautions:   Lines: N/A  O2 Device: RA      FUNCTIONAL MOBILITY:       Bed Mobility SBA    Sit to Stand SBA    Transfer  SBA  Transfer Type: SPT  Equipment: RW    Ambulation SBA  Equipment: RW      ACTIVITIES OF DAILY LIVING:       Shower/Bathe Setup or clean-up assistance S/U UB/LB bathing seated and standing at EOB with RW; LHS utilized for below knees and feet   Upper Body  Dressing Setup or clean-up assistance S/U doff/don t shirt seated EOB   Lower Body Dressing Setup or clean-up assistance S/U pants seated and standing EOB with RW   Donning/Croom Footwear Partial/moderate assistance Mod A jam tennis shoes seated EOB; A secondary to swelling in feet   Toileting Hygiene Supervision or touching assistance SBA standing at commode to void at commode with grab bars   Toilet Transfer Supervision or touching assistance SBA navigating to commode from room with RW   Education Adaptive ADL Techniques, AE/DME Training, Benefits of OT, Energy Conservation, Pacing, and Safety Awareness     Assessment: Patient tolerated session well overall. Pt introduced to and thrilled with LHS for LB bathing. Pt demonstrated good participation in OT treatment. Pt continues to benefit from skilled OT services to address remaining deficits and progress toward baseline level of independence and safety. Patient ended session seated in recliner with call bell and needs within reach and with chair/bed alarm activated.   Plan: Continue OT POC.     Violet Pastrana   7/10/2025

## 2025-07-10 NOTE — PROGRESS NOTES
PHYSICAL THERAPY DAILY NOTE    PT Individual Minutes  Time In: 1300  Time Out: 1344  Minutes: 44                Total Treatment Time:  44 Minutes    Pt. Seen for: PM, Gait Training, Patient Education, Therapeutic Exercise, Transfer Training, and Other     Subjective: Patient reporting he feels OK. Reports he will be ready to go home         Objective:  Restrictions/Precautions: Fall Risk, Bed Alarm, Other (Comment), Surgical protocol (posey alarm)  Activity Level: Up as Tolerated, Up with Assist  Required Braces or Orthoses?: No              Sternal Precautions: No Pushing, No Pulling, 5# Lifting Restrictions  Other Position/Activity Restrictions: Body mechanics for sternal precautions, LE positioning for edema         GROSS ASSESSMENT   Patient resting in w/c at beginning of treatment        COGNITION Daily Assessment    Overall Orientation Status: Within Normal Limits   Overall Cognitive Status: WNL        BED/MAT MOBILITY Daily Assessment     Bridging: Modified independent   Rolling to Left: Modified independent  Rolling to Right: Modified independent  Supine to Sit: Modified independent  Sit to Supine: Modified independent  Scooting: Modified independent  Bed Mobility Comments: Increased time and effort to complete demonstrating correct body mechanics        TRANSFERS Daily Assessment    Sit to Stand: Modified independent  Stand to Sit: Modified independent  Stand Pivot Transfers: Modified independent (with rollator)  Comment: Increased time and effort to complete with cues for body mechanics and manging rollator brakes          GAIT Daily Assessment    Surface: Level tile  Device: Rollator  Assistance: Modified Independent  Quality of Gait: gait training with cues for upright posture with improved step length and step clearance  Gait Deviations: Decreased step height;Slow Lynn;Decreased head and trunk rotation;Decreased step length  Distance: 200ft x 2  Comments: no loss of balance  More Ambulation?: Yes

## 2025-07-10 NOTE — CARE COORDINATION
Transition of care outreach postponed for 4 days due to patient's discharge to and continued stay at [facility] inpatient rehab facility.  Anticipated discharge 7/11/2025, or 07/12/2025.  Will continue to follow and document per ESTRELLA guidelines and recommendations.

## 2025-07-10 NOTE — PROGRESS NOTES
[facility] OCCUPATIONAL THERAPY DAILY NOTE  OT Individual Minutes  Time In: 1033  Time Out: 1115  Minutes: 42               Subjective: Pt agreeable to treatment. \"It's more challenging with the Rollator but that is good.\"  Pain: No pain expressed.  Interdisciplinary Communication: Collaborated with PT and RN regarding pt status, pt performance, and handoff communication.   Precautions: Falls, [place] Alarm, and Sternal Precautions, [place] with hearing aides   Lines:N/A  O2 Device: RA  Vitals  BP: Seated 130/77;  HR: 79bpm   Standing 125/74;  HR: 92bpm   Seated 108/66;  HR: 91bpm   Standing 119/83;  HR: 90bpm     FUNCTIONAL MOBILITY:       Bed Mobility NT    Sit to Stand SBA    Transfer  SBA  Transfer Type: SPT  Equipment: RW    Ambulation SBA and CGA  Equipment: Rollator       - Activity Tolerance - Balance - Coordination - Energy Conservation/Pacing    Pt completed therapeutic activities to challenge static balance, dynamic balance, standing tolerance, and activity tolerance in preparation for safe return to max (I) with I/ADLs. Pt tolerated activities well with no c/o pain. . Rest breaks utilized as needed throughout..   Pt completed weighted bean bag toss standing at Rollator with gait belt. Pt completed a total of 24 bean bags. Pt retrieved weighted bean bag from table on R/L sides 2 ft away from midline. Pt navigated 4 steps forwards to retreive bags and stepped back 4 steps to position back to midline. Pt then stepped with L foot and tossed underhand with R hand to target ~6-8ft away at ground level. Standing and seated rest breaks taken as needed. Pt required 1-3 verbal/visual cues for stepping back.      Education   Adaptive ADL Techniques, AE/DME Training, Benefits of OT, Energy Conservation, Pacing, Functional Transfer Training, and Safety Awareness     Assessment: Patient tolerated session well overall. Vitals taken throughout and WFL.  Pt demonstrated good participation in OT treatment. Pt continues to benefit from

## 2025-07-10 NOTE — PLAN OF CARE
Problem: Discharge Planning  Goal: Discharge to home or other facility with appropriate resources  Outcome: Progressing  Flowsheets (Taken 7/9/2025 1930)  Discharge to home or other facility with appropriate resources:   Identify barriers to discharge with patient and caregiver   Arrange for needed discharge resources and transportation as appropriate   Identify discharge learning needs (meds, wound care, etc)     Problem: Safety - Adult  Goal: Free from fall injury  7/9/2025 2225 by Perla Castro RN  Outcome: Progressing  7/9/2025 0842 by Maddi Clark RN  Outcome: Progressing     Problem: Cardiovascular - Adult  Goal: Maintains optimal cardiac output and hemodynamic stability  Outcome: Progressing  Goal: Absence of cardiac dysrhythmias or at baseline  Outcome: Progressing     Problem: Skin/Tissue Integrity - Adult  Goal: Skin integrity remains intact  Outcome: Progressing  Flowsheets (Taken 7/9/2025 1930)  Skin Integrity Remains Intact: Monitor for areas of redness and/or skin breakdown  Goal: Incisions, wounds, or drain sites healing without S/S of infection  Outcome: Progressing  Flowsheets (Taken 7/9/2025 1930)  Incisions, Wounds, or Drain Sites Healing Without Sign and Symptoms of Infection: ADMISSION and DAILY: Assess and document risk factors for pressure ulcer development  Goal: Oral mucous membranes remain intact  Outcome: Progressing  Flowsheets (Taken 7/9/2025 1930)  Oral Mucous Membranes Remain Intact:   Assess oral mucosa and hygiene practices   Implement preventative oral hygiene regimen     Problem: Musculoskeletal - Adult  Goal: Return mobility to safest level of function  Outcome: Progressing  Flowsheets (Taken 7/9/2025 1930)  Return Mobility to Safest Level of Function:   Assess patient stability and activity tolerance for standing, transferring and ambulating with or without assistive devices   Assist with transfers and ambulation using safe patient handling equipment as needed   Ensure

## 2025-07-10 NOTE — PROGRESS NOTES
PHYSICAL THERAPY DAILY NOTE          PT Concurrent Minutes  Time In: 1117  Time Out: 1204  Minutes: 47           Total Treatment Time:  47 Minutes    Pt. Seen for: AM, Balance Training, Gait Training, Patient Education, Therapeutic Exercise, Transfer Training, and Other     Subjective: patient reporting he feels OK.. reports LLE pain is slowly getting better         Objective:  Restrictions/Precautions: Fall Risk, Bed Alarm, Other (Comment), Surgical protocol (posey alarm)  Activity Level: Up as Tolerated, Up with Assist  Required Braces or Orthoses?: No              Sternal Precautions: No Pushing, No Pulling, 5# Lifting Restrictions  Other Position/Activity Restrictions: Body mechanics for sternal precautions, LE positioning for edema         GROSS ASSESSMENT   Patient resting in w/c at beginning of treatment        COGNITION Daily Assessment    Overall Orientation Status: Within Normal Limits   Overall Cognitive Status: WNL        BED/MAT MOBILITY Daily Assessment     Bed Mobility Comments: NT        TRANSFERS Daily Assessment    Sit to Stand: Modified independent  Stand to Sit: Modified independent  Stand Pivot Transfers: Modified independent (with rollator)  Comment: Increased time and effort to complete with cues for body mechanics and manging rollator brakes          GAIT Daily Assessment    Surface: Level tile  Device: Rollator  Assistance: Modified Independent  Quality of Gait: gait training with cues for upright posture with improved step length and step clearance  Gait Deviations: Decreased step height;Slow Lynn;Decreased head and trunk rotation;Decreased step length  Distance: 200ft x 1, 150ft x 1, 20 ft x 1 in fgiure 8 pattern around 3 bolsters spaced 5 ft apart, 20 ft x 1 ambulating through 3 24 inch wide simulated doorways at 90 degree angles to each other  Comments: no loss of balance  More Ambulation?: Yes   Ambulation 2  Surface - 2: uneven (curved pathway with pebble slate and brick  surfaces)  Device 2: Rollator  Assistance 2: Modified Independent  Quality of Gait 2: gait training with cues for upright posture with improved step length and step clearance  Gait Deviations: Decreased step height;Slow Lynn;Decreased step length;Decreased head and trunk rotation  Distance: 20 ft  Comments: No loss of balance          STEPS/STAIRS Daily Assessment    Stairs?: No              BALANCE Daily Assessment    Static Sitting: Normal:  Pt. able to maintain balance w/o UE support  Dynamic Sitting: Normal - accepts maximal challenge & can shift weight easily fully in all directions  Static Standing: Good:  Pt. able to maintain balance w/o UE support;  exhibits some postural sway  Dynamic Standing: Fair - accepts minimal challenge;  can maintain balance while turning head/trunk       WHEELCHAIR MOBILITY Daily Assessment            NA              LOWER EXTREMITY EXERCISES   LE motomed x 10 mins at level 2     Pain level: 1 to 4 out of 10  Pain Location:  sternal incision, LLE donor sites  Pain Interventions: Medication per order, rest, positioning,relaxation, body mechanics      Vital Signs:  Visit Vitals  /73   Pulse 79   Temp 98.2 °F (36.8 °C) (Oral)   Resp 16   Ht 1.702 m (5' 7\")   Wt 93.3 kg (205 lb 9.6 oz)   SpO2 95%   BMI 32.20 kg/m²         Education:    Education Given To: Patient  Education Provided: Safety;Transfer Training;Fall Prevention Strategies;Precautions;Mobility Training  Education Method: Demonstration;Verbal  Barriers to Learning: None  Education Outcome: Verbalized understanding;Demonstrated understanding;Continued education needed     Interdisciplinary Communication: NA    Pt. Left in recliner call bell in reach needs in reach bed/chair alarm activated         Assessment: Progressing towards goals         Plan of Care: Continue with POC and progress as tolerated.     Salvatore Godfrey, PT  7/10/2025

## 2025-07-11 ENCOUNTER — CARE COORDINATION (OUTPATIENT)
Dept: CARE COORDINATION | Facility: CLINIC | Age: 80
End: 2025-07-11

## 2025-07-11 PROBLEM — R26.9 GAIT ABNORMALITY: Status: RESOLVED | Noted: 2025-07-02 | Resolved: 2025-07-11

## 2025-07-11 PROBLEM — N50.89 SCROTAL EDEMA: Status: RESOLVED | Noted: 2025-07-03 | Resolved: 2025-07-11

## 2025-07-11 PROBLEM — J90 PLEURAL EFFUSION: Status: RESOLVED | Noted: 2025-06-29 | Resolved: 2025-07-11

## 2025-07-11 PROBLEM — Z78.9 DECREASED ACTIVITIES OF DAILY LIVING (ADL): Status: RESOLVED | Noted: 2025-07-02 | Resolved: 2025-07-11

## 2025-07-11 LAB
BASOPHILS # BLD: 0.03 K/UL (ref 0–0.2)
BASOPHILS NFR BLD: 0.5 % (ref 0–2)
DIFFERENTIAL METHOD BLD: ABNORMAL
EOSINOPHIL # BLD: 0.42 K/UL (ref 0–0.8)
EOSINOPHIL NFR BLD: 7.2 % (ref 0.5–7.8)
ERYTHROCYTE [DISTWIDTH] IN BLOOD BY AUTOMATED COUNT: 13.3 % (ref 11.9–14.6)
HCT VFR BLD AUTO: 26.7 % (ref 41.1–50.3)
HGB BLD-MCNC: 8.7 G/DL (ref 13.6–17.2)
IMM GRANULOCYTES # BLD AUTO: 0.02 K/UL (ref 0–0.5)
IMM GRANULOCYTES NFR BLD AUTO: 0.3 % (ref 0–5)
LYMPHOCYTES # BLD: 1.27 K/UL (ref 0.5–4.6)
LYMPHOCYTES NFR BLD: 21.8 % (ref 13–44)
MCH RBC QN AUTO: 31 PG (ref 26.1–32.9)
MCHC RBC AUTO-ENTMCNC: 32.6 G/DL (ref 31.4–35)
MCV RBC AUTO: 95 FL (ref 82–102)
MONOCYTES # BLD: 0.54 K/UL (ref 0.1–1.3)
MONOCYTES NFR BLD: 9.3 % (ref 4–12)
NEUTS SEG # BLD: 3.55 K/UL (ref 1.7–8.2)
NEUTS SEG NFR BLD: 60.9 % (ref 43–78)
NRBC # BLD: 0 K/UL (ref 0–0.2)
PLATELET # BLD AUTO: 380 K/UL (ref 150–450)
PMV BLD AUTO: 8.7 FL (ref 9.4–12.3)
RBC # BLD AUTO: 2.81 M/UL (ref 4.23–5.6)
WBC # BLD AUTO: 5.8 K/UL (ref 4.3–11.1)

## 2025-07-11 PROCEDURE — 6360000002 HC RX W HCPCS: Performed by: STUDENT IN AN ORGANIZED HEALTH CARE EDUCATION/TRAINING PROGRAM

## 2025-07-11 PROCEDURE — 36415 COLL VENOUS BLD VENIPUNCTURE: CPT

## 2025-07-11 PROCEDURE — 94760 N-INVAS EAR/PLS OXIMETRY 1: CPT

## 2025-07-11 PROCEDURE — 94640 AIRWAY INHALATION TREATMENT: CPT

## 2025-07-11 PROCEDURE — 85025 COMPLETE CBC W/AUTO DIFF WBC: CPT

## 2025-07-11 PROCEDURE — 97116 GAIT TRAINING THERAPY: CPT

## 2025-07-11 PROCEDURE — 6370000000 HC RX 637 (ALT 250 FOR IP): Performed by: STUDENT IN AN ORGANIZED HEALTH CARE EDUCATION/TRAINING PROGRAM

## 2025-07-11 PROCEDURE — 97535 SELF CARE MNGMENT TRAINING: CPT

## 2025-07-11 PROCEDURE — 1180000000 HC REHAB R&B

## 2025-07-11 PROCEDURE — 97530 THERAPEUTIC ACTIVITIES: CPT

## 2025-07-11 PROCEDURE — 97110 THERAPEUTIC EXERCISES: CPT

## 2025-07-11 RX ORDER — FUROSEMIDE 40 MG/1
20 TABLET ORAL DAILY PRN
Status: DISCONTINUED | OUTPATIENT
Start: 2025-07-11 | End: 2025-07-12 | Stop reason: HOSPADM

## 2025-07-11 RX ORDER — FUROSEMIDE 20 MG/1
20 TABLET ORAL DAILY PRN
Qty: 60 TABLET | Refills: 3 | Status: SHIPPED | OUTPATIENT
Start: 2025-07-11 | End: 2025-07-15 | Stop reason: SDUPTHER

## 2025-07-11 RX ADMIN — ACETAMINOPHEN 650 MG: 500 TABLET, FILM COATED ORAL at 21:10

## 2025-07-11 RX ADMIN — OXYCODONE AND ACETAMINOPHEN 1 TABLET: 5; 325 TABLET ORAL at 21:10

## 2025-07-11 RX ADMIN — ACETAMINOPHEN 650 MG: 500 TABLET, FILM COATED ORAL at 08:53

## 2025-07-11 RX ADMIN — ATORVASTATIN CALCIUM 40 MG: 40 TABLET, FILM COATED ORAL at 21:10

## 2025-07-11 RX ADMIN — SENNOSIDES AND DOCUSATE SODIUM 1 TABLET: 50; 8.6 TABLET ORAL at 21:10

## 2025-07-11 RX ADMIN — GABAPENTIN 100 MG: 100 CAPSULE ORAL at 21:10

## 2025-07-11 RX ADMIN — ACETAMINOPHEN 650 MG: 325 TABLET ORAL at 02:26

## 2025-07-11 RX ADMIN — ACETAMINOPHEN 650 MG: 500 TABLET, FILM COATED ORAL at 18:30

## 2025-07-11 RX ADMIN — ACETAMINOPHEN 650 MG: 500 TABLET, FILM COATED ORAL at 14:19

## 2025-07-11 RX ADMIN — TAMSULOSIN HYDROCHLORIDE 0.4 MG: 0.4 CAPSULE ORAL at 21:10

## 2025-07-11 RX ADMIN — ARFORMOTEROL TARTRATE: 15 SOLUTION RESPIRATORY (INHALATION) at 05:22

## 2025-07-11 RX ADMIN — ASPIRIN 81 MG: 81 TABLET ORAL at 08:54

## 2025-07-11 RX ADMIN — PRAMIPEXOLE DIHYDROCHLORIDE 0.5 MG: 0.5 TABLET ORAL at 21:10

## 2025-07-11 RX ADMIN — ARFORMOTEROL TARTRATE: 15 SOLUTION RESPIRATORY (INHALATION) at 18:27

## 2025-07-11 RX ADMIN — METOPROLOL SUCCINATE 25 MG: 25 TABLET, EXTENDED RELEASE ORAL at 08:54

## 2025-07-11 ASSESSMENT — PAIN SCALES - GENERAL
PAINLEVEL_OUTOF10: 0
PAINLEVEL_OUTOF10: 4
PAINLEVEL_OUTOF10: 7
PAINLEVEL_OUTOF10: 5
PAINLEVEL_OUTOF10: 3
PAINLEVEL_OUTOF10: 3
PAINLEVEL_OUTOF10: 0

## 2025-07-11 ASSESSMENT — PAIN DESCRIPTION - DESCRIPTORS
DESCRIPTORS: SORE
DESCRIPTORS: ACHING

## 2025-07-11 ASSESSMENT — PAIN DESCRIPTION - ORIENTATION
ORIENTATION: MID
ORIENTATION: MID

## 2025-07-11 ASSESSMENT — PAIN SCALES - WONG BAKER
WONGBAKER_NUMERICALRESPONSE: NO HURT
WONGBAKER_NUMERICALRESPONSE: NO HURT

## 2025-07-11 ASSESSMENT — PAIN DESCRIPTION - LOCATION
LOCATION: CHEST

## 2025-07-11 ASSESSMENT — PAIN - FUNCTIONAL ASSESSMENT: PAIN_FUNCTIONAL_ASSESSMENT: ACTIVITIES ARE NOT PREVENTED

## 2025-07-11 NOTE — CARE COORDINATION
Case Management Assessment  Initial Evaluation    Date/Time of Evaluation: 7/3/2025 1:39 PM  Assessment Completed by: CASSIA ROMERO    If patient is discharged prior to next notation, then this note serves as note for discharge by case management.    Patient Name: Pritesh Orellana                   YOB: 1945  Diagnosis:   Acute on chronic heart failure with mildly reduced ejection fraction (HFmrEF, 41-49%) ([facility]) [I50.23]                   Date / Time: 7/2/2025  3:24 PM    Patient Admission Status: REHAB IP   Readmission Risk (Low < 19, Mod (19-27), High > 27): Readmission Risk Score: 20.8        Current PCP: Natasha Rodriguez MD  PCP verified by CM? (P) Yes    Chart Reviewed: Yes      History Provided by: (P) Patient, Medical Record  Patient Orientation: (P) Alert and Oriented    Patient Cognition: (P) Alert    Hospitalization in the last 30 days (Readmission):  No    If yes, Readmission Assessment in CM Navigator will be completed.    Advance Directives:      Code Status: Full Code   Patient's Primary Decision Maker is: (P) Named in Scanned ACP Document    Primary Decision Maker: Radha Orellana - Spouse - [phone]    Discharge Planning:    Patient lives with: (P) Spouse/Significant Other, Children Type of Home: (P) House  Primary Care Giver: (P) Self  Patient Support Systems include: (P) Spouse/Significant Other, Children   Current Financial resources: (P) Medicare, Other (Comment) (Medicare and [place] [facility])  Current community resources: (P) None  Current services prior to admission: (P) Durable Medical Equipment            Current DME: (P) Cane, Shower Chair, Walker            Type of Home Care services:  None    ADLS  Prior functional level: (P) Independent in ADLs/IADLs  Current functional level: (P) Assistance with the following:, Other (see comment) (TBD by therapy)    PT AM-PAC:   /24  OT AM-PAC:   /24    Family can provide assistance at DC: (P) Yes  Would you like Case 
RN CM in room for bedside rounds, patient sitting up in chair, alert and oriented, wife Inessa present. HH needs will be RN, PT, OT and list of options given. Equipment needs per therapy none. Tentative DC 7/12. RN CM will continue to monitor for DC needs.     1205 Update - HH options list returned, they requested referral to [facility], referral sent   
RN CM in room, patient sitting up in bed, alert and oriented. He is aware of DC tomorrow.  Patient will have [facility] RN, PT, OT at DC per their request after looking at  options list. Wife Radha will pick him up tomorrow.   
RN CM in room, patient sitting up in chair, alert and oriented. Reminded of bedside rounds in the morning and he states his wife will be here. HH, DC and equip TBD. RN CM will continue to monitor for DC needs.   
food stamps (SNAP)

## 2025-07-11 NOTE — PROGRESS NOTES
Able to Propel 150'    NA      Wheelchair management   Wheelchair Size: 18x16  Wheelchair Type: Standard  Wheelchair Cushion: Standard  Pressure Relief Type:  (sit<>stand with RW)  Level of Assistance for Pressure Relief Activities: Minimal assistance  Wheelchair Parts Management: No  Propulsion: No         AMBULATION  Initial Quality Indicator Score Discharge Quality Indicator Score Ambulation Assessment   Walk 10' 88   6   Surface: Level tile  Device: Rollator  Other Apparatus:  (gait belt)  Assistance: Modified Independent  Quality of Gait: gait training with cues for upright posture with improved step length and step clearance  Gait Deviations: Decreased step height, Slow Lynn, Decreased head and trunk rotation, Decreased step length  Distance: 400ft x 1, 150 ft x 1  Comments: no loss of balance  More Ambulation?: Yes  Ambulation 2  Surface - 2: ramp  Device 2: Rollator  Assistance 2: Modified Independent  Quality of Gait 2: gait training with cues for upright posture with improved step length and step clearance  Gait Deviations: Decreased step height, Slow Lynn, Decreased step length, Decreased head and trunk rotation  Distance: up/down 20 ft ramp  Comments: No loss of balance  Ambulation 3  Surface - 3: uneven (curved pathway with slate, pebble and brick surfaces)  Assistance 3: Modified Independent  Quality of Gait 3: gait training with cues for upright posture with improved step length and step clearance  Gait Deviations: Decreased step height, Slow Lynn, Decreased step length, Decreased head and trunk rotation  Distance: 20 ft  Comments: no loss of balance   Walk 50' with 2 Turns 88   6      Walk 150' 88   6      Walking 10' on Unlevel Surface 88   6      Picking Up Object From Floor 88 6       STAIRS Initial Quality Indicator Score Discharge Quality Indicator Score Stairs Assessment   1 Curb Step 88   6   Stairs?: Yes  Stairs  # Steps : 12 (4 x 3)  Stairs Height: 6\"  Rails: Left ascending (R  descending)  Curbs: 6\"  Device: 4 wheeled walker  Other Apparatus:  (gait belt)  Assistance: Modified independent   Comment: slow reciprocating pattern going up steps and single step at a time going down steps leading down with LLE. Increased time and effort to complete. Patient able to manage rollator up/down 6 inch step   4 Steps 88   6      12 Steps 9    6           Pt. Left in recliner call bell in reach needs in reach bed/chair alarm activated family at bedside           PHYSICAL THERAPY PLAN OF CARE     Goals:Long Term Goals:  Pt will demonstrate roll left & right & transition supine<>sit with Min A in 10 days (MET)  2.   Pt. will transfer from bed to/from chair with Supervision/Standby Assist using the least restrictive device in 10 days (MET)  3.   Pt. will ambulate 150 feet with RW or LRAD and Supervision/Standby Assist in 10 days  (MET)  4.   Pt. Will ambulate up/down 4 steps with single hand rail and CGA in 10 days (MET)  5.   Pt. Will ambulate up/down 1 step with RW  with CGA in 10 days (MET)     Pt would benefit from continued skilled physical therapy in order to improve independent functional mobility within the home with use of least restrictive device. Interventions may include range of motion (AROM, PROM B LE/trunk), motor function (B LE/trunk strengthening/coordination), activity tolerance (vitals, oxygen saturation levels), bed mobility training, balance activities, gait training (progressive ambulation program), and functional transfer training.      HEP handout: issued written LE HEP. Able to complete with min assist and cues  SEATED EXERCISES Sets Reps Comments   Ankle PF 3 10    Hip Flexion 3 10    Long Arc Quads 3 10    Ankle DF 3 10    Hip Abduction with red Theraband 3 10    Hamstring Curls with red Theraband 3 10      .      Pt to be discharged 07/12/2025 with assistance from wife.   Instructed patient's family member on how to assist patient with functional mobility and LE HEP as noted

## 2025-07-11 NOTE — PROGRESS NOTES
End of Shift Note      Acute Onset Mental Status change? No    Does the patient have Inattention? Behavior not present    Does the patient have Disorganized Thinking? Behavior not present    Does the patient have Altered Level of Consciousness? Behavior not present    Is the patient impulsive? No    Expression of Ideas and Wants  Does the patient express ideas and wants without difficulty? Yes    Does the patient understand verbal and non-verbal content without cues or repetition? Yes    Toileting Hygiene:  Did the patient void or have a bowel movement? Yes; Requires set-up or clean-up. and Requires supervision for safety, steadying, or contact guard assistance.    The patient uses a toilet;  and urinal;     In: 400 [P.O.:400]  Out: 320  In: 400   Out: 320 [Urine:320]    Toilet Transfer:    Did the patient use a standard toilet with or without a raised seat or bedside commode? Yes; Requires set-up or clean-up. and Requires supervision for safety, steadying, or contact guard assistance.    Bladder   Does the patient urinate? Yes  Does the patient have a catheter or ostomy? No  If no ostomy or catheter, when was the patient's last incontinent episode, if any? The patient is always continent.  Does the patient have stress incontinence? No    Bowel  Date of last BM 7/10/25 per pt report    Does the patient have an ostomy? No  If no ostomy, when was the patient's last incontinent episode, if any? The patient is always continent.    Does the patient have pain that affects any of the following: Sleep, Therapy Activities, or Day-to-Day Activities?sleep;rarely

## 2025-07-11 NOTE — PROGRESS NOTES
[facility] OCCUPATIONAL THERAPY DISCHARGE NOTE  OT Individual Minutes  Time In: 0918  Time Out: 1003  Minutes: 45               GOALS:  Short Term Goals:  Time Frame for Short Term Goals: 7 days   STG 1: Patient will dress UB with Supervision or Touching Assistance using AE/DME PRN. [Goal met 7/11/25]  STG 2: Patient will dress LB with Partial/Moderate Assistance using AE/DME PRN. [Goal met 7/11/25]  STG 3: Patient will don footwear with Partial/Moderate Assistance using AE/DME PRN. [Goal met 7/11/25]  STG 4: Patient will bathe with Partial/Moderate Assistance using AE/DME PRN.  [Goal met 7/11/25]  STG 5: Patient will toilet with Partial/Moderate Assistance using AE/DME PRN. [Goal met 7/11/25]     Long Term Goals:  Time Frame for Long Term Goals: 10 days   LTG 1: Patient will dress UB with [place] using AE/DME PRN. [Pt progressing, continue with HHOT, 7/11/25]  LTG 2: Patient will dress LB with Supervision or Touching Assistance using AE/DME PRN. [Goal met 7/11/25]  LTG 3: Patient will don footwear with Setup Assistance using AE/DME PRN. [Pt progressing, continue with HHOT, 7/11/25]  LTG 4: Patient will bathe with Supervision or Touching Assistance using AE/DME PRN. [Goal met 7/11/25]  LTG 5: Patient will toilet with Supervision or Touching Assistance using AE/DME PRN. [Goal met 7/11/25]  LTG 6: Patient/caregiver will verbalize understanding of OT recommendations regarding ADLs, functional transfers, home safety, AE/DME, energy conservation, safety awareness, activity tolerance, and follow-up therapy to increase safety with functional tasks upon discharge. [Goal met 7/11/25]       Subjective: Patient agreeable to therapy. \"Yea, I'll take a shower\"  Pain: No pain expressed.  Precautions: Falls, [place] Alarm, and Sternal Precautions, [place] with hearing aides   Lines:N/A  O2 Device: RA      FUNCTIONAL MOBILITY:        Bed Mobility Supervision    Sit to Stand SBA    Transfer  SBA  Transfer Type: SPT  Equipment: Rollator   seated in recliner with call bell and needs within reach and with chair/bed alarm activated.     MARI CORTEZ, OT  7/11/2025

## 2025-07-11 NOTE — DISCHARGE SUMMARY
[facility]  Inpatient Rehab Program      PHYSICAL MEDICINE & REHABILITATION DISCHARGE SUMMARY     Date: 7/11/2025  Admission Date: 7/2/2025  Discharge Date: 7/11/2025    Primary Care Provider: Natasha Rodriguez MD       Admitting Diagnosis:   Acute on chronic heart failure with mildly reduced ejection fraction (HFmrEF, 41-49%) ([facility]) [I50.23]    Principal Problem:    Acute on chronic heart failure with mildly reduced ejection fraction (HFmrEF, 41-49%) ([facility])  Active Problems:    Moderate persistent asthma without complication    Hypertension    S/P lumbar fusion    BPH (benign prostatic hyperplasia)    S/P CABG x 4    Postoperative anemia due to acute blood loss    Generalized edema  Resolved Problems:    Pleural effusion    Gait abnormality    Decreased activities of daily living (ADL)    Scrotal edema    Cellulitis of left anterior lower leg      Past Medical History:   Diagnosis Date    Allergic rhinitis     Anemia 2024    Arthritis     Asthma     inhaler daily    BPH (benign prostatic hyperplasia)     Cancer ([facility]) 2017    Top of right ear    Erectile dysfunction     Hearing loss     History of blood transfusion 2012    Hypercholesterolemia     Hypertension     medication    Lumbar stenosis     Osteoarthritis     Pneumonia 10/21/2024    Prolonged emergence from general anesthesia 2012    with bilateral knee replacement    Prostate cancer ([facility]) 07/2024    radiation    Restless legs syndrome     Rosacea     Septicemia ([facility]) 10/21/2024    Skin cancer 2019    BCC  top of right ear    Vitamin D deficiency          PCP FOLLOW-UP:   Lasix was initiated for as needed for edema  Monitor blood pressure  Recheck hemoglobin in 1 week    Transition of care:  -PCP to review new medications below. PCP to determine whether or not to renew or discontinue these medications at outpatient follow-up appointment  -PCP to assess whether or not patient requires ongoing home health care services, or whether or  standing with RW/GB.        Toilet Transfer Partial/moderate assistance  Did not complete on this date, per clinical judgement, MIN A for stand pivot w/c <> raised toilet seat/BSC.          Speech Assessment:           Assessment and Plan    Hemoglobin steady 8.7  Suture in left distal thigh to be removed 7/11    //L LE pain, erythema and edema  //Concern for left leg cellulitis  -7/4 patient with significant increase in L LE pain, new tender patch of erythema anteriorly to midshaft leg  -Patient denies F/C, CP, SOB, change in cough  -Discussed with CT Surgeon on-call, Roderick Mohan MD, who evaluated patient and concurs with suspicion for cellulitis and endorsed L LE arterial and venous duplex  -Start empiric doxycyline 100mg q12h x 7d, Dr. Mohan graciously agreed to monitor over weekend  -CBC reviewed. Afebrile. No leukocytosis  -Improvement in erythema noted morning of 7/7.  Continue with antibiotics  -7/8 continued improvement, erythema improving, lower extremity edema improving  -End of treatment for doxycycline 7/10        //Anemia, acute on chronic  -Recent CABG surgery contributing to worsening anemia  -Received 1 unit of pRBCs 7/2  -7/3 Hgb 8.6  -7/7 hemoglobin 9.0  -7/9 Hgb 8.2  -7/11 hemoglobin 8.7  -Continue monitoring Hgb during IRF stay        //Generalized edema  -Secondary to recent CABG surgery  -Lasix discontinued 7/1  -May benefit from ongoing Lasix to help with volume overload s/p surgery  -Obtain daily weights  Weight change: 0.045 kg (1.6 oz)  -7/4 restart furosemide 40mg daily, apply TubiGrip to L LE, elevate scrotum when sitting or in bed  -Venous and arterial Doppler ultrasound of the left lower extremity within normal limits  -7/7 improvement noted in bilateral lower extremities.  -7/8 continued improvement in edema. Continue Lasix  -7/10 Lasix held due to low BP, continue TubiGrip for edema    - 7/11 change Lasix to 20 mg as needed for lower extremity edema.  Medication sent to pharmacy

## 2025-07-11 NOTE — PROGRESS NOTES
[facility] OCCUPATIONAL THERAPY DAILY NOTE  OT Individual Minutes  Time In: 1259  Time Out: 1346  Minutes: 47               Subjective: Pt agreeable to treatment. \"I feel a little winded.\"  Pain: No pain expressed.  Interdisciplinary Communication: Collaborated with PT and RN regarding pt status, pt performance, and handoff communication.   Precautions: Falls, [place] Alarm, and Sternal Precautions, [place] with hearing aides   Lines:N/A  O2 Device: RA  Vitals  BP: Seated 108/59;  HR: 81bpm  BP: Standing 122/67;  HR: 83bpm      FUNCTIONAL MOBILITY:       Bed Mobility NT    Sit to Stand SBA    Transfer  SBA  Transfer Type: SPT  Equipment: RW    Ambulation SBA  Equipment: RW       - Strengthening   Pt completed therapeutic exercise seated to challenge BUE AROM/STR/coordination, strength and activity tolerance in preparation for safe return to max (I) with I/ADLs. Pt tolerated exercises fair secondary to fatigue with no c/o painRest breaks utilized as needed throughout..Vitals monitored  Arm bike; 2W resistance; 130 rotations; 5 min forwards, 1 min 30 sec backwards. Activity terminated and rest break utilized secondary to fatigue and feeling winded.      - Activity Tolerance - Cognition - Coordination   Pt completed therapeutic activities to challenge FM/GM coordination and FM coordination/dexterity in preparation for safe return to max (I) with I/ADLs. Pt tolerated activities well with no c/o pain. . Rest breaks utilized as needed throughout..   Pt completed peg and board activity seated at table. Pt used RUE to retrieve pegs from R. Pt then used BUE to separate stacked [2] pegs. Once  pt used RUE to place pegs into green board placed at midline at 70 degrees of pt as instructed via verbal and/or diagram instructions. Pt completed ~25-30 set of medium difficulty. Pt completed in ~8-10 minutes and 0 drops noted. Pt required 1-3 verbal/visual cues for directions.     Education   Adaptive ADL Techniques, AE/DME Training,  Benefits of OT, Energy Conservation, Pacing, and Safety Awareness  Pt educated on AE (sock aid and shoe funnel) to use when R/L LE is swollen. Pt adherent and very understanding of equipment and usage.      Assessment: Patient tolerated session well overall. Pt experienced fatigue on arm bike, vitals monitored and WFL. Pt demonstrated good participation in OT treatment. Pt continues to benefit from skilled OT services to address remaining deficits and progress toward baseline level of independence and safety. Patient ended session seated in w/c with PT.   Plan: Continue OT POC.     Violet Pastrana   7/11/2025

## 2025-07-11 NOTE — PROGRESS NOTES
PHYSICAL THERAPY DAILY NOTE    PT Individual Minutes  Time In: 1027  Time Out: 1113  Minutes: 46                 Total Treatment Time:  46 Minutes    Pt. Seen for: AM, Balance Training, Gait Training, Patient Education, Therapeutic Exercise, Transfer Training, and Other     Subjective: Patient reporting he is ready to go home. Reports his wife is coming in for family training this PM         Objective:  Restrictions/Precautions: Fall Risk, Bed Alarm, Other (Comment), Surgical protocol (posey alarm)  Activity Level: Up as Tolerated, Up with Assist  Required Braces or Orthoses?: No              Sternal Precautions: No Pushing, No Pulling, 5# Lifting Restrictions  Other Position/Activity Restrictions: Body mechanics for sternal precautions, LE positioning for edema         GROSS ASSESSMENT   Patient resting in recliner at beginning of treatment        COGNITION Daily Assessment    Overall Orientation Status: Within Normal Limits   Overall Cognitive Status: WNL        BED/MAT MOBILITY Daily Assessment     Bed Mobility Comments: NT        TRANSFERS Daily Assessment    Sit to Stand: Modified independent  Stand to Sit: Modified independent  Stand Pivot Transfers: Modified independent (with rollator)  Comment: Increased time and effort to complete with cues for body mechanics and manging rollator brakes          GAIT Daily Assessment    Surface: Level tile  Device: Rollator;No Device  Assistance: Modified Independent;Stand by assistance (SBA without a device, Mod I with hand rail and rollator)  Quality of Gait: gait training with cues for upright posture with improved step length and step clearance  Gait Deviations: Decreased step height;Slow Lynn;Decreased head and trunk rotation;Decreased step length  Distance: 400ft x 1, 150 ft x 2 with rollator.  20 ft x 2 with hand rail, 30 ft x 2 without a device, 20 ft x 2 in figure 8 pattern around 3 bolsters spaced 5 ft apart, 20 ft x 1 ambulating through 3 24 inch wide

## 2025-07-12 VITALS
HEIGHT: 67 IN | WEIGHT: 200.1 LBS | SYSTOLIC BLOOD PRESSURE: 126 MMHG | HEART RATE: 80 BPM | TEMPERATURE: 98.2 F | BODY MASS INDEX: 31.4 KG/M2 | DIASTOLIC BLOOD PRESSURE: 72 MMHG | RESPIRATION RATE: 17 BRPM | OXYGEN SATURATION: 93 %

## 2025-07-12 PROCEDURE — 6370000000 HC RX 637 (ALT 250 FOR IP): Performed by: STUDENT IN AN ORGANIZED HEALTH CARE EDUCATION/TRAINING PROGRAM

## 2025-07-12 PROCEDURE — 94640 AIRWAY INHALATION TREATMENT: CPT

## 2025-07-12 PROCEDURE — 6360000002 HC RX W HCPCS: Performed by: STUDENT IN AN ORGANIZED HEALTH CARE EDUCATION/TRAINING PROGRAM

## 2025-07-12 RX ADMIN — ASPIRIN 81 MG: 81 TABLET ORAL at 07:47

## 2025-07-12 RX ADMIN — METOPROLOL SUCCINATE 25 MG: 25 TABLET, EXTENDED RELEASE ORAL at 07:47

## 2025-07-12 RX ADMIN — ARFORMOTEROL TARTRATE: 15 SOLUTION RESPIRATORY (INHALATION) at 04:57

## 2025-07-12 RX ADMIN — ACETAMINOPHEN 650 MG: 500 TABLET, FILM COATED ORAL at 07:46

## 2025-07-12 RX ADMIN — ACETAMINOPHEN 325 MG: 325 TABLET ORAL at 01:48

## 2025-07-12 ASSESSMENT — PAIN DESCRIPTION - LOCATION: LOCATION: CHEST

## 2025-07-12 ASSESSMENT — PAIN DESCRIPTION - DESCRIPTORS: DESCRIPTORS: SORE

## 2025-07-12 ASSESSMENT — PAIN SCALES - GENERAL
PAINLEVEL_OUTOF10: 4
PAINLEVEL_OUTOF10: 0
PAINLEVEL_OUTOF10: 1

## 2025-07-12 ASSESSMENT — PAIN DESCRIPTION - ORIENTATION: ORIENTATION: MID

## 2025-07-12 NOTE — DISCHARGE INSTR - DIET

## 2025-07-12 NOTE — PROGRESS NOTES
Discharge instructions and discharge med list given to pt and his wife, voiced understanding. Pt and wife instructed to call PCP on Monday for F/U appointment ASAP, voiced understanding.

## 2025-07-12 NOTE — PLAN OF CARE
Problem: Safety - Adult  Goal: Free from fall injury  7/12/2025 0851 by Kyleigh Vyas, RN  Outcome: Progressing  7/11/2025 2017 by Samara Elizalde, RN  Outcome: Progressing

## 2025-07-12 NOTE — DISCHARGE INSTR - COC
Continuity of Care Form    Patient Name: Pritesh Orellana   :  1945  MRN:  [id number]    Admit date:  2025  Discharge date:  ***    Code Status Order: Full Code   Advance Directives:     Admitting Physician:  Ronna Romano DO  PCP: Natasha Rodriguez MD    Discharging Nurse: ***  Discharging Hospital Unit/Room#: 912/01  Discharging Unit Phone Number: ***    Emergency Contact:   Extended Emergency Contact Information  Primary Emergency Contact: Radha Orellana  Home Phone: [phone]  Mobile Phone: [phone]  Relation: Spouse  Secondary Emergency Contact: Rupali Orellana  Relation: Child    Past Surgical History:  Past Surgical History:   Procedure Laterality Date    ARTHRODESIS Left 02/15/2024    Left first MTP arthrodesis performed by Duncan De Luna III, MD at [facility]    CARDIAC PROCEDURE N/A 2025    Left heart cath / coronary angiography performed by Gerald Vernon MD at [facility] CARDIAC CATH LAB    CATARACT REMOVAL      Bilateral    CHOLECYSTECTOMY      COLONOSCOPY      CORONARY ARTERY BYPASS GRAFT N/A 2025    CORONARY ARTERY BYPASS GRAFTING (CABG X4) LIMA, LEFT LEG ENDOSCOPIC SAPHENOUS VEIN HARVEST / LEFT ATRIAL APPENDAGE CLIP performed by Salvatore Arango MD at [facility] MAIN OR    EYE SURGERY      FOOT SURGERY Left 02/15/2024    left partial metatarsal head resections performed by Duncan De Luna III, MD at [facility]    FOOT SURGERY Right 2025    Right first MP arthrodesis performed by Duncan De Luna III, MD at [facility]    FOOT SURGERY Right 2025    partial metatarsal head resetion performed by Duncan De Luna III, MD at [facility]    HAMMER TOE SURGERY Left 02/15/2024    left second and third proximal interphalangeal joint resection arthroplasties performed by Duncan De Luna III, MD at [facility]    HAMMER TOE SURGERY Right 2025    Right second proximal interphalangeal joint resection arthroplasty performed by Duncan De Luna III, MD at [facility]    HX JOINT  REPLACEMENT SURGERY Bilateral 2012    LORIN Turner, knee replacement    JOINT REPLACEMENT  2012    Bilateral knee replacements    LUMBAR FUSION N/A 10/02/2023    L4-L5 laminectomy and fusion with allograft, transforaminal lumbar interbody fusion, and instrumentation, right S1 hemilaminectomy. performed by Eugene Sanon MD at [facility] MAIN OR    PROSTATE BIOPSY N/A 7/9/2024    PROSTATE BIOPSY TRANSPERINEAL performed by Dmitriy Mccarthy MD at [facility] MAIN OR    SINUS SURGERY  2001    TONSILLECTOMY  1953    TOTAL KNEE ARTHROPLASTY Bilateral 2012    TRANSESOPHAGEAL ECHOCARDIOGRAM N/A 6/26/2025    TRANSESOPHAGEAL ECHOCARDIOGRAM performed by Salvatore Arango MD at [facility] MAIN OR       Immunization History:   Immunization History   Administered Date(s) Administered    COVID-19, MODERNA BLUE border, Primary or Immunocompromised, (age 12y+), IM, 100 mcg/0.5mL 02/10/2021, 03/10/2021, 02/17/2022    COVID-19, MODERNA Booster BLUE border, (age 18y+), IM, 50mcg/0.25mL 02/10/2022    Influenza Virus Vaccine 09/18/2018, 09/19/2019    Influenza, AFLURIA, FLUZONE, (age4 y+), IM, Trivalent MDV, 0.5mL 10/15/2015    Influenza, FLUAD, (age 65 y+), IM, Quadv, 0.5mL 10/16/2021, 09/17/2023    Influenza, FLUZONE High Dose (age 65 y+), IM, Quadv, 0.7mL 10/09/2022    Influenza, FLUZONE High Dose, (age 65 y+), IM, Trivalent PF, 0.5mL 09/08/2024    PPD Test 06/26/2025    Pneumococcal, PCV-13, PREVNAR 13, (age 6w+), IM, 0.5mL 12/11/2015    Pneumococcal, PPSV23, PNEUMOVAX 23, (age 2y+), SC/IM, 0.5mL 02/15/2022    RSV, AREXVY, (age 60y+), PF, IM, 0.5mL 11/03/2024    TDaP, ADACEL (age 10y-64y), BOOSTRIX (age 10y+), IM, 0.5mL 04/27/2022    Zoster Recombinant (Shingrix) 10/09/2022, 12/11/2022, 01/11/2023       Active Problems:  Patient Active Problem List   Diagnosis Code    Moderate persistent asthma without complication J45.40    Hyperlipidemia E78.5    Hypertension I10    Intermittent left-sided chest pain R07.89    RLS (restless legs syndrome)

## 2025-07-12 NOTE — PLAN OF CARE
Problem: Discharge Planning  Goal: Discharge to home or other facility with appropriate resources  Outcome: Completed     Problem: Safety - Adult  Goal: Free from fall injury  7/12/2025 1222 by Kyleigh Vyas RN  Outcome: Completed  7/12/2025 0851 by Kyleigh Vyas RN  Outcome: Progressing     Problem: Cardiovascular - Adult  Goal: Maintains optimal cardiac output and hemodynamic stability  Outcome: Completed  Goal: Absence of cardiac dysrhythmias or at baseline  Outcome: Completed     Problem: Skin/Tissue Integrity - Adult  Goal: Skin integrity remains intact  Outcome: Completed  Goal: Incisions, wounds, or drain sites healing without S/S of infection  Outcome: Completed  Goal: Oral mucous membranes remain intact  Outcome: Completed     Problem: Musculoskeletal - Adult  Goal: Return mobility to safest level of function  Outcome: Completed  Goal: Maintain proper alignment of affected body part  Outcome: Completed  Goal: Return ADL status to a safe level of function  Outcome: Completed     Problem: Hematologic - Adult  Goal: Maintains hematologic stability  Outcome: Completed     Problem: Respiratory - Adult  Goal: Achieves optimal ventilation and oxygenation  Outcome: Completed

## 2025-07-12 NOTE — PROGRESS NOTES
End of Shift Note      Acute Onset Mental Status change? No    Does the patient have Inattention? Behavior not present    Does the patient have Disorganized Thinking? Behavior not present    Does the patient have Altered Level of Consciousness? Behavior not present    Is the patient impulsive? No    Expression of Ideas and Wants  Does the patient express ideas and wants without difficulty? Yes    Does the patient understand verbal and non-verbal content without cues or repetition? Yes    Self-Care  Eating:  Did the patient eat by mouth? no    Oral Hygiene:  Did the patient use suitable items to clean teeth or gums? Yes;     Toileting Hygiene:  Did the patient void or have a bowel movement? Yes; Requires set-up or clean-up.    The patient uses a urinal;     In: -   Out: 750  In: -   Out: 750 [Urine:750]    Shower/Bathe Self:  Did the patient have a shower or bath? no    Upper Body Dressing:  Did the patient dress or undress above the waist?no    Lower Body Dressing:  Did the patient dress or undress below the waist? no    Mobility  Chair/bed-to-chair Transfer:  Did the patient transfer to and from a bed to a chair or wheelchair? Yes; Requires supervision for safety, steadying, or contact guard assistance.    Toilet Transfer:    Did the patient use a standard toilet with or without a raised seat or bedside commode? no    Bladder   Does the patient urinate? Yes  Does the patient have a catheter or ostomy? No  If no ostomy or catheter, when was the patient's last incontinent episode, if any? The patient is always continent.  Does the patient have stress incontinence? No    Bowel  Date of last BM 7/11/25     Does the patient have an ostomy? No  If no ostomy, when was the patient's last incontinent episode, if any? The patient is always continent.    Does the patient have pain that affects any of the following: Sleep, Therapy Activities, or Day-to-Day Activities? Sleep, Yes, Occasionally

## 2025-07-12 NOTE — DISCHARGE INSTRUCTIONS
DISCHARGE SUMMARY from Nurse    PATIENT INSTRUCTIONS:    After general anesthesia or intravenous sedation, for 24 hours or while taking prescription Narcotics:  Limit your activities  Do not drive and operate hazardous machinery  Do not make important personal or business decisions  Do  not drink alcoholic beverages  If you have not urinated within 8 hours after discharge, please contact your surgeon on call.    Report the following to your surgeon:  Excessive pain, swelling, redness or odor of or around the surgical area  Temperature over 100.5  Nausea and vomiting lasting longer than 4 hours or if unable to take medications  Any signs of decreased circulation or nerve impairment to extremity: change in color, persistent  numbness, tingling, coldness or increase pain  Any questions    What to do at Home:  Recommended activity: activity as tolerated, no driving.    You may shower, no tub baths.    Resume pre-hospital diet.    If you experience any of the following symptoms increased pain, increased weakness or fatigue, elevated temperature greater than 100.5, drainage or redness at incision sites please follow up with Primary Care Physician.    *  Please give a list of your current medications to your Primary Care Provider.    *  Please update this list whenever your medications are discontinued, doses are      changed, or new medications (including over-the-counter products) are added.    *  Please carry medication information at all times in case of emergency situations.    These are general instructions for a healthy lifestyle:    No smoking/ No tobacco products/ Avoid exposure to second hand smoke  Surgeon General's Warning:  Quitting smoking now greatly reduces serious risk to your health.    Obesity, smoking, and sedentary lifestyle greatly increases your risk for illness    A healthy diet, regular physical exercise & weight monitoring are important for maintaining a healthy lifestyle    You may be retaining

## 2025-07-14 ENCOUNTER — CARE COORDINATION (OUTPATIENT)
Dept: CARE COORDINATION | Facility: CLINIC | Age: 80
End: 2025-07-14

## 2025-07-14 ENCOUNTER — TELEPHONE (OUTPATIENT)
Dept: INTERNAL MEDICINE CLINIC | Facility: CLINIC | Age: 80
End: 2025-07-14

## 2025-07-14 RX ORDER — ATORVASTATIN CALCIUM 40 MG/1
40 TABLET, FILM COATED ORAL DAILY
Qty: 90 TABLET | Refills: 3 | Status: CANCELLED | OUTPATIENT
Start: 2025-07-14

## 2025-07-14 NOTE — TELEPHONE ENCOUNTER
Pt was discharged from [facility] on 7/12/15. Pt was treated for acute on chronic heart failure with mildly reduced ejection fraction. Pt is scheduled for 7/15/25 at 9:20. TCM needed

## 2025-07-14 NOTE — TELEPHONE ENCOUNTER
Care Transitions Initial Follow Up Call    Outreach made within 2 business days of discharge: Yes    Patient: Pritesh Orellana Patient : 1945   MRN: [id number]  Reason for Admission: acute on chronic heart failure  Discharge Date: 25       Spoke with: spouse Radha Anne    Discharge department/facility: [facility]    TCM Interactive Patient Contact:  Was patient able to fill all prescriptions: No: Was not given Lipitor 40mg QD to take in the place of Atorvastatin but was told that PCP could fill that.   Was patient instructed to bring all medications to the follow-up visit: Yes  Is patient taking all medications as directed in the discharge summary? Yes  Does patient understand their discharge instructions: Yes  Does patient have questions or concerns that need addressed prior to 7-14 day follow up office visit: No      Additional needs identified to be addressed with provider      RX order needed for Lipitor to be sent. Pt was given 2liters of blood and instructed to follow up with PCP with lab work to check HGB.           Scheduled appointment with PCP within 7-14 days    Follow Up  Future Appointments   Date Time Provider Department Center   7/15/2025  9:20 AM Natasha Rodriguez MD [facility] DEP   2025 12:30 PM Cleo Freeman RN [facility]   2025  9:15 AM José Vargas MD [facility] AMB   2025 12:10 PM PERIPHERAL [facility]   2025  9:30 AM Kvng Richards MD GCCR[facility]   2025  8:30 AM MAT LAB [facility] DEP   2025 11:20 AM Natasha Rodriguez MD [facility]   5/15/2026  2:30 PM PERIPHERAL [facility]   2026 12:00 PM April Oropeza PA [facility] AMB       Mona Choi LPN

## 2025-07-15 ENCOUNTER — CARE COORDINATION (OUTPATIENT)
Dept: CARE COORDINATION | Facility: CLINIC | Age: 80
End: 2025-07-15

## 2025-07-15 ENCOUNTER — OFFICE VISIT (OUTPATIENT)
Dept: INTERNAL MEDICINE CLINIC | Facility: CLINIC | Age: 80
End: 2025-07-15

## 2025-07-15 ENCOUNTER — LAB (OUTPATIENT)
Dept: INTERNAL MEDICINE CLINIC | Facility: CLINIC | Age: 80
End: 2025-07-15

## 2025-07-15 VITALS
TEMPERATURE: 97.7 F | BODY MASS INDEX: 30.71 KG/M2 | WEIGHT: 196.1 LBS | HEART RATE: 77 BPM | DIASTOLIC BLOOD PRESSURE: 64 MMHG | SYSTOLIC BLOOD PRESSURE: 92 MMHG | OXYGEN SATURATION: 97 %

## 2025-07-15 DIAGNOSIS — L03.116 CELLULITIS OF LEFT LOWER EXTREMITY: ICD-10-CM

## 2025-07-15 DIAGNOSIS — D50.8 OTHER IRON DEFICIENCY ANEMIA: ICD-10-CM

## 2025-07-15 DIAGNOSIS — I50.23 ACUTE ON CHRONIC HEART FAILURE WITH MILDLY REDUCED EJECTION FRACTION (HFMREF, 41-49%) (HCC): ICD-10-CM

## 2025-07-15 DIAGNOSIS — E83.42 HYPOMAGNESEMIA: ICD-10-CM

## 2025-07-15 DIAGNOSIS — G25.81 RLS (RESTLESS LEGS SYNDROME): ICD-10-CM

## 2025-07-15 DIAGNOSIS — I25.10 CAD, MULTIPLE VESSEL: ICD-10-CM

## 2025-07-15 DIAGNOSIS — Z09 HOSPITAL DISCHARGE FOLLOW-UP: Primary | ICD-10-CM

## 2025-07-15 PROBLEM — I25.110 ATHEROSCLEROTIC HEART DISEASE OF NATIVE CORONARY ARTERY WITH UNSTABLE ANGINA PECTORIS (HCC): Status: RESOLVED | Noted: 2025-06-24 | Resolved: 2025-07-15

## 2025-07-15 PROBLEM — I20.0 UNSTABLE ANGINA (HCC): Status: RESOLVED | Noted: 2025-06-24 | Resolved: 2025-07-15

## 2025-07-15 LAB
ALBUMIN SERPL-MCNC: 3.4 G/DL (ref 3.2–4.6)
ALBUMIN/GLOB SERPL: 1.1 (ref 1–1.9)
ALP SERPL-CCNC: 101 U/L (ref 40–129)
ALT SERPL-CCNC: 17 U/L (ref 8–55)
ANION GAP SERPL CALC-SCNC: 12 MMOL/L (ref 7–16)
AST SERPL-CCNC: 19 U/L (ref 15–37)
BASOPHILS # BLD: 0.05 K/UL (ref 0–0.2)
BASOPHILS NFR BLD: 0.8 % (ref 0–2)
BILIRUB SERPL-MCNC: 0.3 MG/DL (ref 0–1.2)
BUN SERPL-MCNC: 16 MG/DL (ref 8–23)
CALCIUM SERPL-MCNC: 9.3 MG/DL (ref 8.8–10.2)
CHLORIDE SERPL-SCNC: 99 MMOL/L (ref 98–107)
CO2 SERPL-SCNC: 24 MMOL/L (ref 20–29)
CREAT SERPL-MCNC: 0.87 MG/DL (ref 0.8–1.3)
DIFFERENTIAL METHOD BLD: ABNORMAL
EOSINOPHIL # BLD: 0.43 K/UL (ref 0–0.8)
EOSINOPHIL NFR BLD: 6.9 % (ref 0.5–7.8)
ERYTHROCYTE [DISTWIDTH] IN BLOOD BY AUTOMATED COUNT: 14 % (ref 11.9–14.6)
FERRITIN SERPL-MCNC: 239 NG/ML (ref 8–388)
GLOBULIN SER CALC-MCNC: 3.2 G/DL (ref 2.3–3.5)
GLUCOSE SERPL-MCNC: 97 MG/DL (ref 70–99)
HCT VFR BLD AUTO: 33.3 % (ref 41.1–50.3)
HGB BLD-MCNC: 10.6 G/DL (ref 13.6–17.2)
IMM GRANULOCYTES # BLD AUTO: 0.01 K/UL (ref 0–0.5)
IMM GRANULOCYTES NFR BLD AUTO: 0.2 % (ref 0–5)
IRON SATN MFR SERPL: 18 % (ref 20–50)
IRON SERPL-MCNC: 44 UG/DL (ref 35–100)
LYMPHOCYTES # BLD: 1.06 K/UL (ref 0.5–4.6)
LYMPHOCYTES NFR BLD: 17 % (ref 13–44)
MAGNESIUM SERPL-MCNC: 2.1 MG/DL (ref 1.8–2.4)
MCH RBC QN AUTO: 31.6 PG (ref 26.1–32.9)
MCHC RBC AUTO-ENTMCNC: 31.8 G/DL (ref 31.4–35)
MCV RBC AUTO: 99.4 FL (ref 82–102)
MONOCYTES # BLD: 0.53 K/UL (ref 0.1–1.3)
MONOCYTES NFR BLD: 8.5 % (ref 4–12)
NEUTS SEG # BLD: 4.15 K/UL (ref 1.7–8.2)
NEUTS SEG NFR BLD: 66.6 % (ref 43–78)
NRBC # BLD: 0 K/UL (ref 0–0.2)
PLATELET # BLD AUTO: 441 K/UL (ref 150–450)
PMV BLD AUTO: 9 FL (ref 9.4–12.3)
POTASSIUM SERPL-SCNC: 4.5 MMOL/L (ref 3.5–5.1)
PROT SERPL-MCNC: 6.6 G/DL (ref 6.3–8.2)
RBC # BLD AUTO: 3.35 M/UL (ref 4.23–5.6)
SODIUM SERPL-SCNC: 136 MMOL/L (ref 136–145)
TIBC SERPL-MCNC: 238 UG/DL (ref 240–450)
UIBC SERPL-MCNC: 194 UG/DL (ref 112–347)
WBC # BLD AUTO: 6.2 K/UL (ref 4.3–11.1)

## 2025-07-15 RX ORDER — TRAMADOL HYDROCHLORIDE 50 MG/1
50 TABLET ORAL 2 TIMES DAILY PRN
Qty: 60 TABLET | Refills: 0 | Status: SHIPPED | OUTPATIENT
Start: 2025-07-15 | End: 2025-08-14

## 2025-07-15 RX ORDER — FERROUS FUMARATE AND POLYSACCHRIDE IRON VITAMIN MINERAL COMPLEX SUPPLEMENT 191.2; 135.9; 1; 210; 20; 5; 5; 7; 25; 3 MG/1; MG/1; MG/1; MG/1; MG/1; MG/1; MG/1; MG/1; MG/1; UG/1
1 CAPSULE ORAL DAILY
Qty: 16 CAPSULE | Refills: 0 | Status: SHIPPED | COMMUNITY
Start: 2025-07-15

## 2025-07-15 RX ORDER — TAMSULOSIN HYDROCHLORIDE 0.4 MG/1
0.4 CAPSULE ORAL DAILY
Qty: 90 CAPSULE | Refills: 1 | Status: SHIPPED | OUTPATIENT
Start: 2025-07-15

## 2025-07-15 RX ORDER — METOPROLOL SUCCINATE 25 MG/1
25 TABLET, EXTENDED RELEASE ORAL DAILY
Qty: 90 TABLET | Refills: 3 | Status: SHIPPED | OUTPATIENT
Start: 2025-07-15

## 2025-07-15 RX ORDER — PRAMIPEXOLE DIHYDROCHLORIDE 0.5 MG/1
0.5 TABLET ORAL NIGHTLY
Qty: 90 TABLET | Refills: 1 | Status: SHIPPED | OUTPATIENT
Start: 2025-07-15

## 2025-07-15 RX ORDER — POTASSIUM CHLORIDE 750 MG/1
10 TABLET, EXTENDED RELEASE ORAL DAILY PRN
Qty: 90 TABLET | Refills: 3 | Status: SHIPPED | OUTPATIENT
Start: 2025-07-15

## 2025-07-15 RX ORDER — SULFAMETHOXAZOLE AND TRIMETHOPRIM 800; 160 MG/1; MG/1
1 TABLET ORAL 2 TIMES DAILY
Qty: 14 TABLET | Refills: 0 | Status: SHIPPED | OUTPATIENT
Start: 2025-07-15 | End: 2025-07-22

## 2025-07-15 RX ORDER — FERROUS FUMARATE AND POLYSACCHRIDE IRON VITAMIN MINERAL COMPLEX SUPPLEMENT 191.2; 135.9; 1; 210; 20; 5; 5; 7; 25; 3 MG/1; MG/1; MG/1; MG/1; MG/1; MG/1; MG/1; MG/1; MG/1; UG/1
1 CAPSULE ORAL DAILY
Qty: 30 CAPSULE | Refills: 2 | Status: SHIPPED | OUTPATIENT
Start: 2025-07-15

## 2025-07-15 RX ORDER — FUROSEMIDE 20 MG/1
20 TABLET ORAL DAILY PRN
Qty: 90 TABLET | Refills: 3 | Status: SHIPPED | OUTPATIENT
Start: 2025-07-15

## 2025-07-15 ASSESSMENT — ENCOUNTER SYMPTOMS
ABDOMINAL PAIN: 0
SHORTNESS OF BREATH: 0
RHINORRHEA: 0
COUGH: 0
BLOOD IN STOOL: 0

## 2025-07-15 NOTE — PROGRESS NOTES
No pertinent past medical history
[phone] - F [phone]  [address] [address]      Phone: [phone]   furosemide 20 MG tablet  Integra Plus Caps  metoprolol succinate 25 MG extended release tablet  potassium chloride 10 MEQ extended release tablet  pramipexole 0.5 MG tablet  sulfamethoxazole-trimethoprim 800-160 MG per tablet  tamsulosin 0.4 MG capsule  traMADol 50 MG tablet       Information about where to get these medications is not yet available    Ask your nurse or doctor about these medications  Integra Plus Caps           Medications marked \"taking\" at this time  Outpatient Medications Marked as Taking for the 7/15/25 encounter (Office Visit) with Natasha Rodriguez MD   Medication Sig Dispense Refill    furosemide (LASIX) 20 MG tablet Take 1 tablet by mouth daily as needed (edema) 90 tablet 3    potassium chloride (KLOR-CON) 10 MEQ extended release tablet Take 1 tablet by mouth daily as needed (edema) 90 tablet 3    metoprolol succinate (TOPROL XL) 25 MG extended release tablet Take 1 tablet by mouth daily 90 tablet 3    pramipexole (MIRAPEX) 0.5 MG tablet Take 1 tablet by mouth nightly 90 tablet 1    tamsulosin (FLOMAX) 0.4 MG capsule Take 1 capsule by mouth daily 90 capsule 1    traMADol (ULTRAM) 50 MG tablet Take 1 tablet by mouth 2 times daily as needed for Pain for up to 30 days. Max Daily Amount: 100 mg 60 tablet 0    DvNwc-ZiHcyz-DV-B Cmp-C-Biot (INTEGRA PLUS) CAPS Take 1 capsule by mouth daily 16 capsule 0    YqRzz-OgRpvf-EK-B Cmp-C-Biot (INTEGRA PLUS) CAPS Take 1 capsule by mouth daily 30 capsule 2    sulfamethoxazole-trimethoprim (BACTRIM DS;SEPTRA DS) 800-160 MG per tablet Take 1 tablet by mouth 2 times daily for 7 days 14 tablet 0    atorvastatin (LIPITOR) 40 MG tablet Take 1 tablet by mouth daily 90 tablet 3    aspirin 81 MG EC tablet Take 1 tablet by mouth daily      nitroGLYCERIN (NITROSTAT) 0.4 MG SL tablet Place 1 tablet under the tongue every 5 minutes as needed for Chest pain up to max of

## 2025-07-15 NOTE — CARE COORDINATION
Care Transitions Note    Initial Call - Call within 2 business days of discharge: Yes    Patient Current Location:  Home: [address] [facility] [place] [address]    Care Transition Nurse contacted the patient by telephone to perform post hospital discharge assessment, verified name and  as identifiers.  Provided introduction to self, and explanation of the Care Transition Nurse role.    Patient: Pritesh Orellana    Patient : 1945   MRN: [id number]    Reason for Admission: S/P CABG x 4  Discharge Date: 25  RURS: Readmission Risk Score: 20.5      Last Discharge Facility       Date Complaint Diagnosis Description Type Department Provider    25  S/P CABG x 4 Admission (Discharged) [id number] Ronna Romano, DO            Was this an external facility discharge? No    Additional needs identified to be addressed with provider   No needs identified             Method of communication with provider: none.    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 The patient was given an opportunity to ask questions; all questions answered at this time.. The patient verbalized understanding.   Were discharge instructions available to patient? Yes.   Reviewed

## 2025-07-16 ENCOUNTER — TELEPHONE (OUTPATIENT)
Dept: INTERNAL MEDICINE CLINIC | Facility: CLINIC | Age: 80
End: 2025-07-16

## 2025-07-16 NOTE — TELEPHONE ENCOUNTER
Beverly from [facility] home health PT called requesting that Dr. Rodriguez gives a verbal ok for 7 physical therapy home health visits.

## 2025-07-22 ENCOUNTER — CARE COORDINATION (OUTPATIENT)
Dept: CARE COORDINATION | Facility: CLINIC | Age: 80
End: 2025-07-22

## 2025-07-22 NOTE — CARE COORDINATION
Care Transitions Note    Follow Up Call     Patient Current Location:  Home: [address] [place] [address]    [facility] Care Coordinator contacted the spouse/partner  by telephone. Verified name and  as identifiers.    Additional needs identified to be addressed with provider   No needs identified                 Method of communication with provider: none.    Care Summary Note: Spoke with wife Radha, states he is doing very well.  Reports good appetite and hydration.  Sleeping well.  Denies falls or complaints of pain.  No noted bowel or bladder issues.  Has attended scheduled follow up appointment with PCP and wife provided transportation.   therapy sessions with good participation, using walker for ambulation and reports feeling stronger, states  nurse monitoring LLE cellulitis at vein harvest site. Cardiac rehab orientation scheduled 2025 which states he will be attending.  No needs or concerns at this time.  Has contact information if needed, Corrine Bhardwaj [facility] [phone].     Plan of care updates since last contact:  Education: Corrine Bhardwaj [facility] [phone].  Home Health: [facility] [phone].   All scheduled appointments.        Advance Care Planning:   Does patient have an Advance Directive: reviewed during previous call, see note. .    Medication Review:  No changes since last call.     Remote Patient Monitoring:  Offered patient enrollment in the Remote Patient Monitoring (RPM) program for in-home monitoring: Yes, but did not enroll at this time: na.    Assessments:  Please see above care summary note.     Follow Up Appointment:   Reviewed upcoming appointment(s).  Future Appointments         Provider Specialty Dept Phone    2025 8:40 AM Renee Hall PA-C Internal Medicine [phone]    2025 12:30 PM Cleo Freeman RN Rehabilitation [phone]    2025 9:15 AM José Vargas MD Cardiology [phone]    2025 2:00 PM Salavtore Arango MD

## 2025-07-29 ENCOUNTER — CARE COORDINATION (OUTPATIENT)
Dept: CARE COORDINATION | Facility: CLINIC | Age: 80
End: 2025-07-29

## 2025-07-29 ENCOUNTER — OFFICE VISIT (OUTPATIENT)
Dept: INTERNAL MEDICINE CLINIC | Facility: CLINIC | Age: 80
End: 2025-07-29

## 2025-07-29 ENCOUNTER — HOSPITAL ENCOUNTER (OUTPATIENT)
Dept: CARDIAC REHAB | Age: 80
Setting detail: RECURRING SERIES
Discharge: HOME | End: 2025-08-01

## 2025-07-29 VITALS
TEMPERATURE: 97.9 F | OXYGEN SATURATION: 98 % | BODY MASS INDEX: 30.54 KG/M2 | RESPIRATION RATE: 14 BRPM | DIASTOLIC BLOOD PRESSURE: 62 MMHG | WEIGHT: 194.6 LBS | SYSTOLIC BLOOD PRESSURE: 106 MMHG | HEIGHT: 67 IN | HEART RATE: 72 BPM

## 2025-07-29 DIAGNOSIS — L03.116 LEFT LEG CELLULITIS: Primary | ICD-10-CM

## 2025-07-29 DIAGNOSIS — R60.0 EDEMA OF LOWER EXTREMITY: ICD-10-CM

## 2025-07-29 ASSESSMENT — PATIENT HEALTH QUESTIONNAIRE - PHQ9
9. THOUGHTS THAT YOU WOULD BE BETTER OFF DEAD, OR OF HURTING YOURSELF: NOT AT ALL
10. IF YOU CHECKED OFF ANY PROBLEMS, HOW DIFFICULT HAVE THESE PROBLEMS MADE IT FOR YOU TO DO YOUR WORK, TAKE CARE OF THINGS AT HOME, OR GET ALONG WITH OTHER PEOPLE: SOMEWHAT DIFFICULT
SUM OF ALL RESPONSES TO PHQ QUESTIONS 1-9: 4
SUM OF ALL RESPONSES TO PHQ QUESTIONS 1-9: 4
1. LITTLE INTEREST OR PLEASURE IN DOING THINGS: NOT AT ALL
6. FEELING BAD ABOUT YOURSELF - OR THAT YOU ARE A FAILURE OR HAVE LET YOURSELF OR YOUR FAMILY DOWN: NOT AT ALL
5. POOR APPETITE OR OVEREATING: NOT AT ALL
SUM OF ALL RESPONSES TO PHQ QUESTIONS 1-9: 4
4. FEELING TIRED OR HAVING LITTLE ENERGY: SEVERAL DAYS
2. FEELING DOWN, DEPRESSED OR HOPELESS: NOT AT ALL
8. MOVING OR SPEAKING SO SLOWLY THAT OTHER PEOPLE COULD HAVE NOTICED. OR THE OPPOSITE, BEING SO FIGETY OR RESTLESS THAT YOU HAVE BEEN MOVING AROUND A LOT MORE THAN USUAL: NOT AT ALL
SUM OF ALL RESPONSES TO PHQ QUESTIONS 1-9: 4
7. TROUBLE CONCENTRATING ON THINGS, SUCH AS READING THE NEWSPAPER OR WATCHING TELEVISION: SEVERAL DAYS
3. TROUBLE FALLING OR STAYING ASLEEP: MORE THAN HALF THE DAYS

## 2025-07-29 ASSESSMENT — ENCOUNTER SYMPTOMS
SHORTNESS OF BREATH: 0
COUGH: 0

## 2025-07-29 NOTE — PROGRESS NOTES
Chief Complaint   Patient presents with    Follow-up     Cellulitis L leg        HISTORY OF PRESENT ILLNESS:  Pritesh Orlelana is a very pleasant 80 y.o. male who presents for follow up of left leg cellulitis that developed during inpatient rehab after undergoing CABG- treated with one week course of doxycycline and lasix for lower extremity edema initially. Was prescribed bactrim at [facility] on 07/15/25 for continued symptoms. Started a 2nd course of bactrim last week, and his left leg is finally starting to look and feel better. He is taking lasix only prn for swelling. He denies fever/chills, CP, cough, SOB. He had a venous doppler US on his LLE on 07/05/25, which did not demonstrate DVT. He is accompanied by his wife today. He is receiving .       PAST MEDICAL HISTORY:   Current Outpatient Medications   Medication Sig    sulfamethoxazole-trimethoprim (BACTRIM DS;SEPTRA DS) 800-160 MG per tablet Take 1 tablet by mouth 2 times daily for 7 days    atorvastatin (LIPITOR) 40 MG tablet Take 1 tablet by mouth daily    furosemide (LASIX) 20 MG tablet Take 1 tablet by mouth daily as needed (edema)    potassium chloride (KLOR-CON) 10 MEQ extended release tablet Take 1 tablet by mouth daily as needed (edema)    metoprolol succinate (TOPROL XL) 25 MG extended release tablet Take 1 tablet by mouth daily    pramipexole (MIRAPEX) 0.5 MG tablet Take 1 tablet by mouth nightly    tamsulosin (FLOMAX) 0.4 MG capsule Take 1 capsule by mouth daily    traMADol (ULTRAM) 50 MG tablet Take 1 tablet by mouth 2 times daily as needed for Pain for up to 30 days. Max Daily Amount: 100 mg    UaGqi-NjKesq-CR-B Cmp-C-Biot (INTEGRA PLUS) CAPS Take 1 capsule by mouth daily    aspirin 81 MG EC tablet Take 1 tablet by mouth daily    nitroGLYCERIN (NITROSTAT) 0.4 MG SL tablet Place 1 tablet under the tongue every 5 minutes as needed for Chest pain up to max of 3 total doses. If no relief after 1 dose, call 911.    ALBUTEROL SULFATE HFA IN

## 2025-07-29 NOTE — PROGRESS NOTES
Dear Dr. Vargas,    Thank you for referring your patient, Mr. Pritesh Orellana ( 1945), to the Cardiopulmonary Rehabilitation Program at [facility]. He is a good candidate for the Cardiac Rehab Program and should see improvements with regular participation.    We will be addressing appropriate interventions for modifiable risk factors with your patient during the next 12 weeks. We will contact you with any issues or concerns that may arise, or you can follow your patient's progress through [facility] at any time. A final summary will be sent to you when the program is completed.    Again, thank you for the referral. If we can be of further assistance, please feel free to contact the Cardiopulmonary Rehab staff at [phone].    Sincerely,    ZULMA Galvan, RN  Cardiopulmonary Rehabilitation Nurse Liaison  [facility]

## 2025-07-29 NOTE — CARE COORDINATION
Care Transitions Note    Follow Up Call     Patient Current Location:  Home: [address] [place] [address]    N Care Coordinator contacted the patient by telephone. Verified name and  as identifiers.    Additional needs identified to be addressed with provider   No needs identified                 Method of communication with provider: none.    Care Summary Note: States he is doing well.  Has attended all scheduled follow up appointments and his wife provides transportation.  Cellulitis LLE has greatly improved.  Denies falls or complaints of pain, states has occasional discomfort at chest incision but not actual pain.  Reports continued good appetite and hydration.  States HH therapy sessions are going well.  Will be attending cardiac rehab orientation today.  No bowel or urinary issues.  No needs or concerns at this time.  Has contact information if needed, Corrine Bhardwaj LPN  [phone].     Plan of care updates since last contact:  Education: Corrine Bhardwaj LPN  [phone].  Home Health: [facility]  [phone].   All scheduled appointments.        Advance Care Planning:   Does patient have an Advance Directive: reviewed during previous call, see note. .    Medication Review:  No changes since last call.     Remote Patient Monitoring:  Offered patient enrollment in the Remote Patient Monitoring (RPM) program for in-home monitoring: Yes, but did not enroll at this time: na.    Assessments:  Please see above care summary note.     Follow Up Appointment:   Reviewed upcoming appointment(s).  Future Appointments         Provider Specialty Dept Phone    2025 12:30 PM Cleo Freeman RN Rehabilitation [phone]    2025 9:15 AM José aVrgas MD Cardiology [phone]    2025 2:00 PM Salvatore Arango MD Cardiothoracic Surgery [phone]    2025 12:10 PM PERIPHERAL Lab [phone]    2025 9:30 AM Kvng Richards MD Radiation Oncology [phone]

## 2025-07-30 RX ORDER — METRONIDAZOLE 10 MG/G
1 GEL TOPICAL DAILY
Qty: 1 EACH | Refills: 0 | Status: CANCELLED | OUTPATIENT
Start: 2025-07-30

## 2025-08-03 ENCOUNTER — PATIENT MESSAGE (OUTPATIENT)
Dept: INTERNAL MEDICINE CLINIC | Facility: CLINIC | Age: 80
End: 2025-08-03

## 2025-08-03 DIAGNOSIS — D50.8 OTHER IRON DEFICIENCY ANEMIA: ICD-10-CM

## 2025-08-05 RX ORDER — FERROUS FUMARATE AND POLYSACCHRIDE IRON VITAMIN MINERAL COMPLEX SUPPLEMENT 191.2; 135.9; 1; 210; 20; 5; 5; 7; 25; 3 MG/1; MG/1; MG/1; MG/1; MG/1; MG/1; MG/1; MG/1; MG/1; UG/1
1 CAPSULE ORAL DAILY
Qty: 30 CAPSULE | Refills: 2 | Status: SHIPPED | OUTPATIENT
Start: 2025-08-05

## 2025-08-07 ENCOUNTER — CARE COORDINATION (OUTPATIENT)
Dept: CARE COORDINATION | Age: 80
End: 2025-08-07

## 2025-08-07 ENCOUNTER — OFFICE VISIT (OUTPATIENT)
Age: 80
End: 2025-08-07
Payer: MEDICARE

## 2025-08-07 VITALS
HEIGHT: 67 IN | DIASTOLIC BLOOD PRESSURE: 70 MMHG | BODY MASS INDEX: 30.76 KG/M2 | WEIGHT: 196 LBS | SYSTOLIC BLOOD PRESSURE: 130 MMHG | HEART RATE: 84 BPM

## 2025-08-07 DIAGNOSIS — I10 ESSENTIAL HYPERTENSION: ICD-10-CM

## 2025-08-07 DIAGNOSIS — E78.2 HYPERLIPIDEMIA, MIXED: ICD-10-CM

## 2025-08-07 DIAGNOSIS — I25.810 CORONARY ARTERY DISEASE INVOLVING CORONARY BYPASS GRAFT OF NATIVE HEART WITHOUT ANGINA PECTORIS: Primary | ICD-10-CM

## 2025-08-07 PROCEDURE — 3075F SYST BP GE 130 - 139MM HG: CPT | Performed by: INTERNAL MEDICINE

## 2025-08-07 PROCEDURE — G8417 CALC BMI ABV UP PARAM F/U: HCPCS | Performed by: INTERNAL MEDICINE

## 2025-08-07 PROCEDURE — 1126F AMNT PAIN NOTED NONE PRSNT: CPT | Performed by: INTERNAL MEDICINE

## 2025-08-07 PROCEDURE — 1111F DSCHRG MED/CURRENT MED MERGE: CPT | Performed by: INTERNAL MEDICINE

## 2025-08-07 PROCEDURE — G8428 CUR MEDS NOT DOCUMENT: HCPCS | Performed by: INTERNAL MEDICINE

## 2025-08-07 PROCEDURE — 1123F ACP DISCUSS/DSCN MKR DOCD: CPT | Performed by: INTERNAL MEDICINE

## 2025-08-07 PROCEDURE — 99214 OFFICE O/P EST MOD 30 MIN: CPT | Performed by: INTERNAL MEDICINE

## 2025-08-07 PROCEDURE — 1036F TOBACCO NON-USER: CPT | Performed by: INTERNAL MEDICINE

## 2025-08-07 PROCEDURE — 3078F DIAST BP <80 MM HG: CPT | Performed by: INTERNAL MEDICINE

## 2025-08-07 RX ORDER — LOSARTAN POTASSIUM 25 MG/1
25 TABLET ORAL DAILY
Qty: 90 TABLET | Refills: 3 | Status: SHIPPED | OUTPATIENT
Start: 2025-08-07

## 2025-08-07 ASSESSMENT — ENCOUNTER SYMPTOMS
COUGH: 0
SHORTNESS OF BREATH: 0
ORTHOPNEA: 0
NAUSEA: 0
BLOATING: 0
HEMOPTYSIS: 0
DOUBLE VISION: 0
BACK PAIN: 0
ABDOMINAL PAIN: 0
VOMITING: 0
BLURRED VISION: 0

## 2025-08-12 ENCOUNTER — APPOINTMENT (OUTPATIENT)
Dept: GENERAL RADIOLOGY | Age: 80
End: 2025-08-12
Payer: MEDICARE

## 2025-08-12 ENCOUNTER — HOSPITAL ENCOUNTER (EMERGENCY)
Age: 80
Discharge: HOME | End: 2025-08-12
Payer: MEDICARE

## 2025-08-12 ENCOUNTER — HOSPITAL ENCOUNTER (OUTPATIENT)
Dept: CARDIAC REHAB | Age: 80
Setting detail: RECURRING SERIES
Discharge: HOME | End: 2025-08-15
Payer: MEDICARE

## 2025-08-12 VITALS
WEIGHT: 193 LBS | HEIGHT: 67 IN | TEMPERATURE: 97.7 F | SYSTOLIC BLOOD PRESSURE: 145 MMHG | DIASTOLIC BLOOD PRESSURE: 84 MMHG | HEART RATE: 74 BPM | BODY MASS INDEX: 30.29 KG/M2 | RESPIRATION RATE: 19 BRPM | OXYGEN SATURATION: 97 %

## 2025-08-12 DIAGNOSIS — M25.431 PAIN AND SWELLING OF RIGHT WRIST: Primary | ICD-10-CM

## 2025-08-12 DIAGNOSIS — M25.531 PAIN AND SWELLING OF RIGHT WRIST: Primary | ICD-10-CM

## 2025-08-12 LAB
ALBUMIN SERPL-MCNC: 3.8 G/DL (ref 3.2–4.6)
ALBUMIN/GLOB SERPL: 1.1 (ref 1–1.9)
ALP SERPL-CCNC: 72 U/L (ref 40–129)
ALT SERPL-CCNC: 24 U/L (ref 8–55)
ANION GAP SERPL CALC-SCNC: 11 MMOL/L (ref 7–16)
AST SERPL-CCNC: 23 U/L (ref 15–37)
BASOPHILS # BLD: 0.02 K/UL (ref 0–0.2)
BASOPHILS NFR BLD: 0.2 % (ref 0–2)
BILIRUB SERPL-MCNC: 0.3 MG/DL (ref 0–1.2)
BUN SERPL-MCNC: 25 MG/DL (ref 8–23)
CALCIUM SERPL-MCNC: 9.2 MG/DL (ref 8.8–10.2)
CHLORIDE SERPL-SCNC: 100 MMOL/L (ref 98–107)
CO2 SERPL-SCNC: 25 MMOL/L (ref 20–29)
CREAT SERPL-MCNC: 0.88 MG/DL (ref 0.8–1.3)
DIFFERENTIAL METHOD BLD: ABNORMAL
EOSINOPHIL # BLD: 0.11 K/UL (ref 0–0.8)
EOSINOPHIL NFR BLD: 1.2 % (ref 0.5–7.8)
ERYTHROCYTE [DISTWIDTH] IN BLOOD BY AUTOMATED COUNT: 13.7 % (ref 11.9–14.6)
GLOBULIN SER CALC-MCNC: 3.5 G/DL (ref 2.3–3.5)
GLUCOSE SERPL-MCNC: 105 MG/DL (ref 70–99)
HCT VFR BLD AUTO: 34.6 % (ref 41.1–50.3)
HGB BLD-MCNC: 11.1 G/DL (ref 13.6–17.2)
IMM GRANULOCYTES # BLD AUTO: 0.02 K/UL (ref 0–0.5)
IMM GRANULOCYTES NFR BLD AUTO: 0.2 % (ref 0–5)
LACTATE SERPL-SCNC: 1 MMOL/L (ref 0.5–2)
LYMPHOCYTES # BLD: 1 K/UL (ref 0.5–4.6)
LYMPHOCYTES NFR BLD: 11.1 % (ref 13–44)
MCH RBC QN AUTO: 31.2 PG (ref 26.1–32.9)
MCHC RBC AUTO-ENTMCNC: 32.1 G/DL (ref 31.4–35)
MCV RBC AUTO: 97.2 FL (ref 82–102)
MONOCYTES # BLD: 0.89 K/UL (ref 0.1–1.3)
MONOCYTES NFR BLD: 9.9 % (ref 4–12)
NEUTS SEG # BLD: 6.94 K/UL (ref 1.7–8.2)
NEUTS SEG NFR BLD: 77.4 % (ref 43–78)
NRBC # BLD: 0 K/UL (ref 0–0.2)
PLATELET # BLD AUTO: 296 K/UL (ref 150–450)
PMV BLD AUTO: 8.7 FL (ref 9.4–12.3)
POTASSIUM SERPL-SCNC: 4.3 MMOL/L (ref 3.5–5.1)
PROT SERPL-MCNC: 7.3 G/DL (ref 6.3–8.2)
RBC # BLD AUTO: 3.56 M/UL (ref 4.23–5.6)
SODIUM SERPL-SCNC: 136 MMOL/L (ref 136–145)
WBC # BLD AUTO: 9 K/UL (ref 4.3–11.1)

## 2025-08-12 PROCEDURE — 80053 COMPREHEN METABOLIC PANEL: CPT

## 2025-08-12 PROCEDURE — 73110 X-RAY EXAM OF WRIST: CPT

## 2025-08-12 PROCEDURE — 93798 PHYS/QHP OP CAR RHAB W/ECG: CPT

## 2025-08-12 PROCEDURE — 83605 ASSAY OF LACTIC ACID: CPT

## 2025-08-12 PROCEDURE — 85025 COMPLETE CBC W/AUTO DIFF WBC: CPT

## 2025-08-12 PROCEDURE — 6370000000 HC RX 637 (ALT 250 FOR IP): Performed by: NURSE PRACTITIONER

## 2025-08-12 PROCEDURE — 99284 EMERGENCY DEPT VISIT MOD MDM: CPT

## 2025-08-12 RX ORDER — CEPHALEXIN 500 MG/1
500 CAPSULE ORAL 3 TIMES DAILY
Qty: 21 CAPSULE | Refills: 0 | Status: SHIPPED | OUTPATIENT
Start: 2025-08-12 | End: 2025-08-19

## 2025-08-12 RX ORDER — OXYCODONE HYDROCHLORIDE 5 MG/1
5 TABLET ORAL
Refills: 0 | Status: COMPLETED | OUTPATIENT
Start: 2025-08-12 | End: 2025-08-12

## 2025-08-12 RX ORDER — PREDNISONE 20 MG/1
40 TABLET ORAL DAILY
Qty: 10 TABLET | Refills: 0 | Status: SHIPPED | OUTPATIENT
Start: 2025-08-12 | End: 2025-08-17

## 2025-08-12 RX ADMIN — OXYCODONE 5 MG: 5 TABLET ORAL at 13:27

## 2025-08-12 ASSESSMENT — PAIN DESCRIPTION - ORIENTATION: ORIENTATION: PROXIMAL

## 2025-08-12 ASSESSMENT — PAIN - FUNCTIONAL ASSESSMENT: PAIN_FUNCTIONAL_ASSESSMENT: 0-10

## 2025-08-12 ASSESSMENT — LIFESTYLE VARIABLES
HOW OFTEN DO YOU HAVE A DRINK CONTAINING ALCOHOL: NEVER
HOW MANY STANDARD DRINKS CONTAINING ALCOHOL DO YOU HAVE ON A TYPICAL DAY: PATIENT DOES NOT DRINK

## 2025-08-12 ASSESSMENT — PAIN SCALES - GENERAL: PAINLEVEL_OUTOF10: 10

## 2025-08-12 ASSESSMENT — PAIN DESCRIPTION - LOCATION: LOCATION: WRIST

## 2025-08-12 ASSESSMENT — PAIN DESCRIPTION - DESCRIPTORS: DESCRIPTORS: ACHING

## 2025-08-13 ENCOUNTER — CARE COORDINATION (OUTPATIENT)
Dept: CARE COORDINATION | Facility: CLINIC | Age: 80
End: 2025-08-13

## 2025-08-13 ENCOUNTER — OFFICE VISIT (OUTPATIENT)
Dept: CARDIOTHORACIC SURGERY | Age: 80
End: 2025-08-13

## 2025-08-13 VITALS
HEART RATE: 76 BPM | BODY MASS INDEX: 30.61 KG/M2 | SYSTOLIC BLOOD PRESSURE: 120 MMHG | DIASTOLIC BLOOD PRESSURE: 69 MMHG | WEIGHT: 195 LBS | HEIGHT: 67 IN | OXYGEN SATURATION: 98 %

## 2025-08-13 DIAGNOSIS — Z95.1 S/P CABG X 4: Primary | ICD-10-CM

## 2025-08-13 PROCEDURE — 99024 POSTOP FOLLOW-UP VISIT: CPT | Performed by: SURGERY

## 2025-08-14 ENCOUNTER — CARE COORDINATION (OUTPATIENT)
Dept: CARE COORDINATION | Facility: CLINIC | Age: 80
End: 2025-08-14

## 2025-08-15 ENCOUNTER — HOSPITAL ENCOUNTER (OUTPATIENT)
Dept: CARDIAC REHAB | Age: 80
Setting detail: RECURRING SERIES
Discharge: HOME | End: 2025-08-18
Payer: MEDICARE

## 2025-08-15 PROCEDURE — 93798 PHYS/QHP OP CAR RHAB W/ECG: CPT

## 2025-08-18 ENCOUNTER — HOSPITAL ENCOUNTER (OUTPATIENT)
Dept: CARDIAC REHAB | Age: 80
Setting detail: RECURRING SERIES
End: 2025-08-18
Payer: MEDICARE

## 2025-08-20 ENCOUNTER — HOSPITAL ENCOUNTER (OUTPATIENT)
Dept: CARDIAC REHAB | Age: 80
Setting detail: RECURRING SERIES
Discharge: HOME | End: 2025-08-23
Payer: MEDICARE

## 2025-08-20 PROCEDURE — 93798 PHYS/QHP OP CAR RHAB W/ECG: CPT

## 2025-08-25 ENCOUNTER — HOSPITAL ENCOUNTER (OUTPATIENT)
Dept: CARDIAC REHAB | Age: 80
Setting detail: RECURRING SERIES
End: 2025-08-25
Payer: MEDICARE

## 2025-08-27 ENCOUNTER — CARE COORDINATION (OUTPATIENT)
Dept: CARE COORDINATION | Facility: CLINIC | Age: 80
End: 2025-08-27

## 2025-08-27 ENCOUNTER — HOSPITAL ENCOUNTER (OUTPATIENT)
Dept: CARDIAC REHAB | Age: 80
Setting detail: RECURRING SERIES
End: 2025-08-27
Payer: MEDICARE

## 2025-08-29 ENCOUNTER — HOSPITAL ENCOUNTER (OUTPATIENT)
Dept: CARDIAC REHAB | Age: 80
Setting detail: RECURRING SERIES
End: 2025-08-29
Payer: MEDICARE

## (undated) DEVICE — SYRINGE MED 20ML STD CLR PLAS LUERSLIP TIP N CTRL DISP

## (undated) DEVICE — ABSORBENT, WATERPROOF, BACTERIA PROOF FILM DRESSING: Brand: OPSITE POST OP 20X10CM CTN 20

## (undated) DEVICE — SKIN MARKER,REGULAR TIP WITH RULER: Brand: DEVON

## (undated) DEVICE — PERCUTANEOUS INSERTION KIT-ARTERIAL: Brand: PERCUTANEOUS INSERTION KIT-ARTERIAL

## (undated) DEVICE — PENCIL ES L3M BTTN SWCH HOLSTER W/ BLDE ELECTRD EDGE

## (undated) DEVICE — DUAL LUMEN STOMACH TUBE: Brand: SALEM SUMP

## (undated) DEVICE — DRILL BIT

## (undated) DEVICE — PERFUSION PACK XCOATING 76320] TERUMO CARDIOVASCULAR]

## (undated) DEVICE — SOLUTION IRRIG 1000ML 0.9% SOD CHL USP POUR PLAS BTL

## (undated) DEVICE — SUTURE STRATAFIX SPRL MCRYL + SZ 4-0 L12IN ABSRB UD PS-2 SXMP1B117

## (undated) DEVICE — SUTURE VICRYL SZ 2-0 L27IN ABSRB UD L26MM CT-2 1/2 CIR J269H

## (undated) DEVICE — RESERVOIR,SUCTION,100CC,SILICONE: Brand: MEDLINE

## (undated) DEVICE — BNDG,ELSTC,MATRIX,STRL,4"X5YD,LF,HOOK&LP: Brand: MEDLINE

## (undated) DEVICE — SUTURE NONABSORBABLE L24IN SZ 7-0 M0-5 BV175-8 EP 24 BLU M8745

## (undated) DEVICE — SUTURE NONABSORBABLE MONOFILAMENT 3-0 PS-1 18 IN BLK ETHILON 1663H

## (undated) DEVICE — STERNUM BLADE (45.9 X 0.635MM)

## (undated) DEVICE — GLOVE SURG SZ 65 L12IN FNGR THK79MIL GRN LTX FREE

## (undated) DEVICE — GLOVE SURG SZ 65 CRM LTX FREE POLYISOPRENE POLYMER BEAD ANTI

## (undated) DEVICE — NEEDLE HYPO 21GA L1.25IN GRN POLYPR HUB S STL REG BVL STR

## (undated) DEVICE — AUTOTRANSFUSION KIT 120 MH FAST STRT AT1 FOR CATS +

## (undated) DEVICE — DRAPE MICSCP W51XL150IN FOR LEICA M680 WILD OHS

## (undated) DEVICE — SOLUTION IRRIG 1000ML 09% SOD CHL USP PIC PLAS CONTAINER

## (undated) DEVICE — CATHETER VASC 6 FR DIAG PGTL W/ SIDE H COR 110 CM LEN W/OUT

## (undated) DEVICE — FORCEPS BPLR L210MM TIP L1.5 WRK L105MM STR BAYNT INSUL DISP

## (undated) DEVICE — PRESSURE MONITORING SET: Brand: TRUWAVE, VAMP PLUS

## (undated) DEVICE — MPS® DELIVERY SET WITH 6 FT. DELIVERY TUBING: Brand: MPS

## (undated) DEVICE — STERILE PACKAGE WITH CANNULA: Brand: LITE BIO DELIVERY SYSTEM

## (undated) DEVICE — MAX-CORE® DISPOSABLE CORE BIOPSY INSTRUMENT, 18G X 25CM: Brand: MAX-CORE

## (undated) DEVICE — SUTURE VCRL SZ 1 L27IN ABSRB UD L36MM CP-1 1/2 CIR REV CUT J268H

## (undated) DEVICE — GOWN,ECLIPSE,POLYRNF,BRTHSLV,XL,30/CS: Brand: MEDLINE

## (undated) DEVICE — LEAD PACE L475MM CHNL A OR V MYOCARDIAL STEROID ELUT SIL

## (undated) DEVICE — NEPTUNE E-SEP SMOKE EVACUATION PENCIL, COATED, 70MM BLADE, PUSH BUTTON SWITCH: Brand: NEPTUNE E-SEP

## (undated) DEVICE — TTL1LYR 16FR10ML 100%SIL TMPST TR: Brand: MEDLINE

## (undated) DEVICE — PRECISION THIN, OFFSET (5.5 X 0.38 X 25.0MM)

## (undated) DEVICE — DRAPE TWL SURG 16X26IN BLU ORB04] ALLCARE INC]

## (undated) DEVICE — SUTURE VCRL SZ 2-0 L27IN ABSRB UD L26MM CT-2 1/2 CIR J269H

## (undated) DEVICE — BANDAGE COBAN 4 IN COMPR W4INXL5YD FOAM COHESIVE QUIK STK SELF ADH SFT

## (undated) DEVICE — DRESSING,GAUZE,XEROFORM,CURAD,1"X8",ST: Brand: CURAD

## (undated) DEVICE — POSTERIOR LAMI VANPLT-LUCAS: Brand: MEDLINE INDUSTRIES, INC.

## (undated) DEVICE — CATHETER THORACENTESIS STR 28 FRX23 IN 6 EYELET TAPR TIP LF

## (undated) DEVICE — KIT CATH L11.5CM DIA9FR CTRL VEN POLYUR ANTIMIC COAT DBL

## (undated) DEVICE — SUTURE NNBSRBBLE MNFLMNT 4X24 EV DBLE ARMD POLYPR PRLNE

## (undated) DEVICE — DARTFIRE EDGE INSTRUMENT PACK

## (undated) DEVICE — DRAIN SURG SGL COLL PT TB FOR ATS BG OASIS

## (undated) DEVICE — SYRINGE MED 10ML SLIP TIP BLNT FILL AND LUERLOCK DISP

## (undated) DEVICE — SYRINGE MED 10ML LUERLOCK TIP W/O SFTY DISP

## (undated) DEVICE — BNDG,ELSTC,MATRIX,STRL,3"X5YD,LF,HOOK&LP: Brand: MEDLINE

## (undated) DEVICE — CLOTH PRE OP W9XL10.5IN 2% CHG FRAGRANCE RNS FREE READYPREP

## (undated) DEVICE — KIT BX PROST 20ML VI PREFIL W 10ML 10% NEUT BUFF FRMLN LEAK

## (undated) DEVICE — K-Y LUBRICANT JELLY 4OZ

## (undated) DEVICE — AGENT HEMSTAT W2XL4IN OXIDIZED REGENERATED CELOS ABSRB SFT

## (undated) DEVICE — SOL IRR SOD CHL 0.9% TITAN XL CNTNR 3000ML

## (undated) DEVICE — DRESSING PETRO W3XL8IN OIL EMUL N ADH GZ KNIT IMPREG CELOS

## (undated) DEVICE — BAND COMPR L24CM REG CLR PLAS HEMSTAT EXT HK AND LOOP RETEN

## (undated) DEVICE — WIRE ORTH 1.1MM DIA 229MM SMOOTH DBL BAYNT TIP S STL K
Type: IMPLANTABLE DEVICE | Site: FOOT | Status: NON-FUNCTIONAL
Removed: 2024-02-15

## (undated) DEVICE — TRAY,URINE METER,100% SILICONE,16FR10ML: Brand: MEDLINE

## (undated) DEVICE — INSTRUMENT PACK: Brand: DARTFIRE EDGE

## (undated) DEVICE — BANDAGE COMPR L5YDXW2IN FOAM CO FLX

## (undated) DEVICE — GLIDESHEATH SLENDER STAINLESS STEEL KIT: Brand: GLIDESHEATH SLENDER

## (undated) DEVICE — CANISTER, RIGID, 3000CC: Brand: MEDLINE INDUSTRIES, INC.

## (undated) DEVICE — BIPOLAR SEALER 23-112-1 AQM 6.0: Brand: AQUAMANTYS ®

## (undated) DEVICE — STERILE HOOK LOCK LATEX FREE ELASTIC BANDAGE 4INX5YD: Brand: HOOK LOCK™

## (undated) DEVICE — GOWN,SIRUS,NONRNF,SETINSLV,XL,20/CS: Brand: MEDLINE

## (undated) DEVICE — ZIMMER® STERILE DISPOSABLE TOURNIQUET CUFF WITH PLC, DUAL PORT, SINGLE BLADDER, 18 IN. (46 CM)

## (undated) DEVICE — SUTURE ETHLN SZ 3-0 L18IN NONABSORBABLE BLK L24MM PS-1 3/8 1663G

## (undated) DEVICE — CLAMP INSERT: Brand: STEALTH® CLAMP INSERT

## (undated) DEVICE — SUTURE MCRYL SZ 3-0 L27IN ABSRB UD L19MM PS-2 3/8 CIR PRIM Y427H

## (undated) DEVICE — NON-STERILE (4 X 30CM) COVER: Brand: NEOGUARD™

## (undated) DEVICE — MODULAR TAP: Brand: XIA 4.5 SYSTEM -  XIA CT

## (undated) DEVICE — DRAIN SURG 10FR L1/8IN RND CHN FULL FLUT HEAT POLISHED PERF

## (undated) DEVICE — FOOT ANKLE PACK: Brand: MEDLINE INDUSTRIES, INC.

## (undated) DEVICE — OXYGENATOR PERF FX25E CAPOX

## (undated) DEVICE — ADHESIVE SKIN CLOSURE WND 8.661X1.5 IN 22 CM LIQUIBAND SECUR

## (undated) DEVICE — SOLUTION IRRIG 1000ML H2O PIC PLAS SHATTERPROOF CONTAINER

## (undated) DEVICE — GUIDEWIRE 035IN 210CM PTFE COAT FIX COR J TIP 15MM FIRM BODY

## (undated) DEVICE — BANDAGE GZ W2XL75IN ST RAYON POLY CNFRM STRTCH LTWT

## (undated) DEVICE — GLOVE SURG SZ 8 L12IN FNGR THK79MIL GRN LTX FREE

## (undated) DEVICE — Device

## (undated) DEVICE — CARDINAL HEALTH FLEXIBLE LIGHT HANDLE COVER: Brand: CARDINAL HEALTH

## (undated) DEVICE — SPONGE LAP W18XL18IN WHT COT 4 PLY FLD STRUNG RADPQ DISP ST 2 PER PACK

## (undated) DEVICE — JELLY LUBRICATING 10GM PREFIL SYR LUBE

## (undated) DEVICE — CLIP LIG SM TI 20 BLU HNDL FOR OPN AND ENDOSCP SGL APPL

## (undated) DEVICE — SUTURE MCRYL SZ 2-0 L27IN ABSRB UD CP-1 1 L36MM 1/2 CIR REV Y266H

## (undated) DEVICE — COVER PRB W1XL11.8IN ENDOCAVITY CLR E BND NEOGUARD

## (undated) DEVICE — NEEDLE SYR 18GA L1.5IN RED PLAS HUB S STL BLNT FILL W/O

## (undated) DEVICE — FLOTRAC SENSOR W/ VAMP ADULT (60"/152CM): Brand: FLOTRAC, VAMP

## (undated) DEVICE — APPLIER CLP L9.375IN APER 2.1MM CLS L3.8MM 20 SM TI CLP

## (undated) DEVICE — CABG DR WILLIAMS: Brand: MEDLINE INDUSTRIES, INC.

## (undated) DEVICE — FOOT & ANKLE SOFT DR WOMACK: Brand: MEDLINE INDUSTRIES, INC.

## (undated) DEVICE — DRILL BIT: Brand: XIA 4.5 SYSTEM -  XIA CT

## (undated) DEVICE — PAD,ABDOMINAL,5"X9",ST,LF,25/BX: Brand: MEDLINE INDUSTRIES, INC.

## (undated) DEVICE — AGENT HEMOSTATIC SURGIFLOW MATRIX KIT W/THROMBIN

## (undated) DEVICE — BANDAGE COMPR W1INXL5YD FOAM COHESIVE QUIK STK SELF ADH SFT

## (undated) DEVICE — SUTURE MONOCRYL SZ 3-0 L27IN ABSRB UD L19MM PS-2 3/8 CIR PRIM Y427H

## (undated) DEVICE — DISPOSABLE DRAPE, STERILE, FOR A CDS-3060 5 FOOT TABLE: Brand: PEDIGO PRODUCTS, INC.

## (undated) DEVICE — ABSORBENT, WATERPROOF, BACTERIA PROOF FILM DRESSING: Brand: OPSITE POST OP 9.5X8.5CM CTN 20

## (undated) DEVICE — APPLICATOR SKIN PREP 10% PVP IODINE WITH CHLORAPREP SEPP

## (undated) DEVICE — SYSTEM ENDOSCP VES HARV W/ TOOL CANN SEAL SHT PRT BLNT TIP

## (undated) DEVICE — RADIFOCUS OPTITORQUE ANGIOGRAPHIC CATHETER: Brand: OPTITORQUE